# Patient Record
Sex: MALE | Race: BLACK OR AFRICAN AMERICAN
[De-identification: names, ages, dates, MRNs, and addresses within clinical notes are randomized per-mention and may not be internally consistent; named-entity substitution may affect disease eponyms.]

---

## 2017-12-07 ENCOUNTER — HOSPITAL ENCOUNTER (OUTPATIENT)
Dept: HOSPITAL 62 - OD | Age: 72
End: 2017-12-07
Attending: INTERNAL MEDICINE
Payer: MEDICARE

## 2017-12-07 DIAGNOSIS — E11.9: ICD-10-CM

## 2017-12-07 DIAGNOSIS — M10.00: ICD-10-CM

## 2017-12-07 DIAGNOSIS — R80.9: ICD-10-CM

## 2017-12-07 DIAGNOSIS — N18.3: Primary | ICD-10-CM

## 2017-12-07 LAB
ANION GAP SERPL CALC-SCNC: 10 MMOL/L (ref 5–19)
APPEARANCE UR: CLEAR
BILIRUB UR QL STRIP: NEGATIVE
BUN SERPL-MCNC: 22 MG/DL (ref 7–20)
CALCIUM: 8.6 MG/DL (ref 8.4–10.2)
CHLORIDE SERPL-SCNC: 104 MMOL/L (ref 98–107)
CO2 SERPL-SCNC: 29 MMOL/L (ref 22–30)
CREAT SERPL-MCNC: 1.63 MG/DL (ref 0.52–1.25)
CREAT UR-MCNC: 92.4 MG/DL (ref 22–328)
GLUCOSE SERPL-MCNC: 108 MG/DL (ref 75–110)
GLUCOSE UR STRIP-MCNC: NEGATIVE MG/DL
KETONES UR STRIP-MCNC: NEGATIVE MG/DL
NITRITE UR QL STRIP: NEGATIVE
PH UR STRIP: 6 [PH] (ref 5–9)
POTASSIUM SERPL-SCNC: 4 MMOL/L (ref 3.6–5)
PROT UR STRIP-MCNC: 334 MG/DL (ref ?–12)
PROT UR STRIP-MCNC: >=500 MG/DL
SODIUM SERPL-SCNC: 143 MMOL/L (ref 137–145)
SP GR UR STRIP: 1.01
UROBILINOGEN UR-MCNC: NEGATIVE MG/DL (ref ?–2)

## 2017-12-07 PROCEDURE — 81001 URINALYSIS AUTO W/SCOPE: CPT

## 2017-12-07 PROCEDURE — 84156 ASSAY OF PROTEIN URINE: CPT

## 2017-12-07 PROCEDURE — 36415 COLL VENOUS BLD VENIPUNCTURE: CPT

## 2017-12-07 PROCEDURE — 80048 BASIC METABOLIC PNL TOTAL CA: CPT

## 2017-12-07 PROCEDURE — 82570 ASSAY OF URINE CREATININE: CPT

## 2017-12-27 ENCOUNTER — HOSPITAL ENCOUNTER (OUTPATIENT)
Dept: HOSPITAL 62 - SC | Age: 72
Discharge: HOME | End: 2017-12-27
Attending: OPHTHALMOLOGY
Payer: MEDICARE

## 2017-12-27 DIAGNOSIS — M10.9: ICD-10-CM

## 2017-12-27 DIAGNOSIS — H40.1132: ICD-10-CM

## 2017-12-27 DIAGNOSIS — E11.9: ICD-10-CM

## 2017-12-27 DIAGNOSIS — I10: ICD-10-CM

## 2017-12-27 DIAGNOSIS — H25.11: Primary | ICD-10-CM

## 2017-12-27 DIAGNOSIS — Z79.82: ICD-10-CM

## 2017-12-27 DIAGNOSIS — Z79.899: ICD-10-CM

## 2017-12-27 DIAGNOSIS — E78.00: ICD-10-CM

## 2017-12-27 PROCEDURE — 089230Z DRAINAGE OF RIGHT ANTERIOR CHAMBER WITH DRAINAGE DEVICE, PERCUTANEOUS APPROACH: ICD-10-PCS | Performed by: OPHTHALMOLOGY

## 2017-12-27 PROCEDURE — 08RJ3JZ REPLACEMENT OF RIGHT LENS WITH SYNTHETIC SUBSTITUTE, PERCUTANEOUS APPROACH: ICD-10-PCS | Performed by: OPHTHALMOLOGY

## 2017-12-27 PROCEDURE — 0191T: CPT

## 2017-12-27 PROCEDURE — C1783 OCULAR IMP, AQUEOUS DRAIN DE: HCPCS

## 2017-12-27 PROCEDURE — V2630 ANTER CHAMBER INTRAOCUL LENS: HCPCS

## 2017-12-27 PROCEDURE — 66984 XCAPSL CTRC RMVL W/O ECP: CPT

## 2017-12-27 RX ADMIN — BESIFLOXACIN PRN DROP: 6 SUSPENSION OPHTHALMIC at 10:50

## 2017-12-27 RX ADMIN — TROPICAMIDE PRN DROP: 10 SOLUTION/ DROPS OPHTHALMIC at 10:40

## 2017-12-27 RX ADMIN — TROPICAMIDE PRN DROP: 10 SOLUTION/ DROPS OPHTHALMIC at 10:50

## 2017-12-27 RX ADMIN — TETRACAINE HYDROCHLORIDE PRN DROP: 25 LIQUID OPHTHALMIC at 11:06

## 2017-12-27 RX ADMIN — CYCLOPENTOLATE HYDROCHLORIDE AND PHENYLEPHRINE HYDROCHLORIDE PRN DROP: 2; 10 SOLUTION/ DROPS OPHTHALMIC at 10:30

## 2017-12-27 RX ADMIN — CYCLOPENTOLATE HYDROCHLORIDE AND PHENYLEPHRINE HYDROCHLORIDE PRN DROP: 2; 10 SOLUTION/ DROPS OPHTHALMIC at 10:50

## 2017-12-27 RX ADMIN — TETRACAINE HYDROCHLORIDE PRN DROP: 25 LIQUID OPHTHALMIC at 10:50

## 2017-12-27 RX ADMIN — CYCLOPENTOLATE HYDROCHLORIDE AND PHENYLEPHRINE HYDROCHLORIDE PRN DROP: 2; 10 SOLUTION/ DROPS OPHTHALMIC at 10:40

## 2017-12-27 RX ADMIN — BESIFLOXACIN PRN DROP: 6 SUSPENSION OPHTHALMIC at 11:25

## 2017-12-27 RX ADMIN — TROPICAMIDE PRN DROP: 10 SOLUTION/ DROPS OPHTHALMIC at 10:30

## 2017-12-27 RX ADMIN — BESIFLOXACIN PRN DROP: 6 SUSPENSION OPHTHALMIC at 10:30

## 2017-12-27 RX ADMIN — TETRACAINE HYDROCHLORIDE PRN DROP: 25 LIQUID OPHTHALMIC at 10:31

## 2018-01-10 ENCOUNTER — HOSPITAL ENCOUNTER (OUTPATIENT)
Dept: HOSPITAL 62 - SC | Age: 73
Discharge: HOME | End: 2018-01-10
Attending: OPHTHALMOLOGY
Payer: MEDICARE

## 2018-01-10 DIAGNOSIS — Z98.41: ICD-10-CM

## 2018-01-10 DIAGNOSIS — M19.90: ICD-10-CM

## 2018-01-10 DIAGNOSIS — Z79.82: ICD-10-CM

## 2018-01-10 DIAGNOSIS — M10.9: ICD-10-CM

## 2018-01-10 DIAGNOSIS — E78.00: ICD-10-CM

## 2018-01-10 DIAGNOSIS — Z96.652: ICD-10-CM

## 2018-01-10 DIAGNOSIS — Z79.899: ICD-10-CM

## 2018-01-10 DIAGNOSIS — H40.1122: ICD-10-CM

## 2018-01-10 DIAGNOSIS — H25.12: Primary | ICD-10-CM

## 2018-01-10 DIAGNOSIS — E11.9: ICD-10-CM

## 2018-01-10 DIAGNOSIS — I10: ICD-10-CM

## 2018-01-10 PROCEDURE — V2630 ANTER CHAMBER INTRAOCUL LENS: HCPCS

## 2018-01-10 PROCEDURE — 08RK3JZ REPLACEMENT OF LEFT LENS WITH SYNTHETIC SUBSTITUTE, PERCUTANEOUS APPROACH: ICD-10-PCS | Performed by: OPHTHALMOLOGY

## 2018-01-10 PROCEDURE — 66984 XCAPSL CTRC RMVL W/O ECP: CPT

## 2018-01-10 PROCEDURE — C1783 OCULAR IMP, AQUEOUS DRAIN DE: HCPCS

## 2018-01-10 PROCEDURE — 82962 GLUCOSE BLOOD TEST: CPT

## 2018-01-10 PROCEDURE — 089330Z DRAINAGE OF LEFT ANTERIOR CHAMBER WITH DRAINAGE DEVICE, PERCUTANEOUS APPROACH: ICD-10-PCS | Performed by: OPHTHALMOLOGY

## 2018-01-10 PROCEDURE — 0191T: CPT

## 2018-01-10 RX ADMIN — CYCLOPENTOLATE HYDROCHLORIDE AND PHENYLEPHRINE HYDROCHLORIDE PRN DROP: 2; 10 SOLUTION/ DROPS OPHTHALMIC at 07:05

## 2018-01-10 RX ADMIN — TROPICAMIDE PRN DROP: 10 SOLUTION/ DROPS OPHTHALMIC at 07:15

## 2018-01-10 RX ADMIN — TOBRAMYCIN AND DEXAMETHASONE ONE APPLIC: 3; 1 OINTMENT OPHTHALMIC at 07:56

## 2018-01-10 RX ADMIN — CYCLOPENTOLATE HYDROCHLORIDE AND PHENYLEPHRINE HYDROCHLORIDE PRN DROP: 2; 10 SOLUTION/ DROPS OPHTHALMIC at 06:55

## 2018-01-10 RX ADMIN — BESIFLOXACIN PRN DROP: 6 SUSPENSION OPHTHALMIC at 07:15

## 2018-01-10 RX ADMIN — BESIFLOXACIN PRN DROP: 6 SUSPENSION OPHTHALMIC at 06:55

## 2018-01-10 RX ADMIN — TETRACAINE HYDROCHLORIDE PRN DROP: 25 LIQUID OPHTHALMIC at 07:15

## 2018-01-10 RX ADMIN — TROPICAMIDE PRN DROP: 10 SOLUTION/ DROPS OPHTHALMIC at 07:05

## 2018-01-10 RX ADMIN — TETRACAINE HYDROCHLORIDE PRN DROP: 25 LIQUID OPHTHALMIC at 07:35

## 2018-01-10 RX ADMIN — TROPICAMIDE PRN DROP: 10 SOLUTION/ DROPS OPHTHALMIC at 06:55

## 2018-01-10 RX ADMIN — TETRACAINE HYDROCHLORIDE PRN DROP: 25 LIQUID OPHTHALMIC at 06:56

## 2018-01-10 RX ADMIN — TOBRAMYCIN AND DEXAMETHASONE ONE APPLIC: 3; 1 OINTMENT OPHTHALMIC at 00:00

## 2018-01-10 RX ADMIN — BESIFLOXACIN PRN DROP: 6 SUSPENSION OPHTHALMIC at 00:00

## 2018-01-10 RX ADMIN — BESIFLOXACIN PRN DROP: 6 SUSPENSION OPHTHALMIC at 07:56

## 2018-01-10 RX ADMIN — CYCLOPENTOLATE HYDROCHLORIDE AND PHENYLEPHRINE HYDROCHLORIDE PRN DROP: 2; 10 SOLUTION/ DROPS OPHTHALMIC at 07:15

## 2018-01-15 ENCOUNTER — HOSPITAL ENCOUNTER (OUTPATIENT)
Dept: HOSPITAL 62 - OD | Age: 73
End: 2018-01-15
Attending: INTERNAL MEDICINE
Payer: MEDICARE

## 2018-01-15 DIAGNOSIS — Z94.0: ICD-10-CM

## 2018-01-15 DIAGNOSIS — D89.9: Primary | ICD-10-CM

## 2018-01-15 DIAGNOSIS — E83.30: ICD-10-CM

## 2018-01-15 DIAGNOSIS — Z79.899: ICD-10-CM

## 2018-01-15 LAB
ANION GAP SERPL CALC-SCNC: 11 MMOL/L (ref 5–19)
APPEARANCE UR: CLEAR
APTT PPP: YELLOW S
BILIRUB UR QL STRIP: NEGATIVE
BUN SERPL-MCNC: 28 MG/DL (ref 7–20)
CALCIUM: 9.3 MG/DL (ref 8.4–10.2)
CHLORIDE SERPL-SCNC: 105 MMOL/L (ref 98–107)
CO2 SERPL-SCNC: 28 MMOL/L (ref 22–30)
CREAT UR-MCNC: 68.4 MG/DL (ref 22–328)
GLUCOSE SERPL-MCNC: 145 MG/DL (ref 75–110)
GLUCOSE UR STRIP-MCNC: NEGATIVE MG/DL
KETONES UR STRIP-MCNC: NEGATIVE MG/DL
NITRITE UR QL STRIP: NEGATIVE
PH UR STRIP: 6 [PH] (ref 5–9)
POTASSIUM SERPL-SCNC: 4.2 MMOL/L (ref 3.6–5)
PROT UR STRIP-MCNC: 100 MG/DL
PROT UR STRIP-MCNC: 108 MG/DL (ref ?–12)
SODIUM SERPL-SCNC: 143.5 MMOL/L (ref 137–145)
SP GR UR STRIP: 1.01
UR PRO/CREAT RATIO RESULT: 1.6 MG/MG (ref 0–0.2)
UROBILINOGEN UR-MCNC: NEGATIVE MG/DL (ref ?–2)

## 2018-01-15 PROCEDURE — 81001 URINALYSIS AUTO W/SCOPE: CPT

## 2018-01-15 PROCEDURE — 80048 BASIC METABOLIC PNL TOTAL CA: CPT

## 2018-01-15 PROCEDURE — 36415 COLL VENOUS BLD VENIPUNCTURE: CPT

## 2018-01-15 PROCEDURE — 84156 ASSAY OF PROTEIN URINE: CPT

## 2018-01-15 PROCEDURE — 82570 ASSAY OF URINE CREATININE: CPT

## 2018-05-22 ENCOUNTER — HOSPITAL ENCOUNTER (OUTPATIENT)
Dept: HOSPITAL 62 - LAB | Age: 73
End: 2018-05-22
Attending: INTERNAL MEDICINE
Payer: MEDICARE

## 2018-05-22 DIAGNOSIS — R35.1: ICD-10-CM

## 2018-05-22 DIAGNOSIS — E78.5: ICD-10-CM

## 2018-05-22 DIAGNOSIS — E11.22: Primary | ICD-10-CM

## 2018-05-22 DIAGNOSIS — I12.9: ICD-10-CM

## 2018-05-22 DIAGNOSIS — N18.3: ICD-10-CM

## 2018-05-22 DIAGNOSIS — N40.1: ICD-10-CM

## 2018-05-22 LAB
ADD MANUAL DIFF: NO
ALBUMIN SERPL-MCNC: 3.8 G/DL (ref 3.5–5)
ALP SERPL-CCNC: 64 U/L (ref 38–126)
ALT SERPL-CCNC: 36 U/L (ref 21–72)
ANION GAP SERPL CALC-SCNC: 15 MMOL/L (ref 5–19)
AST SERPL-CCNC: 26 U/L (ref 17–59)
BASOPHILS # BLD AUTO: 0.1 10^3/UL (ref 0–0.2)
BASOPHILS NFR BLD AUTO: 0.8 % (ref 0–2)
BILIRUB DIRECT SERPL-MCNC: 0.3 MG/DL (ref 0–0.4)
BILIRUB SERPL-MCNC: 0.8 MG/DL (ref 0.2–1.3)
BUN SERPL-MCNC: 27 MG/DL (ref 7–20)
CALCIUM: 8.8 MG/DL (ref 8.4–10.2)
CHLORIDE SERPL-SCNC: 102 MMOL/L (ref 98–107)
CHOLEST SERPL-MCNC: 135 MG/DL (ref 0–200)
CO2 SERPL-SCNC: 28 MMOL/L (ref 22–30)
EOSINOPHIL # BLD AUTO: 0.2 10^3/UL (ref 0–0.6)
EOSINOPHIL NFR BLD AUTO: 2 % (ref 0–6)
ERYTHROCYTE [DISTWIDTH] IN BLOOD BY AUTOMATED COUNT: 16.7 % (ref 11.5–14)
GLUCOSE SERPL-MCNC: 118 MG/DL (ref 75–110)
HCT VFR BLD CALC: 39.7 % (ref 37.9–51)
HGB BLD-MCNC: 13.1 G/DL (ref 13.5–17)
LDLC SERPL DIRECT ASSAY-MCNC: 71 MG/DL (ref ?–100)
LYMPHOCYTES # BLD AUTO: 1.7 10^3/UL (ref 0.5–4.7)
LYMPHOCYTES NFR BLD AUTO: 19.3 % (ref 13–45)
MCH RBC QN AUTO: 27.4 PG (ref 27–33.4)
MCHC RBC AUTO-ENTMCNC: 33 G/DL (ref 32–36)
MCV RBC AUTO: 83 FL (ref 80–97)
MONOCYTES # BLD AUTO: 1 10^3/UL (ref 0.1–1.4)
MONOCYTES NFR BLD AUTO: 11.4 % (ref 3–13)
NEUTROPHILS # BLD AUTO: 5.9 10^3/UL (ref 1.7–8.2)
NEUTS SEG NFR BLD AUTO: 66.5 % (ref 42–78)
PLATELET # BLD: 171 10^3/UL (ref 150–450)
POTASSIUM SERPL-SCNC: 3.9 MMOL/L (ref 3.6–5)
PROT SERPL-MCNC: 7.2 G/DL (ref 6.3–8.2)
RBC # BLD AUTO: 4.78 10^6/UL (ref 4.35–5.55)
SODIUM SERPL-SCNC: 144.5 MMOL/L (ref 137–145)
TOTAL CELLS COUNTED % (AUTO): 100 %
TRIGL SERPL-MCNC: 53 MG/DL (ref ?–150)
VLDLC SERPL CALC-MCNC: 11 MG/DL (ref 10–31)
WBC # BLD AUTO: 8.8 10^3/UL (ref 4–10.5)

## 2018-05-22 PROCEDURE — 80061 LIPID PANEL: CPT

## 2018-05-22 PROCEDURE — 83036 HEMOGLOBIN GLYCOSYLATED A1C: CPT

## 2018-05-22 PROCEDURE — 85025 COMPLETE CBC W/AUTO DIFF WBC: CPT

## 2018-05-22 PROCEDURE — 84153 ASSAY OF PSA TOTAL: CPT

## 2018-05-22 PROCEDURE — 80053 COMPREHEN METABOLIC PANEL: CPT

## 2018-05-22 PROCEDURE — 84443 ASSAY THYROID STIM HORMONE: CPT

## 2018-05-22 PROCEDURE — 36415 COLL VENOUS BLD VENIPUNCTURE: CPT

## 2018-08-07 ENCOUNTER — HOSPITAL ENCOUNTER (OUTPATIENT)
Dept: HOSPITAL 62 - RAD | Age: 73
End: 2018-08-07
Attending: UROLOGY
Payer: MEDICARE

## 2018-08-07 DIAGNOSIS — K40.90: ICD-10-CM

## 2018-08-07 DIAGNOSIS — R91.8: ICD-10-CM

## 2018-08-07 DIAGNOSIS — C61: Primary | ICD-10-CM

## 2018-08-07 DIAGNOSIS — D35.02: ICD-10-CM

## 2018-08-07 DIAGNOSIS — N40.0: ICD-10-CM

## 2018-08-07 PROCEDURE — 74178 CT ABD&PLV WO CNTR FLWD CNTR: CPT

## 2018-08-07 PROCEDURE — 82565 ASSAY OF CREATININE: CPT

## 2018-08-07 PROCEDURE — A9561 TC99M OXIDRONATE: HCPCS

## 2018-08-07 PROCEDURE — 71260 CT THORAX DX C+: CPT

## 2018-08-07 PROCEDURE — 78306 BONE IMAGING WHOLE BODY: CPT

## 2018-08-07 NOTE — RADIOLOGY REPORT (SQ)
EXAM DESCRIPTION:  CT ABD/PELVIS COMBO



COMPLETED DATE/TIME:  8/7/2018 9:37 am



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



COMPARISON:  None.



TECHNIQUE:  CT scan of the abdomen and pelvis performed with and without intravenous contrast, and wi
thout oral contrast. Contrasted imaging performed helical scanning technique and dynamic intravenous 
contrast injection. Images reviewed with lung, soft tissue, and bone windows. Reconstructed coronal a
nd sagittal MPR images reviewed. Delayed images for evaluation of the urinary system also acquired. A
ll images stored on PACS.

All CT scanners at this facility use dose modulation, iterative reconstruction, and/or weight based d
osing when appropriate to reduce radiation dose to as low as reasonably achievable (ALARA).

[facility]: Dose Right  [facility]: CareDose    [facility]: Dose Right    [facility]: Teradose 4D    [facility]: Smart Technologies



CONTRAST TYPE AND DOSE:  contrast/concentration: Isovue 300.00 mg/ml; Total Contrast Delivered: 91.0 
ml; Total Saline Delivered: 70.0 ml



RENAL FUNCTION:  GFR 42.



RADIATION DOSE:  CT Rad equipment meets quality standard of care and radiation dose reduction techniq
ues were employed. CTDIvol: 9.0 - 9.1 mGy. DLP: 1565 mGy-cm. .



LIMITATIONS:  None.



FINDINGS:  NON-CONTRASTED IMAGING: No significant renal or bladder calcifications. No other significa
nt organ calcifications.

POST-CONTRASTED IMAGING:

LOWER CHEST: See separate report of the CT of the chest.

LIVER: Normal size. No masses.  No dilated ducts.

SPLEEN: Normal size. No focal lesions.

PANCREAS: No masses. No significant calcifications. No adjacent inflammation or peripancreatic fluid 
collections. Pancreatic duct not dilated.

GALLBLADDER: No identified stones by CT criteria. No inflammatory changes to suggest cholecystitis.

ADRENAL GLANDS: Lipid rich left adrenal nodule measuring -20 HU and about 2 cm in diameter.

RIGHT KIDNEY AND URETER: Lower pole cortical lesion measuring 4.2 cm in diameter.  Lesion is cystic w
ith a thick internal septation which enhances.  Simple cortical cyst upper pole measuring 1.5 cm.   N
o significant calcifications.   No hydronephrosis or hydroureter.

LEFT KIDNEY AND URETER: There are 2 lower pole cortical cysts measuring up to about 1.5 cm.  No solid
 masses.   No significant calcifications.   No hydronephrosis or hydroureter.

AORTA AND VESSELS: No aneurysm.

RETROPERITONEUM: No retroperitoneal adenopathy, hemorrhage or masses.

BOWEL AND PERITONEAL CAVITY: No masses or inflammatory changes. No free fluid or peritoneal masses.

APPENDIX: Normal.

PELVIS: Enlarged prostate 5.9 x 4.8 cm.  No pelvic adenopathy.

ABDOMINAL WALL: Right inguinal hernia containing nondilated loops of small bowel.

BONES: No blastic lesions.

OTHER: No other significant finding.



IMPRESSION:

1. 4 cm Bosniak 3 lesion lower pole right kidney.

2. Benign left adrenal adenoma.

3. Enlarged prostate.

4. Right inguinal hernia.



TECHNICAL DOCUMENTATION:  JOB ID:  [id number]

Quality ID # 436: Final reports with documentation of one or more dose reduction techniques (e.g., Au
tomated exposure control, adjustment of the mA and/or kV according to patient size, use of iterative 
reconstruction technique)

 2011 [email or web address]- All Rights Reserved



Reading location - IP/workstation name: [email or web address]

## 2018-08-07 NOTE — RADIOLOGY REPORT (SQ)
EXAM DESCRIPTION:  NM WHOLE BODY BONE SCAN



COMPLETED DATE/TIME:  8/7/2018 1:07 pm



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



COMPARISON:  CT scan 8/7/2018



RADIONUCLIDE AND DOSE:  21.7 millicuries Tc99m HDP.

The route of agent administration: Intravenous.



ADDITIONAL DRUGS AND DOSES:  None.



TECHNIQUE:  Routine delayed images at 3 hour post radionuclide injection acquired of the bony skeleto
n including anterior and posterior whole-body projections and additional focused images as needed.



LIMITATIONS:  None.



FINDINGS:  BONES: Focal increase activity 2 right anterior ribs corresponding to healing fractures on
 CT scan.  Photopenia related to a right knee replacement.  No suspicious lesions for metastatic dise
ase.

KIDNEYS: Symmetric excretion without obstruction.

OTHER: No other significant finding.



IMPRESSION:  No lesions suspicious for metastatic disease.



COMMENT:  Quality measure 147:  Current bone scan is compared with any available plain radiographs, p
rior bone scans, and CT/MRI.



TECHNICAL DOCUMENTATION:  JOB ID:  [id number]

 2011 [employer]- All Rights Reserved



Reading location - IP/workstation name: [email or web address]

## 2018-08-07 NOTE — RADIOLOGY REPORT (SQ)
EXAM DESCRIPTION:  CT CHEST WITH



COMPLETED DATE/TIME:  8/7/2018 9:37 am



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



COMPARISON:  None.



TECHNIQUE:  CT scan of the chest performed using helical scanning technique with dynamic intravenous 
contrast injection.  Images reviewed with lung, soft tissue and bone windows.  Reconstructed coronal 
and sagittal MPR images reviewed.  All images stored on PACS.

All CT scanners at this facility use dose modulation, iterative reconstruction, and/or weight based d
osing when appropriate to reduce radiation dose to as low as reasonably achievable (ALARA).

[facility]: Dose Right  [facility]: CareDose    [facility]: Dose Right    [facility]: Teradose 4D    [facility]: Smart Technologies



CONTRAST TYPE AND DOSE:  See separate report of the same date.



RENAL FUNCTION:  See separate report of the same date.



RADIATION DOSE:   .



LIMITATIONS:  None.



FINDINGS:  LUNGS AND PLEURA: Centrilobular and paraseptal emphysema.  There are 4 to 5 nodules in bot
h lungs, the largest 5 mm in the middle lobe.  No effusions.

HILAR AND MEDIASTINAL STRUCTURES: No identified masses or abnormal nodes.

HEART AND VASCULAR STRUCTURES: No aneurysm or dissection.  No central pulmonary emboli.  No pericardi
al effusion.

HARDWARE: None in the chest.

UPPER ABDOMEN: See separate report of the CT of the abdomen.

THYROID AND OTHER SOFT TISSUES: No masses.  No adenopathy.

BONES: No significant finding.

OTHER: No other significant finding.



IMPRESSION:  Less than 6 mm pulmonary nodules.



TECHNICAL DOCUMENTATION:  JOB ID:  [id number]

Quality ID # 436: Final reports with documentation of one or more dose reduction techniques (e.g., Au
tomated exposure control, adjustment of the mA and/or kV according to patient size, use of iterative 
reconstruction technique)

 2011 [employer]- All Rights Reserved



Reading location - IP/workstation name: [email or web address]

## 2018-08-31 ENCOUNTER — HOSPITAL ENCOUNTER (OUTPATIENT)
Dept: HOSPITAL 62 - LAB | Age: 73
End: 2018-08-31
Attending: INTERNAL MEDICINE
Payer: MEDICARE

## 2018-08-31 DIAGNOSIS — I12.9: Primary | ICD-10-CM

## 2018-08-31 DIAGNOSIS — I50.9: ICD-10-CM

## 2018-08-31 DIAGNOSIS — N18.3: ICD-10-CM

## 2018-08-31 DIAGNOSIS — R80.9: ICD-10-CM

## 2018-08-31 LAB
ANION GAP SERPL CALC-SCNC: 9 MMOL/L (ref 5–19)
BUN SERPL-MCNC: 30 MG/DL (ref 7–20)
CALCIUM: 8.5 MG/DL (ref 8.4–10.2)
CHLORIDE SERPL-SCNC: 110 MMOL/L (ref 98–107)
CO2 SERPL-SCNC: 26 MMOL/L (ref 22–30)
ERYTHROCYTE [DISTWIDTH] IN BLOOD BY AUTOMATED COUNT: 16.3 % (ref 11.5–14)
GLUCOSE SERPL-MCNC: 137 MG/DL (ref 75–110)
HCT VFR BLD CALC: 34.9 % (ref 37.9–51)
HGB BLD-MCNC: 11.4 G/DL (ref 13.5–17)
MCH RBC QN AUTO: 27.2 PG (ref 27–33.4)
MCHC RBC AUTO-ENTMCNC: 32.8 G/DL (ref 32–36)
MCV RBC AUTO: 83 FL (ref 80–97)
PLATELET # BLD: 181 10^3/UL (ref 150–450)
POTASSIUM SERPL-SCNC: 4.5 MMOL/L (ref 3.6–5)
RBC # BLD AUTO: 4.21 10^6/UL (ref 4.35–5.55)
SODIUM SERPL-SCNC: 145.3 MMOL/L (ref 137–145)
WBC # BLD AUTO: 9.2 10^3/UL (ref 4–10.5)

## 2018-08-31 PROCEDURE — 36415 COLL VENOUS BLD VENIPUNCTURE: CPT

## 2018-08-31 PROCEDURE — 80048 BASIC METABOLIC PNL TOTAL CA: CPT

## 2018-08-31 PROCEDURE — 85027 COMPLETE CBC AUTOMATED: CPT

## 2018-10-04 ENCOUNTER — HOSPITAL ENCOUNTER (EMERGENCY)
Dept: HOSPITAL 62 - ER | Age: 73
Discharge: HOME | End: 2018-10-04
Payer: MEDICARE

## 2018-10-04 VITALS — SYSTOLIC BLOOD PRESSURE: 161 MMHG | DIASTOLIC BLOOD PRESSURE: 70 MMHG

## 2018-10-04 DIAGNOSIS — Z96.652: ICD-10-CM

## 2018-10-04 DIAGNOSIS — R19.5: ICD-10-CM

## 2018-10-04 DIAGNOSIS — I10: ICD-10-CM

## 2018-10-04 DIAGNOSIS — C61: ICD-10-CM

## 2018-10-04 DIAGNOSIS — M54.5: Primary | ICD-10-CM

## 2018-10-04 LAB
ADD MANUAL DIFF: NO
ALBUMIN SERPL-MCNC: 3.3 G/DL (ref 3.5–5)
ALP SERPL-CCNC: 87 U/L (ref 38–126)
ALT SERPL-CCNC: 34 U/L (ref 21–72)
ANION GAP SERPL CALC-SCNC: 13 MMOL/L (ref 5–19)
AST SERPL-CCNC: 79 U/L (ref 17–59)
BASOPHILS # BLD AUTO: 0.1 10^3/UL (ref 0–0.2)
BASOPHILS NFR BLD AUTO: 0.6 % (ref 0–2)
BILIRUB DIRECT SERPL-MCNC: 0.5 MG/DL (ref 0–0.4)
BILIRUB SERPL-MCNC: 1 MG/DL (ref 0.2–1.3)
BUN SERPL-MCNC: 26 MG/DL (ref 7–20)
CALCIUM: 8.4 MG/DL (ref 8.4–10.2)
CHLORIDE SERPL-SCNC: 102 MMOL/L (ref 98–107)
CO2 SERPL-SCNC: 21 MMOL/L (ref 22–30)
EOSINOPHIL # BLD AUTO: 0.1 10^3/UL (ref 0–0.6)
EOSINOPHIL NFR BLD AUTO: 0.5 % (ref 0–6)
ERYTHROCYTE [DISTWIDTH] IN BLOOD BY AUTOMATED COUNT: 15.7 % (ref 11.5–14)
GLUCOSE SERPL-MCNC: 140 MG/DL (ref 75–110)
HCT VFR BLD CALC: 34.2 % (ref 37.9–51)
HGB BLD-MCNC: 11.5 G/DL (ref 13.5–17)
LYMPHOCYTES # BLD AUTO: 1.5 10^3/UL (ref 0.5–4.7)
LYMPHOCYTES NFR BLD AUTO: 13.6 % (ref 13–45)
MCH RBC QN AUTO: 26.8 PG (ref 27–33.4)
MCHC RBC AUTO-ENTMCNC: 33.7 G/DL (ref 32–36)
MCV RBC AUTO: 80 FL (ref 80–97)
MONOCYTES # BLD AUTO: 1.3 10^3/UL (ref 0.1–1.4)
MONOCYTES NFR BLD AUTO: 12.1 % (ref 3–13)
NEUTROPHILS # BLD AUTO: 8 10^3/UL (ref 1.7–8.2)
NEUTS SEG NFR BLD AUTO: 73.2 % (ref 42–78)
PLATELET # BLD: 193 10^3/UL (ref 150–450)
POTASSIUM SERPL-SCNC: 3.6 MMOL/L (ref 3.6–5)
PROT SERPL-MCNC: 6.8 G/DL (ref 6.3–8.2)
RBC # BLD AUTO: 4.29 10^6/UL (ref 4.35–5.55)
SODIUM SERPL-SCNC: 135.9 MMOL/L (ref 137–145)
TOTAL CELLS COUNTED % (AUTO): 100 %
WBC # BLD AUTO: 11 10^3/UL (ref 4–10.5)

## 2018-10-04 PROCEDURE — 99283 EMERGENCY DEPT VISIT LOW MDM: CPT

## 2018-10-04 PROCEDURE — 36415 COLL VENOUS BLD VENIPUNCTURE: CPT

## 2018-10-04 PROCEDURE — 82272 OCCULT BLD FECES 1-3 TESTS: CPT

## 2018-10-04 PROCEDURE — 80053 COMPREHEN METABOLIC PANEL: CPT

## 2018-10-04 PROCEDURE — 85025 COMPLETE CBC W/AUTO DIFF WBC: CPT

## 2018-10-04 NOTE — ER DOCUMENT REPORT
ED General





- General


Chief Complaint: Black/Tarry Stools


Stated Complaint: BLACK TARRY STOOLS


Time Seen by Provider: 10/04/18 03:45


Mode of Arrival: Ambulatory


Information source: Patient


Notes: 





Patient is a 72-year-old male who presents to the ED with left low back pain, 

black tarry stools, which started yesterday during the day.  He was diagnosed 

with prostate cancer 3 months ago and was given his cancer medication injection 

2 days ago.  He describes his back pain as a dull, numbing pain.  He has not 

taken any medications to help relieve the pain.  Aggravating factors include 

walking, bending, and twisting.  He denies any relieving factors.  He denies 

any iron use.


TRAVEL OUTSIDE OF THE [place] IN LAST 30 DAYS: No





- HPI


Pain Level: 5





- Related Data


Allergies/Adverse Reactions: 


 





No Known Allergies Allergy (Verified 10/04/18 03:20)


 











Past Medical History





- Social History


Smoking Status: Never Smoker


Frequency of alcohol use: None


Drug Abuse: None


Lives with: Family


Family History: Reviewed & Not Pertinent





- Past Medical History


Cardiac Medical History: Reports: Hx Hypertension


   Denies: Hx Heart Attack


Pulmonary Medical History: 


   Denies: Hx Asthma


Neurological Medical History: Denies: Hx Cerebrovascular Accident, Hx Seizures


Renal/ Medical History: Denies: Hx Peritoneal Dialysis


GI Medical History: Denies: Hx Hepatitis, Hx Hiatal Hernia, Hx Ulcer


Musculoskeletal Medical History: Reports Hx Gout


Infectious Medical History: Denies: Hx Hepatitis


Past Surgical History: Reports: Hx Orthopedic Surgery - lt knee replacement, Hx 

Tonsillectomy.  Denies: Hx Open Heart Surgery, Hx Pacemaker





- Immunizations


Hx Diphtheria, Pertussis, Tetanus Vaccination: Yes





Review of Systems





- Review of Systems


Constitutional: No symptoms reported


EENT: No symptoms reported


Cardiovascular: No symptoms reported


Respiratory: No symptoms reported


Gastrointestinal: See HPI


Genitourinary: No symptoms reported


Male Genitourinary: No symptoms reported


Musculoskeletal: See HPI


Skin: No symptoms reported


Hematologic/Lymphatic: No symptoms reported


Neurological/Psychological: See HPI





Physical Exam





- Vital signs


Vitals: 


 











Temp Pulse Resp BP Pulse Ox


 


 98.9 F   92   20   172/69 H  98 


 


 10/04/18 03:23  10/04/18 03:23  10/04/18 03:23  10/04/18 03:23  10/04/18 03:23














- General


General appearance: Appears well


In distress: None





- Respiratory


Respiratory status: No respiratory distress





- Cardiovascular


Pulses: Normal: Radial, Dorsalis pedis





- Rectal


Stool: Heme negative


Hemorrhoids: None





- Neurological


Cognition: Normal


Orientation: AAOx4


Chelsie Coma Scale Verbal: Oriented


Speech: Normal





Course





- Re-evaluation


Re-evalutation: 


10/04/18 04:30


After patient's rectal exam to check for rectal bleeding, patient stated he had 

taken iron pills for the past 2 days.  Patient educated on how iron pills can 

change stool color.


10/04/18 05:16 all labs have been reviewed.  I do not believe the patient has 

an active GI bleed, abdominal pathology, cauda equina syndrome, or any other 

threatening pathology at this time.


10/04/18 05:48


Patient states his pain has improved.  He was updated on all his lab results.  

Patient will be discharged home.





- Vital Signs


Vital signs: 


 











Temp Pulse Resp BP Pulse Ox


 


 98.9 F   92   20   172/69 H  98 


 


 10/04/18 03:23  10/04/18 03:23  10/04/18 03:23  10/04/18 03:23  10/04/18 03:23














- Laboratory


Result Diagrams: 


 10/04/18 04:38





 10/04/18 04:38


Laboratory results interpreted by me: 


 











  10/04/18 10/04/18





  04:38 04:38


 


WBC  11.0 H 


 


RBC  4.29 L 


 


Hgb  11.5 L 


 


Hct  34.2 L 


 


MCH  26.8 L 


 


RDW  15.7 H 


 


Sodium   135.9 L


 


Carbon Dioxide   21 L


 


BUN   26 H


 


Creatinine   1.81 H


 


Est GFR ( Amer)   45 L


 


Est GFR (Non-Af Amer)   37 L


 


Glucose   140 H


 


Direct Bilirubin   0.5 H


 


AST   79 H


 


Albumin   3.3 L














Discharge





- Discharge


Clinical Impression: 


Back pain


Qualifiers:


 Back pain location: low back pain Chronicity: acute Back pain laterality: left 

Sciatica presence: without sciatica Qualified Code(s): M54.5 - Low back pain





Condition: Stable


Disposition: HOME, SELF-CARE


Additional Instructions: 


You have been seen in the emergency department for back pain.  Your back pain 

is most likely from a pulled muscle.  You may take Tylenol 650 mg every 6 hours 

as needed for pain.  If you feel your back pain is getting worse, you are 

unable to walk, unable to bend, or any symptoms that are worrisome to you, 

please return to the emergency department for further evaluation.





Your black tarry stools are due to you taking iron pills these past few days.  

Iron pills can turn your stool a different color.  Please follow-up with your 

oncologist and primary care doctor in regards to your emergency department 

visit.


Referrals: 


DELORES MUNOZ MD [ACTIVE STAFF] - Follow up as needed

## 2018-11-07 ENCOUNTER — HOSPITAL ENCOUNTER (INPATIENT)
Dept: HOSPITAL 62 - ER | Age: 73
LOS: 8 days | Discharge: NURSING, REHAB OR LONG-TERM CARE | DRG: 641 | End: 2018-11-15
Attending: INTERNAL MEDICINE | Admitting: INTERNAL MEDICINE
Payer: MEDICARE

## 2018-11-07 DIAGNOSIS — C78.02: ICD-10-CM

## 2018-11-07 DIAGNOSIS — Z96.652: ICD-10-CM

## 2018-11-07 DIAGNOSIS — Z79.899: ICD-10-CM

## 2018-11-07 DIAGNOSIS — N18.3: ICD-10-CM

## 2018-11-07 DIAGNOSIS — I12.9: ICD-10-CM

## 2018-11-07 DIAGNOSIS — F32.9: ICD-10-CM

## 2018-11-07 DIAGNOSIS — R11.2: ICD-10-CM

## 2018-11-07 DIAGNOSIS — C61: ICD-10-CM

## 2018-11-07 DIAGNOSIS — M10.9: ICD-10-CM

## 2018-11-07 DIAGNOSIS — E86.0: ICD-10-CM

## 2018-11-07 DIAGNOSIS — D63.8: ICD-10-CM

## 2018-11-07 DIAGNOSIS — E87.1: Primary | ICD-10-CM

## 2018-11-07 DIAGNOSIS — C78.01: ICD-10-CM

## 2018-11-07 PROCEDURE — 36430 TRANSFUSION BLD/BLD COMPNT: CPT

## 2018-11-07 PROCEDURE — 83930 ASSAY OF BLOOD OSMOLALITY: CPT

## 2018-11-07 PROCEDURE — 83935 ASSAY OF URINE OSMOLALITY: CPT

## 2018-11-07 PROCEDURE — 85025 COMPLETE CBC W/AUTO DIFF WBC: CPT

## 2018-11-07 PROCEDURE — 96365 THER/PROPH/DIAG IV INF INIT: CPT

## 2018-11-07 PROCEDURE — 84443 ASSAY THYROID STIM HORMONE: CPT

## 2018-11-07 PROCEDURE — 86850 RBC ANTIBODY SCREEN: CPT

## 2018-11-07 PROCEDURE — 83735 ASSAY OF MAGNESIUM: CPT

## 2018-11-07 PROCEDURE — A9561 TC99M OXIDRONATE: HCPCS

## 2018-11-07 PROCEDURE — 71260 CT THORAX DX C+: CPT

## 2018-11-07 PROCEDURE — 87040 BLOOD CULTURE FOR BACTERIA: CPT

## 2018-11-07 PROCEDURE — 84300 ASSAY OF URINE SODIUM: CPT

## 2018-11-07 PROCEDURE — 82607 VITAMIN B-12: CPT

## 2018-11-07 PROCEDURE — 82728 ASSAY OF FERRITIN: CPT

## 2018-11-07 PROCEDURE — 96375 TX/PRO/DX INJ NEW DRUG ADDON: CPT

## 2018-11-07 PROCEDURE — 74176 CT ABD & PELVIS W/O CONTRAST: CPT

## 2018-11-07 PROCEDURE — 86920 COMPATIBILITY TEST SPIN: CPT

## 2018-11-07 PROCEDURE — 80053 COMPREHEN METABOLIC PANEL: CPT

## 2018-11-07 PROCEDURE — 94799 UNLISTED PULMONARY SVC/PX: CPT

## 2018-11-07 PROCEDURE — 99285 EMERGENCY DEPT VISIT HI MDM: CPT

## 2018-11-07 PROCEDURE — 86901 BLOOD TYPING SEROLOGIC RH(D): CPT

## 2018-11-07 PROCEDURE — 87086 URINE CULTURE/COLONY COUNT: CPT

## 2018-11-07 PROCEDURE — 82746 ASSAY OF FOLIC ACID SERUM: CPT

## 2018-11-07 PROCEDURE — 80048 BASIC METABOLIC PNL TOTAL CA: CPT

## 2018-11-07 PROCEDURE — 96367 TX/PROPH/DG ADDL SEQ IV INF: CPT

## 2018-11-07 PROCEDURE — P9016 RBC LEUKOCYTES REDUCED: HCPCS

## 2018-11-07 PROCEDURE — 78306 BONE IMAGING WHOLE BODY: CPT

## 2018-11-07 PROCEDURE — 82550 ASSAY OF CK (CPK): CPT

## 2018-11-07 PROCEDURE — 84153 ASSAY OF PSA TOTAL: CPT

## 2018-11-07 PROCEDURE — 85045 AUTOMATED RETICULOCYTE COUNT: CPT

## 2018-11-07 PROCEDURE — 83540 ASSAY OF IRON: CPT

## 2018-11-07 PROCEDURE — 86900 BLOOD TYPING SEROLOGIC ABO: CPT

## 2018-11-07 PROCEDURE — 83550 IRON BINDING TEST: CPT

## 2018-11-07 PROCEDURE — 81001 URINALYSIS AUTO W/SCOPE: CPT

## 2018-11-07 PROCEDURE — 36415 COLL VENOUS BLD VENIPUNCTURE: CPT

## 2018-11-08 LAB
%HYPO/RBC NFR BLD AUTO: SLIGHT %
ABSOLUTE LYMPHOCYTES# (MANUAL): 1.9 10^3/UL (ref 0.5–4.7)
ABSOLUTE MONOCYTES # (MANUAL): 1.5 10^3/UL (ref 0.1–1.4)
ABSOLUTE NEUTROPHILS# (MANUAL): 13.3 10^3/UL (ref 1.7–8.2)
ADD MANUAL DIFF: YES
ALBUMIN SERPL-MCNC: 3 G/DL (ref 3.5–5)
ALP SERPL-CCNC: 270 U/L (ref 38–126)
ALT SERPL-CCNC: 38 U/L (ref 21–72)
ANION GAP SERPL CALC-SCNC: 13 MMOL/L (ref 5–19)
ANION GAP SERPL CALC-SCNC: 14 MMOL/L (ref 5–19)
ANISOCYTOSIS BLD QL SMEAR: (no result)
APPEARANCE UR: (no result)
APTT PPP: YELLOW S
AST SERPL-CCNC: 109 U/L (ref 17–59)
BASOPHILS NFR BLD MANUAL: 1 % (ref 0–2)
BILIRUB DIRECT SERPL-MCNC: 0.3 MG/DL (ref 0–0.4)
BILIRUB SERPL-MCNC: 0.6 MG/DL (ref 0.2–1.3)
BILIRUB UR QL STRIP: NEGATIVE
BUN SERPL-MCNC: 22 MG/DL (ref 7–20)
BUN SERPL-MCNC: 23 MG/DL (ref 7–20)
BUN SERPL-MCNC: 24 MG/DL (ref 7–20)
BUN SERPL-MCNC: 25 MG/DL (ref 7–20)
BUN SERPL-MCNC: 30 MG/DL (ref 7–20)
CALCIUM: 7.4 MG/DL (ref 8.4–10.2)
CALCIUM: 7.5 MG/DL (ref 8.4–10.2)
CALCIUM: 7.8 MG/DL (ref 8.4–10.2)
CALCIUM: 7.8 MG/DL (ref 8.4–10.2)
CALCIUM: 7.9 MG/DL (ref 8.4–10.2)
CHLORIDE SERPL-SCNC: 93 MMOL/L (ref 98–107)
CHLORIDE SERPL-SCNC: 96 MMOL/L (ref 98–107)
CHLORIDE SERPL-SCNC: 97 MMOL/L (ref 98–107)
CO2 SERPL-SCNC: 20 MMOL/L (ref 22–30)
CO2 SERPL-SCNC: 20 MMOL/L (ref 22–30)
CO2 SERPL-SCNC: 21 MMOL/L (ref 22–30)
EOSINOPHIL NFR BLD MANUAL: 1 % (ref 0–6)
ERYTHROCYTE [DISTWIDTH] IN BLOOD BY AUTOMATED COUNT: 16.9 % (ref 11.5–14)
GLUCOSE SERPL-MCNC: 168 MG/DL (ref 75–110)
GLUCOSE SERPL-MCNC: 171 MG/DL (ref 75–110)
GLUCOSE SERPL-MCNC: 181 MG/DL (ref 75–110)
GLUCOSE SERPL-MCNC: 182 MG/DL (ref 75–110)
GLUCOSE SERPL-MCNC: 218 MG/DL (ref 75–110)
GLUCOSE UR STRIP-MCNC: NEGATIVE MG/DL
HCT VFR BLD CALC: 28.5 % (ref 37.9–51)
HGB BLD-MCNC: 9.9 G/DL (ref 13.5–17)
KETONES UR STRIP-MCNC: NEGATIVE MG/DL
MCH RBC QN AUTO: 26.2 PG (ref 27–33.4)
MCHC RBC AUTO-ENTMCNC: 34.6 G/DL (ref 32–36)
MCV RBC AUTO: 76 FL (ref 80–97)
MONOCYTES % (MANUAL): 9 % (ref 3–13)
NITRITE UR QL STRIP: NEGATIVE
OSMOLALITY,URINE: 389 MOSM/KG (ref 300–900)
OVALOCYTES BLD QL SMEAR: SLIGHT
PH UR STRIP: 5 [PH] (ref 5–9)
PLATELET # BLD: 424 10^3/UL (ref 150–450)
PLATELET COMMENT: ADEQUATE
POIKILOCYTOSIS BLD QL SMEAR: SLIGHT
POLYCHROMASIA BLD QL SMEAR: SLIGHT
POTASSIUM SERPL-SCNC: 4.4 MMOL/L (ref 3.6–5)
POTASSIUM SERPL-SCNC: 4.5 MMOL/L (ref 3.6–5)
POTASSIUM SERPL-SCNC: 4.5 MMOL/L (ref 3.6–5)
POTASSIUM SERPL-SCNC: 4.6 MMOL/L (ref 3.6–5)
POTASSIUM SERPL-SCNC: 4.6 MMOL/L (ref 3.6–5)
PROT SERPL-MCNC: 6.5 G/DL (ref 6.3–8.2)
PROT UR STRIP-MCNC: 100 MG/DL
RBC # BLD AUTO: 3.77 10^6/UL (ref 4.35–5.55)
SCHISTOCYTES BLD QL SMEAR: SLIGHT
SEGMENTED NEUTROPHILS % (MAN): 78 % (ref 42–78)
SODIUM SERPL-SCNC: 127.5 MMOL/L (ref 137–145)
SODIUM SERPL-SCNC: 129.9 MMOL/L (ref 137–145)
SODIUM SERPL-SCNC: 129.9 MMOL/L (ref 137–145)
SODIUM SERPL-SCNC: 130.5 MMOL/L (ref 137–145)
SODIUM SERPL-SCNC: 131.4 MMOL/L (ref 137–145)
SP GR UR STRIP: 1.01
TOTAL CELLS COUNTED BLD: 100
TOXIC GRANULES BLD QL SMEAR: (no result)
URINE SODIUM: 19 MMOL/L (ref 30–90)
UROBILINOGEN UR-MCNC: NEGATIVE MG/DL (ref ?–2)
VARIANT LYMPHS NFR BLD MANUAL: 11 % (ref 13–45)
WBC # BLD AUTO: 17 10^3/UL (ref 4–10.5)
WBC TOXIC VACUOLES BLD QL SMEAR: PRESENT

## 2018-11-08 RX ADMIN — ACETAMINOPHEN PRN MG: 325 TABLET ORAL at 20:04

## 2018-11-08 RX ADMIN — HYDRALAZINE HYDROCHLORIDE SCH MG: 50 TABLET, FILM COATED ORAL at 14:03

## 2018-11-08 RX ADMIN — Medication SCH ML: at 14:03

## 2018-11-08 RX ADMIN — HYDRALAZINE HYDROCHLORIDE SCH MG: 50 TABLET, FILM COATED ORAL at 10:09

## 2018-11-08 RX ADMIN — HEPARIN SODIUM SCH UNIT: 5000 INJECTION, SOLUTION INTRAVENOUS; SUBCUTANEOUS at 21:38

## 2018-11-08 RX ADMIN — Medication SCH: at 21:39

## 2018-11-08 RX ADMIN — HEPARIN SODIUM SCH UNIT: 5000 INJECTION, SOLUTION INTRAVENOUS; SUBCUTANEOUS at 14:03

## 2018-11-08 RX ADMIN — HYDRALAZINE HYDROCHLORIDE SCH MG: 50 TABLET, FILM COATED ORAL at 21:38

## 2018-11-08 RX ADMIN — Medication SCH ML: at 06:38

## 2018-11-08 RX ADMIN — LEVOFLOXACIN SCH MLS/HR: 750 INJECTION, SOLUTION INTRAVENOUS at 06:37

## 2018-11-08 RX ADMIN — HEPARIN SODIUM SCH UNIT: 5000 INJECTION, SOLUTION INTRAVENOUS; SUBCUTANEOUS at 06:37

## 2018-11-08 NOTE — PDOC H&P
History of Present Illness


Admission Date/PCP: 


  





  CARLOS JACOB MD





Patient complains of: Generalized weakness, nausea


History of Present Illness: 


SULMA MURPHY is a 72 year old male with a past medical history of recently 

diagnosed prostate cancer awaiting chemoradiation.  He presents with headache, 

generalized weakness, nausea and vomiting of gastric content.  He admits 

nonproductive cough and fever of dehydration prompting him to consume 8 16 

ounce bottles of water per day.  Denies chest pain, shortness of breath, 

abdominal pain, diarrhea, constipation, dysuria.  He denies infectious contacts

, recent change in medications or antibiotic use.  In the emergency room is 

found to have a an unremarkable abdominal pelvic CT but chest reveals small 

pleural effusions and lung nodules leukocytosis of 17,000 and hyponatremia of 

127.  He receives empiric antibiotics and referred to the hospitalist for 

admission.  





Past Medical History


Cardiac Medical History: Reports: Hypertension


   Denies: Myocardial Infarction


Pulmonary Medical History: 


   Denies: Asthma


Neurological Medical History: 


   Denies: Seizures


GI Medical History: 


   Denies: Hepatitis, Hiatal Hernia


Musculoskeltal Medical History: Reports: Gout


Hematology: 


   Denies: Anemia, Sickle Cell Disease





Past Surgical History


Past Surgical History: Reports: Orthopedic Surgery - lt knee replacement, 

Tonsillectomy


   Denies: Pacemaker





Social History


Information Source: Patient


Smoking Status: Never Smoker


Frequency of Alcohol Use: None


Hx Recreational Drug Use: No


Drugs: None


Hx Prescription Drug Abuse: No





- Advance Directive


Resuscitation Status: Full Code





Family History


Family History: denies: Malignancy


Parental Family History Reviewed: Yes


Children Family History Reviewed: Yes


Sibling(s) Family History Reviewed.: Yes





Medication/Allergy


Home Medications: 








Allopurinol [Zyloprim 300 mg Tablet] 300 mg PO DAILY 12/21/17 


Aspirin [Aspirin 81 mg Chewable Tablet] 81 mg PO DAILY 12/21/17 


Atorvastatin Calcium 10 mg PO DAILY 12/21/17 


Hydralazine HCl 100 mg PO TID 12/21/17 


Latanoprost 1 drop OP QHS 12/21/17 


Lisinopril 40 mg PO DAILY 12/21/17 








Allergies/Adverse Reactions: 


 





No Known Allergies Allergy (Verified 10/04/18 03:20)


 











Review of Systems


Constitutional: ABSENT: chills, fever(s), headache(s), weight gain, weight loss


Eyes: ABSENT: visual disturbances


Ears: ABSENT: hearing changes


Cardiovascular: ABSENT: chest pain, dyspnea on exertion, edema, orthropnea, 

palpitations


Respiratory: ABSENT: cough, hemoptysis


Gastrointestinal: ABSENT: abdominal pain, constipation, diarrhea, hematemesis, 

hematochezia, nausea, vomiting


Genitourinary: ABSENT: dysuria, hematuria


Musculoskeletal: ABSENT: joint swelling


Integumentary: ABSENT: rash, wounds


Neurological: ABSENT: abnormal gait, abnormal speech, confusion, dizziness, 

focal weakness, syncope


Psychiatric: ABSENT: anxiety, depression, homidical ideation, suicidal ideation


Endocrine: ABSENT: cold intolerance, heat intolerance, polydipsia, polyuria


Hematologic/Lymphatic: ABSENT: easy bleeding, easy bruising





Physical Exam


Vital Signs: 


 











Temp Pulse Resp BP Pulse Ox


 


 98.8 F   101 H  16   153/57 H  97 


 


 11/07/18 23:24  11/07/18 23:24  11/07/18 23:24  11/07/18 23:24  11/07/18 23:24








 Intake & Output











 11/06/18 11/07/18 11/08/18





 11:59 11:59 11:59


 


Intake Total   1000


 


Balance   1000


 


Weight   79.379 kg











General appearance: PRESENT: no acute distress, cooperative, well-developed, 

well-nourished


Head exam: PRESENT: atraumatic, normocephalic


Eye exam: PRESENT: conjunctiva pink, EOMI, PERRLA.  ABSENT: scleral icterus


Ear exam: PRESENT: normal external ear exam


Mouth exam: PRESENT: moist, tongue midline


Neck exam: ABSENT: carotid bruit, JVD, lymphadenopathy, thyromegaly


Respiratory exam: PRESENT: clear to auscultation venessa, crackles, decreased 

breath sounds, symmetrical.  ABSENT: rales, rhonchi, wheezes


Cardiovascular exam: PRESENT: RRR.  ABSENT: diastolic murmur, rubs, systolic 

murmur


Pulses: PRESENT: normal dorsalis pedis pul


Vascular exam: PRESENT: normal capillary refill


GI/Abdominal exam: PRESENT: normal bowel sounds, soft.  ABSENT: distended, 

guarding, mass, organolmegaly, rebound, tenderness


Rectal exam: PRESENT: deferred


Extremities exam: PRESENT: full ROM.  ABSENT: calf tenderness, clubbing, pedal 

edema


Neurological exam: PRESENT: alert, awake, oriented to person, oriented to place

, oriented to time, oriented to situation, CN II-XII grossly intact.  ABSENT: 

motor sensory deficit


Psychiatric exam: PRESENT: appropriate affect, normal mood.  ABSENT: homicidal 

ideation, suicidal ideation


Skin exam: PRESENT: dry, intact, warm.  ABSENT: cyanosis, rash





Results


Laboratory Results: 


 





 11/08/18 00:06 





 11/08/18 00:06 





 











  11/08/18 11/08/18 11/08/18





  00:06 00:06 00:45


 


WBC  17.0 H  


 


RBC  3.77 L  


 


Hgb  9.9 L  


 


Hct  28.5 L  


 


MCV  76 L  


 


MCH  26.2 L  


 


MCHC  34.6  


 


RDW  16.9 H  


 


Plt Count  424  


 


Seg Neutrophils %  Not Reportable  


 


Lymphocytes %  Not Reportable  


 


Monocytes %  Not Reportable  


 


Eosinophils %  Not Reportable  


 


Basophils %  Not Reportable  


 


Absolute Neutrophils  Not Reportable  


 


Absolute Lymphocytes  Not Reportable  


 


Absolute Monocytes  Not Reportable  


 


Absolute Eosinophils  Not Reportable  


 


Absolute Basophils  Not Reportable  


 


Sodium   127.5 L 


 


Potassium   4.5 


 


Chloride   93 L 


 


Carbon Dioxide   21 L 


 


Anion Gap   14 


 


BUN   30 H 


 


Creatinine   1.28 H 


 


Est GFR ( Amer)   > 60 


 


Est GFR (Non-Af Amer)   55 L 


 


Glucose   168 H 


 


Calcium   7.8 L 


 


Total Bilirubin   0.6 


 


AST   109 H 


 


ALT   38 


 


Alkaline Phosphatase   270 H 


 


Total Protein   6.5 


 


Albumin   3.0 L 


 


Urine Color    YELLOW


 


Urine Appearance    SLIGHTLY-CLOUDY


 


Urine pH    5.0


 


Ur Specific Gravity    1.014


 


Urine Protein    100 H


 


Urine Glucose (UA)    NEGATIVE


 


Urine Ketones    NEGATIVE


 


Urine Blood    NEGATIVE


 


Urine Nitrite    NEGATIVE


 


Ur Leukocyte Esterase    NEGATIVE


 


Urine WBC (Auto)    3


 


Urine RBC (Auto)    1











Impressions: 


 





Abdomen/Pelvis CT  11/08/18 01:37


IMPRESSION:


 


Pulmonary nodules in the right lung base measuring up to 8 mm,


concerning for metastatic disease given the history of prostate


cancer. No evidence of bony metastatic disease.


Bilateral pleural effusions.


Enlarged prostate gland.


Bilateral inguinal hernias containing short segments of small


bowel with no evidence of an obstruction


 


TECHNICAL DOCUMENTATION:


 


Quality ID # 436: Final reports with documentation of one or more


dose reduction techniques (e.g., Automated exposure control,


adjustment of the mA and/or kV according to patient size, use of


iterative reconstruction technique)


 


 2011 [employer]- All Rights Reserved


 














Assessment & Plan





- Diagnosis


(1) Pneumonia


Is this a current diagnosis for this admission?: Yes   


Plan: 


Appears atypical given similar lung nodules described in CT of August 2018 with 

follow-up negative PET scan.  Levaquin ordered, follow-up blood culture and CBC








(2) Hyponatremia


Is this a current diagnosis for this admission?: Yes   


Plan: 


Low urine specific gravity, excessive water consumption suggesting primary 

polydipsia.  Trial fluid restriction.  Follow-up urine sodium, osmolarity and 

serial chemistries








(3) Prostate cancer


Is this a current diagnosis for this admission?: Yes   


Plan: 


Defer to oncology








- Time


Time Spent: 50 to 70 Minutes





- Inpatient Certification


Medical Necessity: Need Close Monitoring Due to Risk of Patient Decompensation

## 2018-11-08 NOTE — PDOC PROGRESS REPORT
Subjective


Progress Note for:: 11/08/18


Subjective:: 





The patient states to feel slightly better this morning.  He does not have any 

nausea or vomiting.





Reason For Visit: 


HYPONATREMIA, PNEUMONIA








Physical Exam


Vital Signs: 


 











Temp Pulse Resp BP Pulse Ox


 


 98.8 F   102 H  17   154/57 H  96 


 


 11/08/18 08:38  11/08/18 08:38  11/08/18 08:38  11/08/18 08:38  11/08/18 08:38








 Intake & Output











 11/07/18 11/08/18 11/09/18





 06:59 06:59 06:59


 


Intake Total  0 150


 


Output Total  500 


 


Balance  -500 150


 


Weight  81.1 kg 











General appearance: PRESENT: mild distress


Head exam: PRESENT: atraumatic


Eye exam: PRESENT: conjunctiva pink


Neck exam: PRESENT: carotid bruit.  ABSENT: JVD


Respiratory exam: PRESENT: crackles, rhonchi


Cardiovascular exam: PRESENT: RRR, +S1, +S2


GI/Abdominal exam: PRESENT: normal bowel sounds, soft


Extremities exam: PRESENT: tenderness


Musculoskeletal exam: PRESENT: ambulatory


Neurological exam: PRESENT: alert, awake





Results


Laboratory Results: 


 





 11/08/18 06:13 





 











  11/08/18 11/08/18





  06:13 06:13


 


Sodium   131.4 L


 


Potassium   4.4


 


Chloride   96 L


 


Carbon Dioxide   21 L


 


Anion Gap   14


 


BUN   25 H


 


Creatinine   1.18


 


Est GFR ( Amer)   > 60


 


Est GFR (Non-Af Amer)   > 60


 


Glucose   218 H


 


Serum Osmolality  277 


 


Calcium   7.9 L








 











  11/08/18





  06:47


 


Creatine Kinase  209 H











Impressions: 


 





Abdomen/Pelvis CT  11/08/18 01:37


IMPRESSION:


 


Pulmonary nodules in the right lung base measuring up to 8 mm,


concerning for metastatic disease given the history of prostate


cancer. No evidence of bony metastatic disease.


Bilateral pleural effusions.


Enlarged prostate gland.


Bilateral inguinal hernias containing short segments of small


bowel with no evidence of an obstruction


 


TECHNICAL DOCUMENTATION:


 


Quality ID # 436: Final reports with documentation of one or more


dose reduction techniques (e.g., Automated exposure control,


adjustment of the mA and/or kV according to patient size, use of


iterative reconstruction technique)


 


 2011 [employer]- All Rights Reserved


 














Assessment & Plan





- Diagnosis


(1) Hyponatremia


Is this a current diagnosis for this admission?: Yes   


Plan: 


We will obtain the workup for SIADH because of pulmonary nodules








(2) Leukocytosis


Qualifiers: 


   Leukocytosis type: unspecified   Qualified Code(s): D72.829 - Elevated white 

blood cell count, unspecified   


Is this a current diagnosis for this admission?: Yes   


Plan: 


The workup is in progress to find the cause of leukocytosis








(3) Nausea & vomiting


Qualifiers: 


   Vomiting type: unspecified   Vomiting Intractability: unspecified   

Qualified Code(s): R11.2 - Nausea with vomiting, unspecified   


Is this a current diagnosis for this admission?: Yes   


Plan: 


We will continue with n.p.o. and IV fluids








(4) Prostate cancer


Is this a current diagnosis for this admission?: Yes   


Plan: 


Possibly worsening.  Patient presently is scheduled for radiation treatments.  

The recent events of new findings on CT.  We will need to reconsider the 

possible treatment








(5) CKD (chronic kidney disease) stage 3, GFR 30-59 ml/min


Is this a current diagnosis for this admission?: Yes   


Plan: 


Most probably secondary to dehydration and increase in lisinopril.  Will stop 

lisinopril and hydrate with fluids so we can perform the CT with contrast

## 2018-11-08 NOTE — RADIOLOGY REPORT (SQ)
EXAM DESCRIPTION: 



CT ABDOMEN PELVIS WITHOUT IV CONTRAST



COMPLETED DATE/TME:  11/08/2018 01:37



CLINICAL HISTORY: 



72 years, Male, prostate cancer



COMPARISON:

None.



TECHNIQUE:

Axial CT images of the abdomen and pelvis were obtained without

contrast. DLP for 60  Images stored on PACS.

 

All CT scanners at this facility use dose modulation, iterative

reconstruction, and/or weight based dosing when appropriate to

reduce radiation dose to as low as reasonably achievable (ALARA).





[facility]: Dose Right [facility]: CareDose   [facility]: Dose Right    [facility]:

Teradose 4D    [facility]: Smart Technologies



LIMITATIONS:

None.



FINDINGS:



There are multiple nodules seen within the right lung base the

largest of which measures 8 mm (axial image 1). There are other

nodules which measure 4 mm (axial image 2), 3 mm (axial image 3,

5). There are small bilateral pleural effusions, larger on the

left.

The liver, pancreas, and spleen are unremarkable. The gallbladder

is distended. There is a 1.5 cm right adrenal nodule and a 2 cm

left adrenal nodule. Both adrenal nodules measure between -12 and

3 Hounsfield units, likely representing adenomas.

There is a 3.8 cm right renal cyst and 1 cm left renal cyst.

There is no evidence of nephrolithiasis or hydronephrosis

bilaterally. There is a 9 mm exophytic cyst along the lower pole

of the left kidney.

There is no intraperitoneal free air or fluid. There is no

lymphadenopathy.

There are atherosclerotic calcifications of the abdominal aorta

without evidence of aneurysm.

The stomach and small bowel are unremarkable. There are bilateral

inguinal hernias containing short segments of small bowel with no

evidence of an obstruction. There are changes of an appendectomy.

The colon is unremarkable.

The urinary bladder appears unremarkable. The prostate gland

measures 6.0 x 4.8 cm.

There are no lytic or blastic bone lesions.



IMPRESSION:



Pulmonary nodules in the right lung base measuring up to 8 mm,

concerning for metastatic disease given the history of prostate

cancer. No evidence of bony metastatic disease.

Bilateral pleural effusions.

Enlarged prostate gland.

Bilateral inguinal hernias containing short segments of small

bowel with no evidence of an obstruction

 

TECHNICAL DOCUMENTATION:



Quality ID # 436: Final reports with documentation of one or more

dose reduction techniques (e.g., Automated exposure control,

adjustment of the mA and/or kV according to patient size, use of

iterative reconstruction technique)



 2011 [employer]- All Rights Reserved

## 2018-11-08 NOTE — PDOC CONSULTATION
Consultation


Consult Date: 11/08/18


Attending physician:: CARLOS JACOB


Consult reason:: Called overnight about patient with history of prostate cancer 

here with fever status post fiducial placement for radiation





History of Present Illness


Admission Date/PCP: 


  11/08/18 03:32





  CARLOS JACOB MD





Patient complains of: Fever, weakness


History of Present Illness: 


SULMA MURPHY is a 72 year old male with recent history of early stage prostate 

cancer, he has been seen both by Dr. Marshall Louis of urology [facility], as 

well as Dr. Twan Godoy of radiation oncology, sounds like he had an 

elevated PSA recently and has been monitored and over the last few months it 

has risen and the decision was made to proceed with evaluation, biopsy was done

, we will get the results of that, and radiation therapy recommended.  He had 

fiducial placement for the radiation about a week to 10 days ago, and he was to 

start radiation on 11/13/2018.  Over the last 48 hours prior to admission he 

notes that he was having fevers and chills, although some part of the chills 

may have been sweats at night because he was receiving Lupron shots prior to 

initiation of radiation.  The eye was called overnight and he was noted to have 

an elevated white count at 17, hyponatremia, some elevation in his baseline 

creatinine.  So I recommended admission to the hospitalist team for 24 hours 

with IV antibiotics to ensure that fiducial placement did not cause a mild 

sepsis-like episode.  He feels better now, has been afebrile for about 6 or 8 

hours.





Of note the hospitalist requested CT of the abdomen pelvis to be done, this was 

done there is no source of infection noted, there was an enlarged prostate, 

there was some element of pulmonary nodules noted.  The largest nodule was only 

8 mm.  I reviewed previous imaging, and he did have pulmonary nodules noted but 

the largest was only 6 mm in 8/2018.  I reviewed his labs here and his PSA was 

187.








Past Medical History


Cardiac Medical History: Reports: Hypertension


   Denies: Myocardial Infarction


Pulmonary Medical History: 


   Denies: Asthma


Neurological Medical History: 


   Denies: Seizures


Malignancy Medical History: Reports: Other - Prostate cancer


GI Medical History: 


   Denies: Hepatitis, Hiatal Hernia


Musculoskeltal Medical History: Reports: Gout


Hematology: 


   Denies: Anemia, Sickle Cell Disease





Past Surgical History


Past Surgical History: Reports: Orthopedic Surgery - lt knee replacement, 

Tonsillectomy, Other - Prostate biopsy, fiducial placement


   Denies: Pacemaker





Social History


Information Source: Patient


Lives with: Family - 1


Smoking Status: Never Smoker


Frequency of Alcohol Use: None


Hx Recreational Drug Use: No


Drugs: None


Hx Prescription Drug Abuse: No





- Advance Directive


Resuscitation Status: Full Code





Family History


Family History: denies: Malignancy


Parental Family History Reviewed: Yes


Children Family History Reviewed: Yes


Sibling(s) Family History Reviewed.: Yes





Medication/Allergy


Home Medications: 








Allopurinol [Zyloprim 300 mg Tablet] 300 mg PO DAILY 11/08/18 


Atorvastatin Calcium [Lipitor 10 mg Tablet] 10 mg PO QHS 11/08/18 


Cetirizine HCl [Zyrtec 10 mg Tablet] 10 mg PO DAILY 11/08/18 


Fluticasone Propionate [Flonase Nasal Spray 50 Mcg/Spray 16 gm] 1 spray NASL 

BID 11/08/18 


Hydralazine HCl [Apresoline 50 mg Tablet] 100 mg PO Q8 11/08/18 


Latanoprost/Pf [Latanoprost 0.005% Eye Drop] 1 drop OS QHS 11/08/18 


Lisinopril [Prinivil 40 mg Tablet] 40 mg PO DAILY 11/08/18 


Tamsulosin HCl [Flomax 0.4 mg Cap.sr] 0.4 mg PO DAILY 11/08/18 








Allergies/Adverse Reactions: 


 





No Known Allergies Allergy (Verified 10/04/18 03:20)


 











Review of Systems


Constitutional: PRESENT: anorexia, fatigue, weakness


Cardiovascular: ABSENT: chest pain, dyspnea on exertion, edema, orthropnea, 

palpitations


Gastrointestinal: ABSENT: abdominal pain, constipation, diarrhea, hematemesis, 

hematochezia, nausea, vomiting


Musculoskeletal: ABSENT: joint swelling


Integumentary: PRESENT: diaphoresis


Neurological: ABSENT: abnormal gait, abnormal speech, confusion, dizziness, 

focal weakness, syncope


Endocrine: PRESENT: flushing





Physical Exam


Vital Signs: 


 











Temp Pulse Resp BP Pulse Ox


 


 98.9 F   106 H  18   166/63 H  98 


 


 11/08/18 05:02  11/08/18 05:02  11/08/18 05:02  11/08/18 05:02  11/08/18 05:02








 Intake & Output











 11/07/18 11/08/18 11/09/18





 06:59 06:59 06:59


 


Intake Total  0 


 


Output Total  500 


 


Balance  -500 


 


Weight  81.1 kg 











General appearance: PRESENT: no acute distress, well-developed, well-nourished


Head exam: PRESENT: atraumatic, normocephalic


Eye exam: PRESENT: conjunctiva pink, EOMI, PERRLA.  ABSENT: scleral icterus


Ear exam: PRESENT: normal external ear exam


Mouth exam: PRESENT: moist, tongue midline


Neck exam: ABSENT: carotid bruit, JVD, lymphadenopathy, thyromegaly


Respiratory exam: PRESENT: clear to auscultation venessa.  ABSENT: rales, rhonchi, 

wheezes


Cardiovascular exam: PRESENT: RRR.  ABSENT: diastolic murmur, rubs, systolic 

murmur


Pulses: PRESENT: normal dorsalis pedis pul


Vascular exam: PRESENT: normal capillary refill


GI/Abdominal exam: PRESENT: normal bowel sounds, soft.  ABSENT: distended, 

guarding, mass, organolmegaly, rebound, tenderness


Rectal exam: PRESENT: deferred


Extremities exam: PRESENT: full ROM.  ABSENT: calf tenderness, clubbing, pedal 

edema


Neurological exam: PRESENT: alert, awake, oriented to person, oriented to place

, oriented to time, oriented to situation, CN II-XII grossly intact.  ABSENT: 

motor sensory deficit


Psychiatric exam: PRESENT: appropriate affect, normal mood.  ABSENT: homicidal 

ideation, suicidal ideation


Skin exam: PRESENT: dry, intact, warm.  ABSENT: cyanosis, rash





Results


Laboratory Results: 


 





 11/08/18 06:13 





 











  11/08/18 11/08/18





  06:13 06:13


 


Sodium   131.4 L


 


Potassium   4.4


 


Chloride   96 L


 


Carbon Dioxide   21 L


 


Anion Gap   14


 


BUN   25 H


 


Creatinine   1.18


 


Est GFR ( Amer)   > 60


 


Est GFR (Non-Af Amer)   > 60


 


Glucose   218 H


 


Serum Osmolality  277 


 


Calcium   7.9 L








 











  11/08/18





  06:47


 


Creatine Kinase  209 H











Impressions: 


 





Abdomen/Pelvis CT  11/08/18 01:37


IMPRESSION:


 


Pulmonary nodules in the right lung base measuring up to 8 mm,


concerning for metastatic disease given the history of prostate


cancer. No evidence of bony metastatic disease.


Bilateral pleural effusions.


Enlarged prostate gland.


Bilateral inguinal hernias containing short segments of small


bowel with no evidence of an obstruction


 


TECHNICAL DOCUMENTATION:


 


Quality ID # 436: Final reports with documentation of one or more


dose reduction techniques (e.g., Automated exposure control,


adjustment of the mA and/or kV according to patient size, use of


iterative reconstruction technique)


 


 2011 [employer]- All Rights Reserved


 











Status: Image reviewed by me





Assessment & Plan





- Diagnosis


(1) Prostate cancer


Is this a current diagnosis for this admission?: Yes   


Plan: 


I discussed his case with Dr. Godoy, he recommended restaging with CT of 

the chest with bone scan, this will help evaluate if he is got new bony lesions 

or increased size of pulmonary nodules.  If there is progression of disease, 

would recommend holding on radiation therapy and considering endocrine therapy 

alone.  If instead there is no progression of disease, the pulmonary nodule 

seems similar in size, it is possible that this may be granulomatous disease 

and it would be reasonable to proceed with standard radiation.  I discussed 

this with the patient who agrees with this approach.








- Time


Time Spent: Greater than 70 Minutes





- Inpatient Certification


Based on my medical assessment, after consideration of the patient's 

comorbidities, presenting symptoms, or acuity I expect that the services needed 

warrant INPATIENT care.: Yes


I certify that my determination is in accordance with my understanding of 

Medicare's requirements for reasonable and necessary INPATIENT services [42 CFR 

412.3e].: Yes


Medical Necessity: Need for IV Antibiotics

## 2018-11-09 LAB
ABSOLUTE LYMPHOCYTES# (MANUAL): 2.3 10^3/UL (ref 0.5–4.7)
ABSOLUTE MONOCYTES # (MANUAL): 1.4 10^3/UL (ref 0.1–1.4)
ABSOLUTE NEUTROPHILS# (MANUAL): 13.7 10^3/UL (ref 1.7–8.2)
ADD MANUAL DIFF: YES
ANION GAP SERPL CALC-SCNC: 14 MMOL/L (ref 5–19)
ANISOCYTOSIS BLD QL SMEAR: (no result)
BASOPHILS NFR BLD MANUAL: 0 % (ref 0–2)
BUN SERPL-MCNC: 19 MG/DL (ref 7–20)
CALCIUM: 7.7 MG/DL (ref 8.4–10.2)
CHLORIDE SERPL-SCNC: 97 MMOL/L (ref 98–107)
CO2 SERPL-SCNC: 22 MMOL/L (ref 22–30)
EOSINOPHIL NFR BLD MANUAL: 0 % (ref 0–6)
ERYTHROCYTE [DISTWIDTH] IN BLOOD BY AUTOMATED COUNT: 16.2 % (ref 11.5–14)
GLUCOSE SERPL-MCNC: 175 MG/DL (ref 75–110)
HCT VFR BLD CALC: 27.8 % (ref 37.9–51)
HGB BLD-MCNC: 9.2 G/DL (ref 13.5–17)
MCH RBC QN AUTO: 25.4 PG (ref 27–33.4)
MCHC RBC AUTO-ENTMCNC: 33.1 G/DL (ref 32–36)
MCV RBC AUTO: 77 FL (ref 80–97)
MONOCYTES % (MANUAL): 8 % (ref 3–13)
NEUTS BAND NFR BLD MANUAL: 5 % (ref 3–5)
OVALOCYTES BLD QL SMEAR: SLIGHT
PLATELET # BLD: 448 10^3/UL (ref 150–450)
PLATELET COMMENT: ADEQUATE
POIKILOCYTOSIS BLD QL SMEAR: SLIGHT
POTASSIUM SERPL-SCNC: 4.9 MMOL/L (ref 3.6–5)
RBC # BLD AUTO: 3.63 10^6/UL (ref 4.35–5.55)
SCHISTOCYTES BLD QL SMEAR: SLIGHT
SEGMENTED NEUTROPHILS % (MAN): 74 % (ref 42–78)
SODIUM SERPL-SCNC: 133 MMOL/L (ref 137–145)
TARGETS BLD QL SMEAR: SLIGHT
TOTAL CELLS COUNTED BLD: 100
VARIANT LYMPHS NFR BLD MANUAL: 13 % (ref 13–45)
WBC # BLD AUTO: 17.4 10^3/UL (ref 4–10.5)
WBC TOXIC VACUOLES BLD QL SMEAR: PRESENT

## 2018-11-09 RX ADMIN — HEPARIN SODIUM SCH UNIT: 5000 INJECTION, SOLUTION INTRAVENOUS; SUBCUTANEOUS at 05:22

## 2018-11-09 RX ADMIN — Medication SCH: at 10:24

## 2018-11-09 RX ADMIN — HYDRALAZINE HYDROCHLORIDE SCH MG: 50 TABLET, FILM COATED ORAL at 14:10

## 2018-11-09 RX ADMIN — Medication SCH ML: at 14:13

## 2018-11-09 RX ADMIN — HEPARIN SODIUM SCH UNIT: 5000 INJECTION, SOLUTION INTRAVENOUS; SUBCUTANEOUS at 14:07

## 2018-11-09 RX ADMIN — HEPARIN SODIUM SCH UNIT: 5000 INJECTION, SOLUTION INTRAVENOUS; SUBCUTANEOUS at 21:44

## 2018-11-09 RX ADMIN — ASPIRIN SCH MG: 81 TABLET, CHEWABLE ORAL at 10:33

## 2018-11-09 RX ADMIN — HYDRALAZINE HYDROCHLORIDE SCH MG: 50 TABLET, FILM COATED ORAL at 05:22

## 2018-11-09 RX ADMIN — LEVOFLOXACIN SCH MLS/HR: 750 INJECTION, SOLUTION INTRAVENOUS at 05:22

## 2018-11-09 RX ADMIN — AMLODIPINE BESYLATE SCH MG: 5 TABLET ORAL at 10:32

## 2018-11-09 RX ADMIN — Medication SCH ML: at 21:46

## 2018-11-09 RX ADMIN — HYDRALAZINE HYDROCHLORIDE SCH MG: 50 TABLET, FILM COATED ORAL at 21:44

## 2018-11-09 NOTE — PDOC PROGRESS REPORT
Subjective


Progress Note for:: 11/09/18


Subjective:: 





The patient states to feel slightly better.  He is presently scheduled for a CT.


His kidney function has improved.  His blood pressure is slightly up.  His 

sodium has improved.


Reason For Visit: 


FEVER OF UNKNOWN ORIGIN METASTIC PROSTATE CANCER








Physical Exam


Vital Signs: 


 











Temp Pulse Resp BP Pulse Ox


 


 98.6 F   99   16   155/63 H  96 


 


 11/09/18 09:05  11/09/18 09:05  11/09/18 09:05  11/09/18 09:05  11/09/18 09:05








 Intake & Output











 11/08/18 11/09/18 11/10/18





 06:59 06:59 06:59


 


Intake Total 0 1602 


 


Output Total 500 1475 


 


Balance -500 127 


 


Weight 81.1 kg 82.6 kg 











General appearance: PRESENT: mild distress


Head exam: PRESENT: atraumatic


Eye exam: PRESENT: conjunctiva pink


Neck exam: ABSENT: carotid bruit, JVD


Respiratory exam: PRESENT: rhonchi


Cardiovascular exam: PRESENT: RRR, +S2


GI/Abdominal exam: PRESENT: normal bowel sounds, soft


Extremities exam: PRESENT: full ROM


Musculoskeletal exam: PRESENT: ambulatory


Neurological exam: PRESENT: alert, awake





Results


Laboratory Results: 


 





 11/09/18 03:51 





 11/09/18 03:51 





 











  11/08/18 11/08/18 11/08/18





  06:13 11:15 16:50


 


WBC   


 


RBC   


 


Hgb   


 


Hct   


 


MCV   


 


MCH   


 


MCHC   


 


RDW   


 


Plt Count   


 


Seg Neutrophils %   


 


Lymphocytes %   


 


Monocytes %   


 


Eosinophils %   


 


Basophils %   


 


Absolute Neutrophils   


 


Absolute Lymphocytes   


 


Absolute Monocytes   


 


Absolute Eosinophils   


 


Absolute Basophils   


 


Sodium   129.9 L  130.5 L


 


Potassium   4.6  4.5


 


Chloride   96 L  96 L


 


Carbon Dioxide   20 L  21 L


 


Anion Gap   14  14


 


BUN   24 H  23 H


 


Creatinine   1.21  1.21


 


Est GFR ( Amer)   > 60  > 60


 


Est GFR (Non-Af Amer)   59 L  59 L


 


Glucose   182 H  181 H


 


Calcium   7.8 L  7.5 L


 


Prostate Specific Ag  18.300 H  














  11/08/18 11/09/18 11/09/18





  22:40 03:51 03:51


 


WBC   17.4 H 


 


RBC   3.63 L 


 


Hgb   9.2 L 


 


Hct   27.8 L 


 


MCV   77 L 


 


MCH   25.4 L 


 


MCHC   33.1 


 


RDW   16.2 H 


 


Plt Count   448 


 


Seg Neutrophils %   Not Reportable 


 


Lymphocytes %   Not Reportable 


 


Monocytes %   Not Reportable 


 


Eosinophils %   Not Reportable 


 


Basophils %   Not Reportable 


 


Absolute Neutrophils   Not Reportable 


 


Absolute Lymphocytes   Not Reportable 


 


Absolute Monocytes   Not Reportable 


 


Absolute Eosinophils   Not Reportable 


 


Absolute Basophils   Not Reportable 


 


Sodium  129.9 L   133.0 L


 


Potassium  4.6   4.9


 


Chloride  97 L   97 L


 


Carbon Dioxide  20 L   22


 


Anion Gap  13   14


 


BUN  22 H   19


 


Creatinine  1.20   1.06


 


Est GFR ( Amer)  > 60   > 60


 


Est GFR (Non-Af Amer)  59 L   > 60


 


Glucose  171 H   175 H


 


Calcium  7.4 L   7.7 L


 


Prostate Specific Ag   








 











  11/08/18





  06:47


 


Creatine Kinase  209 H











Impressions: 


 





Abdomen/Pelvis CT  11/08/18 01:37


IMPRESSION:


 


Pulmonary nodules in the right lung base measuring up to 8 mm,


concerning for metastatic disease given the history of prostate


cancer. No evidence of bony metastatic disease.


Bilateral pleural effusions.


Enlarged prostate gland.


Bilateral inguinal hernias containing short segments of small


bowel with no evidence of an obstruction


 


TECHNICAL DOCUMENTATION:


 


Quality ID # 436: Final reports with documentation of one or more


dose reduction techniques (e.g., Automated exposure control,


adjustment of the mA and/or kV according to patient size, use of


iterative reconstruction technique)


 


 2011 [employer]- All Rights Reserved


 














Assessment & Plan





- Diagnosis


(1) Hyponatremia


Is this a current diagnosis for this admission?: Yes   


Plan: 


Resolved will decrease the IV fluids








(2) Leukocytosis


Qualifiers: 


   Leukocytosis type: unspecified   Qualified Code(s): D72.829 - Elevated white 

blood cell count, unspecified   


Is this a current diagnosis for this admission?: Yes   


Plan: 


The workup is in progress to find the cause of leukocytosis








(3) Nausea & vomiting


Qualifiers: 


   Vomiting type: unspecified   Vomiting Intractability: unspecified   

Qualified Code(s): R11.2 - Nausea with vomiting, unspecified   


Is this a current diagnosis for this admission?: Yes   


Plan: 


Resolved continue current treatment








(4) Prostate cancer


Is this a current diagnosis for this admission?: Yes   


Plan: 


Presently being evaluated and followed by the oncologist and radiation oncology








(5) CKD (chronic kidney disease) stage 3, GFR 30-59 ml/min


Is this a current diagnosis for this admission?: Yes   


Plan: 


Improved with rehydration

## 2018-11-09 NOTE — RADIOLOGY REPORT (SQ)
EXAM DESCRIPTION:  CT CHEST WITH



COMPLETED DATE/TIME:  11/9/2018 1:35 pm



REASON FOR STUDY:  INDICATION PROSTATE CANCER PULMONARY NODULE



COMPARISON:  Bone scan 11/9/2018, 8/7/2018

CT abdomen pelvis 11/8/2018, 8/7/2018

CT chest 8/7/2018



TECHNIQUE:  CT scan of the chest performed using helical scanning technique with dynamic intravenous 
contrast injection.  Images reviewed with lung, soft tissue and bone windows.  Reconstructed coronal 
and sagittal MPR and MIP images reviewed.  All images stored on PACS.

All CT scanners at this facility use dose modulation, iterative reconstruction, and/or weight based d
osing when appropriate to reduce radiation dose to as low as reasonably achievable (ALARA).

[facility]: Dose Right  [facility]: CareDose    [facility]: Dose Right    [facility]: Teradose 4D    [facility]: [employer]



CONTRAST TYPE AND DOSE:  contrast/concentration: Isovue 350.00 mg/ml; Total Contrast Delivered: 80.0 
ml; Total Saline Delivered: 55.0 ml



RENAL FUNCTION:  Creatinine 1.06



RADIATION DOSE:  CT Rad equipment meets quality standard of care and radiation dose reduction techniq
ues were employed. CTDIvol: 8.2 mGy. DLP: 321 mGy-cm. .



LIMITATIONS:  None.



FINDINGS:  LUNGS AND PLEURA: There are now small bilateral pleural effusions, new compared to previou
s exams.

New 6 mm posterior left upper lobe pulmonary nodule axial image 35.

New 8 mm nodule in the periphery of the right upper lobe, axial image 79.

Multiple other less than 5 mm pulmonary nodules are scattered throughout the right middle lobe near t
he minor fissure.  These are stable.

There is consolidation in the right and left posterior costophrenic sulci from probable atelectasis. 
 Pneumonia could not entirely be excluded.

HILAR AND MEDIASTINAL STRUCTURES: No identified masses or abnormal nodes.

HEART AND VASCULAR STRUCTURES: No aneurysm or dissection.  No central pulmonary emboli.  No pericardi
al effusion.

HARDWARE: None in the chest.

UPPER ABDOMEN: No significant findings.  Limited exam.

THYROID AND OTHER SOFT TISSUES: No masses.  No adenopathy.

BONES: Subtle smudgy sclerotic foci are present throughout the ribs and thoracic spine which correlat
e with new foci of increased uptake on bone scan today.  These findings are worrisome for bony metast
atic disease

OTHER: No other significant finding.



IMPRESSION:  New small bilateral pleural effusions

New 6 mm posterior left upper lobe pulmonary nodule, 8 mm nodule periphery right upper lobe

New subtle smudgy sclerotic foci are present throughout the ribs and thoracic spine which correlate w
ith new foci of increased uptake on bone scan today, worrisome for bony metastatic involvement



TECHNICAL DOCUMENTATION:  JOB ID:  [id number]

Quality ID # 436: Final reports with documentation of one or more dose reduction techniques (e.g., Au
tomated exposure control, adjustment of the mA and/or kV according to patient size, use of iterative 
reconstruction technique)

 2011 [employer]- All Rights Reserved



Reading location - IP/workstation name: [facility]-[email or web address]

## 2018-11-09 NOTE — PDOC PROGRESS REPORT
Subjective


Progress Note for:: 11/09/18


Subjective:: 


Patient seems a little bit better today, reviewed labs and his PSA is actually 

down to 18 from 187.  So has had a good response to androgen deprivation 

therapy already.





Reason For Visit: 


FEVER OF UNKNOWN ORIGIN METASTIC PROSTATE CANCER








Physical Exam


Vital Signs: 


 











Temp Pulse Resp BP Pulse Ox


 


 99.1 F   106 H  20   171/64 H  96 


 


 11/09/18 03:33  11/09/18 03:33  11/09/18 03:33  11/09/18 03:33  11/09/18 03:33








 Intake & Output











 11/08/18 11/09/18 11/10/18





 06:59 06:59 06:59


 


Intake Total 0 1602 


 


Output Total 500 1475 


 


Balance -500 127 


 


Weight 81.1 kg 82.6 kg 











General appearance: PRESENT: no acute distress, well-developed, well-nourished


Head exam: PRESENT: atraumatic, normocephalic


Eye exam: PRESENT: conjunctiva pink, EOMI, PERRLA.  ABSENT: scleral icterus


Ear exam: PRESENT: normal external ear exam


Mouth exam: PRESENT: moist, tongue midline


Neck exam: ABSENT: carotid bruit, JVD, lymphadenopathy, thyromegaly


Respiratory exam: PRESENT: clear to auscultation venessa.  ABSENT: rales, rhonchi, 

wheezes


Cardiovascular exam: PRESENT: RRR.  ABSENT: diastolic murmur, rubs, systolic 

murmur


Pulses: PRESENT: normal dorsalis pedis pul


Vascular exam: PRESENT: normal capillary refill


GI/Abdominal exam: PRESENT: normal bowel sounds, soft.  ABSENT: distended, 

guarding, mass, organolmegaly, rebound, tenderness


Rectal exam: PRESENT: deferred


Extremities exam: PRESENT: full ROM.  ABSENT: calf tenderness, clubbing, pedal 

edema


Neurological exam: PRESENT: alert, awake, oriented to person, oriented to place

, oriented to time, oriented to situation, CN II-XII grossly intact.  ABSENT: 

motor sensory deficit


Psychiatric exam: PRESENT: appropriate affect, normal mood.  ABSENT: homicidal 

ideation, suicidal ideation


Skin exam: PRESENT: dry, intact, warm.  ABSENT: cyanosis, rash





Results


Laboratory Results: 


 





 11/09/18 03:51 





 11/09/18 03:51 





 











  11/08/18 11/08/18 11/08/18





  06:13 11:15 16:50


 


WBC   


 


RBC   


 


Hgb   


 


Hct   


 


MCV   


 


MCH   


 


MCHC   


 


RDW   


 


Plt Count   


 


Seg Neutrophils %   


 


Lymphocytes %   


 


Monocytes %   


 


Eosinophils %   


 


Basophils %   


 


Absolute Neutrophils   


 


Absolute Lymphocytes   


 


Absolute Monocytes   


 


Absolute Eosinophils   


 


Absolute Basophils   


 


Sodium   129.9 L  130.5 L


 


Potassium   4.6  4.5


 


Chloride   96 L  96 L


 


Carbon Dioxide   20 L  21 L


 


Anion Gap   14  14


 


BUN   24 H  23 H


 


Creatinine   1.21  1.21


 


Est GFR ( Amer)   > 60  > 60


 


Est GFR (Non-Af Amer)   59 L  59 L


 


Glucose   182 H  181 H


 


Calcium   7.8 L  7.5 L


 


Prostate Specific Ag  18.300 H  














  11/08/18 11/09/18 11/09/18





  22:40 03:51 03:51


 


WBC   17.4 H 


 


RBC   3.63 L 


 


Hgb   9.2 L 


 


Hct   27.8 L 


 


MCV   77 L 


 


MCH   25.4 L 


 


MCHC   33.1 


 


RDW   16.2 H 


 


Plt Count   448 


 


Seg Neutrophils %   Not Reportable 


 


Lymphocytes %   Not Reportable 


 


Monocytes %   Not Reportable 


 


Eosinophils %   Not Reportable 


 


Basophils %   Not Reportable 


 


Absolute Neutrophils   Not Reportable 


 


Absolute Lymphocytes   Not Reportable 


 


Absolute Monocytes   Not Reportable 


 


Absolute Eosinophils   Not Reportable 


 


Absolute Basophils   Not Reportable 


 


Sodium  129.9 L   133.0 L


 


Potassium  4.6   4.9


 


Chloride  97 L   97 L


 


Carbon Dioxide  20 L   22


 


Anion Gap  13   14


 


BUN  22 H   19


 


Creatinine  1.20   1.06


 


Est GFR ( Amer)  > 60   > 60


 


Est GFR (Non-Af Amer)  59 L   > 60


 


Glucose  171 H   175 H


 


Calcium  7.4 L   7.7 L


 


Prostate Specific Ag   








 











  11/08/18





  06:47


 


Creatine Kinase  209 H











Impressions: 


 





Abdomen/Pelvis CT  11/08/18 01:37


IMPRESSION:


 


Pulmonary nodules in the right lung base measuring up to 8 mm,


concerning for metastatic disease given the history of prostate


cancer. No evidence of bony metastatic disease.


Bilateral pleural effusions.


Enlarged prostate gland.


Bilateral inguinal hernias containing short segments of small


bowel with no evidence of an obstruction


 


TECHNICAL DOCUMENTATION:


 


Quality ID # 436: Final reports with documentation of one or more


dose reduction techniques (e.g., Automated exposure control,


adjustment of the mA and/or kV according to patient size, use of


iterative reconstruction technique)


 


 2011 [employer]- All Rights Reserved


 














Assessment & Plan





- Diagnosis


(1) Prostate cancer


Is this a current diagnosis for this admission?: Yes   


Plan: 


Proceed with CT imaging of the chest as well as bone scan today, has noted 

previously if this does not show any change in his pulmonary nodules or no new 

bone lesions, he would go forward with radiation of the prostate as scheduled.  

If instead he has pulmonary nodules that is enlarged greater than 10 mm and 

easily accessible by biopsy, we may want to do that before we consider primary 

radiation.  However, he does have rarely severe bullous emphysema with large 

blebs, so he would be at a high risk for pneumothorax so the lesion would have 

to be very peripheral to be able to access appropriately.








- Time


Time Spent with patient: 35 or more minutes

## 2018-11-10 LAB
%HYPO/RBC NFR BLD AUTO: (no result) %
ABSOLUTE LYMPHOCYTES# (MANUAL): 1.1 10^3/UL (ref 0.5–4.7)
ABSOLUTE MONOCYTES # (MANUAL): 0.5 10^3/UL (ref 0.1–1.4)
ABSOLUTE NEUTROPHILS# (MANUAL): 14.4 10^3/UL (ref 1.7–8.2)
ADD MANUAL DIFF: YES
ANION GAP SERPL CALC-SCNC: 12 MMOL/L (ref 5–19)
ANION GAP SERPL CALC-SCNC: 14 MMOL/L (ref 5–19)
ANISOCYTOSIS BLD QL SMEAR: (no result)
BASOPHILS NFR BLD MANUAL: 0 % (ref 0–2)
BUN SERPL-MCNC: 15 MG/DL (ref 7–20)
BUN SERPL-MCNC: 17 MG/DL (ref 7–20)
CALCIUM: 7.8 MG/DL (ref 8.4–10.2)
CALCIUM: 7.8 MG/DL (ref 8.4–10.2)
CHLORIDE SERPL-SCNC: 91 MMOL/L (ref 98–107)
CHLORIDE SERPL-SCNC: 94 MMOL/L (ref 98–107)
CO2 SERPL-SCNC: 21 MMOL/L (ref 22–30)
CO2 SERPL-SCNC: 21 MMOL/L (ref 22–30)
DACRYOCYTES BLD QL SMEAR: SLIGHT
EOSINOPHIL NFR BLD MANUAL: 0 % (ref 0–6)
ERYTHROCYTE [DISTWIDTH] IN BLOOD BY AUTOMATED COUNT: 16.6 % (ref 11.5–14)
GLUCOSE SERPL-MCNC: 144 MG/DL (ref 75–110)
GLUCOSE SERPL-MCNC: 164 MG/DL (ref 75–110)
HCT VFR BLD CALC: 28.9 % (ref 37.9–51)
HGB BLD-MCNC: 9.4 G/DL (ref 13.5–17)
MCH RBC QN AUTO: 24.8 PG (ref 27–33.4)
MCHC RBC AUTO-ENTMCNC: 32.6 G/DL (ref 32–36)
MCV RBC AUTO: 76 FL (ref 80–97)
METAMYELOCYTES % (MANUAL): 1 %
MONOCYTES % (MANUAL): 3 % (ref 3–13)
MYELOCYTES % (MANUAL): 1 %
NEUTS BAND NFR BLD MANUAL: 4 % (ref 3–5)
OVALOCYTES BLD QL SMEAR: (no result)
PLATELET # BLD: 436 10^3/UL (ref 150–450)
PLATELET COMMENT: ADEQUATE
PLATELET LARGE: PRESENT
POIKILOCYTOSIS BLD QL SMEAR: (no result)
POLYCHROMASIA BLD QL SMEAR: (no result)
POTASSIUM SERPL-SCNC: 4.6 MMOL/L (ref 3.6–5)
POTASSIUM SERPL-SCNC: 4.8 MMOL/L (ref 3.6–5)
RBC # BLD AUTO: 3.8 10^6/UL (ref 4.35–5.55)
ROULEAUX BLD QL SMEAR: (no result)
SCHISTOCYTES BLD QL SMEAR: SLIGHT
SEGMENTED NEUTROPHILS % (MAN): 84 % (ref 42–78)
SODIUM SERPL-SCNC: 126.4 MMOL/L (ref 137–145)
SODIUM SERPL-SCNC: 127.2 MMOL/L (ref 137–145)
TOTAL CELLS COUNTED BLD: 100
TOXIC GRANULES BLD QL SMEAR: (no result)
VARIANT LYMPHS NFR BLD MANUAL: 7 % (ref 13–45)
WBC # BLD AUTO: 16 10^3/UL (ref 4–10.5)
WBC TOXIC VACUOLES BLD QL SMEAR: PRESENT

## 2018-11-10 RX ADMIN — Medication SCH ML: at 05:38

## 2018-11-10 RX ADMIN — Medication SCH ML: at 15:40

## 2018-11-10 RX ADMIN — HYDRALAZINE HYDROCHLORIDE SCH MG: 50 TABLET, FILM COATED ORAL at 21:09

## 2018-11-10 RX ADMIN — HEPARIN SODIUM SCH UNIT: 5000 INJECTION, SOLUTION INTRAVENOUS; SUBCUTANEOUS at 21:09

## 2018-11-10 RX ADMIN — LEVOFLOXACIN SCH MLS/HR: 750 INJECTION, SOLUTION INTRAVENOUS at 05:38

## 2018-11-10 RX ADMIN — AMLODIPINE BESYLATE SCH MG: 5 TABLET ORAL at 10:01

## 2018-11-10 RX ADMIN — ACETAMINOPHEN PRN MG: 325 TABLET ORAL at 16:38

## 2018-11-10 RX ADMIN — ACETAMINOPHEN PRN MG: 325 TABLET ORAL at 04:07

## 2018-11-10 RX ADMIN — HEPARIN SODIUM SCH UNIT: 5000 INJECTION, SOLUTION INTRAVENOUS; SUBCUTANEOUS at 15:42

## 2018-11-10 RX ADMIN — ASPIRIN SCH MG: 81 TABLET, CHEWABLE ORAL at 10:02

## 2018-11-10 RX ADMIN — SODIUM CHLORIDE TAB 1 GM SCH GM: 1 TAB at 21:10

## 2018-11-10 RX ADMIN — HYDRALAZINE HYDROCHLORIDE SCH MG: 50 TABLET, FILM COATED ORAL at 15:42

## 2018-11-10 RX ADMIN — ACETAMINOPHEN PRN MG: 325 TABLET ORAL at 20:13

## 2018-11-10 RX ADMIN — HEPARIN SODIUM SCH UNIT: 5000 INJECTION, SOLUTION INTRAVENOUS; SUBCUTANEOUS at 05:37

## 2018-11-10 RX ADMIN — HYDRALAZINE HYDROCHLORIDE SCH MG: 50 TABLET, FILM COATED ORAL at 05:37

## 2018-11-10 RX ADMIN — SODIUM CHLORIDE TAB 1 GM SCH GM: 1 TAB at 15:40

## 2018-11-10 RX ADMIN — ACETAMINOPHEN PRN MG: 325 TABLET ORAL at 10:04

## 2018-11-10 RX ADMIN — Medication SCH: at 22:02

## 2018-11-10 NOTE — PDOC PROGRESS REPORT
Subjective


Progress Note for:: 11/10/18


Subjective:: 


Mr. Tucker is a 72 year old male with a past medical history of recently 

diagnosed prostate cancer and hypertension who presented with generalized 

weakness, headache and nausea. He was noted to have leukocytosis and 

hyponatremia. 





His CT showed multiple pulmonary nodules and bony lesions on the ribs and 

thoracic spine concerning for metastases. Whole body NM scan was done and are 

suggestive that lesions are likely metastatic foci.





Patient was updated on results this morning. He was tearful and was consoled.





His sodium just came back lower than on admission at 126. He denies symptoms 

this morning including headache, dizziness or nausea.





He did say that he has been drinking a lot of water more than the usual in the 

past week prior to admission (>5 bottles of 16 oz water/day) as he has been 

feeling very thirsty.


Reason For Visit: 


FEVER OF UNKNOWN ORIGIN METASTIC PROSTATE CANCER








Physical Exam


Vital Signs: 


 











Temp Pulse Resp BP Pulse Ox


 


 99.2 F   94   20   145/58 H  97 


 


 11/10/18 08:21  11/10/18 08:21  11/10/18 08:21  11/10/18 08:21  11/10/18 08:21








 Intake & Output











 11/09/18 11/10/18 11/11/18





 06:59 06:59 06:59


 


Intake Total 1602 2644 150


 


Output Total 1475 2245 


 


Balance 127 399 150


 


Weight 182 lb 1.629 oz 184 lb 1.376 oz 











General appearance: PRESENT: no acute distress, well-developed, well-nourished


Head exam: PRESENT: atraumatic, normocephalic


Eye exam: PRESENT: conjunctiva pink, EOMI, PERRLA.  ABSENT: scleral icterus


Ear exam: PRESENT: normal external ear exam


Mouth exam: PRESENT: moist, tongue midline


Neck exam: ABSENT: carotid bruit, JVD, lymphadenopathy, thyromegaly


Respiratory exam: PRESENT: clear to auscultation venessa.  ABSENT: rales, rhonchi, 

wheezes


Cardiovascular exam: PRESENT: RRR.  ABSENT: diastolic murmur, rubs, systolic 

murmur


Pulses: PRESENT: normal dorsalis pedis pul


Vascular exam: PRESENT: normal capillary refill


GI/Abdominal exam: PRESENT: normal bowel sounds, soft.  ABSENT: distended, 

guarding, mass, organolmegaly, rebound, tenderness


Rectal exam: PRESENT: deferred


Neurological exam: PRESENT: alert, awake, oriented to person, oriented to place

, oriented to time, oriented to situation, CN II-XII grossly intact.  ABSENT: 

motor sensory deficit





Results


Laboratory Results: 


 





 11/10/18 11:00 





 11/10/18 11:00 





 











  11/09/18 11/10/18 11/10/18





  03:51 11:00 11:00


 


WBC   16.0 H 


 


RBC   3.80 L 


 


Hgb   9.4 L 


 


Hct   28.9 L 


 


MCV   76 L 


 


MCH   24.8 L 


 


MCHC   32.6 


 


RDW   16.6 H 


 


Plt Count   436 


 


Seg Neutrophils %   Not Reportable 


 


Lymphocytes %   Not Reportable 


 


Monocytes %   Not Reportable 


 


Eosinophils %   Not Reportable 


 


Basophils %   Not Reportable 


 


Absolute Neutrophils   Not Reportable 


 


Absolute Lymphocytes   Not Reportable 


 


Absolute Monocytes   Not Reportable 


 


Absolute Eosinophils   Not Reportable 


 


Absolute Basophils   Not Reportable 


 


Sodium    126.4 L


 


Potassium    4.6


 


Chloride    91 L


 


Carbon Dioxide    21 L


 


Anion Gap    14


 


BUN    15


 


Creatinine    1.03


 


Est GFR ( Amer)    > 60


 


Est GFR (Non-Af Amer)    > 60


 


Glucose    164 H


 


Calcium    7.8 L


 


Magnesium  1.6  








 











  11/08/18





  06:47


 


Creatine Kinase  209 H











Impressions: 


 





Abdomen/Pelvis CT  11/08/18 01:37


IMPRESSION:


 


Pulmonary nodules in the right lung base measuring up to 8 mm,


concerning for metastatic disease given the history of prostate


cancer. No evidence of bony metastatic disease.


Bilateral pleural effusions.


Enlarged prostate gland.


Bilateral inguinal hernias containing short segments of small


bowel with no evidence of an obstruction


 


TECHNICAL DOCUMENTATION:


 


Quality ID # 436: Final reports with documentation of one or more


dose reduction techniques (e.g., Automated exposure control,


adjustment of the mA and/or kV according to patient size, use of


iterative reconstruction technique)


 


 2011 [employer]- All Rights Reserved


 








Body Scan Nuclear Medicine  11/09/18 00:00


IMPRESSION:  New diffuse bony foci of increased uptake worrisome for metastatic 

disease.


 








Chest CT  11/09/18 00:00


IMPRESSION:  New small bilateral pleural effusions


New 6 mm posterior left upper lobe pulmonary nodule, 8 mm nodule periphery 

right upper lobe


New subtle smudgy sclerotic foci are present throughout the ribs and thoracic 

spine which correlate with new foci of increased uptake on bone scan today, 

worrisome for bony metastatic involvement


 














Assessment & Plan





- Diagnosis


(1) Acute hyponatremia


Is this a current diagnosis for this admission?: Yes   


Plan: 


Sodium trended down to 126 today. He has hypotonic hypovolemia with serum osm 

of 277. He appears euvolemic. Urine osm is 389 and urine sodium is 19. Possible 

SIADH form lung nodules vs primary polydipsia. Either are treated with fluid 

restriction. Will repeat BMP later today.








(2) Prostate cancer


Is this a current diagnosis for this admission?: Yes   


Plan: 


Prostate CA with possible lung and bony metastases. Oncology following. Patient 

will follow with Dr. Rizvi next week for endocrine therapy.








(3) Leukocytosis


Qualifiers: 


   Leukocytosis type: unspecified   Qualified Code(s): D72.829 - Elevated white 

blood cell count, unspecified   


Is this a current diagnosis for this admission?: Yes   


Plan: 


Possible prostatitis. On Levaquin.








(4) Hypertension


Qualifiers: 


   Hypertension type: unspecified   Qualified Code(s): I10 - Essential (primary

) hypertension   


Is this a current diagnosis for this admission?: Yes   


Plan: 


Continue amlodipine and hydralazine.








- Time


Time Spent with patient: 25-34 minutes

## 2018-11-10 NOTE — PDOC PROGRESS REPORT
Subjective


Progress Note for:: 11/10/18


Subjective:: 


I reviewed the CT images myself and with the patient, along with the bone scan 

results, unfortunately there is diffuse bony metastasis, and there are new 

pulmonary nodules both subcentimeter and too small to biopsy.  Today discussed 

the implications of this with the patient and daughter who is at bedside.





Reason For Visit: 


FEVER OF UNKNOWN ORIGIN METASTIC PROSTATE CANCER








Physical Exam


Vital Signs: 


 











Temp Pulse Resp BP Pulse Ox


 


 99.2 F   94   20   145/58 H  97 


 


 11/10/18 08:21  11/10/18 08:21  11/10/18 08:21  11/10/18 08:21  11/10/18 08:21








 Intake & Output











 11/09/18 11/10/18 11/11/18





 06:59 06:59 06:59


 


Intake Total 1602 2644 150


 


Output Total 1475 2245 


 


Balance 127 399 150


 


Weight 82.6 kg 83.5 kg 











General appearance: PRESENT: no acute distress, well-developed, well-nourished


Head exam: PRESENT: atraumatic, normocephalic


Eye exam: PRESENT: conjunctiva pink, EOMI, PERRLA.  ABSENT: scleral icterus


Ear exam: PRESENT: normal external ear exam


Mouth exam: PRESENT: moist, tongue midline


Neck exam: ABSENT: carotid bruit, JVD, lymphadenopathy, thyromegaly


Respiratory exam: PRESENT: clear to auscultation venessa.  ABSENT: rales, rhonchi, 

wheezes


Cardiovascular exam: PRESENT: RRR.  ABSENT: diastolic murmur, rubs, systolic 

murmur


Pulses: PRESENT: normal dorsalis pedis pul


Vascular exam: PRESENT: normal capillary refill


GI/Abdominal exam: PRESENT: normal bowel sounds, soft.  ABSENT: distended, 

guarding, mass, organolmegaly, rebound, tenderness


Rectal exam: PRESENT: deferred


Extremities exam: PRESENT: full ROM.  ABSENT: calf tenderness, clubbing, pedal 

edema


Neurological exam: PRESENT: alert, awake, oriented to person, oriented to place

, oriented to time, oriented to situation, CN II-XII grossly intact.  ABSENT: 

motor sensory deficit


Psychiatric exam: PRESENT: appropriate affect, normal mood.  ABSENT: homicidal 

ideation, suicidal ideation


Skin exam: PRESENT: dry, intact, warm.  ABSENT: cyanosis, rash





Results


Laboratory Results: 


 





 11/09/18 03:51 





 11/09/18 03:51 





 











  11/09/18





  03:51


 


Magnesium  1.6








 











  11/08/18





  06:47


 


Creatine Kinase  209 H











Impressions: 


 





Abdomen/Pelvis CT  11/08/18 01:37


IMPRESSION:


 


Pulmonary nodules in the right lung base measuring up to 8 mm,


concerning for metastatic disease given the history of prostate


cancer. No evidence of bony metastatic disease.


Bilateral pleural effusions.


Enlarged prostate gland.


Bilateral inguinal hernias containing short segments of small


bowel with no evidence of an obstruction


 


TECHNICAL DOCUMENTATION:


 


Quality ID # 436: Final reports with documentation of one or more


dose reduction techniques (e.g., Automated exposure control,


adjustment of the mA and/or kV according to patient size, use of


iterative reconstruction technique)


 


 2011 [employer]- All Rights Reserved


 








Body Scan Nuclear Medicine  11/09/18 00:00


IMPRESSION:  New diffuse bony foci of increased uptake worrisome for metastatic 

disease.


 








Chest CT  11/09/18 00:00


IMPRESSION:  New small bilateral pleural effusions


New 6 mm posterior left upper lobe pulmonary nodule, 8 mm nodule periphery 

right upper lobe


New subtle smudgy sclerotic foci are present throughout the ribs and thoracic 

spine which correlate with new foci of increased uptake on bone scan today, 

worrisome for bony metastatic involvement


 














Assessment & Plan





- Diagnosis


(1) Prostate cancer


Is this a current diagnosis for this admission?: Yes   


Plan: 


Does appear to be metastatic disease, will discuss with Dr. Godoy but 

unfortunately radiation should be put on hold and we should continue with 

endocrine therapy alone.  I will discussed this with his urologist as well and 

we will take over his oncology care from here.  We will set him up for 

outpatient follow-up and plan to take over his care thereafter.  Today spent 

greater than 45 minutes in discussion and coordination of care with patient and 

family, discussion with hospitalist team.








- Time


Time Spent with patient: 35 or more minutes


Anticipated discharge: Home


Within: within 24 hours

## 2018-11-10 NOTE — PROGRESS NOTE
Provider Note


Provider Note: 


Patient wants to be full code for now and will pursue further treatment options 

for his cancer when he follows up with oncology next week.

## 2018-11-11 LAB
ANION GAP SERPL CALC-SCNC: 11 MMOL/L (ref 5–19)
ANION GAP SERPL CALC-SCNC: 13 MMOL/L (ref 5–19)
BUN SERPL-MCNC: 15 MG/DL (ref 7–20)
BUN SERPL-MCNC: 16 MG/DL (ref 7–20)
CALCIUM: 7.7 MG/DL (ref 8.4–10.2)
CALCIUM: 7.7 MG/DL (ref 8.4–10.2)
CHLORIDE SERPL-SCNC: 95 MMOL/L (ref 98–107)
CHLORIDE SERPL-SCNC: 96 MMOL/L (ref 98–107)
CO2 SERPL-SCNC: 22 MMOL/L (ref 22–30)
CO2 SERPL-SCNC: 22 MMOL/L (ref 22–30)
GLUCOSE SERPL-MCNC: 146 MG/DL (ref 75–110)
GLUCOSE SERPL-MCNC: 154 MG/DL (ref 75–110)
POTASSIUM SERPL-SCNC: 4.7 MMOL/L (ref 3.6–5)
POTASSIUM SERPL-SCNC: 4.8 MMOL/L (ref 3.6–5)
SODIUM SERPL-SCNC: 128.8 MMOL/L (ref 137–145)
SODIUM SERPL-SCNC: 129.7 MMOL/L (ref 137–145)

## 2018-11-11 RX ADMIN — Medication SCH ML: at 16:18

## 2018-11-11 RX ADMIN — ACETAMINOPHEN PRN MG: 325 TABLET ORAL at 02:45

## 2018-11-11 RX ADMIN — HYDRALAZINE HYDROCHLORIDE SCH MG: 50 TABLET, FILM COATED ORAL at 05:40

## 2018-11-11 RX ADMIN — GABAPENTIN SCH MG: 300 CAPSULE ORAL at 10:59

## 2018-11-11 RX ADMIN — AMLODIPINE BESYLATE SCH MG: 5 TABLET ORAL at 11:01

## 2018-11-11 RX ADMIN — GABAPENTIN SCH MG: 300 CAPSULE ORAL at 17:53

## 2018-11-11 RX ADMIN — ASPIRIN SCH MG: 81 TABLET, CHEWABLE ORAL at 11:02

## 2018-11-11 RX ADMIN — SODIUM CHLORIDE TAB 1 GM SCH GM: 1 TAB at 21:50

## 2018-11-11 RX ADMIN — HEPARIN SODIUM SCH UNIT: 5000 INJECTION, SOLUTION INTRAVENOUS; SUBCUTANEOUS at 21:50

## 2018-11-11 RX ADMIN — HEPARIN SODIUM SCH UNIT: 5000 INJECTION, SOLUTION INTRAVENOUS; SUBCUTANEOUS at 05:40

## 2018-11-11 RX ADMIN — Medication SCH ML: at 21:55

## 2018-11-11 RX ADMIN — HEPARIN SODIUM SCH UNIT: 5000 INJECTION, SOLUTION INTRAVENOUS; SUBCUTANEOUS at 16:18

## 2018-11-11 RX ADMIN — HYDRALAZINE HYDROCHLORIDE SCH MG: 50 TABLET, FILM COATED ORAL at 16:18

## 2018-11-11 RX ADMIN — Medication SCH ML: at 05:40

## 2018-11-11 RX ADMIN — HYDROCODONE BITARTRATE AND ACETAMINOPHEN PRN TAB: 5; 325 TABLET ORAL at 11:03

## 2018-11-11 RX ADMIN — SODIUM CHLORIDE TAB 1 GM SCH GM: 1 TAB at 10:59

## 2018-11-11 RX ADMIN — HYDROCODONE BITARTRATE AND ACETAMINOPHEN PRN TAB: 5; 325 TABLET ORAL at 21:50

## 2018-11-11 RX ADMIN — HYDRALAZINE HYDROCHLORIDE SCH MG: 50 TABLET, FILM COATED ORAL at 21:49

## 2018-11-11 RX ADMIN — LEVOFLOXACIN SCH MG: 500 TABLET, FILM COATED ORAL at 11:00

## 2018-11-11 NOTE — PDOC PROGRESS REPORT
Subjective


Progress Note for:: 11/11/18


Subjective:: 


Mr. Tucker is a 72 year old male with a past medical history of recently 

diagnosed prostate cancer and hypertension who presented with generalized 

weakness, headache and nausea. He was noted to have leukocytosis and 

hyponatremia. 





His CT showed multiple pulmonary nodules and bony lesions on the ribs and 

thoracic spine concerning for metastases. Whole body NM scan was done and are 

suggestive that lesions are likely metastatic foci. Results have been discussed 

with him yesterday.





His sodium just came back lower yesterday at 126. He did say that he has been 

drinking a lot of water more than the usual in the past week prior to admission 

(>5 bottles of 16 oz water/day) as he has been feeling very thirsty as well 

since admission.





Fluid restriction was started yesterday. His sodium did slightly improve to 128 

today. No acute event overnight but he says he feels weak today and is 

complaining of lower back pain.





He denies headache, dizziness or nausea.


Reason For Visit: 


FEVER OF UNKNOWN ORIGIN METASTIC PROSTATE CANCER








Physical Exam


Vital Signs: 


 











Temp Pulse Resp BP Pulse Ox


 


 99.6 F   95   16   158/57 H  95 


 


 11/11/18 08:00  11/11/18 08:00  11/11/18 08:00  11/11/18 08:00  11/11/18 08:00








 Intake & Output











 11/10/18 11/11/18 11/12/18





 06:59 06:59 06:59


 


Intake Total 2644 1584 


 


Output Total 2245 1875 200


 


Balance 399 -291 -200


 


Weight 184 lb 1.376 oz 184 lb 15.485 oz 











General appearance: PRESENT: no acute distress, well-developed, well-nourished


Head exam: PRESENT: atraumatic, normocephalic


Eye exam: PRESENT: conjunctiva pink, EOMI, PERRLA.  ABSENT: scleral icterus


Ear exam: PRESENT: normal external ear exam


Mouth exam: PRESENT: moist, tongue midline


Neck exam: ABSENT: carotid bruit, JVD, lymphadenopathy, thyromegaly


Respiratory exam: PRESENT: clear to auscultation venessa.  ABSENT: rales, rhonchi, 

wheezes


Cardiovascular exam: PRESENT: RRR.  ABSENT: diastolic murmur, rubs, systolic 

murmur


Pulses: PRESENT: normal dorsalis pedis pul


GI/Abdominal exam: PRESENT: normal bowel sounds, soft.  ABSENT: distended, 

guarding, mass, organolmegaly, rebound, tenderness


Rectal exam: PRESENT: deferred


Extremities exam: PRESENT: full ROM.  ABSENT: calf tenderness, clubbing, pedal 

edema


Neurological exam: PRESENT: alert, awake, oriented to person, oriented to place

, oriented to time, oriented to situation, CN II-XII grossly intact.  ABSENT: 

motor sensory deficit





Results


Laboratory Results: 


 





 11/10/18 11:00 





 11/11/18 05:38 





 











  11/10/18 11/10/18 11/11/18





  11:00 18:00 05:38


 


Sodium   127.2 L  128.8 L


 


Potassium   4.8  4.7


 


Chloride   94 L  96 L


 


Carbon Dioxide   21 L  22


 


Anion Gap   12  11


 


BUN   17  15


 


Creatinine   1.01  1.00


 


Est GFR ( Amer)   > 60  > 60


 


Est GFR (Non-Af Amer)   > 60  > 60


 


Glucose   144 H  146 H


 


Calcium   7.8 L  7.7 L


 


TSH  1.85  








 











  11/08/18





  06:47


 


Creatine Kinase  209 H











Impressions: 


 





Abdomen/Pelvis CT  11/08/18 01:37


IMPRESSION:


 


Pulmonary nodules in the right lung base measuring up to 8 mm,


concerning for metastatic disease given the history of prostate


cancer. No evidence of bony metastatic disease.


Bilateral pleural effusions.


Enlarged prostate gland.


Bilateral inguinal hernias containing short segments of small


bowel with no evidence of an obstruction


 


TECHNICAL DOCUMENTATION:


 


Quality ID # 436: Final reports with documentation of one or more


dose reduction techniques (e.g., Automated exposure control,


adjustment of the mA and/or kV according to patient size, use of


iterative reconstruction technique)


 


 2011 [employer]- All Rights Reserved


 








Body Scan Nuclear Medicine  11/09/18 00:00


IMPRESSION:  New diffuse bony foci of increased uptake worrisome for metastatic 

disease.


 








Chest CT  11/09/18 00:00


IMPRESSION:  New small bilateral pleural effusions


New 6 mm posterior left upper lobe pulmonary nodule, 8 mm nodule periphery 

right upper lobe


New subtle smudgy sclerotic foci are present throughout the ribs and thoracic 

spine which correlate with new foci of increased uptake on bone scan today, 

worrisome for bony metastatic involvement


 














Assessment & Plan





- Diagnosis


(1) Acute hyponatremia


Is this a current diagnosis for this admission?: Yes   


Plan: 


Fluid restriction was started yesterday. Sodium has slightly improved to 128 

today from 126.  He has hypotonic hypovolemia with serum osm of 277. He appears 

euvolemic. Urine osm is 389 and urine sodium is 19. Hyponatremia could be from 

a combination of possible SIADH form lung nodules vs primary polydipsia. Either 

are treated with fluid restriction. Repeat later this afternoon.








(2) Prostate cancer


Is this a current diagnosis for this admission?: Yes   


Plan: 


Prostate CA with possible lung and bony metastases. Oncology following. Patient 

will follow with Dr. Rizvi next week for endocrine therapy.








(3) Leukocytosis


Qualifiers: 


   Leukocytosis type: unspecified   Qualified Code(s): D72.829 - Elevated white 

blood cell count, unspecified   


Is this a current diagnosis for this admission?: Yes   


Plan: 


Possible prostatitis. On Levaquin.








(4) Hypertension


Qualifiers: 


   Hypertension type: unspecified   Qualified Code(s): I10 - Essential (primary

) hypertension   


Is this a current diagnosis for this admission?: Yes   


Plan: 


Continue amlodipine and hydralazine.








- Time


Time Spent with patient: 15-24 minutes

## 2018-11-12 LAB
ANION GAP SERPL CALC-SCNC: 13 MMOL/L (ref 5–19)
BUN SERPL-MCNC: 18 MG/DL (ref 7–20)
CALCIUM: 7.7 MG/DL (ref 8.4–10.2)
CHLORIDE SERPL-SCNC: 92 MMOL/L (ref 98–107)
CO2 SERPL-SCNC: 23 MMOL/L (ref 22–30)
GLUCOSE SERPL-MCNC: 144 MG/DL (ref 75–110)
PATH REV BLD -IMP: (no result)
POTASSIUM SERPL-SCNC: 4.5 MMOL/L (ref 3.6–5)
SODIUM SERPL-SCNC: 127.7 MMOL/L (ref 137–145)

## 2018-11-12 RX ADMIN — Medication SCH: at 13:16

## 2018-11-12 RX ADMIN — Medication SCH ML: at 05:32

## 2018-11-12 RX ADMIN — HYDROCODONE BITARTRATE AND ACETAMINOPHEN PRN TAB: 5; 325 TABLET ORAL at 21:22

## 2018-11-12 RX ADMIN — SODIUM CHLORIDE PRN MLS/HR: 9 INJECTION, SOLUTION INTRAVENOUS at 23:00

## 2018-11-12 RX ADMIN — AMLODIPINE BESYLATE SCH MG: 5 TABLET ORAL at 09:39

## 2018-11-12 RX ADMIN — HYDROCODONE BITARTRATE AND ACETAMINOPHEN PRN TAB: 5; 325 TABLET ORAL at 15:20

## 2018-11-12 RX ADMIN — LACTULOSE SCH GM: 20 SOLUTION ORAL at 21:21

## 2018-11-12 RX ADMIN — HEPARIN SODIUM SCH UNIT: 5000 INJECTION, SOLUTION INTRAVENOUS; SUBCUTANEOUS at 13:23

## 2018-11-12 RX ADMIN — LEVOFLOXACIN SCH MG: 500 TABLET, FILM COATED ORAL at 09:39

## 2018-11-12 RX ADMIN — LACTULOSE SCH: 20 SOLUTION ORAL at 11:59

## 2018-11-12 RX ADMIN — HYDRALAZINE HYDROCHLORIDE SCH MG: 50 TABLET, FILM COATED ORAL at 21:21

## 2018-11-12 RX ADMIN — HYDROCODONE BITARTRATE AND ACETAMINOPHEN PRN TAB: 5; 325 TABLET ORAL at 05:29

## 2018-11-12 RX ADMIN — SODIUM CHLORIDE TAB 1 GM SCH GM: 1 TAB at 21:23

## 2018-11-12 RX ADMIN — SODIUM CHLORIDE TAB 1 GM SCH GM: 1 TAB at 09:39

## 2018-11-12 RX ADMIN — ACETAMINOPHEN PRN MG: 325 TABLET ORAL at 09:46

## 2018-11-12 RX ADMIN — GABAPENTIN SCH MG: 300 CAPSULE ORAL at 09:39

## 2018-11-12 RX ADMIN — HEPARIN SODIUM SCH UNIT: 5000 INJECTION, SOLUTION INTRAVENOUS; SUBCUTANEOUS at 21:21

## 2018-11-12 RX ADMIN — ACETAMINOPHEN PRN MG: 325 TABLET ORAL at 00:16

## 2018-11-12 RX ADMIN — ASPIRIN SCH MG: 81 TABLET, CHEWABLE ORAL at 09:39

## 2018-11-12 RX ADMIN — Medication SCH ML: at 21:22

## 2018-11-12 RX ADMIN — ACETAMINOPHEN PRN MG: 325 TABLET ORAL at 20:15

## 2018-11-12 RX ADMIN — HEPARIN SODIUM SCH UNIT: 5000 INJECTION, SOLUTION INTRAVENOUS; SUBCUTANEOUS at 05:29

## 2018-11-12 RX ADMIN — HYDRALAZINE HYDROCHLORIDE SCH MG: 50 TABLET, FILM COATED ORAL at 13:23

## 2018-11-12 RX ADMIN — HYDRALAZINE HYDROCHLORIDE SCH MG: 50 TABLET, FILM COATED ORAL at 05:29

## 2018-11-12 RX ADMIN — GABAPENTIN SCH MG: 300 CAPSULE ORAL at 17:11

## 2018-11-12 NOTE — PDOC PROGRESS REPORT
Subjective


Progress Note for:: 11/12/18


Subjective:: 


Did not d/c home b/c of hyponatremia, improved on fluid restriction, Dr. Schwartz has seen pt, getting nephrology involved for recs. Discussed 

findings w/ Dr. Godoy, d/c radiation plans since mets noted on imaging. 





Reason For Visit: 


FEVER OF UNKNOWN ORIGIN METASTIC PROSTATE CANCER








Physical Exam


Vital Signs: 


 











Temp Pulse Resp BP Pulse Ox


 


 98.5 F   93   16   146/68 H  96 


 


 11/12/18 04:00  11/12/18 07:00  11/12/18 04:00  11/12/18 04:00  11/12/18 04:00








 Intake & Output











 11/11/18 11/12/18 11/13/18





 06:59 06:59 06:59


 


Intake Total 1584 960 


 


Output Total 1875 775 


 


Balance -291 185 


 


Weight 83.9 kg 82.2 kg 











General appearance: PRESENT: no acute distress, well-developed, well-nourished


Head exam: PRESENT: atraumatic, normocephalic


Eye exam: PRESENT: conjunctiva pink, EOMI, PERRLA.  ABSENT: scleral icterus


Ear exam: PRESENT: normal external ear exam


Mouth exam: PRESENT: moist, tongue midline


Neck exam: ABSENT: carotid bruit, JVD, lymphadenopathy, thyromegaly


Respiratory exam: PRESENT: clear to auscultation venessa.  ABSENT: rales, rhonchi, 

wheezes


Cardiovascular exam: PRESENT: RRR.  ABSENT: diastolic murmur, rubs, systolic 

murmur


Pulses: PRESENT: normal dorsalis pedis pul


Vascular exam: PRESENT: normal capillary refill


GI/Abdominal exam: PRESENT: normal bowel sounds, soft.  ABSENT: distended, 

guarding, mass, organolmegaly, rebound, tenderness


Rectal exam: PRESENT: deferred


Extremities exam: PRESENT: full ROM.  ABSENT: calf tenderness, clubbing, pedal 

edema


Neurological exam: PRESENT: alert, awake, oriented to person, oriented to place

, oriented to time, oriented to situation, CN II-XII grossly intact.  ABSENT: 

motor sensory deficit


Psychiatric exam: PRESENT: appropriate affect, normal mood.  ABSENT: homicidal 

ideation, suicidal ideation


Skin exam: PRESENT: dry, intact, warm.  ABSENT: cyanosis, rash





Results


Laboratory Results: 


 





 11/10/18 11:00 





 11/11/18 16:50 





 











  11/11/18





  16:50


 


Sodium  129.7 L


 


Potassium  4.8


 


Chloride  95 L


 


Carbon Dioxide  22


 


Anion Gap  13


 


BUN  16


 


Creatinine  1.06


 


Est GFR ( Amer)  > 60


 


Est GFR (Non-Af Amer)  > 60


 


Glucose  154 H


 


Calcium  7.7 L








 











  11/08/18





  06:47


 


Creatine Kinase  209 H











Impressions: 


 





Abdomen/Pelvis CT  11/08/18 01:37


IMPRESSION:


 


Pulmonary nodules in the right lung base measuring up to 8 mm,


concerning for metastatic disease given the history of prostate


cancer. No evidence of bony metastatic disease.


Bilateral pleural effusions.


Enlarged prostate gland.


Bilateral inguinal hernias containing short segments of small


bowel with no evidence of an obstruction


 


TECHNICAL DOCUMENTATION:


 


Quality ID # 436: Final reports with documentation of one or more


dose reduction techniques (e.g., Automated exposure control,


adjustment of the mA and/or kV according to patient size, use of


iterative reconstruction technique)


 


 2011 [employer]- All Rights Reserved


 








Body Scan Nuclear Medicine  11/09/18 00:00


IMPRESSION:  New diffuse bony foci of increased uptake worrisome for metastatic 

disease.


 








Chest CT  11/09/18 00:00


IMPRESSION:  New small bilateral pleural effusions


New 6 mm posterior left upper lobe pulmonary nodule, 8 mm nodule periphery 

right upper lobe


New subtle smudgy sclerotic foci are present throughout the ribs and thoracic 

spine which correlate with new foci of increased uptake on bone scan today, 

worrisome for bony metastatic involvement


 














Assessment & Plan





- Diagnosis


(1) Prostate cancer


Is this a current diagnosis for this admission?: Yes   


Plan: 


f/u in our office in 2 wk, today spent >35 m in discussion w/ pt, coordination 

of care








- Time


Time Spent with patient: 35 or more minutes

## 2018-11-12 NOTE — PDOC PROGRESS REPORT
Subjective


Progress Note for:: 11/12/18


Subjective:: 





The patient states to feel slightly better.  He has been out of bed to chair.  

Discussed with oncology and nephrology about his hypo-natremia.  With the lung 

metastases it is most probably SIADH.


Reason For Visit: 


FEVER OF UNKNOWN ORIGIN METASTIC PROSTATE CANCER








Physical Exam


Vital Signs: 


 











Temp Pulse Resp BP Pulse Ox


 


 98.5 F   93   16   146/68 H  96 


 


 11/12/18 04:00  11/12/18 07:00  11/12/18 04:00  11/12/18 04:00  11/12/18 04:00








 Intake & Output











 11/11/18 11/12/18 11/13/18





 06:59 06:59 06:59


 


Intake Total 1584 960 


 


Output Total 1875 775 


 


Balance -291 185 


 


Weight 83.9 kg 82.2 kg 











General appearance: PRESENT: mild distress


Head exam: PRESENT: atraumatic


Eye exam: PRESENT: conjunctiva pink


Neck exam: ABSENT: carotid bruit, JVD


Respiratory exam: PRESENT: crackles


Cardiovascular exam: PRESENT: RRR, +S1, +S2


GI/Abdominal exam: PRESENT: normal bowel sounds, soft


Extremities exam: PRESENT: full ROM


Musculoskeletal exam: PRESENT: ambulatory


Neurological exam: PRESENT: alert, awake





Results


Laboratory Results: 


 





 11/10/18 11:00 





 11/11/18 16:50 





 











  11/11/18





  16:50


 


Sodium  129.7 L


 


Potassium  4.8


 


Chloride  95 L


 


Carbon Dioxide  22


 


Anion Gap  13


 


BUN  16


 


Creatinine  1.06


 


Est GFR ( Amer)  > 60


 


Est GFR (Non-Af Amer)  > 60


 


Glucose  154 H


 


Calcium  7.7 L








 











  11/08/18





  06:47


 


Creatine Kinase  209 H











Impressions: 


 





Abdomen/Pelvis CT  11/08/18 01:37


IMPRESSION:


 


Pulmonary nodules in the right lung base measuring up to 8 mm,


concerning for metastatic disease given the history of prostate


cancer. No evidence of bony metastatic disease.


Bilateral pleural effusions.


Enlarged prostate gland.


Bilateral inguinal hernias containing short segments of small


bowel with no evidence of an obstruction


 


TECHNICAL DOCUMENTATION:


 


Quality ID # 436: Final reports with documentation of one or more


dose reduction techniques (e.g., Automated exposure control,


adjustment of the mA and/or kV according to patient size, use of


iterative reconstruction technique)


 


 2011 [employer]- All Rights Reserved


 








Body Scan Nuclear Medicine  11/09/18 00:00


IMPRESSION:  New diffuse bony foci of increased uptake worrisome for metastatic 

disease.


 








Chest CT  11/09/18 00:00


IMPRESSION:  New small bilateral pleural effusions


New 6 mm posterior left upper lobe pulmonary nodule, 8 mm nodule periphery 

right upper lobe


New subtle smudgy sclerotic foci are present throughout the ribs and thoracic 

spine which correlate with new foci of increased uptake on bone scan today, 

worrisome for bony metastatic involvement


 














Assessment & Plan





- Diagnosis


(1) Hyponatremia


Is this a current diagnosis for this admission?: Yes   


Plan: 


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  

Will consult nephrology.








(2) Leukocytosis


Qualifiers: 


   Leukocytosis type: unspecified   Qualified Code(s): D72.829 - Elevated white 

blood cell count, unspecified   


Is this a current diagnosis for this admission?: Yes   


Plan: 


The workup is in progress to find the cause of leukocytosis








(3) Nausea & vomiting


Qualifiers: 


   Vomiting type: unspecified   Vomiting Intractability: unspecified   

Qualified Code(s): R11.2 - Nausea with vomiting, unspecified   


Is this a current diagnosis for this admission?: Yes   





(4) Prostate cancer


Is this a current diagnosis for this admission?: Yes   


Plan: 


At this point there is metastatic disease and the patient is being followed by 

the oncologist and will probably start hormonal therapy.








(5) CKD (chronic kidney disease) stage 3, GFR 30-59 ml/min


Is this a current diagnosis for this admission?: Yes   





(6) Hypertension


Qualifiers: 


   Hypertension type: unspecified   Qualified Code(s): I10 - Essential (primary

) hypertension   


Is this a current diagnosis for this admission?: Yes   


Plan: 


Continue current medications

## 2018-11-13 LAB
ABSOLUTE RETICS #: 0.05 10^6/UL (ref 0.03–0.12)
ANION GAP SERPL CALC-SCNC: 15 MMOL/L (ref 5–19)
BUN SERPL-MCNC: 19 MG/DL (ref 7–20)
CALCIUM: 7.6 MG/DL (ref 8.4–10.2)
CHLORIDE SERPL-SCNC: 95 MMOL/L (ref 98–107)
CO2 SERPL-SCNC: 20 MMOL/L (ref 22–30)
FOLATE SERPL-MCNC: 5.11 NG/ML (ref 2.76–?)
GLUCOSE SERPL-MCNC: 178 MG/DL (ref 75–110)
IRON(TIBC): 10.5 UG/DL (ref 49–181)
POTASSIUM SERPL-SCNC: 4.5 MMOL/L (ref 3.6–5)
RETICULOCYTE COUNT (AUTO): 1.36 % (ref 0.66–2.85)
SODIUM SERPL-SCNC: 129.5 MMOL/L (ref 137–145)
VIT B12 SERPL-MCNC: 349 PG/ML (ref 239–931)

## 2018-11-13 RX ADMIN — HYDRALAZINE HYDROCHLORIDE SCH MG: 50 TABLET, FILM COATED ORAL at 17:34

## 2018-11-13 RX ADMIN — SODIUM CHLORIDE TAB 1 GM SCH GM: 1 TAB at 22:03

## 2018-11-13 RX ADMIN — SODIUM CHLORIDE TAB 1 GM SCH GM: 1 TAB at 09:32

## 2018-11-13 RX ADMIN — ASPIRIN SCH MG: 81 TABLET, CHEWABLE ORAL at 09:33

## 2018-11-13 RX ADMIN — AMLODIPINE BESYLATE SCH MG: 10 TABLET ORAL at 09:32

## 2018-11-13 RX ADMIN — GABAPENTIN SCH MG: 300 CAPSULE ORAL at 17:33

## 2018-11-13 RX ADMIN — SERTRALINE HYDROCHLORIDE SCH MG: 50 TABLET ORAL at 09:33

## 2018-11-13 RX ADMIN — LACTULOSE SCH GM: 20 SOLUTION ORAL at 06:32

## 2018-11-13 RX ADMIN — SODIUM CHLORIDE PRN MLS/HR: 9 INJECTION, SOLUTION INTRAVENOUS at 09:34

## 2018-11-13 RX ADMIN — HEPARIN SODIUM SCH UNIT: 5000 INJECTION, SOLUTION INTRAVENOUS; SUBCUTANEOUS at 06:32

## 2018-11-13 RX ADMIN — DEXAMETHASONE SODIUM PHOSPHATE PRN MG: 10 INJECTION INTRAMUSCULAR; INTRAVENOUS at 17:29

## 2018-11-13 RX ADMIN — GABAPENTIN SCH MG: 300 CAPSULE ORAL at 09:33

## 2018-11-13 RX ADMIN — HEPARIN SODIUM SCH UNIT: 5000 INJECTION, SOLUTION INTRAVENOUS; SUBCUTANEOUS at 17:33

## 2018-11-13 RX ADMIN — HYDRALAZINE HYDROCHLORIDE SCH MG: 50 TABLET, FILM COATED ORAL at 22:02

## 2018-11-13 RX ADMIN — HYDROCODONE BITARTRATE AND ACETAMINOPHEN PRN TAB: 5; 325 TABLET ORAL at 22:11

## 2018-11-13 RX ADMIN — LACTULOSE SCH: 20 SOLUTION ORAL at 17:33

## 2018-11-13 RX ADMIN — Medication SCH: at 17:34

## 2018-11-13 RX ADMIN — Medication SCH ML: at 06:33

## 2018-11-13 RX ADMIN — ACETAMINOPHEN PRN MG: 325 TABLET ORAL at 06:33

## 2018-11-13 RX ADMIN — HYDRALAZINE HYDROCHLORIDE SCH MG: 50 TABLET, FILM COATED ORAL at 06:33

## 2018-11-13 RX ADMIN — HEPARIN SODIUM SCH UNIT: 5000 INJECTION, SOLUTION INTRAVENOUS; SUBCUTANEOUS at 22:02

## 2018-11-13 RX ADMIN — LACTULOSE SCH: 20 SOLUTION ORAL at 22:03

## 2018-11-13 NOTE — PDOC PROGRESS REPORT
Subjective


Progress Note for:: 11/13/18


Subjective:: 





The patient is complaining of not feeling well.  He is complaining of being 

weak.  He has difficulty according to him getting up out of bed on his own.


He does not have any help at home.  His wife is disabled also.  Discussed the 

possibility of rehab to improve his stamina and health.  The patient is 

agreeable to it.


Advised patient that because of the metastasis of his prostate cancer they are 

considering the endocrine therapy and not radiation.  He would have to follow-

up with Dr. JOEL stearns as an outpatient to start the treatment


Reason For Visit: 


FEVER OF UNKNOWN ORIGIN METASTIC PROSTATE CANCER








Physical Exam


Vital Signs: 


 











Temp Pulse Resp BP Pulse Ox


 


 101.3 F H  107 H  18   169/70 H  94 


 


 11/13/18 04:02  11/13/18 04:02  11/13/18 04:02  11/13/18 04:02  11/13/18 04:02








 Intake & Output











 11/12/18 11/13/18 11/14/18





 06:59 06:59 06:59


 


Intake Total 960 820 


 


Output Total 775 900 


 


Balance 185 -80 


 


Weight 82.2 kg 82.2 kg 











General appearance: PRESENT: mild distress


Head exam: PRESENT: atraumatic


Eye exam: PRESENT: conjunctiva pink


Neck exam: ABSENT: carotid bruit, JVD


Respiratory exam: PRESENT: clear to auscultation venessa


Cardiovascular exam: PRESENT: RRR, +S1, +S2


GI/Abdominal exam: PRESENT: soft


Extremities exam: PRESENT: full ROM, tenderness


Musculoskeletal exam: PRESENT: tenderness


Neurological exam: PRESENT: alert, awake


Psychiatric exam: PRESENT: anxious, depressed, flat affect





Results


Laboratory Results: 


 





 11/10/18 11:00 





 11/13/18 05:20 





 











  11/12/18 11/13/18 11/13/18





  18:09 05:20 05:20


 


Retic Count (auto)   


 


Absolute Retic   


 


Sodium  127.7 L  129.5 L 


 


Potassium  4.5  4.5 


 


Chloride  92 L  95 L 


 


Carbon Dioxide  23  20 L 


 


Anion Gap  13  15 


 


BUN  18  19 


 


Creatinine  1.13  1.14 


 


Est GFR ( Amer)  > 60  > 60 


 


Est GFR (Non-Af Amer)  > 60  > 60 


 


Glucose  144 H  178 H 


 


Serum Osmolality    270 L


 


Calcium  7.7 L  7.6 L 


 


Iron   10.5 L 


 


TIBC   173 L 


 


% Saturation   6 


 


Ferritin   > 1000.00 H 


 


Vitamin B12   349.0 


 


Folate   5.11 














  11/13/18





  05:20


 


Retic Count (auto)  1.36


 


Absolute Retic  0.046


 


Sodium 


 


Potassium 


 


Chloride 


 


Carbon Dioxide 


 


Anion Gap 


 


BUN 


 


Creatinine 


 


Est GFR ( Amer) 


 


Est GFR (Non-Af Amer) 


 


Glucose 


 


Serum Osmolality 


 


Calcium 


 


Iron 


 


TIBC 


 


% Saturation 


 


Ferritin 


 


Vitamin B12 


 


Folate 








 





11/08/18 04:54   Blood   Blood Culture - Final


                            NO GROWTH IN 5 DAYS





 











  11/08/18





  06:47


 


Creatine Kinase  209 H











Impressions: 


 





Abdomen/Pelvis CT  11/08/18 01:37


IMPRESSION:


 


Pulmonary nodules in the right lung base measuring up to 8 mm,


concerning for metastatic disease given the history of prostate


cancer. No evidence of bony metastatic disease.


Bilateral pleural effusions.


Enlarged prostate gland.


Bilateral inguinal hernias containing short segments of small


bowel with no evidence of an obstruction


 


TECHNICAL DOCUMENTATION:


 


Quality ID # 436: Final reports with documentation of one or more


dose reduction techniques (e.g., Automated exposure control,


adjustment of the mA and/or kV according to patient size, use of


iterative reconstruction technique)


 


 2011 [employer]- All Rights Reserved


 








Body Scan Nuclear Medicine  11/09/18 00:00


IMPRESSION:  New diffuse bony foci of increased uptake worrisome for metastatic 

disease.


 








Chest CT  11/09/18 00:00


IMPRESSION:  New small bilateral pleural effusions


New 6 mm posterior left upper lobe pulmonary nodule, 8 mm nodule periphery 

right upper lobe


New subtle smudgy sclerotic foci are present throughout the ribs and thoracic 

spine which correlate with new foci of increased uptake on bone scan today, 

worrisome for bony metastatic involvement


 














Assessment & Plan





- Diagnosis


(1) Hyponatremia


Is this a current diagnosis for this admission?: Yes   


Plan: 


Presently being followed by the nephrologist.  He is receiving IV fluids








(2) Leukocytosis


Qualifiers: 


   Leukocytosis type: unspecified   Qualified Code(s): D72.829 - Elevated white 

blood cell count, unspecified   


Is this a current diagnosis for this admission?: Yes   





(3) Nausea & vomiting


Qualifiers: 


   Vomiting type: unspecified   Vomiting Intractability: unspecified   

Qualified Code(s): R11.2 - Nausea with vomiting, unspecified   


Is this a current diagnosis for this admission?: Yes   





(4) Prostate cancer


Is this a current diagnosis for this admission?: Yes   


Plan: 


At this point there is metastatic disease and the patient is being followed by 

the oncologist and will probably start hormonal therapy.








(5) CKD (chronic kidney disease) stage 3, GFR 30-59 ml/min


Is this a current diagnosis for this admission?: Yes   


Plan: 


Improved with rehydration








(6) Hypertension


Qualifiers: 


   Hypertension type: unspecified   Qualified Code(s): I10 - Essential (primary

) hypertension   


Is this a current diagnosis for this admission?: Yes   


Plan: 


Continue current medications








(7) Physical deconditioning


Is this a current diagnosis for this admission?: Yes   


Plan: 


We will start with physical therapy.  Discussed the short-term rehab

## 2018-11-13 NOTE — PDOC PROGRESS REPORT
Subjective


Progress Note for:: 11/13/18


Reason For Visit: 


Patient seen today.  He feels progressively getting weaker.  He says he is 

unable to stand up combination of weakness and neuropathy.  He denies any such 

chest pain, shortness of breath, fever or chills.  Appetite is getting worse.  

He is looking rather depressed. Labs and medications were reviewed with the 

patient.  His sodium today is 129.  Renal functions are stable.





Physical Exam


Vital Signs: 


 











Temp Pulse Resp BP Pulse Ox


 


 98.1 F   100   16   147/60 H  96 


 


 11/13/18 12:37  11/13/18 12:37  11/13/18 12:37  11/13/18 12:37  11/13/18 12:37








 Intake & Output











 11/12/18 11/13/18 11/14/18





 06:59 06:59 06:59


 


Intake Total 960 820 526


 


Output Total 775 900 100


 


Balance 185 -80 426


 


Weight 82.2 kg 82.2 kg 











General appearance: PRESENT: no acute distress, disheveled


Respiratory exam: PRESENT: clear to auscultation venessa.  ABSENT: crackles


Cardiovascular exam: PRESENT: +S1, +S2, systolic murmur


GI/Abdominal exam: PRESENT: normal bowel sounds, soft.  ABSENT: organomegaly, 

tenderness


Extremities exam: ABSENT: pedal edema


Neurological exam: PRESENT: alert, awake, oriented to person, oriented to place


Skin exam: ABSENT: erythema, mottled, rash





Results


Laboratory Results: 


 





 11/10/18 11:00 





 11/13/18 05:20 





 











  11/12/18 11/13/18 11/13/18





  18:09 05:20 05:20


 


Retic Count (auto)   


 


Absolute Retic   


 


Sodium  127.7 L  129.5 L 


 


Potassium  4.5  4.5 


 


Chloride  92 L  95 L 


 


Carbon Dioxide  23  20 L 


 


Anion Gap  13  15 


 


BUN  18  19 


 


Creatinine  1.13  1.14 


 


Est GFR ( Amer)  > 60  > 60 


 


Est GFR (Non-Af Amer)  > 60  > 60 


 


Glucose  144 H  178 H 


 


Serum Osmolality    270 L


 


Calcium  7.7 L  7.6 L 


 


Iron   10.5 L 


 


TIBC   173 L 


 


% Saturation   6 


 


Ferritin   > 1000.00 H 


 


Vitamin B12   349.0 


 


Folate   5.11 














  11/13/18





  05:20


 


Retic Count (auto)  1.36


 


Absolute Retic  0.046


 


Sodium 


 


Potassium 


 


Chloride 


 


Carbon Dioxide 


 


Anion Gap 


 


BUN 


 


Creatinine 


 


Est GFR ( Amer) 


 


Est GFR (Non-Af Amer) 


 


Glucose 


 


Serum Osmolality 


 


Calcium 


 


Iron 


 


TIBC 


 


% Saturation 


 


Ferritin 


 


Vitamin B12 


 


Folate 








 





11/08/18 04:54   Blood   Blood Culture - Final


                            NO GROWTH IN 5 DAYS





 











  11/08/18





  06:47


 


Creatine Kinase  209 H











Impressions: 


 





Abdomen/Pelvis CT  11/08/18 01:37


IMPRESSION:


 


Pulmonary nodules in the right lung base measuring up to 8 mm,


concerning for metastatic disease given the history of prostate


cancer. No evidence of bony metastatic disease.


Bilateral pleural effusions.


Enlarged prostate gland.


Bilateral inguinal hernias containing short segments of small


bowel with no evidence of an obstruction


 


TECHNICAL DOCUMENTATION:


 


Quality ID # 436: Final reports with documentation of one or more


dose reduction techniques (e.g., Automated exposure control,


adjustment of the mA and/or kV according to patient size, use of


iterative reconstruction technique)


 


 2011 [employer]- All Rights Reserved


 








Body Scan Nuclear Medicine  11/09/18 00:00


IMPRESSION:  New diffuse bony foci of increased uptake worrisome for metastatic 

disease.


 








Chest CT  11/09/18 00:00


IMPRESSION:  New small bilateral pleural effusions


New 6 mm posterior left upper lobe pulmonary nodule, 8 mm nodule periphery 

right upper lobe


New subtle smudgy sclerotic foci are present throughout the ribs and thoracic 

spine which correlate with new foci of increased uptake on bone scan today, 

worrisome for bony metastatic involvement


 














Assessment & Plan





- Diagnosis


(1) Acute hyponatremia


Is this a current diagnosis for this admission?: Yes   


Plan: 


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  Orders for urine studies to be 

repeated this morning were not done.  Further orders have been placed again 

this morning which on reviewing this evening was not done again.  Have called 

the nurse about it.  In the meanwhile continue on gentle hydration.








(2) Leukocytosis


Qualifiers: 


   Leukocytosis type: unspecified   Qualified Code(s): D72.829 - Elevated white 

blood cell count, unspecified   


Is this a current diagnosis for this admission?: Yes   


Plan: 


As per Ramses Guadalupe MD.








(3) Prostate cancer


Is this a current diagnosis for this admission?: Yes   


Plan: 


With bony and lung metastasis.  Management as per Dr. Rizvi.








(4) CKD (chronic kidney disease) stage 3, GFR 30-59 ml/min


Is this a current diagnosis for this admission?: Yes   


Plan: 


Stable.  Stay on current guidelines.








(5) Peripheral neuropathy


Plan: 


Normal B12.  Unexplained etiology for his new onset peripheral neuropathy.This 

might need further looking into by neurology as this might be a cause for his 

progressive lower extremity weakness which cannot really be explained by some 

debilitation of just a few days duration.

## 2018-11-13 NOTE — PDOC PROGRESS REPORT
Subjective


Progress Note for:: 11/13/18


Subjective:: 


No acute events overnight but feels very weak and is very concerned about going 

home, he notes that he doesn't feel he can get up w/out assistance although 

nursing notes he walked up and down the [place] w/ walker and nursing assist.





I ordered PT/OT to eval pt, but I told pt he should be appropriate for home PT, 

most likely deconditioned from the hospital stay





Reason For Visit: 


FEVER OF UNKNOWN ORIGIN METASTIC PROSTATE CANCER








Physical Exam


Vital Signs: 


 











Temp Pulse Resp BP Pulse Ox


 


 101.3 F H  107 H  18   169/70 H  94 


 


 11/13/18 04:02  11/13/18 04:02  11/13/18 04:02  11/13/18 04:02  11/13/18 04:02








 Intake & Output











 11/12/18 11/13/18 11/14/18





 06:59 06:59 06:59


 


Intake Total 960 820 


 


Output Total 775 900 


 


Balance 185 -80 


 


Weight 82.2 kg 82.2 kg 











General appearance: PRESENT: no acute distress, well-developed, well-nourished


Head exam: PRESENT: atraumatic, normocephalic


Eye exam: PRESENT: conjunctiva pink, EOMI, PERRLA.  ABSENT: scleral icterus


Ear exam: PRESENT: normal external ear exam


Mouth exam: PRESENT: moist, tongue midline


Neck exam: ABSENT: carotid bruit, JVD, lymphadenopathy, thyromegaly


Respiratory exam: PRESENT: clear to auscultation venessa.  ABSENT: rales, rhonchi, 

wheezes


Cardiovascular exam: PRESENT: RRR.  ABSENT: diastolic murmur, rubs, systolic 

murmur


Pulses: PRESENT: normal dorsalis pedis pul


Vascular exam: PRESENT: normal capillary refill


GI/Abdominal exam: PRESENT: normal bowel sounds, soft.  ABSENT: distended, 

guarding, mass, organolmegaly, rebound, tenderness


Rectal exam: PRESENT: deferred


Extremities exam: PRESENT: full ROM.  ABSENT: calf tenderness, clubbing, pedal 

edema


Neurological exam: PRESENT: alert, awake, oriented to person, oriented to place

, oriented to time, oriented to situation, CN II-XII grossly intact.  ABSENT: 

motor sensory deficit


Psychiatric exam: PRESENT: appropriate affect, normal mood.  ABSENT: homicidal 

ideation, suicidal ideation


Skin exam: PRESENT: dry, intact, warm.  ABSENT: cyanosis, rash





Results


Laboratory Results: 


 





 11/10/18 11:00 





 11/13/18 05:20 





 











  11/12/18 11/13/18 11/13/18





  18:09 05:20 05:20


 


Retic Count (auto)    1.36


 


Absolute Retic    0.046


 


Sodium  127.7 L  129.5 L 


 


Potassium  4.5  4.5 


 


Chloride  92 L  95 L 


 


Carbon Dioxide  23  20 L 


 


Anion Gap  13  15 


 


BUN  18  19 


 


Creatinine  1.13  1.14 


 


Est GFR ( Amer)  > 60  > 60 


 


Est GFR (Non-Af Amer)  > 60  > 60 


 


Glucose  144 H  178 H 


 


Calcium  7.7 L  7.6 L 


 


Iron   10.5 L 


 


TIBC   173 L 


 


% Saturation   6 








 





11/08/18 04:54   Blood   Blood Culture - Final


                            NO GROWTH IN 5 DAYS





 











  11/08/18





  06:47


 


Creatine Kinase  209 H











Impressions: 


 





Abdomen/Pelvis CT  11/08/18 01:37


IMPRESSION:


 


Pulmonary nodules in the right lung base measuring up to 8 mm,


concerning for metastatic disease given the history of prostate


cancer. No evidence of bony metastatic disease.


Bilateral pleural effusions.


Enlarged prostate gland.


Bilateral inguinal hernias containing short segments of small


bowel with no evidence of an obstruction


 


TECHNICAL DOCUMENTATION:


 


Quality ID # 436: Final reports with documentation of one or more


dose reduction techniques (e.g., Automated exposure control,


adjustment of the mA and/or kV according to patient size, use of


iterative reconstruction technique)


 


 2011 [employer]- All Rights Reserved


 








Body Scan Nuclear Medicine  11/09/18 00:00


IMPRESSION:  New diffuse bony foci of increased uptake worrisome for metastatic 

disease.


 








Chest CT  11/09/18 00:00


IMPRESSION:  New small bilateral pleural effusions


New 6 mm posterior left upper lobe pulmonary nodule, 8 mm nodule periphery 

right upper lobe


New subtle smudgy sclerotic foci are present throughout the ribs and thoracic 

spine which correlate with new foci of increased uptake on bone scan today, 

worrisome for bony metastatic involvement


 














Assessment & Plan





- Diagnosis


(1) Prostate cancer


Is this a current diagnosis for this admission?: Yes   


Plan: 


PT/OT consult, mineral oil enema x 2 if needed today, f/u as outpt for cont rx

## 2018-11-13 NOTE — PDOC CONSULTATION
Consultation


Consult Date: 11/12/18


Consult reason:: Hyponatremia





History of Present Illness


Admission Date/PCP: 


  11/08/18 03:32





  CALROS JACOB MD





History of Present Illness: 


SULMA MURPHY is a 72 year old male with a history of hypertension, diabetes on 

diet, CKD stage III with a base creatinine 1.5 and recent diagnosis of cancer 

of the prostate was admitted with history of progressive weakness. He also 

complains of bilateral peripheral neuropathy of both his extremities which 

apparently new and this is been giving him problems in walking.  No apparent 

history of foot drop. He was scheduled to start radiation treatment soon and 

had pellets placed approximately 2 weeks ago.  He gives a history of 

intermittent chills without any fever.  No history of any dysuria or hematuria.

  Patient denies any history of nausea or vomiting or diarrhea prior to 

admission.  Currently he is constipated. He admits to drinking excessive 

amounts of water though. He is not on any diuretics. Initial evaluations 

revealed a sodium of 127 and a BUN/creatinine of 30/1.2 respectively.Patient 

was begun on IV normal saline and his sodium was improved to 129.  Patient 

denies any history of orthostasis. His labs and medications were reviewed.








Past Medical History


Cardiac Medical History: Reports: Hypertension-primary


   Denies: Myocardial Infarction


Pulmonary Medical History: 


   Denies: Asthma


Neurological Medical History: 


   Denies: Seizures


Renal/ Medical History: Reports: Chronic Kidney Disease Stage III


Malignancy Medical History: Reports: Other - Prostate cancer


GI Medical History: 


   Denies: Hepatitis, Hiatal Hernia


Musculoskeltal Medical History: Reports: Gout





Past Surgical History


Past Surgical History: Reports: Orthopedic Surgery - lt knee replacement, 

Tonsillectomy, Other - Prostate biopsy, fiducial placement


   Denies: Pacemaker





Social History


Lives with: Family - 1


Smoking Status: Never Smoker


Frequency of Alcohol Use: None


Hx Recreational Drug Use: No


Drugs: None


Hx Prescription Drug Abuse: No





- Advance Directive


Resuscitation Status: Full Code





Family History


Parental Family History Reviewed: Yes - Negative for CKD.


Children Family History Reviewed: No


Sibling(s) Family History Reviewed.: No





Medication/Allergy


Home Medications: 








Allopurinol [Zyloprim 300 mg Tablet] 300 mg PO DAILY 11/08/18 


Atorvastatin Calcium [Lipitor 10 mg Tablet] 10 mg PO QHS 11/08/18 


Cetirizine HCl [Zyrtec 10 mg Tablet] 10 mg PO DAILY 11/08/18 


Fluticasone Propionate [Flonase Nasal Spray 50 Mcg/Spray 16 gm] 1 spray NASL 

BID 11/08/18 


Hydralazine HCl [Apresoline 50 mg Tablet] 100 mg PO Q8 11/08/18 


Latanoprost/Pf [Latanoprost 0.005% Eye Drop] 1 drop OS QHS 11/08/18 


Lisinopril [Prinivil 40 mg Tablet] 40 mg PO DAILY 11/08/18 


Tamsulosin HCl [Flomax 0.4 mg Cap.sr] 0.4 mg PO DAILY 11/08/18 








Allergies/Adverse Reactions: 


 





No Known Allergies Allergy (Verified 10/04/18 03:20)


 











Review of Systems


Constitutional: PRESENT: anorexia, chills, fatigue, night sweats, weakness, 

weight loss.  ABSENT: fever(s), headache(s)


Nose, Mouth, and Throat: ABSENT: mouth pain, sore throat


Cardiovascular: ABSENT: chest pain, dyspnea on exertion, edema, orthropnea, 

palpitations


Gastrointestinal: ABSENT: abdominal pain, coffee ground emesis, diarrhea, 

dysphagia, heartburn, hematemesis, hematochezia, melena, vomiting


Genitourinary: ABSENT: difficulty urinating, dysuria, hematuria


Integumentary: ABSENT: erythema, lesions, pruritus, rash


Neurological: PRESENT: numbness.  ABSENT: abnormal movements, abnormal speech, 

focal weakness


Hematologic/Lymphatic: ABSENT: easy bleeding, easy bruising, lymphadenopathy





Physical Exam


Vital Signs: 


 











Temp Pulse Resp BP Pulse Ox


 


 99.1 F   99   20   147/64 H  97 


 


 11/12/18 07:36  11/12/18 07:36  11/12/18 07:36  11/12/18 07:36  11/12/18 08:41








 Intake & Output











 11/11/18 11/12/18 11/13/18





 06:59 06:59 06:59


 


Intake Total 1584 960 


 


Output Total 1875 775 


 


Balance -291 185 


 


Weight 83.9 kg 82.2 kg 











General appearance: PRESENT: no acute distress


Eye exam: PRESENT: EOMI.  ABSENT: nystagmus


Ear exam: PRESENT: normal external ear exam


Mouth exam: PRESENT: neck supple.  ABSENT: moist


Neck exam: ABSENT: lymphadenopathy, meningismus, tenderness, thyromegaly, 

tracheal deviation


Respiratory exam: PRESENT: clear to auscultation venessa.  ABSENT: crackles


Cardiovascular exam: PRESENT: +S1, +S2, systolic murmur


GI/Abdominal exam: PRESENT: normal bowel sounds, soft.  ABSENT: organomegaly, 

tenderness


Extremities exam: ABSENT: pedal edema


Neurological exam: PRESENT: alert, awake, oriented to person, oriented to place


Psychiatric exam: PRESENT: appropriate affect


Skin exam: ABSENT: cyanosis, mottled, rash





Results


Laboratory Results: 


 





 11/10/18 11:00 





 11/11/18 16:50 





 











  11/11/18





  16:50


 


Sodium  129.7 L


 


Potassium  4.8


 


Chloride  95 L


 


Carbon Dioxide  22


 


Anion Gap  13


 


BUN  16


 


Creatinine  1.06


 


Est GFR ( Amer)  > 60


 


Est GFR (Non-Af Amer)  > 60


 


Glucose  154 H


 


Calcium  7.7 L








 











  11/08/18





  06:47


 


Creatine Kinase  209 H











Impressions: 


 





Abdomen/Pelvis CT  11/08/18 01:37


IMPRESSION:


 


Pulmonary nodules in the right lung base measuring up to 8 mm,


concerning for metastatic disease given the history of prostate


cancer. No evidence of bony metastatic disease.


Bilateral pleural effusions.


Enlarged prostate gland.


Bilateral inguinal hernias containing short segments of small


bowel with no evidence of an obstruction


 


TECHNICAL DOCUMENTATION:


 


Quality ID # [id number]: Final reports with documentation of one or more


dose reduction techniques (e.g., Automated exposure control,


adjustment of the mA and/or kV according to patient size, use of


iterative reconstruction technique)


 


 2011 [employer]- All Rights Reserved


 








Body Scan Nuclear Medicine  11/09/18 00:00


IMPRESSION:  New diffuse bony foci of increased uptake worrisome for metastatic 

disease.


 








Chest CT  11/09/18 00:00


IMPRESSION:  New small bilateral pleural effusions


New 6 mm posterior left upper lobe pulmonary nodule, 8 mm nodule periphery 

right upper lobe


New subtle smudgy sclerotic foci are present throughout the ribs and thoracic 

spine which correlate with new foci of increased uptake on bone scan today, 

worrisome for bony metastatic involvement


 














Assessment & Plan





- Diagnosis


(1) Acute hyponatremia


Is this a current diagnosis for this admission?: Yes   


Plan: 


Patient has asymptomatic euvolemic hyponatremia.  This is new for him.  Labs do 

not reveal a clear pattern for SIADH even though his metastatic CA of the 

prostate to the lungs and bones might make one think about that as a first 

differential.  I believe he has got a mixed pattern.  His excessive amount of 

fluid intake in combination with his metastatic cancer could be a factor.  

Advised the patient about limiting fluid intake.  I am going to start him on 

gentle hydration and and watch over him and repeat his osmolality and similar 

labs again in the morning.








(2) Leukocytosis


Qualifiers: 


   Leukocytosis type: unspecified   Qualified Code(s): D72.829 - Elevated white 

blood cell count, unspecified   


Is this a current diagnosis for this admission?: Yes   


Plan: 


Unknown etiology.  However his recent surgical procedure for placement of 

pellets for his radiation could be a factor.  Patient is on antibiotics.  

Monitor.








(3) Prostate cancer


Is this a current diagnosis for this admission?: Yes   


Plan: 


Now with bone and lung metastasis.  As per Dr. Rizvi.








(4) CKD (chronic kidney disease) stage 3, GFR 30-59 ml/min


Is this a current diagnosis for this admission?: Yes   


Plan: 


Stable with improving renal numbers with good hydration.








(5) Peripheral neuropathy


Plan: 


Unexplained. We will get basic diagnostics.

## 2018-11-14 LAB
%HYPO/RBC NFR BLD AUTO: (no result) %
ABSOLUTE LYMPHOCYTES# (MANUAL): 1 10^3/UL (ref 0.5–4.7)
ABSOLUTE MONOCYTES # (MANUAL): 1.4 10^3/UL (ref 0.1–1.4)
ABSOLUTE NEUTROPHILS# (MANUAL): 17.2 10^3/UL (ref 1.7–8.2)
ADD MANUAL DIFF: YES
ALBUMIN SERPL-MCNC: 2.5 G/DL (ref 3.5–5)
ALP SERPL-CCNC: 322 U/L (ref 38–126)
ALT SERPL-CCNC: 33 U/L (ref 21–72)
ANION GAP SERPL CALC-SCNC: 11 MMOL/L (ref 5–19)
AST SERPL-CCNC: 121 U/L (ref 17–59)
BASOPHILS NFR BLD MANUAL: 0 % (ref 0–2)
BILIRUB DIRECT SERPL-MCNC: 0.5 MG/DL (ref 0–0.4)
BILIRUB SERPL-MCNC: 0.6 MG/DL (ref 0.2–1.3)
BUN SERPL-MCNC: 27 MG/DL (ref 7–20)
CALCIUM: 7.6 MG/DL (ref 8.4–10.2)
CHLORIDE SERPL-SCNC: 98 MMOL/L (ref 98–107)
CO2 SERPL-SCNC: 23 MMOL/L (ref 22–30)
EOSINOPHIL NFR BLD MANUAL: 0 % (ref 0–6)
ERYTHROCYTE [DISTWIDTH] IN BLOOD BY AUTOMATED COUNT: 17.1 % (ref 11.5–14)
GLUCOSE SERPL-MCNC: 147 MG/DL (ref 75–110)
HCT VFR BLD CALC: 23.7 % (ref 37.9–51)
HGB BLD-MCNC: 7.8 G/DL (ref 13.5–17)
MCH RBC QN AUTO: 25.1 PG (ref 27–33.4)
MCHC RBC AUTO-ENTMCNC: 33 G/DL (ref 32–36)
MCV RBC AUTO: 76 FL (ref 80–97)
MONOCYTES % (MANUAL): 7 % (ref 3–13)
NEUTS HYPERSEG BLD QL SMEAR: PRESENT
OSMOLALITY,URINE: 388 MOSM/KG (ref 300–900)
OVALOCYTES BLD QL SMEAR: SLIGHT
PLATELET # BLD: 297 10^3/UL (ref 150–450)
PLATELET COMMENT: ADEQUATE
POIKILOCYTOSIS BLD QL SMEAR: SLIGHT
POTASSIUM SERPL-SCNC: 4.5 MMOL/L (ref 3.6–5)
PROT SERPL-MCNC: 5.4 G/DL (ref 6.3–8.2)
RBC # BLD AUTO: 3.12 10^6/UL (ref 4.35–5.55)
SCHISTOCYTES BLD QL SMEAR: SLIGHT
SEGMENTED NEUTROPHILS % (MAN): 88 % (ref 42–78)
SODIUM SERPL-SCNC: 132.1 MMOL/L (ref 137–145)
TOTAL CELLS COUNTED BLD: 100
URINE SODIUM: 8 MMOL/L (ref 30–90)
VARIANT LYMPHS NFR BLD MANUAL: 5 % (ref 13–45)
WBC # BLD AUTO: 19.6 10^3/UL (ref 4–10.5)

## 2018-11-14 PROCEDURE — 30233N1 TRANSFUSION OF NONAUTOLOGOUS RED BLOOD CELLS INTO PERIPHERAL VEIN, PERCUTANEOUS APPROACH: ICD-10-PCS | Performed by: INTERNAL MEDICINE

## 2018-11-14 RX ADMIN — ASPIRIN SCH MG: 81 TABLET, CHEWABLE ORAL at 10:45

## 2018-11-14 RX ADMIN — LACTULOSE SCH: 20 SOLUTION ORAL at 13:53

## 2018-11-14 RX ADMIN — BICALUTAMIDE SCH MG: 50 TABLET, FILM COATED ORAL at 10:50

## 2018-11-14 RX ADMIN — HEPARIN SODIUM SCH UNIT: 5000 INJECTION, SOLUTION INTRAVENOUS; SUBCUTANEOUS at 06:49

## 2018-11-14 RX ADMIN — SERTRALINE HYDROCHLORIDE SCH MG: 50 TABLET ORAL at 10:44

## 2018-11-14 RX ADMIN — ACETAMINOPHEN PRN MG: 325 TABLET ORAL at 13:52

## 2018-11-14 RX ADMIN — HYDRALAZINE HYDROCHLORIDE SCH MG: 50 TABLET, FILM COATED ORAL at 21:44

## 2018-11-14 RX ADMIN — GABAPENTIN SCH MG: 300 CAPSULE ORAL at 10:44

## 2018-11-14 RX ADMIN — GABAPENTIN SCH MG: 300 CAPSULE ORAL at 18:26

## 2018-11-14 RX ADMIN — LACTULOSE SCH: 20 SOLUTION ORAL at 06:50

## 2018-11-14 RX ADMIN — DEXAMETHASONE SODIUM PHOSPHATE PRN MG: 10 INJECTION INTRAMUSCULAR; INTRAVENOUS at 08:54

## 2018-11-14 RX ADMIN — HYDROCODONE BITARTRATE AND ACETAMINOPHEN PRN TAB: 5; 325 TABLET ORAL at 10:45

## 2018-11-14 RX ADMIN — HEPARIN SODIUM SCH UNIT: 5000 INJECTION, SOLUTION INTRAVENOUS; SUBCUTANEOUS at 18:26

## 2018-11-14 RX ADMIN — HYDRALAZINE HYDROCHLORIDE SCH MG: 50 TABLET, FILM COATED ORAL at 06:49

## 2018-11-14 RX ADMIN — HEPARIN SODIUM SCH: 5000 INJECTION, SOLUTION INTRAVENOUS; SUBCUTANEOUS at 21:48

## 2018-11-14 RX ADMIN — SODIUM CHLORIDE TAB 1 GM SCH GM: 1 TAB at 10:49

## 2018-11-14 RX ADMIN — LACTULOSE SCH: 20 SOLUTION ORAL at 21:48

## 2018-11-14 RX ADMIN — SODIUM CHLORIDE TAB 1 GM SCH GM: 1 TAB at 21:45

## 2018-11-14 RX ADMIN — HYDRALAZINE HYDROCHLORIDE SCH MG: 50 TABLET, FILM COATED ORAL at 13:52

## 2018-11-14 RX ADMIN — AMLODIPINE BESYLATE SCH MG: 10 TABLET ORAL at 10:44

## 2018-11-14 RX ADMIN — HYDROCODONE BITARTRATE AND ACETAMINOPHEN PRN TAB: 5; 325 TABLET ORAL at 22:22

## 2018-11-14 RX ADMIN — LEVOFLOXACIN SCH MG: 500 TABLET, FILM COATED ORAL at 10:44

## 2018-11-14 NOTE — PDOC PROGRESS REPORT
Subjective


Progress Note for:: 11/14/18


Subjective:: 


Had long discussion w/ pt and Dr. Schwartz, pt feels very weak so yesterday PT

/OT eval pt and feel that there has been significant functional decline, 

suggest rehab placement. 





Discussed w/ pt that he would need to wait until d/c from rehab to receive 

active treatment for his prostate ca but we can start casodex orally daily 

which is inexpensive and can be continued thru rehab. We can continue outpt 

[facility] once d/c from rehab.





Reason For Visit: 


FEVER OF UNKNOWN ORIGIN METASTIC PROSTATE CANCER








Physical Exam


Vital Signs: 


 











Temp Pulse Resp BP Pulse Ox


 


 98.6 F   91   14   117/54 L  95 


 


 11/14/18 03:07  11/14/18 03:07  11/14/18 03:07  11/14/18 03:07  11/14/18 03:07








 Intake & Output











 11/13/18 11/14/18 11/15/18





 06:59 06:59 06:59


 


Intake Total 820 1092 


 


Output Total 900 850 


 


Balance -80 242 


 


Weight 82.2 kg 83.2 kg 











General appearance: PRESENT: no acute distress, well-developed, well-nourished


Head exam: PRESENT: atraumatic, normocephalic


Eye exam: PRESENT: conjunctiva pink, EOMI, PERRLA.  ABSENT: scleral icterus


Ear exam: PRESENT: normal external ear exam


Mouth exam: PRESENT: moist, tongue midline


Neck exam: ABSENT: carotid bruit, JVD, lymphadenopathy, thyromegaly


Respiratory exam: PRESENT: clear to auscultation venessa.  ABSENT: rales, rhonchi, 

wheezes


Cardiovascular exam: PRESENT: RRR.  ABSENT: diastolic murmur, rubs, systolic 

murmur


Pulses: PRESENT: normal dorsalis pedis pul


Vascular exam: PRESENT: normal capillary refill


GI/Abdominal exam: PRESENT: normal bowel sounds, soft.  ABSENT: distended, 

guarding, mass, organolmegaly, rebound, tenderness


Rectal exam: PRESENT: deferred


Extremities exam: PRESENT: full ROM.  ABSENT: calf tenderness, clubbing, pedal 

edema


Neurological exam: PRESENT: alert, awake, oriented to person, oriented to place

, oriented to time, oriented to situation, CN II-XII grossly intact.  ABSENT: 

motor sensory deficit


Psychiatric exam: PRESENT: appropriate affect, normal mood.  ABSENT: homicidal 

ideation, suicidal ideation


Skin exam: PRESENT: dry, intact, warm.  ABSENT: cyanosis, rash





Results


Laboratory Results: 


 





 11/14/18 05:16 





 











  11/13/18 11/13/18 11/14/18





  05:20 05:20 05:16


 


WBC    19.6 H


 


RBC    3.12 L


 


Hgb    7.8 L


 


Hct    23.7 L


 


MCV    76 L


 


MCH    25.1 L


 


MCHC    33.0


 


RDW    17.1 H


 


Plt Count    297


 


Seg Neutrophils %    Not Reportable


 


Lymphocytes %    Not Reportable


 


Monocytes %    Not Reportable


 


Eosinophils %    Not Reportable


 


Basophils %    Not Reportable


 


Absolute Neutrophils    Not Reportable


 


Absolute Lymphocytes    Not Reportable


 


Absolute Monocytes    Not Reportable


 


Absolute Eosinophils    Not Reportable


 


Absolute Basophils    Not Reportable


 


Serum Osmolality   270 L 


 


Ferritin  > 1000.00 H  


 


Vitamin B12  349.0  


 


Folate  5.11  








 





11/08/18 04:54   Blood   Blood Culture - Final


                            NO GROWTH IN 5 DAYS





 











  11/08/18





  06:47


 


Creatine Kinase  209 H











Impressions: 


 





Abdomen/Pelvis CT  11/08/18 01:37


IMPRESSION:


 


Pulmonary nodules in the right lung base measuring up to 8 mm,


concerning for metastatic disease given the history of prostate


cancer. No evidence of bony metastatic disease.


Bilateral pleural effusions.


Enlarged prostate gland.


Bilateral inguinal hernias containing short segments of small


bowel with no evidence of an obstruction


 


TECHNICAL DOCUMENTATION:


 


Quality ID # 436: Final reports with documentation of one or more


dose reduction techniques (e.g., Automated exposure control,


adjustment of the mA and/or kV according to patient size, use of


iterative reconstruction technique)


 


 2011 [employer]- All Rights Reserved


 








Body Scan Nuclear Medicine  11/09/18 00:00


IMPRESSION:  New diffuse bony foci of increased uptake worrisome for metastatic 

disease.


 








Chest CT  11/09/18 00:00


IMPRESSION:  New small bilateral pleural effusions


New 6 mm posterior left upper lobe pulmonary nodule, 8 mm nodule periphery 

right upper lobe


New subtle smudgy sclerotic foci are present throughout the ribs and thoracic 

spine which correlate with new foci of increased uptake on bone scan today, 

worrisome for bony metastatic involvement


 














Assessment & Plan





- Diagnosis


(1) Prostate cancer


Is this a current diagnosis for this admission?: Yes   


Plan: 


Start casodex today, plan for D/c to rehab and f/u in our office post rehab dc








(2) Anemia, chronic disease


Is this a current diagnosis for this admission?: Yes   


Plan: 


Appears to be ACD, possibly related to prostate ca, hb down to 7.8 will give 

transfusion w/ 2 units PRBC.








- Time


Time Spent with patient: 35 or more minutes





- Inpatient Certification


Based on my medical assessment, after consideration of the patient's 

comorbidities, presenting symptoms, or acuity I expect that the services needed 

warrant INPATIENT care.: Yes


I certify that my determination is in accordance with my understanding of 

Medicare's requirements for reasonable and necessary INPATIENT services [42 CFR 

412.3e].: Yes

## 2018-11-14 NOTE — PDOC PROGRESS REPORT
Subjective


Progress Note for:: 11/14/18


Reason For Visit: 


Patient seen today.  He looks and feels somewhat better than yesterday.  He is 

smiling and not acting depressed like he was yesterday.  He states his weakness 

is also some better.  He denies any history of chest pain or shortness of 

breath.  No history of any fever or chills.  He still has symptoms of 

peripheral neuropathy affecting his lower extremities. Labs and medications 

were reviewed with the patient.  His sodium is improved today to 132.








Physical Exam


Vital Signs: 


 











Temp Pulse Resp BP Pulse Ox


 


 97.9 F   75   16   116/46 L  100 


 


 11/14/18 11:28  11/14/18 11:28  11/14/18 11:28  11/14/18 11:28  11/14/18 11:28








 Intake & Output











 11/13/18 11/14/18 11/15/18





 06:59 06:59 06:59


 


Intake Total 820 1092 0


 


Output Total 900 850 


 


Balance -80 242 0


 


Weight 82.2 kg 83.2 kg 











General appearance: PRESENT: no acute distress


Respiratory exam: PRESENT: clear to auscultation venessa.  ABSENT: crackles


Cardiovascular exam: PRESENT: +S1, +S2, systolic murmur


GI/Abdominal exam: PRESENT: normal bowel sounds, soft.  ABSENT: organomegaly, 

tenderness


Extremities exam: PRESENT: pedal edema


Neurological exam: PRESENT: alert, awake, oriented to person, oriented to place


Skin exam: ABSENT: cyanosis, erythema, rash





Results


Laboratory Results: 


 





 11/14/18 05:16 





 11/14/18 05:16 





 











  11/13/18 11/14/18 11/14/18





  05:20 03:10 05:16


 


WBC    19.6 H


 


RBC    3.12 L


 


Hgb    7.8 L


 


Hct    23.7 L


 


MCV    76 L


 


MCH    25.1 L


 


MCHC    33.0


 


RDW    17.1 H


 


Plt Count    297


 


Seg Neutrophils %    Not Reportable


 


Lymphocytes %    Not Reportable


 


Monocytes %    Not Reportable


 


Eosinophils %    Not Reportable


 


Basophils %    Not Reportable


 


Absolute Neutrophils    Not Reportable


 


Absolute Lymphocytes    Not Reportable


 


Absolute Monocytes    Not Reportable


 


Absolute Eosinophils    Not Reportable


 


Absolute Basophils    Not Reportable


 


Sodium   


 


Potassium   


 


Chloride   


 


Carbon Dioxide   


 


Anion Gap   


 


BUN   


 


Creatinine   


 


Est GFR ( Amer)   


 


Est GFR (Non-Af Amer)   


 


Glucose   


 


Calcium   


 


Magnesium   


 


Ferritin  2580.00 H  


 


Total Bilirubin   


 


AST   


 


ALT   


 


Alkaline Phosphatase   


 


Total Protein   


 


Albumin   


 


Urine Osmolality   388 


 


Blood Type   


 


Antibody Screen   














  11/14/18 11/14/18





  05:16 11:12


 


WBC  


 


RBC  


 


Hgb  


 


Hct  


 


MCV  


 


MCH  


 


MCHC  


 


RDW  


 


Plt Count  


 


Seg Neutrophils %  


 


Lymphocytes %  


 


Monocytes %  


 


Eosinophils %  


 


Basophils %  


 


Absolute Neutrophils  


 


Absolute Lymphocytes  


 


Absolute Monocytes  


 


Absolute Eosinophils  


 


Absolute Basophils  


 


Sodium  132.1 L 


 


Potassium  4.5 


 


Chloride  98 


 


Carbon Dioxide  23 


 


Anion Gap  11 


 


BUN  27 H 


 


Creatinine  1.36 H 


 


Est GFR ( Amer)  > 60 


 


Est GFR (Non-Af Amer)  51 L 


 


Glucose  147 H 


 


Calcium  7.6 L 


 


Magnesium  1.9 


 


Ferritin  


 


Total Bilirubin  0.6 


 


AST  121 H 


 


ALT  33 


 


Alkaline Phosphatase  322 H 


 


Total Protein  5.4 L 


 


Albumin  2.5 L 


 


Urine Osmolality  


 


Blood Type   O POSITIVE


 


Antibody Screen   NEGATIVE








 











  11/08/18





  06:47


 


Creatine Kinase  209 H











Impressions: 


 





Abdomen/Pelvis CT  11/08/18 01:37


IMPRESSION:


 


Pulmonary nodules in the right lung base measuring up to 8 mm,


concerning for metastatic disease given the history of prostate


cancer. No evidence of bony metastatic disease.


Bilateral pleural effusions.


Enlarged prostate gland.


Bilateral inguinal hernias containing short segments of small


bowel with no evidence of an obstruction


 


TECHNICAL DOCUMENTATION:


 


Quality ID # 436: Final reports with documentation of one or more


dose reduction techniques (e.g., Automated exposure control,


adjustment of the mA and/or kV according to patient size, use of


iterative reconstruction technique)


 


 2011 [employer]- All Rights Reserved


 








Body Scan Nuclear Medicine  11/09/18 00:00


IMPRESSION:  New diffuse bony foci of increased uptake worrisome for metastatic 

disease.


 








Chest CT  11/09/18 00:00


IMPRESSION:  New small bilateral pleural effusions


New 6 mm posterior left upper lobe pulmonary nodule, 8 mm nodule periphery 

right upper lobe


New subtle smudgy sclerotic foci are present throughout the ribs and thoracic 

spine which correlate with new foci of increased uptake on bone scan today, 

worrisome for bony metastatic involvement


 














Assessment & Plan





- Diagnosis


(1) Acute hyponatremia


Is this a current diagnosis for this admission?: Yes   


Plan: 


He is showing improvement of his sodium.  His latest sodium today is 132.  He 

is now showing signs of mild fluid overload.  We will therefore discontinue the 

normal saline and advised him to p.o. hydration appropriately.  Continue to 

monitor.








(2) Leukocytosis


Qualifiers: 


   Leukocytosis type: unspecified   Qualified Code(s): D72.829 - Elevated white 

blood cell count, unspecified   


Is this a current diagnosis for this admission?: Yes   


Plan: 


As per Ramses Guadalupe MD.








(3) Prostate cancer


Is this a current diagnosis for this admission?: Yes   


Plan: 


With bony and lung metastasis.  Management as per Dr. Rizvi.








(4) CKD (chronic kidney disease) stage 3, GFR 30-59 ml/min


Is this a current diagnosis for this admission?: Yes   


Plan: 


Stable.  Stay on current guidelines.








(5) Peripheral neuropathy


Plan: 


Normal B12.  Unexplained etiology for his new onset peripheral neuropathy.

## 2018-11-15 VITALS — DIASTOLIC BLOOD PRESSURE: 52 MMHG | SYSTOLIC BLOOD PRESSURE: 134 MMHG

## 2018-11-15 LAB
ABSOLUTE LYMPHOCYTES# (MANUAL): 0.8 10^3/UL (ref 0.5–4.7)
ABSOLUTE MONOCYTES # (MANUAL): 2 10^3/UL (ref 0.1–1.4)
ABSOLUTE NEUTROPHILS# (MANUAL): 14 10^3/UL (ref 1.7–8.2)
ADD MANUAL DIFF: YES
ANION GAP SERPL CALC-SCNC: 16 MMOL/L (ref 5–19)
ANISOCYTOSIS BLD QL SMEAR: (no result)
BASOPHILS NFR BLD MANUAL: 0 % (ref 0–2)
BUN SERPL-MCNC: 39 MG/DL (ref 7–20)
CALCIUM: 7.5 MG/DL (ref 8.4–10.2)
CHLORIDE SERPL-SCNC: 97 MMOL/L (ref 98–107)
CO2 SERPL-SCNC: 19 MMOL/L (ref 22–30)
EOSINOPHIL NFR BLD MANUAL: 0 % (ref 0–6)
ERYTHROCYTE [DISTWIDTH] IN BLOOD BY AUTOMATED COUNT: 17.1 % (ref 11.5–14)
GLUCOSE SERPL-MCNC: 167 MG/DL (ref 75–110)
HCT VFR BLD CALC: 27.4 % (ref 37.9–51)
HGB BLD-MCNC: 9.2 G/DL (ref 13.5–17)
MCH RBC QN AUTO: 25.5 PG (ref 27–33.4)
MCHC RBC AUTO-ENTMCNC: 33.4 G/DL (ref 32–36)
MCV RBC AUTO: 77 FL (ref 80–97)
MONOCYTES % (MANUAL): 12 % (ref 3–13)
NEUTS BAND NFR BLD MANUAL: 1 % (ref 3–5)
PLATELET # BLD: 277 10^3/UL (ref 150–450)
PLATELET COMMENT: ADEQUATE
PLATELET LARGE: PRESENT
POIKILOCYTOSIS BLD QL SMEAR: SLIGHT
POLYCHROMASIA BLD QL SMEAR: SLIGHT
POTASSIUM SERPL-SCNC: 4.8 MMOL/L (ref 3.6–5)
RBC # BLD AUTO: 3.58 10^6/UL (ref 4.35–5.55)
SCHISTOCYTES BLD QL SMEAR: SLIGHT
SEGMENTED NEUTROPHILS % (MAN): 82 % (ref 42–78)
SMUDGE CELLS # BLD: PRESENT 10*3/UL
SODIUM SERPL-SCNC: 132.3 MMOL/L (ref 137–145)
TOTAL CELLS COUNTED BLD: 100
TOXIC GRANULES BLD QL SMEAR: (no result)
VARIANT LYMPHS NFR BLD MANUAL: 5 % (ref 13–45)
WBC # BLD AUTO: 16.9 10^3/UL (ref 4–10.5)
WBC TOXIC VACUOLES BLD QL SMEAR: PRESENT

## 2018-11-15 RX ADMIN — HEPARIN SODIUM SCH UNIT: 5000 INJECTION, SOLUTION INTRAVENOUS; SUBCUTANEOUS at 06:36

## 2018-11-15 RX ADMIN — HYDROCODONE BITARTRATE AND ACETAMINOPHEN PRN TAB: 5; 325 TABLET ORAL at 06:35

## 2018-11-15 RX ADMIN — LEVOFLOXACIN SCH MG: 500 TABLET, FILM COATED ORAL at 10:39

## 2018-11-15 RX ADMIN — HYDRALAZINE HYDROCHLORIDE SCH MG: 50 TABLET, FILM COATED ORAL at 06:35

## 2018-11-15 RX ADMIN — AMLODIPINE BESYLATE SCH MG: 10 TABLET ORAL at 10:39

## 2018-11-15 RX ADMIN — BICALUTAMIDE SCH MG: 50 TABLET, FILM COATED ORAL at 10:39

## 2018-11-15 RX ADMIN — GABAPENTIN SCH MG: 300 CAPSULE ORAL at 10:39

## 2018-11-15 RX ADMIN — LACTULOSE SCH: 20 SOLUTION ORAL at 13:55

## 2018-11-15 RX ADMIN — ASPIRIN SCH MG: 81 TABLET, CHEWABLE ORAL at 10:39

## 2018-11-15 RX ADMIN — HEPARIN SODIUM SCH: 5000 INJECTION, SOLUTION INTRAVENOUS; SUBCUTANEOUS at 13:55

## 2018-11-15 RX ADMIN — SODIUM CHLORIDE TAB 1 GM SCH GM: 1 TAB at 10:39

## 2018-11-15 RX ADMIN — HYDRALAZINE HYDROCHLORIDE SCH MG: 50 TABLET, FILM COATED ORAL at 14:12

## 2018-11-15 RX ADMIN — SERTRALINE HYDROCHLORIDE SCH MG: 50 TABLET ORAL at 10:39

## 2018-11-15 RX ADMIN — LACTULOSE SCH: 20 SOLUTION ORAL at 06:31

## 2018-11-15 NOTE — PDOC PROGRESS REPORT
Subjective


Progress Note for:: 11/15/18


Subjective:: 


Pt feeling better this am, D/C planning notes there is bed offer from [place]. 

We will give pt and daughter f/u appt date with our office. Con't casodex.





Reason For Visit: 


FEVER OF UNKNOWN ORIGIN METASTIC PROSTATE CANCER








Physical Exam


Vital Signs: 


 











Temp Pulse Resp BP Pulse Ox


 


 98.5 F   82   18   129/58 H  93 


 


 11/15/18 01:33  11/15/18 02:00  11/15/18 01:33  11/15/18 01:33  11/15/18 01:33








 Intake & Output











 11/14/18 11/15/18 11/16/18





 06:59 06:59 06:59


 


Intake Total 1092 2010 


 


Output Total 850 500 


 


Balance 242 1510 


 


Weight 83.2 kg 81.4 kg 











General appearance: PRESENT: no acute distress, well-developed, well-nourished


Head exam: PRESENT: atraumatic, normocephalic


Eye exam: PRESENT: conjunctiva pink, EOMI, PERRLA.  ABSENT: scleral icterus


Ear exam: PRESENT: normal external ear exam


Mouth exam: PRESENT: moist, tongue midline


Neck exam: ABSENT: carotid bruit, JVD, lymphadenopathy, thyromegaly


Respiratory exam: PRESENT: clear to auscultation venessa.  ABSENT: rales, rhonchi, 

wheezes


Cardiovascular exam: PRESENT: RRR.  ABSENT: diastolic murmur, rubs, systolic 

murmur


Pulses: PRESENT: normal dorsalis pedis pul


Vascular exam: PRESENT: normal capillary refill


GI/Abdominal exam: PRESENT: normal bowel sounds, soft.  ABSENT: distended, 

guarding, mass, organolmegaly, rebound, tenderness


Rectal exam: PRESENT: deferred


Extremities exam: PRESENT: full ROM.  ABSENT: calf tenderness, clubbing, pedal 

edema


Neurological exam: PRESENT: alert, awake, oriented to person, oriented to place

, oriented to time, oriented to situation, CN II-XII grossly intact.  ABSENT: 

motor sensory deficit


Psychiatric exam: PRESENT: appropriate affect, normal mood.  ABSENT: homicidal 

ideation, suicidal ideation


Skin exam: PRESENT: dry, intact, warm.  ABSENT: cyanosis, rash





Results


Laboratory Results: 


 





 11/15/18 03:52 





 11/15/18 03:52 





 











  11/13/18 11/14/18 11/14/18





  05:20 03:10 05:16


 


WBC   


 


RBC   


 


Hgb   


 


Hct   


 


MCV   


 


MCH   


 


MCHC   


 


RDW   


 


Plt Count   


 


Seg Neutrophils %   


 


Lymphocytes %   


 


Monocytes %   


 


Eosinophils %   


 


Basophils %   


 


Absolute Neutrophils   


 


Absolute Lymphocytes   


 


Absolute Monocytes   


 


Absolute Eosinophils   


 


Absolute Basophils   


 


Sodium    132.1 L


 


Potassium    4.5


 


Chloride    98


 


Carbon Dioxide    23


 


Anion Gap    11


 


BUN    27 H


 


Creatinine    1.36 H


 


Est GFR ( Amer)    > 60


 


Est GFR (Non-Af Amer)    51 L


 


Glucose    147 H


 


Calcium    7.6 L


 


Magnesium    1.9


 


Ferritin  2580.00 H  


 


Total Bilirubin    0.6


 


AST    121 H


 


ALT    33


 


Alkaline Phosphatase    322 H


 


Total Protein    5.4 L


 


Albumin    2.5 L


 


Urine Osmolality   388 


 


Blood Type   


 


Antibody Screen   














  11/14/18 11/15/18 11/15/18





  11:12 03:52 03:52


 


WBC    16.9 H


 


RBC    3.58 L


 


Hgb    9.2 L


 


Hct    27.4 L


 


MCV    77 L


 


MCH    25.5 L


 


MCHC    33.4


 


RDW    17.1 H


 


Plt Count    277


 


Seg Neutrophils %    Not Reportable


 


Lymphocytes %    Not Reportable


 


Monocytes %    Not Reportable


 


Eosinophils %    Not Reportable


 


Basophils %    Not Reportable


 


Absolute Neutrophils    Not Reportable


 


Absolute Lymphocytes    Not Reportable


 


Absolute Monocytes    Not Reportable


 


Absolute Eosinophils    Not Reportable


 


Absolute Basophils    Not Reportable


 


Sodium   132.3 L 


 


Potassium   4.8 


 


Chloride   97 L 


 


Carbon Dioxide   19 L 


 


Anion Gap   16 


 


BUN   39 H 


 


Creatinine   1.61 H 


 


Est GFR ( Amer)   51 L 


 


Est GFR (Non-Af Amer)   42 L 


 


Glucose   167 H 


 


Calcium   7.5 L 


 


Magnesium   1.9 


 


Ferritin   


 


Total Bilirubin   


 


AST   


 


ALT   


 


Alkaline Phosphatase   


 


Total Protein   


 


Albumin   


 


Urine Osmolality   


 


Blood Type  O POSITIVE  


 


Antibody Screen  NEGATIVE  








 











  11/08/18





  06:47


 


Creatine Kinase  209 H











Impressions: 


 





Abdomen/Pelvis CT  11/08/18 01:37


IMPRESSION:


 


Pulmonary nodules in the right lung base measuring up to 8 mm,


concerning for metastatic disease given the history of prostate


cancer. No evidence of bony metastatic disease.


Bilateral pleural effusions.


Enlarged prostate gland.


Bilateral inguinal hernias containing short segments of small


bowel with no evidence of an obstruction


 


TECHNICAL DOCUMENTATION:


 


Quality ID # 436: Final reports with documentation of one or more


dose reduction techniques (e.g., Automated exposure control,


adjustment of the mA and/or kV according to patient size, use of


iterative reconstruction technique)


 


 2011 [employer]- All Rights Reserved


 








Body Scan Nuclear Medicine  11/09/18 00:00


IMPRESSION:  New diffuse bony foci of increased uptake worrisome for metastatic 

disease.


 








Chest CT  11/09/18 00:00


IMPRESSION:  New small bilateral pleural effusions


New 6 mm posterior left upper lobe pulmonary nodule, 8 mm nodule periphery 

right upper lobe


New subtle smudgy sclerotic foci are present throughout the ribs and thoracic 

spine which correlate with new foci of increased uptake on bone scan today, 

worrisome for bony metastatic involvement


 














Assessment & Plan





- Diagnosis


(1) Prostate cancer


Is this a current diagnosis for this admission?: Yes   


Plan: 


Stage IV prostate ca, con't casodex alone until d/c from rehab, we would then 

con't androgen deprivation therapy with Lupron after d/c from rehab.








(2) Anemia, chronic disease


Is this a current diagnosis for this admission?: Yes   


Plan: 


Hb improved post transfusion, should stabilize hereafter.








- Time


Time Spent with patient: 35 or more minutes


Anticipated discharge: Acute Rehab


Within: within 24 hours

## 2018-11-15 NOTE — PDOC PROGRESS REPORT
Subjective


Progress Note for:: 11/15/18


Reason For Visit: 


Patient was seen today.  He generally feels better.He has a good appetite now.  

He had a good bowel movement today.  He denies any history of chest pain or 

shortness of breath.  Labs and medications were reviewed with the patient that 

shows stable sodium of 132.  However creatinine has gone up to 1.6 which 

according to my outpatient records is his baseline.He says he will be 

discharged to rehab soon.





Physical Exam


Vital Signs: 


 











Temp Pulse Resp BP Pulse Ox


 


 98.8 F   91   14   145/50 H  92 


 


 11/15/18 03:28  11/15/18 07:00  11/15/18 03:28  11/15/18 03:28  11/15/18 03:28








 Intake & Output











 11/14/18 11/15/18 11/16/18





 06:59 06:59 06:59


 


Intake Total 1092 2010 


 


Output Total 850 500 


 


Balance 242 1510 


 


Weight 83.2 kg 81.4 kg 











General appearance: PRESENT: no acute distress


Respiratory exam: PRESENT: clear to auscultation venessa.  ABSENT: crackles


Cardiovascular exam: PRESENT: +S1, +S2, systolic murmur


GI/Abdominal exam: PRESENT: normal bowel sounds, soft.  ABSENT: organomegaly, 

tenderness


Extremities exam: PRESENT: pedal edema


Neurological exam: PRESENT: alert, awake, oriented to person, oriented to place





Results


Laboratory Results: 


 





 11/15/18 03:52 





 11/15/18 03:52 





 











  11/14/18 11/15/18 11/15/18





  11:12 03:52 03:52


 


WBC    16.9 H


 


RBC    3.58 L


 


Hgb    9.2 L


 


Hct    27.4 L


 


MCV    77 L


 


MCH    25.5 L


 


MCHC    33.4


 


RDW    17.1 H


 


Plt Count    277


 


Seg Neutrophils %    Not Reportable


 


Lymphocytes %    Not Reportable


 


Monocytes %    Not Reportable


 


Eosinophils %    Not Reportable


 


Basophils %    Not Reportable


 


Absolute Neutrophils    Not Reportable


 


Absolute Lymphocytes    Not Reportable


 


Absolute Monocytes    Not Reportable


 


Absolute Eosinophils    Not Reportable


 


Absolute Basophils    Not Reportable


 


Sodium   132.3 L 


 


Potassium   4.8 


 


Chloride   97 L 


 


Carbon Dioxide   19 L 


 


Anion Gap   16 


 


BUN   39 H 


 


Creatinine   1.61 H 


 


Est GFR ( Amer)   51 L 


 


Est GFR (Non-Af Amer)   42 L 


 


Glucose   167 H 


 


Calcium   7.5 L 


 


Magnesium   1.9 


 


Blood Type  O POSITIVE  


 


Antibody Screen  NEGATIVE  








 











  11/08/18





  06:47


 


Creatine Kinase  209 H











Impressions: 


 





Abdomen/Pelvis CT  11/08/18 01:37


IMPRESSION:


 


Pulmonary nodules in the right lung base measuring up to 8 mm,


concerning for metastatic disease given the history of prostate


cancer. No evidence of bony metastatic disease.


Bilateral pleural effusions.


Enlarged prostate gland.


Bilateral inguinal hernias containing short segments of small


bowel with no evidence of an obstruction


 


TECHNICAL DOCUMENTATION:


 


Quality ID # [id number]: Final reports with documentation of one or more


dose reduction techniques (e.g., Automated exposure control,


adjustment of the mA and/or kV according to patient size, use of


iterative reconstruction technique)


 


 2011 [employer]- All Rights Reserved


 








Body Scan Nuclear Medicine  11/09/18 00:00


IMPRESSION:  New diffuse bony foci of increased uptake worrisome for metastatic 

disease.


 








Chest CT  11/09/18 00:00


IMPRESSION:  New small bilateral pleural effusions


New 6 mm posterior left upper lobe pulmonary nodule, 8 mm nodule periphery 

right upper lobe


New subtle smudgy sclerotic foci are present throughout the ribs and thoracic 

spine which correlate with new foci of increased uptake on bone scan today, 

worrisome for bony metastatic involvement


 














Assessment & Plan





- Diagnosis


(1) Acute hyponatremia


Is this a current diagnosis for this admission?: Yes   


Plan: 


 His latest sodium today is 132.  He is now showing signs of mild fluid 

overload.  Continue on Lasix low-dose as an outpatient as well.Advised to follow

-up with me in about 2 weeks post discharge.Advised on fluid restriction.








(2) Leukocytosis


Qualifiers: 


   Leukocytosis type: unspecified   Qualified Code(s): D72.829 - Elevated white 

blood cell count, unspecified   


Is this a current diagnosis for this admission?: Yes   





(3) Prostate cancer


Is this a current diagnosis for this admission?: Yes   


Plan: 


With bony and lung metastasis.  Management as per Dr. Rizvi.








(4) CKD (chronic kidney disease) stage 3, GFR 30-59 ml/min


Is this a current diagnosis for this admission?: Yes   


Plan: 


Latest creatinine is 1.6 which is his baseline.  Earlier numbers were most 

likely from over hydration.  We will follow-up as an outpatient.  Continue on 

present medications.

## 2018-11-15 NOTE — PDOC TRANSFER SUMMARY
General





- Admit/Disc Date/PCP


Admission Date/Primary Care Provider: 


  11/08/18 03:32





  CARLOS JACOB MD





Discharge Date: 11/15/18





- Discharge Diagnosis


(1) Hyponatremia


Is this a current diagnosis for this admission?: Yes   


Summary: 


Resolved.  Will continue current treatment.  The nephrology has instructed 

patient about the amount of fluid and salt intake








(2) Leukocytosis


Is this a current diagnosis for this admission?: Yes   


Summary: 


Improved blood cultures and urine cultures are negative.  We will continue the 

Levaquin for 5 more days








(3) Nausea & vomiting


Is this a current diagnosis for this admission?: Yes   





(4) Prostate cancer


Is this a current diagnosis for this admission?: Yes   


Summary: 


Follow-up with Dr. Rizvi after the discharge from the rehab for restarting 

his treatment








(5) CKD (chronic kidney disease) stage 3, GFR 30-59 ml/min


Is this a current diagnosis for this admission?: Yes   


Summary: 


Stable continue current treatment








(6) Hypertension


Is this a current diagnosis for this admission?: Yes   


Summary: 


Stable continue current treatment








(7) Physical deconditioning


Is this a current diagnosis for this admission?: Yes   


Summary: 


We will do physical therapy twice a day








(8) Depression


Is this a current diagnosis for this admission?: Yes   


Summary: 


Stable continue current treatment








- Additional Information


Resuscitation Status: Full Code


Discharge Diet: Regular


Discharge Activity: Activity As Tolerated


Prescriptions: 


Hydrocodone/Acetaminophen [Norco 5-325 mg Tablet] 1 tab PO Q6HP PRN #60 tablet


 PRN Reason: 


Home Medications: 








Allopurinol [Zyloprim 300 mg Tablet] 300 mg PO DAILY 11/08/18 


Atorvastatin Calcium [Lipitor 10 mg Tablet] 10 mg PO QHS 11/08/18 


Cetirizine HCl [Zyrtec 10 mg Tablet] 10 mg PO DAILY 11/08/18 


Fluticasone Propionate [Flonase Nasal Spray 50 Mcg/Spray 16 gm] 1 spray NASL 

BID 11/08/18 


Hydralazine HCl [Apresoline 50 mg Tablet] 100 mg PO Q8 11/08/18 


Latanoprost/Pf [Latanoprost 0.005% Eye Drop] 1 drop OS QHS 11/08/18 


Lisinopril [Prinivil 40 mg Tablet] 40 mg PO DAILY 11/08/18 


Tamsulosin HCl [Flomax 0.4 mg Cap.sr] 0.4 mg PO DAILY 11/08/18 


Acetaminophen [Tylenol 325 mg Tablet] 650 mg PO Q4HP PRN  tablet 11/15/18 


Amlodipine Besylate [Norvasc 10 mg Tablet] 10 mg PO DAILY  tablet 11/15/18 


Bicalutamide [Casodex 50 mg Tablet] 50 mg PO DAILY  tablet 11/15/18 


Gabapentin [Neurontin 300 mg Capsule] 300 mg PO BID  capsule 11/15/18 


Guaifenesin [Robitussin Syrup 200 mg/10 ml Ud Cup] 200 mg PO QIDP PRN  udc 11/15

/18 


Hydrocodone/Acetaminophen [Norco 5-325 mg Tablet] 1 tab PO Q6HP PRN #60 tablet 

11/15/18 


Lactulose [Cephulac Syrup 20 gm/30 ml Udcup] 6.67 gm PO Q8  udc 11/15/18 


Levofloxacin [Levaquin 500 mg Tablet] 500 mg PO DAILY #5 tablet 11/15/18 


Sertraline HCl [Zoloft 50 mg Tablet] 50 mg PO DAILY  tablet 11/15/18 


Sodium Chloride [Sodium Chloride 1 gm Tablet] 1 gm PO Q12  tablet 11/15/18 











History of Present Illness


Admission Date/PCP: 


  11/08/18 03:32





  CARLOS JACOB MD





History of Present Illness: 


SULMA MURPHY is a 72 year old male








Hospital Course


Hospital Course: 





The patient was admitted because of hyponatremia dehydration and 

deconditioning.  He has been hydrated.  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

  He was seen by the oncology nephrology.  His sodium has improved with 

rehydration and fluid restriction.  The patient appeared to be very depressed.  

He was started on Zoloft which has improved his symptoms.  Long discussion with 

the patient and the family about the need to rehab so he can restart his 

treatments after the rehab.





Physical Exam


Vital Signs: 


 











Temp Pulse Resp BP Pulse Ox


 


 98.5 F   91   18   129/58 H  93 


 


 11/15/18 01:33  11/15/18 07:00  11/15/18 01:33  11/15/18 01:33  11/15/18 01:33








 Intake & Output











 11/14/18 11/15/18 11/16/18





 06:59 06:59 06:59


 


Intake Total 1092 2010 


 


Output Total 850 500 


 


Balance 242 1510 


 


Weight 83.2 kg 81.4 kg 











General appearance: PRESENT: no acute distress


Head exam: PRESENT: atraumatic


Eye exam: PRESENT: conjunctiva pink


Neck exam: ABSENT: carotid bruit, JVD


Respiratory exam: PRESENT: clear to auscultation venessa


Cardiovascular exam: PRESENT: RRR, +S1, +S2


GI/Abdominal exam: PRESENT: normal bowel sounds, soft


Extremities exam: PRESENT: tenderness


Musculoskeletal exam: PRESENT: tenderness


Neurological exam: PRESENT: alert, awake





Results


Laboratory Results: 


 





 11/15/18 03:52 





 11/15/18 03:52 





 











  11/13/18 11/14/18 11/14/18





  05:20 05:16 11:12


 


WBC   


 


RBC   


 


Hgb   


 


Hct   


 


MCV   


 


MCH   


 


MCHC   


 


RDW   


 


Plt Count   


 


Seg Neutrophils %   


 


Lymphocytes %   


 


Monocytes %   


 


Eosinophils %   


 


Basophils %   


 


Absolute Neutrophils   


 


Absolute Lymphocytes   


 


Absolute Monocytes   


 


Absolute Eosinophils   


 


Absolute Basophils   


 


Sodium   132.1 L 


 


Potassium   4.5 


 


Chloride   98 


 


Carbon Dioxide   23 


 


Anion Gap   11 


 


BUN   27 H 


 


Creatinine   1.36 H 


 


Est GFR ( Amer)   > 60 


 


Est GFR (Non-Af Amer)   51 L 


 


Glucose   147 H 


 


Calcium   7.6 L 


 


Magnesium   1.9 


 


Ferritin  2580.00 H  


 


Total Bilirubin   0.6 


 


AST   121 H 


 


ALT   33 


 


Alkaline Phosphatase   322 H 


 


Total Protein   5.4 L 


 


Albumin   2.5 L 


 


Blood Type    O POSITIVE


 


Antibody Screen    NEGATIVE














  11/15/18 11/15/18





  03:52 03:52


 


WBC   16.9 H


 


RBC   3.58 L


 


Hgb   9.2 L


 


Hct   27.4 L


 


MCV   77 L


 


MCH   25.5 L


 


MCHC   33.4


 


RDW   17.1 H


 


Plt Count   277


 


Seg Neutrophils %   Not Reportable


 


Lymphocytes %   Not Reportable


 


Monocytes %   Not Reportable


 


Eosinophils %   Not Reportable


 


Basophils %   Not Reportable


 


Absolute Neutrophils   Not Reportable


 


Absolute Lymphocytes   Not Reportable


 


Absolute Monocytes   Not Reportable


 


Absolute Eosinophils   Not Reportable


 


Absolute Basophils   Not Reportable


 


Sodium  132.3 L 


 


Potassium  4.8 


 


Chloride  97 L 


 


Carbon Dioxide  19 L 


 


Anion Gap  16 


 


BUN  39 H 


 


Creatinine  1.61 H 


 


Est GFR ( Amer)  51 L 


 


Est GFR (Non-Af Amer)  42 L 


 


Glucose  167 H 


 


Calcium  7.5 L 


 


Magnesium  1.9 


 


Ferritin  


 


Total Bilirubin  


 


AST  


 


ALT  


 


Alkaline Phosphatase  


 


Total Protein  


 


Albumin  


 


Blood Type  


 


Antibody Screen  








 











  11/08/18





  06:47


 


Creatine Kinase  209 H











Impressions: 


 





Abdomen/Pelvis CT  11/08/18 01:37


IMPRESSION:


 


Pulmonary nodules in the right lung base measuring up to 8 mm,


concerning for metastatic disease given the history of prostate


cancer. No evidence of bony metastatic disease.


Bilateral pleural effusions.


Enlarged prostate gland.


Bilateral inguinal hernias containing short segments of small


bowel with no evidence of an obstruction


 


TECHNICAL DOCUMENTATION:


 


Quality ID # [id number]: Final reports with documentation of one or more


dose reduction techniques (e.g., Automated exposure control,


adjustment of the mA and/or kV according to patient size, use of


iterative reconstruction technique)


 


 2011 [employer]- All Rights Reserved


 








Body Scan Nuclear Medicine  11/09/18 00:00


IMPRESSION:  New diffuse bony foci of increased uptake worrisome for metastatic 

disease.


 








Chest CT  11/09/18 00:00


IMPRESSION:  New small bilateral pleural effusions


New 6 mm posterior left upper lobe pulmonary nodule, 8 mm nodule periphery 

right upper lobe


New subtle smudgy sclerotic foci are present throughout the ribs and thoracic 

spine which correlate with new foci of increased uptake on bone scan today, 

worrisome for bony metastatic involvement


 














Qualifiers





- *


PATIENT BEING DISCHARGED WITH ANY OF THE FOLLOWING DIAGNOSIS: No

## 2018-11-21 ENCOUNTER — HOSPITAL ENCOUNTER (INPATIENT)
Dept: HOSPITAL 62 - ER | Age: 73
LOS: 15 days | Discharge: NURSING, REHAB OR LONG-TERM CARE | DRG: 682 | End: 2018-12-06
Attending: INTERNAL MEDICINE | Admitting: STUDENT IN AN ORGANIZED HEALTH CARE EDUCATION/TRAINING PROGRAM
Payer: MEDICARE

## 2018-11-21 DIAGNOSIS — E44.0: ICD-10-CM

## 2018-11-21 DIAGNOSIS — I08.1: ICD-10-CM

## 2018-11-21 DIAGNOSIS — C79.51: ICD-10-CM

## 2018-11-21 DIAGNOSIS — D63.0: ICD-10-CM

## 2018-11-21 DIAGNOSIS — N17.9: Primary | ICD-10-CM

## 2018-11-21 DIAGNOSIS — I48.91: ICD-10-CM

## 2018-11-21 DIAGNOSIS — M62.82: ICD-10-CM

## 2018-11-21 DIAGNOSIS — F32.9: ICD-10-CM

## 2018-11-21 DIAGNOSIS — I13.0: ICD-10-CM

## 2018-11-21 DIAGNOSIS — C61: ICD-10-CM

## 2018-11-21 DIAGNOSIS — G93.41: ICD-10-CM

## 2018-11-21 DIAGNOSIS — N18.3: ICD-10-CM

## 2018-11-21 DIAGNOSIS — F05: ICD-10-CM

## 2018-11-21 DIAGNOSIS — E11.65: ICD-10-CM

## 2018-11-21 DIAGNOSIS — E87.0: ICD-10-CM

## 2018-11-21 DIAGNOSIS — F03.90: ICD-10-CM

## 2018-11-21 DIAGNOSIS — J91.8: ICD-10-CM

## 2018-11-21 DIAGNOSIS — Z96.652: ICD-10-CM

## 2018-11-21 DIAGNOSIS — I27.20: ICD-10-CM

## 2018-11-21 DIAGNOSIS — J02.9: ICD-10-CM

## 2018-11-21 DIAGNOSIS — E11.22: ICD-10-CM

## 2018-11-21 DIAGNOSIS — R13.10: ICD-10-CM

## 2018-11-21 DIAGNOSIS — C78.02: ICD-10-CM

## 2018-11-21 DIAGNOSIS — M10.9: ICD-10-CM

## 2018-11-21 DIAGNOSIS — D72.829: ICD-10-CM

## 2018-11-21 DIAGNOSIS — R29.810: ICD-10-CM

## 2018-11-21 DIAGNOSIS — I50.9: ICD-10-CM

## 2018-11-21 DIAGNOSIS — E86.0: ICD-10-CM

## 2018-11-21 DIAGNOSIS — Z79.899: ICD-10-CM

## 2018-11-21 DIAGNOSIS — R00.1: ICD-10-CM

## 2018-11-21 DIAGNOSIS — D63.8: ICD-10-CM

## 2018-11-21 LAB
%HYPO/RBC NFR BLD AUTO: SLIGHT %
ABSOLUTE LYMPHOCYTES# (MANUAL): 0.8 10^3/UL (ref 0.5–4.7)
ABSOLUTE MONOCYTES # (MANUAL): 1.5 10^3/UL (ref 0.1–1.4)
ABSOLUTE NEUTROPHILS# (MANUAL): 14.4 10^3/UL (ref 1.7–8.2)
ADD MANUAL DIFF: YES
ALBUMIN SERPL-MCNC: 3 G/DL (ref 3.5–5)
ALP SERPL-CCNC: 425 U/L (ref 38–126)
ALT SERPL-CCNC: 34 U/L (ref 21–72)
ANION GAP SERPL CALC-SCNC: 17 MMOL/L (ref 5–19)
ANISOCYTOSIS BLD QL SMEAR: (no result)
APPEARANCE UR: (no result)
APTT PPP: YELLOW S
ARTERIAL BLOOD FIO2: (no result)
ARTERIAL BLOOD H2CO3: 0.87 MMOL/L (ref 1.05–1.35)
ARTERIAL BLOOD HCO3: 20.3 MMOL/L (ref 20–24)
ARTERIAL BLOOD PCO2: 28.9 MMHG (ref 35–45)
ARTERIAL BLOOD PH: 7.46 (ref 7.35–7.45)
ARTERIAL BLOOD PO2: 47.1 MMHG (ref 80–100)
ARTERIAL BLOOD TOTAL CO2: 21.2 MMOL/L (ref 23–27)
AST SERPL-CCNC: 184 U/L (ref 17–59)
BARBITURATES UR QL SCN: NEGATIVE
BASE EXCESS BLDA CALC-SCNC: -2.7 MMOL/L
BASE EXCESS BLDV CALC-SCNC: -3.8 MMOL/L
BASOPHILS NFR BLD MANUAL: 0 % (ref 0–2)
BILIRUB DIRECT SERPL-MCNC: 0.6 MG/DL (ref 0–0.4)
BILIRUB SERPL-MCNC: 0.7 MG/DL (ref 0.2–1.3)
BILIRUB UR QL STRIP: NEGATIVE
BUN SERPL-MCNC: 73 MG/DL (ref 7–20)
CALCIUM: 8.1 MG/DL (ref 8.4–10.2)
CHLORIDE SERPL-SCNC: 111 MMOL/L (ref 98–107)
CK MB SERPL-MCNC: 1.73 NG/ML (ref ?–4.55)
CK SERPL-CCNC: 817 U/L (ref 55–170)
CO2 SERPL-SCNC: 20 MMOL/L (ref 22–30)
EOSINOPHIL NFR BLD MANUAL: 0 % (ref 0–6)
ERYTHROCYTE [DISTWIDTH] IN BLOOD BY AUTOMATED COUNT: 17.9 % (ref 11.5–14)
GLUCOSE SERPL-MCNC: 321 MG/DL (ref 75–110)
GLUCOSE UR STRIP-MCNC: NEGATIVE MG/DL
HCO3 BLDV-SCNC: 20 MMOL/L (ref 20–32)
HCT VFR BLD CALC: 27.5 % (ref 37.9–51)
HGB BLD-MCNC: 9 G/DL (ref 13.5–17)
INR PPP: 1.52
KETONES UR STRIP-MCNC: NEGATIVE MG/DL
MCH RBC QN AUTO: 25.5 PG (ref 27–33.4)
MCHC RBC AUTO-ENTMCNC: 32.8 G/DL (ref 32–36)
MCV RBC AUTO: 78 FL (ref 80–97)
METAMYELOCYTES % (MANUAL): 1 %
METHADONE UR QL SCN: NEGATIVE
MONOCYTES % (MANUAL): 9 % (ref 3–13)
NITRITE UR QL STRIP: NEGATIVE
NRBC BLD AUTO-RTO: 1 /100 WBC
OVALOCYTES BLD QL SMEAR: SLIGHT
PCO2 BLDV: 32.6 MMHG (ref 35–63)
PCP UR QL SCN: NEGATIVE
PH BLDV: 7.41 [PH] (ref 7.3–7.42)
PH UR STRIP: 5 [PH] (ref 5–9)
PLATELET # BLD: 251 10^3/UL (ref 150–450)
PLATELET COMMENT: ADEQUATE
POIKILOCYTOSIS BLD QL SMEAR: (no result)
POLYCHROMASIA BLD QL SMEAR: SLIGHT
POTASSIUM SERPL-SCNC: 3.9 MMOL/L (ref 3.6–5)
PROT SERPL-MCNC: 6.3 G/DL (ref 6.3–8.2)
PROT UR STRIP-MCNC: 100 MG/DL
PROTHROMBIN TIME: 19.1 SEC (ref 11.4–15.4)
RBC # BLD AUTO: 3.54 10^6/UL (ref 4.35–5.55)
SAO2 % BLDA: 86.2 % (ref 94–98)
SCHISTOCYTES BLD QL SMEAR: SLIGHT
SEGMENTED NEUTROPHILS % (MAN): 85 % (ref 42–78)
SODIUM SERPL-SCNC: 147.7 MMOL/L (ref 137–145)
SP GR UR STRIP: 1.01
TOTAL CELLS COUNTED BLD: 100
TROPONIN I SERPL-MCNC: 0.04 NG/ML
URINE AMPHETAMINES SCREEN: NEGATIVE
URINE BENZODIAZEPINES SCREEN: NEGATIVE
URINE COCAINE SCREEN: NEGATIVE
URINE MARIJUANA (THC) SCREEN: NEGATIVE
UROBILINOGEN UR-MCNC: NEGATIVE MG/DL (ref ?–2)
VARIANT LYMPHS NFR BLD MANUAL: 5 % (ref 13–45)
WBC # BLD AUTO: 16.7 10^3/UL (ref 4–10.5)
WBC TOXIC VACUOLES BLD QL SMEAR: PRESENT

## 2018-11-21 PROCEDURE — 93306 TTE W/DOPPLER COMPLETE: CPT

## 2018-11-21 PROCEDURE — 96361 HYDRATE IV INFUSION ADD-ON: CPT

## 2018-11-21 PROCEDURE — 93010 ELECTROCARDIOGRAM REPORT: CPT

## 2018-11-21 PROCEDURE — 96365 THER/PROPH/DIAG IV INF INIT: CPT

## 2018-11-21 PROCEDURE — 80307 DRUG TEST PRSMV CHEM ANLYZR: CPT

## 2018-11-21 PROCEDURE — 86850 RBC ANTIBODY SCREEN: CPT

## 2018-11-21 PROCEDURE — 81001 URINALYSIS AUTO W/SCOPE: CPT

## 2018-11-21 PROCEDURE — 86901 BLOOD TYPING SEROLOGIC RH(D): CPT

## 2018-11-21 PROCEDURE — 62270 DX LMBR SPI PNXR: CPT

## 2018-11-21 PROCEDURE — 36600 WITHDRAWAL OF ARTERIAL BLOOD: CPT

## 2018-11-21 PROCEDURE — 82746 ASSAY OF FOLIC ACID SERUM: CPT

## 2018-11-21 PROCEDURE — 70551 MRI BRAIN STEM W/O DYE: CPT

## 2018-11-21 PROCEDURE — 83605 ASSAY OF LACTIC ACID: CPT

## 2018-11-21 PROCEDURE — S0028 INJECTION, FAMOTIDINE, 20 MG: HCPCS

## 2018-11-21 PROCEDURE — 85610 PROTHROMBIN TIME: CPT

## 2018-11-21 PROCEDURE — 86738 MYCOPLASMA ANTIBODY: CPT

## 2018-11-21 PROCEDURE — 36569 INSJ PICC 5 YR+ W/O IMAGING: CPT

## 2018-11-21 PROCEDURE — 84484 ASSAY OF TROPONIN QUANT: CPT

## 2018-11-21 PROCEDURE — 89050 BODY FLUID CELL COUNT: CPT

## 2018-11-21 PROCEDURE — 80048 BASIC METABOLIC PNL TOTAL CA: CPT

## 2018-11-21 PROCEDURE — 86900 BLOOD TYPING SEROLOGIC ABO: CPT

## 2018-11-21 PROCEDURE — 85730 THROMBOPLASTIN TIME PARTIAL: CPT

## 2018-11-21 PROCEDURE — 87070 CULTURE OTHR SPECIMN AEROBIC: CPT

## 2018-11-21 PROCEDURE — 87493 C DIFF AMPLIFIED PROBE: CPT

## 2018-11-21 PROCEDURE — 82140 ASSAY OF AMMONIA: CPT

## 2018-11-21 PROCEDURE — 87086 URINE CULTURE/COLONY COUNT: CPT

## 2018-11-21 PROCEDURE — 84443 ASSAY THYROID STIM HORMONE: CPT

## 2018-11-21 PROCEDURE — 82607 VITAMIN B-12: CPT

## 2018-11-21 PROCEDURE — 80202 ASSAY OF VANCOMYCIN: CPT

## 2018-11-21 PROCEDURE — 74176 CT ABD & PELVIS W/O CONTRAST: CPT

## 2018-11-21 PROCEDURE — 82962 GLUCOSE BLOOD TEST: CPT

## 2018-11-21 PROCEDURE — 36415 COLL VENOUS BLD VENIPUNCTURE: CPT

## 2018-11-21 PROCEDURE — 82945 GLUCOSE OTHER FLUID: CPT

## 2018-11-21 PROCEDURE — 80074 ACUTE HEPATITIS PANEL: CPT

## 2018-11-21 PROCEDURE — 76937 US GUIDE VASCULAR ACCESS: CPT

## 2018-11-21 PROCEDURE — 87252 VIRUS INOCULATION TISSUE: CPT

## 2018-11-21 PROCEDURE — 83735 ASSAY OF MAGNESIUM: CPT

## 2018-11-21 PROCEDURE — 85025 COMPLETE CBC W/AUTO DIFF WBC: CPT

## 2018-11-21 PROCEDURE — 87205 SMEAR GRAM STAIN: CPT

## 2018-11-21 PROCEDURE — 82553 CREATINE MB FRACTION: CPT

## 2018-11-21 PROCEDURE — 83690 ASSAY OF LIPASE: CPT

## 2018-11-21 PROCEDURE — 85027 COMPLETE CBC AUTOMATED: CPT

## 2018-11-21 PROCEDURE — 87075 CULTR BACTERIA EXCEPT BLOOD: CPT

## 2018-11-21 PROCEDURE — 80053 COMPREHEN METABOLIC PANEL: CPT

## 2018-11-21 PROCEDURE — 82565 ASSAY OF CREATININE: CPT

## 2018-11-21 PROCEDURE — 83036 HEMOGLOBIN GLYCOSYLATED A1C: CPT

## 2018-11-21 PROCEDURE — 99285 EMERGENCY DEPT VISIT HI MDM: CPT

## 2018-11-21 PROCEDURE — 83880 ASSAY OF NATRIURETIC PEPTIDE: CPT

## 2018-11-21 PROCEDURE — 32555 ASPIRATE PLEURA W/ IMAGING: CPT

## 2018-11-21 PROCEDURE — 82150 ASSAY OF AMYLASE: CPT

## 2018-11-21 PROCEDURE — 70450 CT HEAD/BRAIN W/O DYE: CPT

## 2018-11-21 PROCEDURE — 77001 FLUOROGUIDE FOR VEIN DEVICE: CPT

## 2018-11-21 PROCEDURE — 82803 BLOOD GASES ANY COMBINATION: CPT

## 2018-11-21 PROCEDURE — 87040 BLOOD CULTURE FOR BACTERIA: CPT

## 2018-11-21 PROCEDURE — 86592 SYPHILIS TEST NON-TREP QUAL: CPT

## 2018-11-21 PROCEDURE — 84157 ASSAY OF PROTEIN OTHER: CPT

## 2018-11-21 PROCEDURE — 83615 LACTATE (LD) (LDH) ENZYME: CPT

## 2018-11-21 PROCEDURE — 84155 ASSAY OF PROTEIN SERUM: CPT

## 2018-11-21 PROCEDURE — 77003 FLUOROGUIDE FOR SPINE INJECT: CPT

## 2018-11-21 PROCEDURE — 71045 X-RAY EXAM CHEST 1 VIEW: CPT

## 2018-11-21 PROCEDURE — 94640 AIRWAY INHALATION TREATMENT: CPT

## 2018-11-21 PROCEDURE — 82550 ASSAY OF CK (CPK): CPT

## 2018-11-21 PROCEDURE — 93005 ELECTROCARDIOGRAM TRACING: CPT

## 2018-11-21 RX ADMIN — TAMSULOSIN HYDROCHLORIDE SCH MG: 0.4 CAPSULE ORAL at 21:10

## 2018-11-21 RX ADMIN — HEPARIN SODIUM SCH UNIT: 5000 INJECTION, SOLUTION INTRAVENOUS; SUBCUTANEOUS at 21:10

## 2018-11-21 RX ADMIN — FAMOTIDINE SCH MG: 10 INJECTION INTRAVENOUS at 21:11

## 2018-11-21 RX ADMIN — METOPROLOL TARTRATE SCH MG: 25 TABLET, FILM COATED ORAL at 21:10

## 2018-11-21 RX ADMIN — ASPIRIN SCH: 81 TABLET, CHEWABLE ORAL at 21:05

## 2018-11-21 RX ADMIN — HEPARIN SODIUM SCH UNIT: 5000 INJECTION, SOLUTION INTRAVENOUS; SUBCUTANEOUS at 16:17

## 2018-11-21 NOTE — EKG REPORT
SEVERITY:- ABNORMAL ECG -

ATRIAL FIBRILLATION

BORDERLINE LEFT AXIS DEVIATION

NONSPECIFIC T ABNORMALITIES, INFERIOR LEADS

:

Confirmed by: Maryann Suarez MD 21-Nov-2018 15:47:42

## 2018-11-21 NOTE — PDOC H&P
History of Present Illness


Admission Date/PCP: 


  11/21/18 12:55





  CARLOS JACOB MD





Patient complains of: Confusion


History of Present Illness: 


SULMA MURPHY is a 72 year old male significant for prostate cancer with lung 

and bone metastasis, type 2 diabetes mellitus, stage III kidney disease, CHF, 

and hypertension who presented to the emergency department via EMS from [facility] with limited information.  Per emergency department provider, EMS was 

called for increased confusion, however, received limited information from SNF 

personnel.  Per the ED provider, the patient's family reports that they had 

noted a decrease in his oral intake and slurred speech beginning 3-4 days ago 

that has gradually worsened.


At the time of my evaluation, the patient is oriented to self, place, year, but 

unable to answer any other questions clearly due to garbled speech.  He does 

intermittently follow commands with guidance/prompting.  He is not noted to 

have any focal deficits and is moving all extremities equally.  Unfortunately, 

family members were not present at the time of my arrival in the emergency 

department or during follow-up assessment once the patient was placed on the 

floor.


Evaluation in the emergency department reveals leukocytosis of 16.7 (Unchanged 

from day of previous discharge on 11/15/18 when his WBC was noted to be 16.9), 

and anemia with a hemoglobin of 9.0 (also unchanged from day of discharge 11/15)

, elevated INR of 1.53 (patient not known to be on anticoagulant therapy), 

hypernatremia (.7), acute renal failure (creatinine 2.35, BUN 73 with 

baseline Cr of 1.6 per Dr. Scruggs's most recent progress note from his previous 

admission), elevated lactic acid (2.2) and elevated LFTs.  Urinalysis was 

negative.EKG demonstrated new onset atrial fibrillation with a heart rate of 

57.  Chest x-ray demonstrated bibasilar densities with the right greater than 

left, similar to previous chest CT, with associated tiny pleural effusions.  

Head CT is benign.


He is referred to the hospitalist service for admission and management of the 

above stated complaints.





Past Medical History


Cardiac Medical History: Reports: Congestive Heart Failure, Hypertension


   Denies: Myocardial Infarction


Pulmonary Medical History: Reports: Pneumonia, Other


   Denies: Asthma


EENT Medical History: Reports: None


Neurological Medical History: Reports: None


   Denies: Seizures


Endocrine Medical History: Reports: Diabetes Mellitus Type 1


Renal/ Medical History: Reports: Chronic Kidney Disease


Malignancy Medical History: Reports: Other - Prostate Cancer w/ Lung and Bone 

mets


GI Medical History: 


   Denies: Hepatitis, Hiatal Hernia


Musculoskeltal Medical History: Reports: Gout


Skin Medical History: Reports: None


Psychiatric Medical History: Reports: Depression


Traumatic Medical History: Reports: None


Hematology: Reports: Anemia


   Denies: Sickle Cell Disease





Past Surgical History


Past Surgical History: Reports: Orthopedic Surgery - lt knee replacement, 

Tonsillectomy, Other - Prostate biopsy, fiducial placement


   Denies: Pacemaker





Social History


Information Source: Emergency Med Personnel, Critical access hospital Records


Lives with: Nursing Home


Smoking Status: Unknown if Ever Smoked


Frequency of Alcohol Use: None


Hx Recreational Drug Use: No


Drugs: None


Hx Prescription Drug Abuse: No





- Advance Directive


Resuscitation Status: Full Code


Surrogate healthcare decision maker:: 





Pt is unable to identify a surrogate decision maker; no family members are 

present at this time.





Family History


Family History: 





Limited due to patient's current mental status.


Parental Family History Reviewed: No - Unable to review due to patient's 

altered mental status.


Children Family History Reviewed: No


Sibling(s) Family History Reviewed.: No





Medication/Allergy


Home Medications: 








Allopurinol [Zyloprim 300 mg Tablet] 300 mg PO DAILY 11/08/18 


Atorvastatin Calcium [Lipitor 10 mg Tablet] 10 mg PO QHS 11/08/18 


Cetirizine HCl [Zyrtec 10 mg Tablet] 10 mg PO DAILY 11/08/18 


Fluticasone Propionate [Flonase Nasal Spray 50 Mcg/Spray 16 gm] 1 spray NASL 

BID 11/08/18 


Hydralazine HCl [Apresoline 50 mg Tablet] 100 mg PO Q8 11/08/18 


Latanoprost/Pf [Latanoprost 0.005% Eye Drop] 1 drop OS QHS 11/08/18 


Lisinopril [Prinivil 40 mg Tablet] 40 mg PO DAILY 11/08/18 


Tamsulosin HCl [Flomax 0.4 mg Cap.sr] 0.4 mg PO QHS 11/08/18 


Acetaminophen [Tylenol 325 mg Tablet] 650 mg PO Q4HP PRN  tablet 11/15/18 


Amlodipine Besylate [Norvasc 10 mg Tablet] 10 mg PO DAILY  tablet 11/15/18 


Bicalutamide [Casodex 50 mg Tablet] 50 mg PO DAILY  tablet 11/15/18 


Gabapentin [Neurontin 300 mg Capsule] 300 mg PO BID  capsule 11/15/18 


Guaifenesin [Robitussin Syrup 200 mg/10 ml Ud Cup] 200 mg PO QIDP PRN  udc 11/15

/18 


Hydrocodone/Acetaminophen [Norco 5-325 mg Tablet] 1 tab PO Q6HP PRN #60 tablet 

11/15/18 


Levofloxacin [Levaquin 500 mg Tablet] 500 mg PO DAILY #5 tablet 11/15/18 


Sertraline HCl [Zoloft 50 mg Tablet] 50 mg PO DAILY  tablet 11/15/18 


Sodium Chloride [Sodium Chloride 1 gm Tablet] 1 gm PO Q12  tablet 11/15/18 


Lactulose [Cephulac Syrup 20 gm/30 ml Udcup] 30 ml PO Q8 11/21/18 








Allergies/Adverse Reactions: 


 





No Known Allergies Allergy (Verified 10/04/18 03:20)


 











Review of Systems


ROS unobtainable: Due to mental status





Physical Exam


Vital Signs: 


 











Temp Pulse Resp BP Pulse Ox


 


 97.9 F   77   20   155/51 H  90 L


 


 11/21/18 16:16  11/21/18 16:16  11/21/18 16:16  11/21/18 16:16  11/21/18 16:16








 Intake & Output











 11/20/18 11/21/18 11/22/18





 06:59 06:59 06:59


 


Weight   76.4 kg











General appearance: PRESENT: no acute distress, thin, well-developed, well-

nourished, other - Chronically ill-appearing


Head exam: PRESENT: atraumatic, normocephalic


Eye exam: PRESENT: conjunctiva pink, EOMI, PERRLA.  ABSENT: scleral icterus


Ear exam: PRESENT: normal external ear exam


Mouth exam: PRESENT: dry mucosa, tongue midline


Teeth exam: PRESENT: poor dentation


Neck exam: ABSENT: carotid bruit, JVD, lymphadenopathy, thyromegaly


Respiratory exam: PRESENT: clear to auscultation venessa.  ABSENT: rales, rhonchi, 

wheezes


Cardiovascular exam: PRESENT: irregular rhythm, +S1, +S2.  ABSENT: diastolic 

murmur, rubs, systolic murmur


Pulses: PRESENT: normal dorsalis pedis pul


Vascular exam: PRESENT: normal capillary refill


GI/Abdominal exam: PRESENT: normal bowel sounds, soft.  ABSENT: distended, 

guarding, mass, organolmegaly, rebound, tenderness


Rectal exam: PRESENT: deferred


Extremities exam: PRESENT: full ROM.  ABSENT: calf tenderness, clubbing, pedal 

edema, +1 edema


Neurological exam: PRESENT: alert, awake, oriented to person, oriented to place

, oriented to time, CN II-XII grossly intact, other - Technically A&O x3; 

though unable to clearly speak more than 2 words or answer detailed questions 

without speech becoming garbled.  Does follow simple commands with guidance/

prompting..  ABSENT: oriented to situation, motor sensory deficit


Psychiatric exam: ABSENT: homicidal ideation, suicidal ideation


Skin exam: PRESENT: dry, intact, warm.  ABSENT: cyanosis, rash





Results


Laboratory Results: 


 











  11/21/18 11/21/18 11/21/18





  15:35 15:50 17:00


 


Carbonic Acid    0.87 L


 


HCO3/H2CO3 Ratio    23:1


 


ABG pH    7.46 H


 


ABG pCO2    28.9 L


 


ABG pO2    47.1 L


 


ABG HCO3    20.3


 


ABG O2 Saturation    86.2 L


 


ABG Base Excess    -2.7


 


FiO2    ROOMAIR


 


Lactic Acid   2.2 H 


 


Ammonia  < 8.7 L  











Impressions: 


 





Abdomen/Pelvis CT  11/21/18 00:00


IMPRESSION:  Fluid overload with bilateral moderate pleural effusions and small 

amount of ascites.


Bibasilar consolidation atelectasis versus pneumonia


 








Chest X-Ray  11/21/18 09:09


IMPRESSION:  Bibasilar densities right greater than left as noted above.


 








Head CT  11/21/18 10:00


IMPRESSION:  No acute intracranial pathology.  No noncontrast CT findings to 

explain symptoms.  MRI may be used to more sensitively evaluate for acute 

diffusion restricting infarction if suspected.


EVIDENCE OF ACUTE STROKE: NO.


 














Assessment & Plan





- Diagnosis


(1) Altered mental status


Is this a current diagnosis for this admission?: Yes   


Plan: 


The patient presents with altered mental status.  Per EMS personnel and ED 

provider, the patient's family reports that he had decreased p.o. intake 

beginning 3-4 days ago with onset of slurred speech that has progressively 

worsened until the point that he has become incomprehensible.  He is able to 

speak 1-2 words clearly prior to his speech becoming garbled.  He does follow 

simple commands.


No focal deficits; moving all extremities spontaneously and equally.


Head CT is benign.


Ammonia normal.


UDS and serum EtOH are negative.





Unclear etiology; likely multifactorial secondary to metabolic encephalopathy 

evidenced by elevated LFTs, acute renal failure, hypoxia (ABG reveals 

respiratory alkalosis), poor cardiac output in setting of CHF and new onset A. 

fib with bradycardia, and profound dehydration noted on exam (hypernatremia, 

dry mucous membranes, skin tenting).





The patient is admitted to the medical floor on continuous cardiac telemetry.


He is ordered a one-to-one sitter for safety as the patient is noted to be 

confused and impulsive.


Will begin rehydrating with IV fluids; discussed with nursing to be careful 

attention to fluid volume as the patient's underlying CHF predisposes him to 

fluid volume overload.


Blood and urine cultures are pending.  Urinalysis is negative and patient is 

afebrile.  Although leukocytosis is elevated, this is unchanged from his time 

of discharge earlier this month; low suspicion  for infectious process.  We 

will continue to monitor closely but hold on antibiotics at this time.


Supplemental oxygen as needed to maintain oxygen saturations greater than 92% 

as the patient does not have a history of COPD or chronic respiratory failure.


Spoke with Dr. Pearce, appreciate her evaluation and recommendations.  Will 

attempt to correct the patient's acute kidney injury so that he can tolerate a 

brain MRI with contrast to evaluate for possible brain metastases.


Fall and aspiration precautions; speech therapy is consulted for consistency 

recommendations.  Pured diet with assistance for now.  Will hold on physical 

therapy and he will the patient's mental status improved.








(2) Acute on chronic kidney failure


Qualifiers: 


   Chronic kidney disease stage: stage 3 (moderate) 


Is this a current diagnosis for this admission?: Yes   


Plan: 


Per Dr. Scruggs's note from his previous admission, the patient's baseline 

creatinine is 1.6.  At time of discharge 11/15/2018 his creatinine had returned 

to baseline with a BUN of 39.


On admission today, he is noted to have a creatinine of 2.35 and a BUN of 73.


Sodium is elevated to 147.7.  Fortunately, potassium remains normal at 3.9.


On clinical exam, the patient is clearly dehydrated.


Noncontrasted abdominal and pelvic CT is negative for hydronephrosis or post 

obstruction related to the patient's prostate cancer.





The patient was provided a 1 L bolus while in the emergency department.  We 

will continue gentle IV fluid rehydration at 150 ml/hour.  Nursing is asked to 

monitor closely for evidence of fluid volume overload due to the patient's 

underlying heart failure.


We will avoid nephrotoxic medications as able.


Alcaraz catheter for strict I'&O's.


Daily chemistry.


Should the patient not show improvement with a.m. labs, will consult nephrology.








(3) Hypernatremia


Is this a current diagnosis for this admission?: Yes   


Plan: 


Secondary to report of poor p.o. intake.


During the patient's previous admission he was actually hyponatremic.  

Evaluation at that time was unclear whether or not the patient had SIADH or if 

low sodium was a result of polydipsia.


He received appropriate IV fluids, was educated on dietary and fluid intake.


Sodium is currently elevated to 147.7.





He is received a 1 L normal saline bolus by the emergency department provider.  

We will continue gentle IV fluid rehydration.


We will monitor daily chemistries.








(4) Diabetes


Qualifiers: 


   Diabetes mellitus type: type 2 


Is this a current diagnosis for this admission?: Yes   


Plan: 


A1c (5/22/18) 4.7%.


Glucose is elevated on random chemistry today to 321 despite report of poor 

p.o. intake for the last several days.





Patient is placed on a consistent carb diet.  Accu-Cheks before meals and at 

bedtime with Humalog for sliding scale coverage.








(5) New onset atrial fibrillation


Is this a current diagnosis for this admission?: Yes   


Plan: 


EKG demonstrates atrial fibrillation with a rate in the 50s.


Rhythm strip from previous admission this month demonstrates normal sinus 

rhythm.


TSH is normal.


Echocardiogram is pending.


Possibly related to electrolyte derangements, acute respiratory issue/hypoxia (

lung metastasis, bibasilar consolidations, consideration of PE -unable to rule 

out at this time).





Will start daily aspirin therapy.


Metoprolol 12.5 mg every 12 hours; holding parameters for bradycardia.


Discussed with Dr. Rothman; concern for bleeding with full dose anticoagulation.  

We will continue DVT prophylaxis heparin only at this time.


Consider cardiology consultation.








(6) Rhabdomyolysis


Qualifiers: 


   Rhabdomyolysis type: non-traumatic   Qualified Code(s): M62.82 - 

Rhabdomyolysis   


Is this a current diagnosis for this admission?: Yes   


Plan: 


Secondary to poor p.o. intake resulting in dehydration and bedbound status.


CK is elevated to 817.





IV fluid rehydration.


Every 2 turns; once altered mental status is improved enough to assure patient 

safety, increase mobility.


Daily chemistries.








(7) Anemia, chronic disease


Is this a current diagnosis for this admission?: Yes   


Plan: 


Anemia of chronic disease related to chronic kidney disease stage III, prostate 

cancer with bony metastasis, and iron deficiency related to poor nutrition.





We will place patient on multivitamin with iron.


Registered dietitian is consulted.


We will monitor daily CBC and transfuse as necessary.








(8) Congestive heart failure


Qualifiers: 


   Qualified Code(s): I50.9 - Heart failure, unspecified   


Is this a current diagnosis for this admission?: Yes   


Plan: 


Patient with a history of CHF.


Previous echocardiogram is from July 2017 which demonstrated an LVEF greater 

than 65% with mild pulmonary hypertension.  Unable to assess diastolic 

dysfunction at that time due to study quality.


ProBNP today is elevated to 8400 (up from 597 last year).


Imaging (chest x-ray and abdominal/pelvic CT) suggestive of fluid volume 

overload with moderate bilateral pleural effusions and small amount of ascites.


However, on exam the patient is clearly dehydrated.





Will begin gentle IV fluid rehydration; nursing is advised of concern for 

potential for fluid volume overload and asked to monitor closely.


Is placed on a cardiac diet.


Daily weights, Alcaraz catheter for strict I'&O's.


Continuing amlodipine, start low-dose metoprolol.  Holding diuretics secondary 

to dehydration and acute renal failure.


Echocardiogram is pending.


Consider cardiology consultation.








(9) Dehydration


Is this a current diagnosis for this admission?: Yes   


Plan: 


Evidenced by hypernatremia, dry mucous membranes, skin tenting.


Continue IV fluids.


Remaining plan as above.








(10) Leukocytosis


Qualifiers: 


   Leukocytosis type: unspecified   Qualified Code(s): D72.829 - Elevated white 

blood cell count, unspecified   


Is this a current diagnosis for this admission?: Yes   


Plan: 


Stable; WBC is currently 16.7.  At time of discharge on 11/15/18, WBCs were 

elevated to 16.9.  Currently afebrile; no report of infectious symptoms.


Urinalysis is negative.


Chest x-ray demonstrates bibasilar consolidation; atelectasis versus pneumonia.


Blood and urine cultures are pending.





During previous admission, patient was treated for pneumonia.  He was 

discharged on p.o. Levaquin and reportedly completed his course of therapy.  On 

exam, lung sounds are clear.  Imaging is questionable for pneumonia, however, 

atelectasis is just is possible.


We will rehydrate overnight with IV fluids and repeat imaging in the morning.


Will hold on antibiotics at this time his persistent leukocytosis is likely an 

inflammatory reaction related to his metastatic prostate cancer.








(11) Abnormal LFTs


Is this a current diagnosis for this admission?: Yes   


Plan: 


Patient with elevated LFTs; actually increased from previous admission. 


MELD score 19


Total Bili 0.7, Direct Bili 0.6, , ALT 34, Alk Phos 425


Ammonia <8.7


Albumin 3.0


UDS and serum alcohol are negative


Noncontrasted CT of the abdomen and pelvis is an unremarkable liver today.


Contrasted abdominal CT (8/2018) also showed a normal liver size without masses 

and no dilated ducts.


Hepatitis panel is pending.


Echocardiogram is pending.








- Time


Time Spent: Greater than 70 Minutes


Medications reviewed and adjusted accordingly: Yes





- Inpatient Certification


Based on my medical assessment, after consideration of the patient's 

comorbidities, presenting symptoms, or acuity I expect that the services needed 

warrant INPATIENT care.: Yes


I certify that my determination is in accordance with my understanding of 

Medicare's requirements for reasonable and necessary INPATIENT services [42 CFR 

412.3e].: Yes


Medical Necessity: Failure to Improve With Outpatient Therapy, Need Close 

Monitoring Due to Risk of Patient Decompensation, Need For IV Fluids, Need For 

Continuous Telemetry Monitoring

## 2018-11-21 NOTE — ER DOCUMENT REPORT
ED General





- General


Mode of Arrival: Medic


Information source: Emergency Med Personnel, Outside Facility Records


TRAVEL OUTSIDE OF THE [place] IN LAST 30 DAYS: No





<GEORGETTE DURANT - Last Filed: 11/21/18 09:59>





<LOIS LAFLERU - Last Filed: 11/21/18 12:08>





- General


Stated Complaint: ALTERED MENTAL STATUS


Time Seen by Provider: 11/21/18 08:58


Notes: 





72-year-old male brought in by EMS today for complaints of "not acting like 

himself".  EMS states that upon arrival to the patient's nursing facility, the 

report that was given was very vague and not helpful.  Documents from the 

facility states the patient was on 2 L of oxygen saturating 91% however EMS got 

a saturation of 95% without oxygen.  Patient was discharged from this facility 

on the 15th for hyponatremia, nausea, vomiting, along with his known stage IV 

prostate cancer with metastasis to the lung.  History is limited.





According to family who has just arrived, the patient had slurred speech on 

saturday, and yesterday they were unable to understand him at all. (GEORGETTE DURANT)





- Related Data


Allergies/Adverse Reactions: 


 





No Known Allergies Allergy (Verified 10/04/18 03:20)


 











Past Medical History





- General


Information source: Emergency Med Personnel, Outside Facility Records





- Social History


Smoking Status: Unknown if Ever Smoked


Family History: Reviewed & Not Pertinent





- Past Medical History


Cardiac Medical History: Reports: Hx Hypertension


Malignancy Medical History: Reports Hx Prostate Cancer - stage IV with mets to 

lung


Musculoskeletal Medical History: Reports Hx Gout


Past Surgical History: Reports: Hx Orthopedic Surgery - lt knee replacement, Hx 

Tonsillectomy, Other - Prostate biopsy, fiducial placement





- Immunizations


Hx Diphtheria, Pertussis, Tetanus Vaccination: Yes


Hx Pneumococcal Vaccination: 01/01/18





<GEORGETTE DURANT - Last Filed: 11/21/18 09:59>





Review of Systems





- Review of Systems


-: Yes ROS unobtainable due to patient's medical condition





<GEORGETTE DURANT - Last Filed: 11/21/18 09:59>





Physical Exam





<GEORGETTE DURANT - Last Filed: 11/21/18 09:59>





<LOIS LAFLEUR - Last Filed: 11/21/18 12:08>





- Vital signs


Vitals: 


 











Resp Pulse Ox


 


 27 H  95 


 


 11/21/18 08:59  11/21/18 08:59














- Notes


Notes: 





Physical Exam:


 


General: Alert, appears somewhat lethargic. 


 


HEENT: Normocephalic. Atraumatic. PERRL. Extraocular movements intact. 

Oropharynx clear. Dry mucous membranes.


 


Neck: Supple. Non-tender.


 


Respiratory: No respiratory distress. Clear and equal breath sounds bilaterally.


 


Cardiovascular: Irregularly irregular, normal rate. 


 


Abdominal: Normal Inspection. Non-tender. No distension. Normal Bowel Sounds. 


 


Back: Non-tender. No deformity or step off.


 


Extremities: Moves all four extremities.


Upper extremities: Normal ROM.  


Lower extremities: No edema. Normal ROM.


 


Neurological: Normal cognition. AAOx4. Normal speech.  Appears to have slight 

right sided facial weakness


 


Psychological: Normal affect. Normal Mood. 


 


Skin: Dry skin, normal color. (GEORGETTE DURANT)





Course





- Laboratory


Result Diagrams: 


 11/21/18 09:03





 11/21/18 09:03





<GEORGETTE DURANT - Last Filed: 11/21/18 09:59>





- Laboratory


Result Diagrams: 


 11/21/18 09:03





 11/21/18 09:03





- Diagnostic Test


Radiology reviewed: Image reviewed - Chest x-ray suggests a right lower lobe 

infiltrate





- EKG Interpretation by Me


EKG shows normal: Axis, Intervals, QRS Complexes.  abnormal: ST-T Waves - 

Nonspecific inferior T abnormalities


Rate: Normal - 57


Rhythm: A.Fib


Axis/QRS: Left axis deviation


When compared to previous EKG there are: Changes noted - Inferior T 

abnormalities are a new finding, and the atrial fibrillation is a new finding.





- Consults


  ** Ana Laura Pinzon NP


Time consulted: 12:00


Consulted provider: will come to ER





<LOIS LAFLEUR - Last Filed: 11/21/18 12:08>





- Re-evaluation


Re-evalutation: 





11/21/18 10:12


The patient was admitted on 11/7/2018 through 11/15/2018.  He was diagnosed 

with dehydration and hyponatremia, and treated with hydration and fluid 

restriction according to the discharge summary.  He did come in with a 

creatinine about 1.1, was discharged with a creatinine of 1.61 and a BUN of 39.

  He had outpatient lab work done yesterday showed a BUN of 65 and a creatinine 

2.19 with a sodium of 145.  Today his sodium is 147.7 with a BUN of 73 and a 

creatinine 2.35


He was given some water to drink as he did complain of being thirsty, but he 

spit it back up because of the pain and trying to swallow according to the 

patient.  He does seem to have a droop to the right corner of his mouth which 

his family member states is new.  He does not have any motor deficits in the 

extremities and his speech seems rather clear to me at this time.  There is no 

tongue deviation.


His laboratory workup also shows a leukocytosis that persisted throughout his 

hospital stay and his CBC today is unchanged from his discharge CBC on 11/15/

2018.  His total CK is 817 today suggesting that he is developing 

rhabdomyolysis due to his dehydration and bedridden status. (LOIS LAFLEUR)





- Vital Signs


Vital signs: 


 











Temp Pulse Resp BP Pulse Ox


 


 97.7 F      24 H  138/58 H  94 


 


 11/21/18 09:28     11/21/18 11:01  11/21/18 11:01  11/21/18 11:01














- Laboratory


Laboratory results interpreted by me: 


 











  11/21/18 11/21/18 11/21/18





  09:03 09:03 09:03


 


WBC  16.7 H  


 


RBC  3.54 L  


 


Hgb  9.0 L  


 


Hct  27.5 L  


 


MCV  78 L  


 


MCH  25.5 L  


 


RDW  17.9 H  


 


Seg Neuts % (Manual)  85 H  


 


Lymphocytes % (Manual)  5 L  


 


Metamyelocytes %  1 H  


 


Abs Neuts (Manual)  14.4 H  


 


Abs Monocytes (Manual)  1.5 H  


 


PT   19.1 H 


 


VBG pCO2   


 


Sodium    147.7 H


 


Chloride    111 H


 


Carbon Dioxide    20 L


 


BUN    73 H


 


Creatinine    2.35 H


 


Est GFR ( Amer)    33 L


 


Est GFR (Non-Af Amer)    27 L


 


Glucose    321 H


 


Lactic Acid   


 


Calcium    8.1 L


 


Direct Bilirubin    0.6 H


 


AST    184 H


 


Alkaline Phosphatase    425 H


 


Creatine Kinase    817 H


 


Albumin    3.0 L


 


Urine Protein   














  11/21/18 11/21/18 11/21/18





  09:03 09:03 09:40


 


WBC   


 


RBC   


 


Hgb   


 


Hct   


 


MCV   


 


MCH   


 


RDW   


 


Seg Neuts % (Manual)   


 


Lymphocytes % (Manual)   


 


Metamyelocytes %   


 


Abs Neuts (Manual)   


 


Abs Monocytes (Manual)   


 


PT   


 


VBG pCO2   32.6 L 


 


Sodium   


 


Chloride   


 


Carbon Dioxide   


 


BUN   


 


Creatinine   


 


Est GFR ( Amer)   


 


Est GFR (Non-Af Amer)   


 


Glucose   


 


Lactic Acid  2.2 H  


 


Calcium   


 


Direct Bilirubin   


 


AST   


 


Alkaline Phosphatase   


 


Creatine Kinase   


 


Albumin   


 


Urine Protein    100 H














Critical Care Note





- Critical Care Note


Total time excluding time spent on procedures (mins): 35





<LOIS LAFLEUR - Last Filed: 11/21/18 12:08>





Discharge





<GEORGETTE DURANT - Last Filed: 11/21/18 09:59>





- Discharge


Admitting Provider: Hospitalist


Unit Admitted: Telemetry





<LOIS LAFLEUR - Last Filed: 11/21/18 12:08>





- Discharge


Clinical Impression: 


 Dehydration, Acute kidney injury, New onset atrial fibrillation, Sore throat, 

Difficulty swallowing liquids, Right lower lobe pulmonary infiltrate





Altered mental status


Qualifiers:


 Altered mental status type: unspecified Qualified Code(s): R41.82 - Altered 

mental status, unspecified





Rhabdomyolysis


Qualifiers:


 Rhabdomyolysis type: non-traumatic Qualified Code(s): M62.82 - Rhabdomyolysis





Condition: Stable


Disposition: ADMITTED AS INPATIENT


Referrals: 


RAFI LI,  [NO LOCAL MD] - Follow up as needed


Scribe Attestation: 





11/21/18 10:17


I personally performed the services described in the documentation, reviewed 

and edited the documentation which was dictated to the scribe in my presence, 

and it accurately records my words and actions. (LOIS LAFLEUR)





Scribe Documentation





- Scribe


Written by Christiana:: Christiana Johnson, 11/21/2018 0921


acting as scribe for :: Imtiaz





<GEORGETTE DURANT - Last Filed: 11/21/18 09:59>

## 2018-11-21 NOTE — RADIOLOGY REPORT (SQ)
EXAM DESCRIPTION:  CT ABD/PELVIS NO ORAL OR IV



COMPLETED DATE/TIME:  11/21/2018 1:08 pm



REASON FOR STUDY:  acute renal failure, known prostate CA



COMPARISON:  CT abdomen pelvis 8/7/2015, 11/8/2018

CT chest 11/9/2018

Bone scan 8/7/2018



TECHNIQUE:  CT scan of the abdomen and pelvis performed without intravenous or oral contrast. Images 
reviewed with lung, soft tissue, and bone windows. Reconstructed coronal and sagittal MPR images revi
ewed. All images stored on PACS.

All CT scanners at this facility use dose modulation, iterative reconstruction, and/or weight based d
osing when appropriate to reduce radiation dose to as low as reasonably achievable (ALARA).

[facility]: Dose Right  [facility]: CareDose    [facility]: Dose Right    [facility]: Teradose 4D    [facility]: [email or web address]



RADIATION DOSE:  CT Rad equipment meets quality standard of care and radiation dose reduction techniq
ues were employed. CTDIvol: 12.2 mGy. DLP: 688 mGy-cm.mGy.



LIMITATIONS:  None.



FINDINGS:  LOWER CHEST: Since the prior CT chest 11/9/2018, the patient has developed moderate bilate
ral pleural effusions with moderate airspace disease in the bilateral lower lobes, atelectasis versus
 pneumonia.

NON-CONTRASTED LIVER, SPLEEN, ADRENALS: Non contrasted imaging of the liver and spleen is unremarkabl
e.  Stable bilateral adrenal nodules compared to previous studies.

PANCREAS: Non contrasted images of the pancreas are unremarkable

GALLBLADDER: Trace right upper quadrant free fluid, trace pericholecystic fluid.  No radiopaque galls
tones.

RIGHT KIDNEY AND URETER: No solid masses.  Stable septated cyst right lower pole kidney 5 cm in diame
ter.  Stable small non septated cysts in the upper pole kidney less than 1 cm.   No significant calci
fications.   No hydronephrosis or hydroureter.

LEFT KIDNEY AND URETER: No suspicious masses. Assessment limited by lack of IV contrast.  1.5 cm low-
density benign cortical cyst posterior left lower pole kidney.  No significant calcifications.   No h
ydronephrosis or hydroureter.

AORTA AND RETROPERITONEUM: No aneurysm. No retroperitoneal masses or adenopathy.

BOWEL AND PERITONEAL CAVITY: There is a small amount of ascites in the right pericolic gutter and pel
vis.  No free intraperitoneal air.  No CT evidence of bowel obstruction

APPENDIX: Normal.

PELVIS, BLADDER, AND ABDOMINAL WALL:Trace free pelvic fluid.  Bilateral inguinal hernias containing f
luid.  No adenopathy.  Non contrasted images of the bladder are unremarkable

BONES: No gross bony metastatic disease

OTHER: No other significant finding.



IMPRESSION:  Fluid overload with bilateral moderate pleural effusions and small amount of ascites.

Bibasilar consolidation atelectasis versus pneumonia



COMMENT:  Quality ID # 436: Final reports with documentation of one or more dose reduction techniques
 (e.g., Automated exposure control, adjustment of the mA and/or kV according to patient size, use of 
iterative reconstruction technique)



TECHNICAL DOCUMENTATION:  JOB ID:  [id number]

 2011 [employer]- All Rights Reserved



Reading location - IP/workstation name: [facility]

## 2018-11-21 NOTE — RADIOLOGY REPORT (SQ)
EXAM DESCRIPTION:  CT HEAD WITHOUT



COMPLETED DATE/TIME:  11/21/2018 10:16 am



REASON FOR STUDY:  New onset A-fib,slurred speech, swallowing difficult



COMPARISON:  None.



TECHNIQUE:  Axial images acquired through the brain without intravenous contrast.  Images reviewed wi
th bone, brain and subdural windows.  CT brain, 11/25/2012 Images stored on PACS.

All CT scanners at this facility use dose modulation, iterative reconstruction, and/or weight based d
osing when appropriate to reduce radiation dose to as low as reasonably achievable (ALARA).

[facility]: Dose Right  [facility]: CareDose    [facility]: Dose Right    [facility]: Teradose 4D    [facility]: Smart [email or web address]



RADIATION DOSE:  CT Rad equipment meets quality standard of care and radiation dose reduction techniq
ues were employed. CTDIvol: 53.2 mGy. DLP: 991 mGy-cm. mGy.



LIMITATIONS:  None.



FINDINGS:  VENTRICLES: Normal size and contour.

CEREBRUM: No masses.  No hemorrhage.  No midline shift.  No evidence for acute infarction. Normal gra
y/white matter differentiation. No areas of low density in the white matter.

CEREBELLUM: No masses.  No hemorrhage.  No alteration of density.  No evidence for acute infarction.

EXTRAAXIAL SPACES: No fluid collections.  No masses.

ORBITS AND GLOBE: No intra- or extraconal masses.  Normal contour of globe without masses.

CALVARIUM: No fracture.

PARANASAL SINUSES: No fluid or mucosal thickening.

SOFT TISSUES: No mass or hematoma.

OTHER: No other significant finding.



IMPRESSION:  No acute intracranial pathology.  No noncontrast CT findings to explain symptoms.  MRI m
ay be used to more sensitively evaluate for acute diffusion restricting infarction if suspected.

EVIDENCE OF ACUTE STROKE: NO.



COMMENT:  Quality ID # 436: Final reports with documentation of one or more dose reduction techniques
 (e.g., Automated exposure control, adjustment of the mA and/or kV according to patient size, use of 
iterative reconstruction technique)



TECHNICAL DOCUMENTATION:  JOB ID:  [id number]

 2011 [employer]- All Rights Reserved



Reading location - IP/workstation name: [email or web address]

## 2018-11-21 NOTE — PDOC CONSULTATION
Consultation


Consult Date: 11/21/18


Consult reason:: Hematology/Oncology consultation was requested for patient 

with prostate cancer and new A-fib.





History of Present Illness


Admission Date/PCP: 


  11/21/18 12:55





  CARLOS JACOB MD





History of Present Illness: 


SULMA MURPHY is a 72 year old male with recent history of early stage prostate 

cancer, he has been seen both by Dr. Marshall Louis of urology [facility], as 

well as Dr. Twan Godoy of radiation oncology.  He had fiducial placement 

for the radiation about 2-3 weeks ago, and he was to start radiation on 11/13/ 2018.  He was receiving Lupron shots prior to initiation of radiation.  He was 

admitted to this institution earlier this month and treated for pneumonia.  He 

was discharged to Skilled Nursing/rehab and over the past week has 

progressively worsened.  Family reports that he has been moaning and restless.  

He has slurred speech, with hallucinations.  No obvious pain.  Cold and Hot 

flashes.  Zoloft was started last admission, but this is currently on hold.  





He is now found to be in A-fib which is new.  Work-up for the delirium is 

underway.  





Past Medical History


Cardiac Medical History: Reports: Hypertension


   Denies: Myocardial Infarction


Pulmonary Medical History: 


   Denies: Asthma


Neurological Medical History: 


   Denies: Seizures


Endocrine Medical History: Reports: Diabetes Mellitus Type 1


Malignancy Medical History: Reports: Other - Prostate Cancer


GI Medical History: 


   Denies: Hepatitis, Hiatal Hernia


Musculoskeltal Medical History: Reports: Gout


Hematology: 


   Denies: Anemia, Sickle Cell Disease





Past Surgical History


Past Surgical History: Reports: Orthopedic Surgery - lt knee replacement, 

Tonsillectomy, Other - Prostate biopsy, fiducial placement


   Denies: Pacemaker





Social History


Smoking Status: Unknown if Ever Smoked


Frequency of Alcohol Use: None


Hx Recreational Drug Use: No


Drugs: None


Hx Prescription Drug Abuse: No





- Advance Directive


Resuscitation Status: Full Code





Family History


Family History: Reviewed & Not Pertinent


Parental Family History Reviewed: Yes


Children Family History Reviewed: No


Sibling(s) Family History Reviewed.: Yes





Medication/Allergy


Home Medications: 








Allopurinol [Zyloprim 300 mg Tablet] 300 mg PO DAILY 11/08/18 


Atorvastatin Calcium [Lipitor 10 mg Tablet] 10 mg PO QHS 11/08/18 


Cetirizine HCl [Zyrtec 10 mg Tablet] 10 mg PO DAILY 11/08/18 


Fluticasone Propionate [Flonase Nasal Spray 50 Mcg/Spray 16 gm] 1 spray NASL 

BID 11/08/18 


Hydralazine HCl [Apresoline 50 mg Tablet] 100 mg PO Q8 11/08/18 


Latanoprost/Pf [Latanoprost 0.005% Eye Drop] 1 drop OS QHS 11/08/18 


Lisinopril [Prinivil 40 mg Tablet] 40 mg PO DAILY 11/08/18 


Tamsulosin HCl [Flomax 0.4 mg Cap.sr] 0.4 mg PO QHS 11/08/18 


Acetaminophen [Tylenol 325 mg Tablet] 650 mg PO Q4HP PRN  tablet 11/15/18 


Amlodipine Besylate [Norvasc 10 mg Tablet] 10 mg PO DAILY  tablet 11/15/18 


Bicalutamide [Casodex 50 mg Tablet] 50 mg PO DAILY  tablet 11/15/18 


Gabapentin [Neurontin 300 mg Capsule] 300 mg PO BID  capsule 11/15/18 


Guaifenesin [Robitussin Syrup 200 mg/10 ml Ud Cup] 200 mg PO QIDP PRN  udc 11/15

/18 


Hydrocodone/Acetaminophen [Norco 5-325 mg Tablet] 1 tab PO Q6HP PRN #60 tablet 

11/15/18 


Levofloxacin [Levaquin 500 mg Tablet] 500 mg PO DAILY #5 tablet 11/15/18 


Sertraline HCl [Zoloft 50 mg Tablet] 50 mg PO DAILY  tablet 11/15/18 


Sodium Chloride [Sodium Chloride 1 gm Tablet] 1 gm PO Q12  tablet 11/15/18 


Lactulose [Cephulac Syrup 20 gm/30 ml Udcup] 30 ml PO Q8 11/21/18 








Allergies/Adverse Reactions: 


 





No Known Allergies Allergy (Verified 10/04/18 03:20)


 











Review of Systems


ROS unobtainable: Due to mental status


Review of Systems: 





Family denies any other complaints other than listed in HPI.  





Physical Exam


Vital Signs: 


 











Temp Pulse Resp BP Pulse Ox


 


 97.9 F   77   20   155/51 H  90 L


 


 11/21/18 16:16  11/21/18 16:16  11/21/18 16:16  11/21/18 16:16  11/21/18 16:16








 Intake & Output











 11/20/18 11/21/18 11/22/18





 06:59 06:59 06:59


 


Weight   76.4 kg











General appearance: PRESENT: no acute distress, thin


Exam: 





72 year old  male.  


Head exam: PRESENT: atraumatic, normocephalic


Eye exam: PRESENT: EOMI, other - Pupils dilated..  ABSENT: PERRLA


Mouth exam: PRESENT: dry mucosa


Neck exam: ABSENT: lymphadenopathy, tenderness


Respiratory exam: PRESENT: clear to auscultation venessa, unlabored


Cardiovascular exam: PRESENT: bradycardia, irregular rhythm


GI/Abdominal exam: PRESENT: soft.  ABSENT: tenderness


Extremities exam: PRESENT: +1 edema


Neurological exam: PRESENT: altered, awake.  ABSENT: oriented to person, 

oriented to place, oriented to time, oriented to situation


Psychiatric exam: PRESENT: agitated


Focused psych exam: PRESENT: delusional, restlessness


Skin exam: PRESENT: normal color





Results


Laboratory Results: 


 











  11/21/18 11/21/18 11/21/18





  15:35 15:50 17:00


 


Carbonic Acid    0.87 L


 


HCO3/H2CO3 Ratio    23:1


 


ABG pH    7.46 H


 


ABG pCO2    28.9 L


 


ABG pO2    47.1 L


 


ABG HCO3    20.3


 


ABG O2 Saturation    86.2 L


 


ABG Base Excess    -2.7


 


FiO2    ROOMAIR


 


Lactic Acid   2.2 H 


 


Ammonia  < 8.7 L  











Impressions: 


 





Abdomen/Pelvis CT  11/21/18 00:00


IMPRESSION:  Fluid overload with bilateral moderate pleural effusions and small 

amount of ascites.


Bibasilar consolidation atelectasis versus pneumonia


 








Chest X-Ray  11/21/18 09:09


IMPRESSION:  Bibasilar densities right greater than left as noted above.


 








Head CT  11/21/18 10:00


IMPRESSION:  No acute intracranial pathology.  No noncontrast CT findings to 

explain symptoms.  MRI may be used to more sensitively evaluate for acute 

diffusion restricting infarction if suspected.


EVIDENCE OF ACUTE STROKE: NO.


 











Status: Image reviewed by me





Assessment & Plan





- Diagnosis


(1) Prostate cancer


Is this a current diagnosis for this admission?: Yes   


Plan: 


This was reportedly an early stage cancer.  Although brain mets is possible as 

a cause of his delirium, this is not top on my list.  Consider MRI brain with 

contrast once Cr improved.  








(2) Altered mental status


Qualifiers: 


   Altered mental status type: delirium   Qualified Code(s): R41.0 - 

Disorientation, unspecified   


Is this a current diagnosis for this admission?: Yes   


Plan: 


Multiple possible causes at this point.  His ammonia and Ca are normal.  He is 

hypoxic and with his A-fib and bradycardia, he could have poor perfusion to the 

brain.  Await ECHO.  








(3) New onset atrial fibrillation


Is this a current diagnosis for this admission?: Yes   


Plan: 


I discussed anticoagulation with Ana Laura [place] and I believe his risk of 

bleeding at this point is greater than his risk of clotting.  Also, MRI brain 

to rule out CVA or hemorrhage should be performed prior to starting any 

anticoagulation.  Would consider anticoagulation in the future if his Cr and 

mental status improve.  








(4) CKD (chronic kidney disease) stage 3, GFR 30-59 ml/min


Is this a current diagnosis for this admission?: Yes   


Plan: 


He also has evidence of acute renal insufficiency in addition to the chronic 

renal disease.  I am hopeful that fluids will help this, but if it is due to 

poor perfusion from the heart, this may not improve.  








- Plan Summary


Plan Summary: 





Patient was discussed at length with Ana Laura Pinzon.  I am happy to continue to 

follow him.  I will be available by phone over the next few days and Dr. Rizvi will return on Monday.  Please call with any concerns.

## 2018-11-21 NOTE — RADIOLOGY REPORT (SQ)
EXAM DESCRIPTION:  CHEST SINGLE VIEW



COMPLETED DATE/TIME:  11/21/2018 9:59 am



REASON FOR STUDY:  alterred LOC



COMPARISON:  Chest CT scan dated 11/9/2018



EXAM PARAMETERS:  NUMBER OF VIEWS: One view.

TECHNIQUE: Single frontal radiographic view of the chest acquired.

RADIATION DOSE: NA

LIMITATIONS: None.



FINDINGS:  LUNGS AND PLEURA: A small right and tiny left pleural effusions are identified which appea
r decreased in size as compared to the previous chest CT scan.  There is associated airspace consolid
ation in the right lung base which could represent atelectatic changes or pneumonic consolidation.  T
here is some minimal associated airspace consolidation in the left lung base which could also represe
nt atelectatic changes or minimal basilar infiltrate.  Remaining lung fields are clear.

MEDIASTINUM AND HILAR STRUCTURES: No masses.  Contour normal.

HEART AND VASCULAR STRUCTURES: Cardiac silhouette is enlarged.

BONES: No acute findings.

HARDWARE: None in the chest.

OTHER: No other significant finding.



IMPRESSION:  Bibasilar densities right greater than left as noted above.



TECHNICAL DOCUMENTATION:  JOB ID:  [id number]

 2011 [employer]- All Rights Reserved



Reading location - IP/workstation name: [email or web address]

## 2018-11-22 LAB
ALBUMIN SERPL-MCNC: 2.7 G/DL (ref 3.5–5)
ALP SERPL-CCNC: 358 U/L (ref 38–126)
ALT SERPL-CCNC: 29 U/L (ref 21–72)
ANION GAP SERPL CALC-SCNC: 14 MMOL/L (ref 5–19)
ANION GAP SERPL CALC-SCNC: 16 MMOL/L (ref 5–19)
ANION GAP SERPL CALC-SCNC: 18 MMOL/L (ref 5–19)
ARTERIAL BLOOD FIO2: (no result)
ARTERIAL BLOOD H2CO3: 0.72 MMOL/L (ref 1.05–1.35)
ARTERIAL BLOOD HCO3: 17.2 MMOL/L (ref 20–24)
ARTERIAL BLOOD PCO2: 23.8 MMHG (ref 35–45)
ARTERIAL BLOOD PH: 7.48 (ref 7.35–7.45)
ARTERIAL BLOOD PO2: 57.9 MMHG (ref 80–100)
ARTERIAL BLOOD TOTAL CO2: 17.9 MMOL/L (ref 23–27)
AST SERPL-CCNC: 145 U/L (ref 17–59)
BASE EXCESS BLDA CALC-SCNC: -5.3 MMOL/L
BILIRUB DIRECT SERPL-MCNC: 0.5 MG/DL (ref 0–0.4)
BILIRUB SERPL-MCNC: 0.6 MG/DL (ref 0.2–1.3)
BUN SERPL-MCNC: 63 MG/DL (ref 7–20)
BUN SERPL-MCNC: 65 MG/DL (ref 7–20)
BUN SERPL-MCNC: 66 MG/DL (ref 7–20)
CALCIUM: 7.7 MG/DL (ref 8.4–10.2)
CALCIUM: 7.9 MG/DL (ref 8.4–10.2)
CALCIUM: 8 MG/DL (ref 8.4–10.2)
CHLORIDE SERPL-SCNC: 119 MMOL/L (ref 98–107)
CHLORIDE SERPL-SCNC: 120 MMOL/L (ref 98–107)
CHLORIDE SERPL-SCNC: 121 MMOL/L (ref 98–107)
CK SERPL-CCNC: 617 U/L (ref 55–170)
CO2 SERPL-SCNC: 15 MMOL/L (ref 22–30)
CO2 SERPL-SCNC: 16 MMOL/L (ref 22–30)
CO2 SERPL-SCNC: 17 MMOL/L (ref 22–30)
ERYTHROCYTE [DISTWIDTH] IN BLOOD BY AUTOMATED COUNT: 18.1 % (ref 11.5–14)
ERYTHROCYTE [DISTWIDTH] IN BLOOD BY AUTOMATED COUNT: 18.3 % (ref 11.5–14)
GLUCOSE SERPL-MCNC: 224 MG/DL (ref 75–110)
GLUCOSE SERPL-MCNC: 256 MG/DL (ref 75–110)
GLUCOSE SERPL-MCNC: 300 MG/DL (ref 75–110)
HCT VFR BLD CALC: 25.8 % (ref 37.9–51)
HCT VFR BLD CALC: 29 % (ref 37.9–51)
HGB BLD-MCNC: 8.5 G/DL (ref 13.5–17)
HGB BLD-MCNC: 9.4 G/DL (ref 13.5–17)
MCH RBC QN AUTO: 25.4 PG (ref 27–33.4)
MCH RBC QN AUTO: 25.5 PG (ref 27–33.4)
MCHC RBC AUTO-ENTMCNC: 32.5 G/DL (ref 32–36)
MCHC RBC AUTO-ENTMCNC: 32.7 G/DL (ref 32–36)
MCV RBC AUTO: 78 FL (ref 80–97)
MCV RBC AUTO: 78 FL (ref 80–97)
PLATELET # BLD: 218 10^3/UL (ref 150–450)
PLATELET # BLD: 222 10^3/UL (ref 150–450)
POTASSIUM SERPL-SCNC: 3.8 MMOL/L (ref 3.6–5)
POTASSIUM SERPL-SCNC: 3.9 MMOL/L (ref 3.6–5)
POTASSIUM SERPL-SCNC: 3.9 MMOL/L (ref 3.6–5)
PROT SERPL-MCNC: 5.6 G/DL (ref 6.3–8.2)
RBC # BLD AUTO: 3.31 10^6/UL (ref 4.35–5.55)
RBC # BLD AUTO: 3.72 10^6/UL (ref 4.35–5.55)
SAO2 % BLDA: 92.5 % (ref 94–98)
SODIUM SERPL-SCNC: 151.2 MMOL/L (ref 137–145)
SODIUM SERPL-SCNC: 151.7 MMOL/L (ref 137–145)
SODIUM SERPL-SCNC: 152.8 MMOL/L (ref 137–145)
WBC # BLD AUTO: 16.8 10^3/UL (ref 4–10.5)
WBC # BLD AUTO: 18.8 10^3/UL (ref 4–10.5)

## 2018-11-22 RX ADMIN — METOPROLOL TARTRATE SCH: 25 TABLET, FILM COATED ORAL at 09:03

## 2018-11-22 RX ADMIN — PEDIATRIC MULTIPLE VITAMINS W/ IRON CHEW TAB 18 MG SCH: 18 CHEW TAB at 09:03

## 2018-11-22 RX ADMIN — TAMSULOSIN HYDROCHLORIDE SCH MG: 0.4 CAPSULE ORAL at 01:30

## 2018-11-22 RX ADMIN — FAMOTIDINE SCH MG: 10 INJECTION INTRAVENOUS at 22:30

## 2018-11-22 RX ADMIN — HEPARIN SODIUM SCH UNIT: 5000 INJECTION, SOLUTION INTRAVENOUS; SUBCUTANEOUS at 22:30

## 2018-11-22 RX ADMIN — HEPARIN SODIUM SCH UNIT: 5000 INJECTION, SOLUTION INTRAVENOUS; SUBCUTANEOUS at 15:16

## 2018-11-22 RX ADMIN — HEPARIN SODIUM SCH: 5000 INJECTION, SOLUTION INTRAVENOUS; SUBCUTANEOUS at 05:34

## 2018-11-22 RX ADMIN — DOCUSATE SODIUM SCH: 100 CAPSULE, LIQUID FILLED ORAL at 09:03

## 2018-11-22 RX ADMIN — METOPROLOL TARTRATE SCH MG: 25 TABLET, FILM COATED ORAL at 01:30

## 2018-11-22 RX ADMIN — AMLODIPINE BESYLATE SCH: 10 TABLET ORAL at 09:05

## 2018-11-22 RX ADMIN — ASPIRIN SCH: 81 TABLET, CHEWABLE ORAL at 09:02

## 2018-11-22 NOTE — EKG REPORT
SEVERITY:- ABNORMAL ECG -

SINUS OR ECTOPIC ATRIAL RHYTHM

FIRST DEGREE AV BLOCK

BORDERLINE LEFT AXIS DEVIATION

BORDERLINE T ABNORMALITIES, INFERIOR LEADS

BORDERLINE PROLONGED QT INTERVAL

:

Confirmed by: Maryann Suarez MD 22-Nov-2018 09:40:06

## 2018-11-22 NOTE — RADIOLOGY REPORT (SQ)
EXAM DESCRIPTION:  MRI HEAD WITHOUT



COMPLETED DATE/TIME:  11/22/2018 5:01 pm



REASON FOR STUDY:  AMS, known metastatic CA



COMPARISON:  CT head 11/21/2018



TECHNIQUE:  Multiplanar imaging includes non-contrasted T1, T2, FLAIR, and diffusion with ADC map seq
uences. Images stored on PACS.



LIMITATIONS:  Motion artifact.



FINDINGS:  ANATOMY: No anomalies. Normal vascular flow voids. Pituitary fossa normal.

CSF SPACES: Prominence of the lateral ventricles secondary to mild cortical atrophy.

CEREBRUM: Sulci and gyri normal in size and contour. Normal white matter signal on FLAIR imaging.  No
 evidence of hemorrhage, mass, or extraaxial fluid collection.

POSTERIOR FOSSA: No signal alteration. No hemorrhage. No edema, masses or mass effect.  Internal darshan
tory canals, cerebello-pontine angles, mastoids normal.

DIFFUSION IMAGING: Negative for acute or sub-acute infarction.

ORBITS: No masses. Globes normal.

PARANASAL  SINUSES: No fluid levels.  Mucosa normal.

OTHER: No other significant finding.



IMPRESSION:  Mild involutional changes of aging with no acute intracranial imaging findings on this s
lightly limited study.

EVIDENCE OF ACUTE STROKE: NO.



TECHNICAL DOCUMENTATION:  JOB ID:  [id number]

 2011 [employer]- All Rights Reserved



Reading location - IP/workstation name: [email or web address]

## 2018-11-22 NOTE — PDOC PROGRESS REPORT
Subjective


Progress Note for:: 11/22/18


Subjective:: 


The patient is a 72 year old male significant for prostate cancer with lung and 

bone metastasis, type 2 diabetes mellitus, stage III kidney disease, CHF, and 

hypertension who was admitted on 11/21/2018 for altered mental status and acute 

renal failure.





The patient was seen on morning rounds.  He was found resting in bed 

comfortably on supplemental oxygen at 2 L/min.  He was arousable but with 

intelligible speech, but made fair attempts to follow commands.


ROS is limited secondary to mental status.





I attempted to call patient's NOK; wife Coco Tucker, at 11 am.  Voicemail was 

left requesting that call is returned.  This afternoon, attempted again was 

able to reach the patient's daughter, Ninoska, who provided telephone consent 

for MRI screening.


She had no specific questions or concerns and is anxiously awaiting results of 

MRI.





No concerns per nursing.


Reason For Visit: 


ALTERED MENTAL STATUS,METABOLIC ENCEPHALOPATHY








Physical Exam


Vital Signs: 


 











Temp Pulse Resp BP Pulse Ox


 


 98.3 F   79   20   155/48 H  93 


 


 11/22/18 07:44  11/22/18 07:44  11/22/18 07:44  11/22/18 07:44  11/22/18 07:44








 Intake & Output











 11/21/18 11/22/18 11/23/18





 06:59 06:59 06:59


 


Intake Total   1000


 


Output Total  650 


 


Balance  -650 1000


 


Weight  77.2 kg 











General appearance: PRESENT: no acute distress, thin, well-developed, well-

nourished


Head exam: PRESENT: atraumatic, normocephalic


Eye exam: PRESENT: conjunctiva pink, EOMI, PERRLA.  ABSENT: scleral icterus


Ear exam: PRESENT: normal external ear exam


Mouth exam: PRESENT: dry mucosa, tongue midline


Neck exam: ABSENT: carotid bruit, JVD, lymphadenopathy, thyromegaly


Respiratory exam: PRESENT: clear to auscultation venessa, symmetrical, unlabored.  

ABSENT: rales, rhonchi, wheezes


Cardiovascular exam: PRESENT: irregular rhythm, +S1, +S2.  ABSENT: diastolic 

murmur, rubs, systolic murmur


Pulses: PRESENT: normal dorsalis pedis pul


Vascular exam: PRESENT: normal capillary refill


GI/Abdominal exam: PRESENT: normal bowel sounds, soft.  ABSENT: distended, 

guarding, mass, organolmegaly, rebound, tenderness


Rectal exam: PRESENT: deferred


Extremities exam: ABSENT: calf tenderness, clubbing, pedal edema, +1 edema


Neurological exam: PRESENT: other - Arousable, intelligible speech,  weak 

but equal, plantar flexion weak but equal.  Patient is unable to lift arms or 

legs off bed.  Appears to have a right-sided facial droop today.  Failed 

bedside swallow eval..  ABSENT: motor sensory deficit


Skin exam: PRESENT: dry, intact, warm.  ABSENT: cyanosis, rash





Results


Laboratory Results: 


 





 11/22/18 03:54 





 11/22/18 03:54 





 











  11/21/18 11/21/18 11/21/18





  15:35 15:35 15:50


 


WBC   


 


RBC   


 


Hgb   


 


Hct   


 


MCV   


 


MCH   


 


MCHC   


 


RDW   


 


Plt Count   


 


Carbonic Acid   


 


HCO3/H2CO3 Ratio   


 


ABG pH   


 


ABG pCO2   


 


ABG pO2   


 


ABG HCO3   


 


ABG O2 Saturation   


 


ABG Base Excess   


 


FiO2   


 


Sodium   


 


Potassium   


 


Chloride   


 


Carbon Dioxide   


 


Anion Gap   


 


BUN   


 


Creatinine   


 


Est GFR ( Amer)   


 


Est GFR (Non-Af Amer)   


 


Glucose   


 


Lactic Acid    2.2 H


 


Calcium   


 


Magnesium   2.5 H 


 


Total Bilirubin   


 


AST   


 


ALT   


 


Alkaline Phosphatase   


 


Ammonia  < 8.7 L  


 


Total Protein   


 


Albumin   














  11/21/18 11/22/18 11/22/18





  17:00 03:54 03:54


 


WBC   16.8 H 


 


RBC   3.31 L 


 


Hgb   8.5 L 


 


Hct   25.8 L 


 


MCV   78 L 


 


MCH   25.5 L 


 


MCHC   32.7 


 


RDW   18.1 H 


 


Plt Count   218 


 


Carbonic Acid  0.87 L  


 


HCO3/H2CO3 Ratio  23:1  


 


ABG pH  7.46 H  


 


ABG pCO2  28.9 L  


 


ABG pO2  47.1 L  


 


ABG HCO3  20.3  


 


ABG O2 Saturation  86.2 L  


 


ABG Base Excess  -2.7  


 


FiO2  ROOMAIR  


 


Sodium    151.7 H


 


Potassium    3.8


 


Chloride    119 H


 


Carbon Dioxide    17 L


 


Anion Gap    16


 


BUN    66 H


 


Creatinine    1.92 H


 


Est GFR ( Amer)    42 L


 


Est GFR (Non-Af Amer)    35 L


 


Glucose    224 H


 


Lactic Acid   


 


Calcium    7.7 L


 


Magnesium   


 


Total Bilirubin    0.6


 


AST    145 H


 


ALT    29


 


Alkaline Phosphatase    358 H


 


Ammonia   


 


Total Protein    5.6 L


 


Albumin    2.7 L








 











  11/22/18 11/22/18





  03:54 03:54


 


Creatine Kinase  617 H 


 


NT-Pro-B Natriuret Pep   9380 H











Impressions: 


 





Abdomen/Pelvis CT  11/21/18 00:00


IMPRESSION:  Fluid overload with bilateral moderate pleural effusions and small 

amount of ascites.


Bibasilar consolidation atelectasis versus pneumonia


 








Head CT  11/21/18 10:00


IMPRESSION:  No acute intracranial pathology.  No noncontrast CT findings to 

explain symptoms.  MRI may be used to more sensitively evaluate for acute 

diffusion restricting infarction if suspected.


EVIDENCE OF ACUTE STROKE: NO.


 














Assessment & Plan





- Diagnosis


(1) Altered mental status


Is this a current diagnosis for this admission?: Yes   


Plan: 


Worsened today; patient is now arousable, intelligible speech, questionable 

right-sided facial droop though moves all extremities equally (weak).


The patient presents with altered mental status.  Per EMS personnel and ED 

provider, the patient's family reports that he had decreased p.o. intake 

beginning 3-4 days ago with onset of slurred speech that has progressively 

worsened until the point that he has become incomprehensible.  He is able to 

speak 1-2 words clearly prior to his speech becoming garbled.  He does follow 

simple commands.





Head CT is benign.


Ammonia normal.


UDS and serum EtOH are negative.


MRI Head w/o pending.


WBC trending up, though remains afebrile and without evidence of infectious 

process.


Blood and urine cultures negative at 1 day.


ABG revealed mixed respiratory and metabolic alkalosis.





Unclear etiology; likely multifactorial secondary to metabolic encephalopathy 

evidenced by elevated LFTs, acute renal failure, hypoxia (ABG reveals 

respiratory alkalosis), poor cardiac output in setting of CHF and new onset A. 

fib with bradycardia, and profound dehydration noted on exam (hypernatremia, 

dry mucous membranes, skin tenting).  Concern for metastasis vs CVA.





The patient is admitted to the medical floor on continuous cardiac telemetry.


He is ordered a one-to-one sitter for safety as the patient is noted to be 

confused and impulsive.


Continue IVF.


Oncology has been consulted; appreciate their assistance.


Failed bedside swallow eval; now in n.p.o. status.


Fall and aspiration precautions.








(2) Acute on chronic kidney failure


Qualifiers: 


   Chronic kidney disease stage: stage 3 (moderate) 


Is this a current diagnosis for this admission?: Yes   


Plan: 


Improved; creatinine and BUN are trending down.  Cr 2.35--> 1.92--> 1.91


Per Dr. Scruggs's note from his previous admission, the patient's baseline 

creatinine is 1.6.  At time of discharge 11/15/2018 his creatinine had returned 

to baseline with a BUN of 39.


Sodium is elevated to 152.8.  Fortunately, potassium remains normal at 3.9.


On clinical exam, the patient is clearly dehydrated.


Noncontrasted abdominal and pelvic CT is negative for hydronephrosis or post 

obstruction related to the patient's prostate cancer.





Continue IVF.  Nursing is asked to monitor closely for evidence of fluid volume 

overload due to the patient's underlying heart failure.


We will avoid nephrotoxic medications as able.


Alcaraz catheter for strict I'&O's.


Serial chemistries.








(3) Hypernatremia


Is this a current diagnosis for this admission?: Yes   


Plan: 


Trending up.  147.7--> 151.7--> 152.8


During the patient's previous admission he was actually hyponatremic.  

Evaluation at that time was unclear whether or not the patient had SIADH or if 

low sodium was a result of polydipsia.


He received appropriate IV fluids, was educated on dietary and fluid intake at 

time of discharge.  





Initially placed on NS, changed to 1/2 NS this am; Na continued to trend up.


Will begin D5W at 3ml/kg/hr (200 ml/hr) and monitor with serial chemistries q 6 

hours.








(4) Diabetes


Qualifiers: 


   Diabetes mellitus type: type 2 


Is this a current diagnosis for this admission?: Yes   


Plan: 


A1c (5/22/18) 4.7%.


Glucose is persistently elevated this admission. 





Accu-Cheks every 6 hours with Humalog for sliding scale coverage.


Hypoglycemia protocol in place.








(5) New onset atrial fibrillation


Is this a current diagnosis for this admission?: Yes   


Plan: 


EKG demonstrates atrial fibrillation with a rate in the 50s.


Rhythm strip from previous admission this month demonstrates normal sinus 

rhythm.


TSH is normal.


Echocardiogram is pending.


Possibly related to electrolyte derangements, acute respiratory issue/hypoxia (

lung metastasis, bibasilar consolidations, consideration of PE -unable to rule 

out at this time).





Will start daily aspirin therapy.


Metoprolol 12.5 mg every 12 hours; holding parameters for bradycardia.


Discussed with Dr. Rothman; concern for bleeding with full dose anticoagulation.  

We will continue DVT prophylaxis heparin only at this time.


Consider cardiology consultation.








(6) Rhabdomyolysis


Qualifiers: 


   Rhabdomyolysis type: non-traumatic   Qualified Code(s): M62.82 - 

Rhabdomyolysis   


Is this a current diagnosis for this admission?: Yes   


Plan: 


Improved.


Secondary to poor p.o. intake resulting in dehydration and bedbound status.


CK is elevated to 817--> 617.





IV fluid rehydration.


Every 2 turns; once altered mental status is improved enough to assure patient 

safety, increase mobility.








(7) Anemia, chronic disease


Is this a current diagnosis for this admission?: Yes   


Plan: 


Stable; Hgb 9.0--> 8.5--> 9.4


Anemia of chronic disease related to chronic kidney disease stage III, prostate 

cancer with bony metastasis, and iron deficiency related to poor nutrition.





We will place patient on multivitamin with iron.


Registered dietitian is consulted.


We will monitor daily CBC and transfuse as necessary.








(8) Congestive heart failure


Is this a current diagnosis for this admission?: Yes   


Plan: 


Patient with a history of CHF.


Previous echocardiogram is from July 2017 which demonstrated an LVEF greater 

than 65% with mild pulmonary hypertension.  Unable to assess diastolic 

dysfunction at that time due to study quality.


ProBNP up slightly with IVF; 8400--> 9300 (up from 597 last year).


Imaging (chest x-ray and abdominal/pelvic CT) suggestive of fluid volume 

overload with moderate bilateral pleural effusions and small amount of ascites.


However, on exam the patient is clearly dehydrated.





Continue IV fluid rehydration; nursing is advised of concern for potential for 

fluid volume overload and asked to monitor closely.


Daily weights, Alcaraz catheter for strict I'&O's.


Continuing amlodipine, start low-dose metoprolol w/ holding parameters.  

Holding diuretics secondary to dehydration and acute renal failure.


Echocardiogram is pending.


Consider cardiology consultation.








(9) Dehydration


Is this a current diagnosis for this admission?: Yes   


Plan: 


Evidenced by hypernatremia, dry mucous membranes, skin tenting.


Continue IV fluids.


Remaining plan as above.








(10) Leukocytosis


Qualifiers: 


   Leukocytosis type: unspecified   Qualified Code(s): D72.829 - Elevated white 

blood cell count, unspecified   


Is this a current diagnosis for this admission?: Yes   


Plan: 


Trending up.


WBC is currently 16.7--> 18.8.  


At time of discharge on 11/15/18, WBCs were elevated to 16.9.  Currently 

afebrile; no report of infectious symptoms, lung sounds clear.


Urinalysis is negative.


Chest x-ray demonstrates bibasilar consolidation; atelectasis versus pneumonia.


Repeat Chest x-ray today slightly worsened.


Blood and urine cultures are negative at 24 hours.





During previous admission, patient was treated for pneumonia.  He was 

discharged on p.o. Levaquin and reportedly completed his course of therapy.  On 

exam, lung sounds are clear.  Imaging is questionable for pneumonia, however, 

atelectasis is just is possible.


Will hold on antibiotics at this time his persistent leukocytosis is likely an 

inflammatory reaction related to his metastatic prostate cancer.








(11) Abnormal LFTs


Is this a current diagnosis for this admission?: Yes   


Plan: 


Patient with elevated LFTs; actually increased from previous admission. 


MELD score 19


Total Bili 0.7, Direct Bili 0.6, , ALT 34, Alk Phos 425


Ammonia <8.7


Albumin 3.0


UDS and serum alcohol are negative


Noncontrasted CT of the abdomen and pelvis is an unremarkable liver today.


Contrasted abdominal CT (8/2018) also showed a normal liver size without masses 

and no dilated ducts.


Hepatitis panel is pending.


Echocardiogram is pending.








- Time


Time Spent with patient: 25-34 minutes


Medications reviewed and adjusted accordingly: Yes

## 2018-11-22 NOTE — RADIOLOGY REPORT (SQ)
EXAM DESCRIPTION:  CHEST SINGLE VIEW



COMPLETED DATE/TIME:  11/22/2018 8:13 am



REASON FOR STUDY:  hypoxia



COMPARISON:  11/21/2018.



FINDINGS:  Single-view chest AP portable upright.  Approximately 0802 hours.

Slightly worsening pulmonary findings.

There is diminished aeration in both lung bases, as before.  Dense consolidation, volume loss and sma
ll effusions noted.  Mild vascular congestion.

No pneumothorax.

Stable cardiomediastinal silhouette.



TECHNICAL DOCUMENTATION:  JOB ID:  [id number]



Reading location - IP/workstation name: [email or web address]

## 2018-11-23 LAB
ABSOLUTE LYMPHOCYTES# (MANUAL): 1.1 10^3/UL (ref 0.5–4.7)
ABSOLUTE MONOCYTES # (MANUAL): 0.9 10^3/UL (ref 0.1–1.4)
ABSOLUTE NEUTROPHILS# (MANUAL): 16.3 10^3/UL (ref 1.7–8.2)
ADD MANUAL DIFF: YES
ALBUMIN SERPL-MCNC: 2.7 G/DL (ref 3.5–5)
ALP SERPL-CCNC: 335 U/L (ref 38–126)
ALT SERPL-CCNC: 28 U/L (ref 21–72)
ANION GAP SERPL CALC-SCNC: 12 MMOL/L (ref 5–19)
ANION GAP SERPL CALC-SCNC: 13 MMOL/L (ref 5–19)
ANION GAP SERPL CALC-SCNC: 13 MMOL/L (ref 5–19)
ANION GAP SERPL CALC-SCNC: 18 MMOL/L (ref 5–19)
ANISOCYTOSIS BLD QL SMEAR: (no result)
APPEARANCE ALL TUBES: CLEAR
AST SERPL-CCNC: 94 U/L (ref 17–59)
BASOPHILS NFR BLD MANUAL: 0 % (ref 0–2)
BILIRUB DIRECT SERPL-MCNC: 0.6 MG/DL (ref 0–0.4)
BILIRUB SERPL-MCNC: 0.6 MG/DL (ref 0.2–1.3)
BUN SERPL-MCNC: 55 MG/DL (ref 7–20)
BUN SERPL-MCNC: 56 MG/DL (ref 7–20)
BUN SERPL-MCNC: 59 MG/DL (ref 7–20)
BUN SERPL-MCNC: 60 MG/DL (ref 7–20)
BURR CELLS BLD QL SMEAR: (no result)
CALCIUM: 7.8 MG/DL (ref 8.4–10.2)
CALCIUM: 7.8 MG/DL (ref 8.4–10.2)
CALCIUM: 7.9 MG/DL (ref 8.4–10.2)
CALCIUM: 8 MG/DL (ref 8.4–10.2)
CHLORIDE SERPL-SCNC: 119 MMOL/L (ref 98–107)
CHLORIDE SERPL-SCNC: 120 MMOL/L (ref 98–107)
CK SERPL-CCNC: 367 U/L (ref 55–170)
CO2 SERPL-SCNC: 16 MMOL/L (ref 22–30)
CO2 SERPL-SCNC: 19 MMOL/L (ref 22–30)
COLOR ALL TUBES: COLORLESS
CSF TOTAL VOLUME: 8 CC
CSF TUBE NUMBER: 3
EOSINOPHIL NFR BLD MANUAL: 0 % (ref 0–6)
ERYTHROCYTE [DISTWIDTH] IN BLOOD BY AUTOMATED COUNT: 18.2 % (ref 11.5–14)
GLUCOSE CSF-MCNC: 177 MG/DL (ref 40–70)
GLUCOSE SERPL-MCNC: 231 MG/DL (ref 75–110)
GLUCOSE SERPL-MCNC: 235 MG/DL (ref 75–110)
GLUCOSE SERPL-MCNC: 345 MG/DL (ref 75–110)
GLUCOSE SERPL-MCNC: 384 MG/DL (ref 75–110)
HCT VFR BLD CALC: 28.4 % (ref 37.9–51)
HGB BLD-MCNC: 9.2 G/DL (ref 13.5–17)
INR PPP: 1.46
MCH RBC QN AUTO: 25.5 PG (ref 27–33.4)
MCHC RBC AUTO-ENTMCNC: 32.6 G/DL (ref 32–36)
MCV RBC AUTO: 78 FL (ref 80–97)
MONOCYTES % (MANUAL): 5 % (ref 3–13)
NRBC BLD AUTO-RTO: 2 /100 WBC
PLATELET # BLD: 200 10^3/UL (ref 150–450)
PLATELET COMMENT: ADEQUATE
PLATELET LARGE: PRESENT
POIKILOCYTOSIS BLD QL SMEAR: (no result)
POTASSIUM SERPL-SCNC: 3.6 MMOL/L (ref 3.6–5)
POTASSIUM SERPL-SCNC: 3.7 MMOL/L (ref 3.6–5)
POTASSIUM SERPL-SCNC: 3.7 MMOL/L (ref 3.6–5)
POTASSIUM SERPL-SCNC: 3.8 MMOL/L (ref 3.6–5)
PROT SERPL-MCNC: 5.4 G/DL (ref 6.3–8.2)
PROT SERPL-MCNC: 5.6 G/DL (ref 6.3–8.2)
PROTEIN,CSF: 67 MG/DL (ref 12–60)
PROTHROMBIN TIME: 18.4 SEC (ref 11.4–15.4)
RBC # BLD AUTO: 3.63 10^6/UL (ref 4.35–5.55)
SCHISTOCYTES BLD QL SMEAR: SLIGHT
SEGMENTED NEUTROPHILS % (MAN): 89 % (ref 42–78)
SODIUM SERPL-SCNC: 149.9 MMOL/L (ref 137–145)
SODIUM SERPL-SCNC: 150.8 MMOL/L (ref 137–145)
SODIUM SERPL-SCNC: 152 MMOL/L (ref 137–145)
SODIUM SERPL-SCNC: 152.8 MMOL/L (ref 137–145)
TOTAL CELLS COUNTED BLD: 100
TOXIC GRANULES BLD QL SMEAR: (no result)
VARIANT LYMPHS NFR BLD MANUAL: 6 % (ref 13–45)
VOLUME TUBE 1: 1 CC
VOLUME TUBE 2: 2 CC
VOLUME TUBE 3: 2 CC
VOLUME TUBE 4: 3 CC
WBC # BLD AUTO: 18.3 10^3/UL (ref 4–10.5)

## 2018-11-23 PROCEDURE — B01BZZZ FLUOROSCOPY OF SPINAL CORD: ICD-10-PCS | Performed by: PHYSICIAN ASSISTANT

## 2018-11-23 PROCEDURE — 009U3ZX DRAINAGE OF SPINAL CANAL, PERCUTANEOUS APPROACH, DIAGNOSTIC: ICD-10-PCS | Performed by: PHYSICIAN ASSISTANT

## 2018-11-23 RX ADMIN — VANCOMYCIN HYDROCHLORIDE SCH MLS/HR: 1 INJECTION, POWDER, LYOPHILIZED, FOR SOLUTION INTRAVENOUS at 17:44

## 2018-11-23 RX ADMIN — TAMSULOSIN HYDROCHLORIDE SCH: 0.4 CAPSULE ORAL at 21:25

## 2018-11-23 RX ADMIN — DEXTROSE PRN MLS/HR: 5 SOLUTION INTRAVENOUS at 16:39

## 2018-11-23 RX ADMIN — INSULIN LISPRO PRN UNIT: 100 INJECTION, SOLUTION INTRAVENOUS; SUBCUTANEOUS at 11:52

## 2018-11-23 RX ADMIN — INSULIN LISPRO PRN UNIT: 100 INJECTION, SOLUTION INTRAVENOUS; SUBCUTANEOUS at 09:20

## 2018-11-23 RX ADMIN — DEXTROSE PRN MLS/HR: 5 SOLUTION INTRAVENOUS at 06:44

## 2018-11-23 RX ADMIN — PEDIATRIC MULTIPLE VITAMINS W/ IRON CHEW TAB 18 MG SCH: 18 CHEW TAB at 16:23

## 2018-11-23 RX ADMIN — HEPARIN SODIUM SCH UNIT: 5000 INJECTION, SOLUTION INTRAVENOUS; SUBCUTANEOUS at 16:30

## 2018-11-23 RX ADMIN — ASPIRIN SCH: 81 TABLET, CHEWABLE ORAL at 16:23

## 2018-11-23 RX ADMIN — CEFTRIAXONE SODIUM SCH MLS/HR: 1 INJECTION, POWDER, FOR SOLUTION INTRAMUSCULAR; INTRAVENOUS at 09:15

## 2018-11-23 RX ADMIN — METOPROLOL TARTRATE SCH: 25 TABLET, FILM COATED ORAL at 10:00

## 2018-11-23 RX ADMIN — METOPROLOL TARTRATE SCH: 25 TABLET, FILM COATED ORAL at 21:25

## 2018-11-23 RX ADMIN — HEPARIN SODIUM SCH UNIT: 5000 INJECTION, SOLUTION INTRAVENOUS; SUBCUTANEOUS at 05:22

## 2018-11-23 RX ADMIN — AMLODIPINE BESYLATE SCH: 10 TABLET ORAL at 10:00

## 2018-11-23 RX ADMIN — FAMOTIDINE SCH MG: 10 INJECTION INTRAVENOUS at 21:34

## 2018-11-23 RX ADMIN — HEPARIN SODIUM SCH UNIT: 5000 INJECTION, SOLUTION INTRAVENOUS; SUBCUTANEOUS at 21:34

## 2018-11-23 RX ADMIN — DOCUSATE SODIUM SCH: 100 CAPSULE, LIQUID FILLED ORAL at 16:23

## 2018-11-23 NOTE — RADIOLOGY REPORT (SQ)
EXAM DESCRIPTION:  LUMBAR PUNCTURE; FLUORO/NEEDLE PLACEMENT/SPINE



COMPLETED DATE/TIME:  11/23/2018 12:58 pm



REASON FOR STUDY:  AMS, leukocytosis; AMS



COMPARISON:  MRI brain 11/22/2018.



FLUOROSCOPY TIME:  29 seconds

1 images saved to PACS.



TECHNIQUE:  Fluoroscopic guided lumbar puncture with opening and closing pressures.



LIMITATIONS:  None.



PROCEDURE:  After written consent and assessment were obtained, the patient was brought into the fluo
roscopy room and placed prone on the table.  The patient's lower back was prepped in a sterile fashio
n and an entry site was selected under live fluoroscopic guidance. The entry site was anesthetized wi
th 1% lidocaine. A 20 gauge needle was advanced through the skin and into the thecal sac at the level
 of L2-L3.  An opening pressure of 20 water units was obtained. After approximately 8ml of CSF was dr
ained, a closing pressure of 17 water units was obtained. The needle was removed and a sterile bandag
e was placed of the site. Specimens were sent to the lab for testing. A fluoroscopic spot image was s
aved to PACS confirming level access.



FINDINGS:  Clear CSF



IMPRESSION:  Lumbar puncture under fluoroscopy. No immediate complication.



COMMENT:  Patient medication list reviewed:Yes- Quality ID# [id number]:Eligible professional attests to docu
menting in the medical record they obtained, updated, or reviewed the patient's current medications..


Quality :  Final reports for procedures using fluoroscopy that document radiation exposure fay
claire, or exposure time and number of fluorographic images (if radiation exposure indices are not avail
able)



TECHNICAL DOCUMENTATION:  JOB ID:  [id number]

 2011 [employer]- All Rights Reserved



Reading location - IP/workstation name: [email or web address]

## 2018-11-23 NOTE — RADIOLOGY REPORT (SQ)
EXAM DESCRIPTION:  LUMBAR PUNCTURE; FLUORO/NEEDLE PLACEMENT/SPINE



COMPLETED DATE/TIME:  11/23/2018 12:58 pm



REASON FOR STUDY:  AMS, leukocytosis; AMS



COMPARISON:  MRI brain 11/22/2018.



FLUOROSCOPY TIME:  29 seconds

1 images saved to PACS.



TECHNIQUE:  Fluoroscopic guided lumbar puncture with opening and closing pressures.



LIMITATIONS:  None.



PROCEDURE:  After written consent and assessment were obtained, the patient was brought into the fluo
roscopy room and placed prone on the table.  The patient's lower back was prepped in a sterile fashio
n and an entry site was selected under live fluoroscopic guidance. The entry site was anesthetized wi
th 1% lidocaine. A 20 gauge needle was advanced through the skin and into the thecal sac at the level
 of L2-L3.  An opening pressure of 20 water units was obtained. After approximately 8ml of CSF was dr
ained, a closing pressure of 17 water units was obtained. The needle was removed and a sterile bandag
e was placed of the site. Specimens were sent to the lab for testing. A fluoroscopic spot image was s
aved to PACS confirming level access.



FINDINGS:  Clear CSF



IMPRESSION:  Lumbar puncture under fluoroscopy. No immediate complication.



COMMENT:  Patient medication list reviewed:Yes- Quality ID# [id number]:Eligible professional attests to docu
menting in the medical record they obtained, updated, or reviewed the patient's current medications..


Quality :  Final reports for procedures using fluoroscopy that document radiation exposure fay
claire, or exposure time and number of fluorographic images (if radiation exposure indices are not avail
able)



TECHNICAL DOCUMENTATION:  JOB ID:  [id number]

 2011 [employer]- All Rights Reserved



Reading location - IP/workstation name: [email or web address]

## 2018-11-23 NOTE — PDOC PROGRESS REPORT
Subjective


Progress Note for:: 11/23/18


Subjective:: 


The patient is a 72 year old male significant for prostate cancer with lung and 

bone metastasis, type 2 diabetes mellitus, stage III kidney disease, CHF, and 

hypertension who was admitted on 11/21/2018 for altered mental status and acute 

renal failure.





The patient was seen on morning rounds with his daughter and son-in-law 

present.  He was found resting in bed comfortably on supplemental oxygen at 2 L/

min.  He was arousable and able to say "good morning" and answer "I'm feeling 

fine," but with very difficult ot understand speech.  All other words were 

incomprehensible.  The patient fair attempts to follow commands, but with weak 

response


ROS is limited secondary to mental status.


The patient's daughter confirms that he was in good health on Monday and was 

able to participate with physical therapy on a exercise bicycle.  She states 

that on Tuesday, he seemed overly fatigued and with slurred speech.  She states 

that the nurses at the SNF found his oxygen to be slightly low and placed him 

on O2; the following day he was send to the ED for AMS.  She reports that the 

only abnormal symptom she had noted with occasional dyspnea and nonproductive 

cough that she attributed to his recent pneumonia.


She is appreciative of the continued work up with plans for LP and 

thoracentesis.  She has no further questions at this time.


No concerns per nursing.


Reason For Visit: 


ALTERED MENTAL STATUS,METABOLIC ENCEPHALOPATHY








Physical Exam


Vital Signs: 


 











Temp Pulse Resp BP Pulse Ox


 


 97.8 F   60   20   134/51 H  93 


 


 11/23/18 11:19  11/23/18 14:35  11/23/18 14:35  11/23/18 11:19  11/23/18 14:35








 Intake & Output











 11/22/18 11/23/18 11/24/18





 06:59 06:59 06:59


 


Intake Total  3350 


 


Output Total 650 1700 325


 


Balance -650 1650 -325


 


Weight 77.2 kg 72 kg 











General appearance: PRESENT: no acute distress, thin, well-developed, well-

nourished


Head exam: PRESENT: atraumatic, normocephalic


Eye exam: PRESENT: conjunctiva pink, EOMI, PERRLA.  ABSENT: scleral icterus


Ear exam: PRESENT: normal external ear exam


Mouth exam: PRESENT: moist, tongue midline


Neck exam: ABSENT: carotid bruit, JVD, lymphadenopathy, thyromegaly


Respiratory exam: PRESENT: decreased breath sounds - R>L, symmetrical, unlabored

, other - O2 via NC.  ABSENT: rales, rhonchi, wheezes


Cardiovascular exam: PRESENT: irregular rhythm, +S1, +S2.  ABSENT: diastolic 

murmur, rubs, systolic murmur


Pulses: PRESENT: normal dorsalis pedis pul


Vascular exam: PRESENT: normal capillary refill


GI/Abdominal exam: PRESENT: normal bowel sounds, soft.  ABSENT: distended, 

guarding, mass, organolmegaly, rebound, tenderness


Rectal exam: PRESENT: deferred


Extremities exam: PRESENT: full ROM.  ABSENT: calf tenderness, clubbing, pedal 

edema


Neurological exam: PRESENT: other - Arousable, garbled speech, fair attempts to 

follow directions, however  strength further diminished from yesterday.  

Patient is unable to lift arms or legs off bed.  Right-sided facial droop 

today.  Failed bedside swallow eval..  ABSENT: motor sensory deficit


Psychiatric exam: PRESENT: appropriate affect, normal mood.  ABSENT: homicidal 

ideation, suicidal ideation


Skin exam: PRESENT: dry, intact, warm.  ABSENT: cyanosis, rash





Results


Laboratory Results: 


 





 11/23/18 03:44 





 11/23/18 10:28 





 











  11/22/18 11/23/18 11/23/18





  21:30 03:44 03:44


 


WBC   18.3 H 


 


RBC   3.63 L 


 


Hgb   9.2 L 


 


Hct   28.4 L 


 


MCV   78 L 


 


MCH   25.5 L 


 


MCHC   32.6 


 


RDW   18.2 H 


 


Plt Count   200 


 


Seg Neutrophils %   Not Reportable 


 


Lymphocytes %   Not Reportable 


 


Monocytes %   Not Reportable 


 


Eosinophils %   Not Reportable 


 


Basophils %   Not Reportable 


 


Absolute Neutrophils   Not Reportable 


 


Absolute Lymphocytes   Not Reportable 


 


Absolute Monocytes   Not Reportable 


 


Absolute Eosinophils   Not Reportable 


 


Absolute Basophils   Not Reportable 


 


Sodium  151.2 H   152.8 H


 


Potassium  3.9   3.8


 


Chloride  121 H   119 H


 


Carbon Dioxide  16 L   16 L


 


Anion Gap  14   18


 


BUN  63 H   60 H


 


Creatinine  1.77 H   1.64 H


 


Est GFR ( Amer)  46 L   50 L


 


Est GFR (Non-Af Amer)  38 L   41 L


 


Glucose  300 H   345 H


 


Calcium  7.9 L   7.8 L


 


Total Bilirubin    0.6


 


AST    94 H


 


ALT    28


 


Alkaline Phosphatase    335 H


 


Total Protein    5.6 L


 


Albumin    2.7 L


 


Fluid Tube Number   


 


Fluid Glucose   


 


Fluid Total Protein   


 


Fluid LDH   


 


Fluid Amylase   


 


CSF Volume   


 


CSF Appearance   


 


CSF Color   


 


CSF WBC   


 


CSF RBC   


 


CSF Glucose   


 


CSF Total Protein   














  11/23/18 11/23/18 11/23/18





  10:28 10:28 12:46


 


WBC   


 


RBC   


 


Hgb   


 


Hct   


 


MCV   


 


MCH   


 


MCHC   


 


RDW   


 


Plt Count   


 


Seg Neutrophils %   


 


Lymphocytes %   


 


Monocytes %   


 


Eosinophils %   


 


Basophils %   


 


Absolute Neutrophils   


 


Absolute Lymphocytes   


 


Absolute Monocytes   


 


Absolute Eosinophils   


 


Absolute Basophils   


 


Sodium  150.8 H  


 


Potassium  3.6  


 


Chloride  119 H  


 


Carbon Dioxide  19 L  


 


Anion Gap  13  


 


BUN  56 H  


 


Creatinine  1.61 H  


 


Est GFR ( Amer)  51 L  


 


Est GFR (Non-Af Amer)  42 L  


 


Glucose  384 H  


 


Calcium  7.9 L  


 


Total Bilirubin   


 


AST   


 


ALT   


 


Alkaline Phosphatase   


 


Total Protein   5.4 L 


 


Albumin   


 


Fluid Tube Number   


 


Fluid Glucose   


 


Fluid Total Protein   


 


Fluid LDH   


 


Fluid Amylase    Cancelled


 


CSF Volume   


 


CSF Appearance   


 


CSF Color   


 


CSF WBC   


 


CSF RBC   


 


CSF Glucose   


 


CSF Total Protein   














  11/23/18 11/23/18 11/23/18





  12:46 12:46 12:46


 


WBC   


 


RBC   


 


Hgb   


 


Hct   


 


MCV   


 


MCH   


 


MCHC   


 


RDW   


 


Plt Count   


 


Seg Neutrophils %   


 


Lymphocytes %   


 


Monocytes %   


 


Eosinophils %   


 


Basophils %   


 


Absolute Neutrophils   


 


Absolute Lymphocytes   


 


Absolute Monocytes   


 


Absolute Eosinophils   


 


Absolute Basophils   


 


Sodium   


 


Potassium   


 


Chloride   


 


Carbon Dioxide   


 


Anion Gap   


 


BUN   


 


Creatinine   


 


Est GFR ( Amer)   


 


Est GFR (Non-Af Amer)   


 


Glucose   


 


Calcium   


 


Total Bilirubin   


 


AST   


 


ALT   


 


Alkaline Phosphatase   


 


Total Protein   


 


Albumin   


 


Fluid Tube Number    3


 


Fluid Glucose  Cancelled  


 


Fluid Total Protein   Cancelled 


 


Fluid LDH   Cancelled 


 


Fluid Amylase   


 


CSF Volume    8.0


 


CSF Appearance    CLEAR


 


CSF Color    COLORLESS


 


CSF WBC    2


 


CSF RBC    37


 


CSF Glucose   


 


CSF Total Protein   














  11/23/18





  12:46


 


WBC 


 


RBC 


 


Hgb 


 


Hct 


 


MCV 


 


MCH 


 


MCHC 


 


RDW 


 


Plt Count 


 


Seg Neutrophils % 


 


Lymphocytes % 


 


Monocytes % 


 


Eosinophils % 


 


Basophils % 


 


Absolute Neutrophils 


 


Absolute Lymphocytes 


 


Absolute Monocytes 


 


Absolute Eosinophils 


 


Absolute Basophils 


 


Sodium 


 


Potassium 


 


Chloride 


 


Carbon Dioxide 


 


Anion Gap 


 


BUN 


 


Creatinine 


 


Est GFR ( Amer) 


 


Est GFR (Non-Af Amer) 


 


Glucose 


 


Calcium 


 


Total Bilirubin 


 


AST 


 


ALT 


 


Alkaline Phosphatase 


 


Total Protein 


 


Albumin 


 


Fluid Tube Number 


 


Fluid Glucose 


 


Fluid Total Protein 


 


Fluid LDH 


 


Fluid Amylase 


 


CSF Volume 


 


CSF Appearance 


 


CSF Color 


 


CSF WBC 


 


CSF RBC 


 


CSF Glucose  177 H


 


CSF Total Protein  67 H








 











  11/22/18 11/22/18





  03:54 03:54


 


Creatine Kinase  617 H 


 


NT-Pro-B Natriuret Pep   9380 H











Impressions: 


 





Abdomen/Pelvis CT  11/21/18 00:00


IMPRESSION:  Fluid overload with bilateral moderate pleural effusions and small 

amount of ascites.


Bibasilar consolidation atelectasis versus pneumonia


 








Head CT  11/21/18 10:00


IMPRESSION:  No acute intracranial pathology.  No noncontrast CT findings to 

explain symptoms.  MRI may be used to more sensitively evaluate for acute 

diffusion restricting infarction if suspected.


EVIDENCE OF ACUTE STROKE: NO.


 








Head MRI  11/22/18 14:54


IMPRESSION:  Mild involutional changes of aging with no acute intracranial 

imaging findings on this slightly limited study.


EVIDENCE OF ACUTE STROKE: NO.


 














Assessment & Plan





- Diagnosis


(1) Altered mental status


Is this a current diagnosis for this admission?: Yes   


Plan: 


Worsened today; patient is now arousable, intelligible speech, questionable 

right-sided facial droop though moves all extremities equally (weak).


The patient presents with altered mental status.  Per EMS personnel and ED 

provider, the patient's family reports that he had decreased p.o. intake 

beginning 3-4 days ago with onset of slurred speech that has progressively 

worsened until the point that he has become incomprehensible.  He is able to 

speak 1-2 words clearly prior to his speech becoming garbled.  He does follow 

simple commands.





Head CT is benign.


Ammonia normal.


UDS and serum EtOH are negative.


WBC trended up slightly up, though remains afebrile and without evidence of 

infectious process.


Blood and urine cultures negative at 2 days.


ABG revealed mixed respiratory and metabolic alkalosis.


Noncontrasted head MRI demonstrated mild involutional changes of aging with no 

acute intracranial findings.  Negative for CVA.





Unclear etiology; likely multifactorial secondary to metabolic encephalopathy 

evidenced by elevated LFTs, acute renal failure, hypoxia (ABG reveals 

respiratory alkalosis), poor cardiac output in setting of CHF and new onset A. 

fib with bradycardia, and profound dehydration noted on exam (hypernatremia, 

dry mucous membranes, skin tenting). 





The patient is admitted to the medical floor on continuous cardiac telemetry.


He is ordered a one-to-one sitter for safety as the patient is noted to be 

confused and impulsive.


Continue IVF.


Lumbar puncture completed today; initial evaluation is reassuring with normal 

opening pressure and mildly elevated protein and glucose which is attributed to 

his hyperglycemia.  Gram stain and cultures are pending.


He has been empirically placed on IV vancomycin and Rocephin.


Oncology has been consulted; appreciate their assistance.


Failed bedside swallow eval; now in n.p.o. status.


Fall and aspiration precautions.


Planning for thoracentesis; will evaluate pleural fluid for infection.





I spoke with the patient's daughter today who requests aggressive workup and 

interventions; patient remains full code.








(2) Acute on chronic kidney failure


Qualifiers: 


   Chronic kidney disease stage: stage 3 (moderate) 


Is this a current diagnosis for this admission?: Yes   


Plan: 


Resolved; patient has returned to his baseline creatinine of 1.61. 


Per Dr. Scruggs's note from his previous admission, the patient's baseline 

creatinine is 1.6.  At time of discharge 11/15/2018 his creatinine had returned 

to baseline with a BUN of 39.


Sodium is elevated to 150.8.  Fortunately, potassium remains normal at 3.9.


Noncontrasted abdominal and pelvic CT is negative for hydronephrosis or post 

obstruction related to the patient's prostate cancer.





Continue IVF.  Nursing is asked to monitor closely for evidence of fluid volume 

overload due to the patient's underlying heart failure.


We will avoid nephrotoxic medications as able.


Pharmacy to dose vancomycin.


Alcaraz catheter for strict I'&O's.


Daily chemistries.








(3) Hypernatremia


Is this a current diagnosis for this admission?: Yes   


Plan: 


Improving with IV fluids.  147.7--> 151.7--> 152.8--> 150.8


During the patient's previous admission he was actually hyponatremic.  

Evaluation at that time was unclear whether or not the patient had SIADH or if 

low sodium was a result of polydipsia.


He received appropriate IV fluids, was educated on dietary and fluid intake at 

time of discharge.  





Continue D5W at 100 ml/hr


Monitor with serial chemistries.








(4) Diabetes


Qualifiers: 


   Diabetes mellitus type: type 2   Chronic kidney disease stage: stage 3 (

moderate) 


Is this a current diagnosis for this admission?: Yes   


Plan: 


A1c (5/22/18) 4.7%.


Glucose is persistently elevated this admission. 





Accu-Cheks every 6 hours with Humalog for sliding scale coverage.


Hypoglycemia protocol in place.








(5) New onset atrial fibrillation


Is this a current diagnosis for this admission?: Yes   


Plan: 


EKG demonstrates atrial fibrillation with a rate in the 50s.


Rhythm strip from previous admission this month demonstrates normal sinus 

rhythm.


TSH is normal.


Echocardiogram is pending.


Possibly related to electrolyte derangements, acute respiratory issue/hypoxia (

lung metastasis, bibasilar consolidations, consideration of PE -unable to rule 

out at this time).





Will start daily aspirin therapy.


Metoprolol 12.5 mg every 12 hours; holding parameters for bradycardia.


Discussed with Dr. Rothman; concern for bleeding with full dose anticoagulation.  

We will continue DVT prophylaxis heparin only at this time.


Consider cardiology consultation.








(6) Rhabdomyolysis


Qualifiers: 


   Rhabdomyolysis type: non-traumatic   Qualified Code(s): M62.82 - 

Rhabdomyolysis   


Is this a current diagnosis for this admission?: Yes   


Plan: 


Improved.


Secondary to poor p.o. intake resulting in dehydration and bedbound status.


CK is elevated to 817--> 617.





IV fluid rehydration.


Every 2 turns; once altered mental status is improved enough to assure patient 

safety, increase mobility.








(7) Anemia, chronic disease


Is this a current diagnosis for this admission?: Yes   


Plan: 


Stable; Hgb 9.0--> 8.5--> 9.4--> 9.2


Anemia of chronic disease related to chronic kidney disease stage III, prostate 

cancer with bony metastasis, and iron deficiency related to poor nutrition.





We will place patient on multivitamin with iron.


Registered dietitian is consulted.


We will monitor daily CBC and transfuse as necessary.








(8) Congestive heart failure


Is this a current diagnosis for this admission?: Yes   


Plan: 


Patient with a history of CHF.


Previous echocardiogram is from July 2017 which demonstrated an LVEF greater 

than 65% with mild pulmonary hypertension.  Unable to assess diastolic 

dysfunction at that time due to study quality.


ProBNP up slightly with IVF; 8400--> 9300 (up from 597 last year).


Imaging (chest x-ray and abdominal/pelvic CT) suggestive of fluid volume 

overload with moderate bilateral pleural effusions and small amount of ascites.


However, on exam the patient is clearly dehydrated.





Continue IV fluid rehydration; nursing is advised of concern for potential for 

fluid volume overload and asked to monitor closely.


Daily weights, Alcaraz catheter for strict I'&O's.


Continuing amlodipine, start low-dose metoprolol w/ holding parameters.  

Holding diuretics secondary to dehydration and acute renal failure.


Echocardiogram is pending.


Consider cardiology consultation.








(9) Dehydration


Is this a current diagnosis for this admission?: Yes   


Plan: 


Improved. 


Continue IV fluids.


Remaining plan as above.








(10) Leukocytosis


Qualifiers: 


   Leukocytosis type: unspecified   Qualified Code(s): D72.829 - Elevated white 

blood cell count, unspecified   


Is this a current diagnosis for this admission?: Yes   


Plan: 


WBC is currently 16.7--> 18.8--> 18.3.  


At time of discharge on 11/15/18, WBCs were elevated to 16.9.  Currently 

afebrile; no report of infectious symptoms, lung sounds clear.


Urinalysis is negative.


Chest x-ray demonstrates bibasilar consolidation; atelectasis versus pneumonia.


Repeat Chest x-ray today slightly worsened.


Blood and urine cultures are negative at 2 days.


Initial CSF evaluation is reassuring; Gram stain and C&S pending.





During previous admission, patient was treated for pneumonia.  He was 

discharged on p.o. Levaquin and reportedly completed his course of therapy.  On 

exam, lung sounds are clear.  Imaging is questionable for pneumonia, however, 

atelectasis is just is possible.


Have empirically started IV vancomycin and rocephin; will adjust as cultures 

result.








(11) Abnormal LFTs


Is this a current diagnosis for this admission?: Yes   


Plan: 


Patient with elevated LFTs; increased from previous admission. 


MELD score 19


Total Bili 0.7, Direct Bili 0.6, , ALT 34, Alk Phos 425


Ammonia <8.7


Albumin 3.0


UDS and serum alcohol are negative


Noncontrasted CT of the abdomen and pelvis is an unremarkable liver today.


Contrasted abdominal CT (8/2018) also showed a normal liver size without masses 

and no dilated ducts.


Hepatitis panel is pending.


Echocardiogram is pending.








(12) Pleural effusion


Is this a current diagnosis for this admission?: Yes   


Plan: 


Bilateral, moderate, pleural effusions noted on CT ABD/Pelvis.  Diminished 

bibasilar lung sounds R>L.


Unclear etiology; possibly related to infectious process (recently treated for 

pneumonia), cancer (known lung metastasis), CHF (proBNP is elevated, 

echocardiogram pending), and liver derangements (LFTs trending down).


Mycoplasma pneumonia IgG and IgM pending.


Cultures and antibiotics as above.





Planning for thoracentesis; per radiology could not be completed on same day as 

lumbar puncture.


Supplemental oxygen as needed to maintain oxygen saturations >90%.








- Time


Time Spent with patient: 25-34 minutes


Medications reviewed and adjusted accordingly: Yes

## 2018-11-24 LAB
ABSOLUTE LYMPHOCYTES# (MANUAL): 1.5 10^3/UL (ref 0.5–4.7)
ABSOLUTE MONOCYTES # (MANUAL): 0.7 10^3/UL (ref 0.1–1.4)
ABSOLUTE NEUTROPHILS# (MANUAL): 14.3 10^3/UL (ref 1.7–8.2)
ADD MANUAL DIFF: YES
ALBUMIN SERPL-MCNC: 2.3 G/DL (ref 3.5–5)
ALP SERPL-CCNC: 299 U/L (ref 38–126)
ALT SERPL-CCNC: 25 U/L (ref 21–72)
ANION GAP SERPL CALC-SCNC: 11 MMOL/L (ref 5–19)
ANION GAP SERPL CALC-SCNC: 14 MMOL/L (ref 5–19)
ANISOCYTOSIS BLD QL SMEAR: (no result)
APPEARANCE FLD: CLEAR
APTT BLD: 35.8 SEC (ref 23.5–35.8)
AST SERPL-CCNC: 92 U/L (ref 17–59)
BASOPHILS NFR BLD MANUAL: 0 % (ref 0–2)
BILIRUB DIRECT SERPL-MCNC: 0.4 MG/DL (ref 0–0.4)
BILIRUB SERPL-MCNC: 0.5 MG/DL (ref 0.2–1.3)
BODY FLD TYPE: (no result)
BUN SERPL-MCNC: 51 MG/DL (ref 7–20)
BUN SERPL-MCNC: 55 MG/DL (ref 7–20)
CALCIUM: 7.7 MG/DL (ref 8.4–10.2)
CALCIUM: 7.8 MG/DL (ref 8.4–10.2)
CHLORIDE SERPL-SCNC: 117 MMOL/L (ref 98–107)
CHLORIDE SERPL-SCNC: 119 MMOL/L (ref 98–107)
CK SERPL-CCNC: 291 U/L (ref 55–170)
CO2 SERPL-SCNC: 18 MMOL/L (ref 22–30)
CO2 SERPL-SCNC: 19 MMOL/L (ref 22–30)
DACRYOCYTES BLD QL SMEAR: SLIGHT
EOSINOPHIL NFR BLD MANUAL: 0 % (ref 0–6)
ERYTHROCYTE [DISTWIDTH] IN BLOOD BY AUTOMATED COUNT: 18.4 % (ref 11.5–14)
FLUID COLOR: (no result)
FLUID SOURCE: (no result)
FLUID VISCOSITY: (no result)
GLUCOSE SERPL-MCNC: 223 MG/DL (ref 75–110)
GLUCOSE SERPL-MCNC: 245 MG/DL (ref 75–110)
HCT VFR BLD CALC: 28.4 % (ref 37.9–51)
HEPATITIS C VIRUS ANTIBODY: <0.1 S/CO RATIO (ref 0–0.9)
HGB BLD-MCNC: 9.4 G/DL (ref 13.5–17)
INR PPP: 1.34
LIPASE SERPL-CCNC: 239.6 U/L (ref 23–300)
MCH RBC QN AUTO: 25.5 PG (ref 27–33.4)
MCHC RBC AUTO-ENTMCNC: 32.9 G/DL (ref 32–36)
MCV RBC AUTO: 77 FL (ref 80–97)
MONOCYTES % (MANUAL): 4 % (ref 3–13)
OVALOCYTES BLD QL SMEAR: SLIGHT
PLATELET # BLD: 177 10^3/UL (ref 150–450)
PLATELET COMMENT: ADEQUATE
POIKILOCYTOSIS BLD QL SMEAR: SLIGHT
POTASSIUM SERPL-SCNC: 3.6 MMOL/L (ref 3.6–5)
POTASSIUM SERPL-SCNC: 3.8 MMOL/L (ref 3.6–5)
PROT SERPL-MCNC: 5.1 G/DL (ref 6.3–8.2)
PROTHROMBIN TIME: 17.3 SEC (ref 11.4–15.4)
RBC # BLD AUTO: 3.68 10^6/UL (ref 4.35–5.55)
SCHISTOCYTES BLD QL SMEAR: SLIGHT
SEGMENTED NEUTROPHILS % (MAN): 87 % (ref 42–78)
SODIUM SERPL-SCNC: 148.7 MMOL/L (ref 137–145)
SODIUM SERPL-SCNC: 149.4 MMOL/L (ref 137–145)
TOTAL CELLS COUNTED BLD: 100
TOXIC GRANULES BLD QL SMEAR: (no result)
VARIANT LYMPHS NFR BLD MANUAL: 9 % (ref 13–45)
WBC # BLD AUTO: 16.4 10^3/UL (ref 4–10.5)
WBC TOXIC VACUOLES BLD QL SMEAR: PRESENT

## 2018-11-24 PROCEDURE — 0W993ZX DRAINAGE OF RIGHT PLEURAL CAVITY, PERCUTANEOUS APPROACH, DIAGNOSTIC: ICD-10-PCS | Performed by: PHYSICIAN ASSISTANT

## 2018-11-24 RX ADMIN — VANCOMYCIN HYDROCHLORIDE SCH MLS/HR: 1 INJECTION, POWDER, LYOPHILIZED, FOR SOLUTION INTRAVENOUS at 18:37

## 2018-11-24 RX ADMIN — ASPIRIN SCH: 81 TABLET, CHEWABLE ORAL at 18:52

## 2018-11-24 RX ADMIN — PEDIATRIC MULTIPLE VITAMINS W/ IRON CHEW TAB 18 MG SCH: 18 CHEW TAB at 18:52

## 2018-11-24 RX ADMIN — HEPARIN SODIUM SCH UNIT: 5000 INJECTION, SOLUTION INTRAVENOUS; SUBCUTANEOUS at 22:15

## 2018-11-24 RX ADMIN — METOPROLOL TARTRATE SCH: 25 TABLET, FILM COATED ORAL at 21:41

## 2018-11-24 RX ADMIN — HEPARIN SODIUM SCH UNIT: 5000 INJECTION, SOLUTION INTRAVENOUS; SUBCUTANEOUS at 06:01

## 2018-11-24 RX ADMIN — DOCUSATE SODIUM SCH: 100 CAPSULE, LIQUID FILLED ORAL at 18:52

## 2018-11-24 RX ADMIN — HEPARIN SODIUM SCH: 5000 INJECTION, SOLUTION INTRAVENOUS; SUBCUTANEOUS at 14:00

## 2018-11-24 RX ADMIN — INSULIN LISPRO PRN UNIT: 100 INJECTION, SOLUTION INTRAVENOUS; SUBCUTANEOUS at 11:00

## 2018-11-24 RX ADMIN — TAMSULOSIN HYDROCHLORIDE SCH: 0.4 CAPSULE ORAL at 21:41

## 2018-11-24 RX ADMIN — INSULIN LISPRO PRN UNIT: 100 INJECTION, SOLUTION INTRAVENOUS; SUBCUTANEOUS at 18:32

## 2018-11-24 RX ADMIN — AMLODIPINE BESYLATE SCH: 10 TABLET ORAL at 18:53

## 2018-11-24 RX ADMIN — DEXTROSE PRN MLS/HR: 5 SOLUTION INTRAVENOUS at 18:36

## 2018-11-24 RX ADMIN — INSULIN LISPRO PRN UNIT: 100 INJECTION, SOLUTION INTRAVENOUS; SUBCUTANEOUS at 01:08

## 2018-11-24 RX ADMIN — INSULIN LISPRO PRN UNIT: 100 INJECTION, SOLUTION INTRAVENOUS; SUBCUTANEOUS at 06:01

## 2018-11-24 RX ADMIN — FAMOTIDINE SCH MG: 10 INJECTION INTRAVENOUS at 22:14

## 2018-11-24 RX ADMIN — DEXTROSE PRN MLS/HR: 5 SOLUTION INTRAVENOUS at 06:01

## 2018-11-24 RX ADMIN — CEFTRIAXONE SODIUM SCH MLS/HR: 1 INJECTION, POWDER, FOR SOLUTION INTRAMUSCULAR; INTRAVENOUS at 10:55

## 2018-11-24 RX ADMIN — METOPROLOL TARTRATE SCH: 25 TABLET, FILM COATED ORAL at 18:52

## 2018-11-24 NOTE — RADIOLOGY REPORT (SQ)
EXAM DESCRIPTION:  CHEST SINGLE VIEW



COMPLETED DATE/TIME:  11/24/2018 6:35 pm



REASON FOR STUDY:  POST THORA PLEURAL EFFUSION DRAINED ON RT



COMPARISON:  11/24/2018



EXAM PARAMETERS:  NUMBER OF VIEWS: One view.

TECHNIQUE: Single frontal radiographic view of the chest acquired.

RADIATION DOSE: NA

LIMITATIONS: Patient rotation.



FINDINGS:  LUNGS AND PLEURA: Stable pulmonary exam again demonstrating small bilateral pleural effusi
ons.  No new focal consolidation.  No pneumothorax.

MEDIASTINUM AND HILAR STRUCTURES: No masses.  Contour normal.

HEART AND VASCULAR STRUCTURES: Heart normal in size.  Normal vasculature.

BONES: No acute findings.

HARDWARE: None in the chest.

OTHER: No other significant finding.



IMPRESSION:  Stable pulmonary exam demonstrating small bilateral pleural effusions.



TECHNICAL DOCUMENTATION:  JOB ID:  [id number]

 2011 [employer]- All Rights Reserved



Reading location - IP/workstation name: [email or web address]

## 2018-11-24 NOTE — RADIOLOGY REPORT (SQ)
EXAM DESCRIPTION:  CHEST SINGLE VIEW



COMPLETED DATE/TIME:  11/24/2018 4:31 pm



REASON FOR STUDY:  POST THORA PLEURAL EFFUSION DRAINED ON RT



COMPARISON:  Chest x-ray 11/22/2018.



EXAM PARAMETERS:  NUMBER OF VIEWS: One view.

TECHNIQUE: Single frontal portable supine radiographic view of the chest acquired on 11/24/2018 at 16
:21 hours.

RADIATION DOSE: NA

LIMITATIONS: None.



FINDINGS:  LUNGS AND PLEURA: Interval decrease in the moderate right-sided pleural effusion.  Persist
ent small left-sided pleural effusion.  Airspace opacities are seen at the bilateral lung bases.  No 
obvious pneumothorax on this supine view.

MEDIASTINUM AND HILAR STRUCTURES: No masses.  Contour normal.

HEART AND VASCULAR STRUCTURES: Heart normal in size.  Mild vascular congestion.

BONES: No acute findings.

HARDWARE: None in the chest.



IMPRESSION:  1.   Interval decrease in the moderate right-sided pleural effusion.  No obvious pneumot
horax on this supine view.

2.  Persistent small left-sided pleural effusion.

3.  Bibasilar airspace opacities, may be secondary to atelectasis or pneumonia.

4.  Mild vascular congestion.



TECHNICAL DOCUMENTATION:  JOB ID:  [id number]

[id number]

 2011 [employer]- All Rights Reserved



Reading location - IP/workstation name: [email or web address]

## 2018-11-24 NOTE — PDOC PROGRESS REPORT
Subjective


Progress Note for:: 11/24/18


Subjective:: 


The patient is a 72 year old male significant for prostate cancer with lung and 

bone metastasis, type 2 diabetes mellitus, stage III kidney disease, CHF, and 

hypertension who was admitted on 11/21/2018 for altered mental status and acute 

renal failure.





The patient was seen on morning rounds.  He was found resting in bed 

comfortably on supplemental oxygen at 2 L/min.  He was arousable and oriented 

to person place and time.  He was clearly able to tell me his name, location, 

but then began to have slightly slurred speech and drifted back to sleep.  The 

patient fair attempts to follow commands with improved strength from yesterday.

  Facial droop is no longer present.


ROS is limited secondary to mental status.


No concerns per nursing; reports that he is on the schedule for his 

thoracentesis today.


Reason For Visit: 


ALTERED MENTAL STATUS,METABOLIC ENCEPHALOPATHY








Physical Exam


Vital Signs: 


 











Temp Pulse Resp BP Pulse Ox


 


 98.2 F   77   14   138/56 H  97 


 


 11/24/18 08:30  11/24/18 08:30  11/24/18 08:30  11/24/18 08:30  11/24/18 08:30








 Intake & Output











 11/23/18 11/24/18 11/25/18





 06:59 06:59 06:59


 


Intake Total 3350 2292 


 


Output Total 1700 1425 


 


Balance 1650 867 


 


Weight 72 kg 81.5 kg 











General appearance: PRESENT: no acute distress, cooperative, thin, well-

developed, well-nourished


Head exam: PRESENT: atraumatic, normocephalic


Eye exam: PRESENT: conjunctiva pink, EOMI, PERRLA.  ABSENT: scleral icterus


Ear exam: PRESENT: normal external ear exam


Mouth exam: PRESENT: dry mucosa, tongue midline


Neck exam: ABSENT: carotid bruit, JVD, lymphadenopathy, thyromegaly


Respiratory exam: PRESENT: clear to auscultation venessa, decreased breath sounds - 

Bibasilar; absent to the right base, symmetrical, unlabored, other - 

Supplemental oxygen via nasal cannula.  ABSENT: rales, rhonchi, wheezes


Cardiovascular exam: PRESENT: irregular rhythm, +S1, +S2.  ABSENT: diastolic 

murmur, rubs, systolic murmur


Pulses: PRESENT: normal dorsalis pedis pul


Vascular exam: PRESENT: normal capillary refill


GI/Abdominal exam: PRESENT: normal bowel sounds, soft.  ABSENT: distended, 

guarding, mass, organolmegaly, rebound, tenderness


Rectal exam: PRESENT: deferred


Gentrourinary exam: PRESENT: indwelling catheter


Extremities exam: ABSENT: calf tenderness, clubbing, pedal edema, +1 edema


Neurological exam: PRESENT: alert, oriented to person, oriented to place, 

oriented to time, CN II-XII grossly intact, other - Arousable, initially speech 

is clear however become slurred as the patient fatigues.  Follows commands; 

 strength equal 4/5, dorsiflexion equal 3/5 -significant improvements from 

yesterday.  ABSENT: oriented to situation, motor sensory deficit


Skin exam: PRESENT: dry, intact, warm.  ABSENT: cyanosis, rash





Results


Laboratory Results: 


 





 11/24/18 03:56 





 11/24/18 08:45 





 











  11/23/18 11/23/18 11/23/18





  12:46 12:46 12:46


 


WBC   


 


RBC   


 


Hgb   


 


Hct   


 


MCV   


 


MCH   


 


MCHC   


 


RDW   


 


Plt Count   


 


Seg Neutrophils %   


 


Lymphocytes %   


 


Monocytes %   


 


Eosinophils %   


 


Basophils %   


 


Absolute Neutrophils   


 


Absolute Lymphocytes   


 


Absolute Monocytes   


 


Absolute Eosinophils   


 


Absolute Basophils   


 


Sodium   


 


Potassium   


 


Chloride   


 


Carbon Dioxide   


 


Anion Gap   


 


BUN   


 


Creatinine   


 


Est GFR ( Amer)   


 


Est GFR (Non-Af Amer)   


 


Glucose   


 


Lactic Acid   


 


Calcium   


 


Total Bilirubin   


 


AST   


 


ALT   


 


Alkaline Phosphatase   


 


Total Protein   


 


Albumin   


 


Lipase   


 


Fluid Tube Number   


 


Fluid Glucose   Cancelled 


 


Fluid Total Protein    Cancelled


 


Fluid LDH    Cancelled


 


Fluid Amylase  Cancelled  


 


CSF Volume   


 


CSF Appearance   


 


CSF Color   


 


CSF WBC   


 


CSF RBC   


 


CSF Glucose   


 


CSF Total Protein   














  11/23/18 11/23/18 11/23/18





  12:46 12:46 16:05


 


WBC   


 


RBC   


 


Hgb   


 


Hct   


 


MCV   


 


MCH   


 


MCHC   


 


RDW   


 


Plt Count   


 


Seg Neutrophils %   


 


Lymphocytes %   


 


Monocytes %   


 


Eosinophils %   


 


Basophils %   


 


Absolute Neutrophils   


 


Absolute Lymphocytes   


 


Absolute Monocytes   


 


Absolute Eosinophils   


 


Absolute Basophils   


 


Sodium    152.0 H


 


Potassium    3.7


 


Chloride    120 H


 


Carbon Dioxide    19 L


 


Anion Gap    13


 


BUN    59 H


 


Creatinine    1.62 H


 


Est GFR ( Amer)    51 L


 


Est GFR (Non-Af Amer)    42 L


 


Glucose    235 H


 


Lactic Acid   


 


Calcium    8.0 L


 


Total Bilirubin   


 


AST   


 


ALT   


 


Alkaline Phosphatase   


 


Total Protein   


 


Albumin   


 


Lipase   


 


Fluid Tube Number  3  


 


Fluid Glucose   


 


Fluid Total Protein   


 


Fluid LDH   


 


Fluid Amylase   


 


CSF Volume  8.0  


 


CSF Appearance  CLEAR  


 


CSF Color  COLORLESS  


 


CSF WBC  2  


 


CSF RBC  37  


 


CSF Glucose   177 H 


 


CSF Total Protein   67 H 














  11/23/18 11/23/18 11/24/18





  22:00 22:00 03:56


 


WBC    16.4 H


 


RBC    3.68 L


 


Hgb    9.4 L


 


Hct    28.4 L


 


MCV    77 L


 


MCH    25.5 L


 


MCHC    32.9


 


RDW    18.4 H


 


Plt Count    177


 


Seg Neutrophils %    Not Reportable


 


Lymphocytes %    Not Reportable


 


Monocytes %    Not Reportable


 


Eosinophils %    Not Reportable


 


Basophils %    Not Reportable


 


Absolute Neutrophils    Not Reportable


 


Absolute Lymphocytes    Not Reportable


 


Absolute Monocytes    Not Reportable


 


Absolute Eosinophils    Not Reportable


 


Absolute Basophils    Not Reportable


 


Sodium  149.9 H  


 


Potassium  3.7  


 


Chloride  119 H  


 


Carbon Dioxide  19 L  


 


Anion Gap  12  


 


BUN  55 H  


 


Creatinine  1.59 H  


 


Est GFR ( Amer)  52 L  


 


Est GFR (Non-Af Amer)  43 L  


 


Glucose  231 H  


 


Lactic Acid   2.1 


 


Calcium  7.8 L  


 


Total Bilirubin   


 


AST   


 


ALT   


 


Alkaline Phosphatase   


 


Total Protein   


 


Albumin   


 


Lipase   


 


Fluid Tube Number   


 


Fluid Glucose   


 


Fluid Total Protein   


 


Fluid LDH   


 


Fluid Amylase   


 


CSF Volume   


 


CSF Appearance   


 


CSF Color   


 


CSF WBC   


 


CSF RBC   


 


CSF Glucose   


 


CSF Total Protein   














  11/24/18 11/24/18





  03:56 08:45


 


WBC  


 


RBC  


 


Hgb  


 


Hct  


 


MCV  


 


MCH  


 


MCHC  


 


RDW  


 


Plt Count  


 


Seg Neutrophils %  


 


Lymphocytes %  


 


Monocytes %  


 


Eosinophils %  


 


Basophils %  


 


Absolute Neutrophils  


 


Absolute Lymphocytes  


 


Absolute Monocytes  


 


Absolute Eosinophils  


 


Absolute Basophils  


 


Sodium  149.4 H  148.7 H


 


Potassium  3.6  3.8


 


Chloride  119 H  117 H


 


Carbon Dioxide  19 L  18 L


 


Anion Gap  11  14


 


BUN  55 H  51 H


 


Creatinine  1.40 H  1.48 H


 


Est GFR ( Amer)  > 60  56 L


 


Est GFR (Non-Af Amer)  50 L  47 L


 


Glucose  223 H  245 H


 


Lactic Acid  


 


Calcium  7.8 L  7.7 L


 


Total Bilirubin   0.5


 


AST   92 H


 


ALT   25


 


Alkaline Phosphatase   299 H


 


Total Protein   5.1 L


 


Albumin   2.3 L


 


Lipase   239.6


 


Fluid Tube Number  


 


Fluid Glucose  


 


Fluid Total Protein  


 


Fluid LDH  


 


Fluid Amylase  


 


CSF Volume  


 


CSF Appearance  


 


CSF Color  


 


CSF WBC  


 


CSF RBC  


 


CSF Glucose  


 


CSF Total Protein  








 











  11/22/18 11/22/18 11/23/18





  03:54 03:54 16:05


 


Creatine Kinase  617 H   367 H


 


NT-Pro-B Natriuret Pep   9380 H 














  11/24/18 11/24/18





  08:45 08:45


 


Creatine Kinase  291 H 


 


NT-Pro-B Natriuret Pep   8430 H











Impressions: 


 





Abdomen/Pelvis CT  11/21/18 00:00


IMPRESSION:  Fluid overload with bilateral moderate pleural effusions and small 

amount of ascites.


Bibasilar consolidation atelectasis versus pneumonia


 








Head CT  11/21/18 10:00


IMPRESSION:  No acute intracranial pathology.  No noncontrast CT findings to 

explain symptoms.  MRI may be used to more sensitively evaluate for acute 

diffusion restricting infarction if suspected.


EVIDENCE OF ACUTE STROKE: NO.


 








Head MRI  11/22/18 14:54


IMPRESSION:  Mild involutional changes of aging with no acute intracranial 

imaging findings on this slightly limited study.


EVIDENCE OF ACUTE STROKE: NO.


 








Guidance Fluoroscopy  11/23/18 00:00


IMPRESSION:  Lumbar puncture under fluoroscopy. No immediate complication.


 








Lumbar Puncture  11/23/18 10:40


IMPRESSION:  Lumbar puncture under fluoroscopy. No immediate complication.


 














Assessment & Plan





- Diagnosis


(1) Altered mental status


Is this a current diagnosis for this admission?: Yes   


Plan: 


Improved today; patient is A&Ox3 with initially clear speech, and improved 

strength.  Facial droop appears to have resolved.  


Multifactorial; secondary to metabolic encephalopathy due to severe dehydration 

resulting in acute renal failure, electrolyte derangements, and likely 

accumulation of gabapentin.





Head CT is benign.


Ammonia normal.


UDS and serum EtOH are negative.


WBC trended up slightly up, though remains afebrile and without evidence of 

infectious process.


Blood and urine cultures negative at 2 days.


ABG revealed mixed respiratory and metabolic alkalosis.


Noncontrasted head MRI demonstrated mild involutional changes of aging with no 

acute intracranial findings.  Negative for CVA.


CSF Gram stain is negative, no growth at 1 day





The patient is admitted to the medical floor on continuous cardiac telemetry.


Continue IVF.


Lumbar puncture completed yesterday; initial evaluation is reassuring with 

normal opening pressure and mildly elevated protein and glucose which is 

attributed to his hyperglycemia. 


Continue empiric IV vancomycin and Rocephin.


We will continue to evaluate for infectious process with thoracentesis which is 

scheduled for today.


Oncology has been consulted; appreciate their assistance.


Failed bedside swallow eval; now in n.p.o. status.


Fall and aspiration precautions.





I spoke with the patient's daughter today who requests aggressive workup and 

interventions; patient remains FULL CODE.








(2) Acute on chronic kidney failure


Qualifiers: 


   Chronic kidney disease stage: stage 3 (moderate) 


Is this a current diagnosis for this admission?: Yes   


Plan: 


Resolved; patient has returned to his baseline.  Current creatinine 1.48 


Per Dr. Scruggs's note from his previous admission, the patient's baseline 

creatinine is 1.6.  At time of discharge 11/15/2018 his creatinine had returned 

to baseline with a BUN of 39.


Sodium is slightly elevated at 148.7.  Fortunately, potassium remains normal at 

3.8.


Noncontrasted abdominal and pelvic CT is negative for hydronephrosis or post 

obstruction related to the patient's prostate cancer.





Continue IVF.  Nursing is asked to monitor closely for evidence of fluid volume 

overload due to the patient's underlying heart failure.


We will avoid nephrotoxic medications as able.


Pharmacy to dose vancomycin.


Alcaraz catheter for strict I'&O's.


Daily chemistries.








(3) Hypernatremia


Is this a current diagnosis for this admission?: Yes   


Plan: 


Continues to improve with IV fluids.  147.7--> 151.7--> 152.8--> 150.8--> 148.7


During the patient's previous admission he was actually hyponatremic.  

Evaluation at that time was unclear whether or not the patient had SIADH or if 

low sodium was a result of polydipsia.


He received appropriate IV fluids, was educated on dietary and fluid intake at 

time of discharge.  





Continue D5W at 100 ml/hr


Monitor with serial chemistries.








(4) Diabetes


Qualifiers: 


   Diabetes mellitus type: type 2   Chronic kidney disease stage: stage 3 (

moderate) 


Is this a current diagnosis for this admission?: Yes   


Plan: 


A1c 6.7%





Accu-Cheks every 6 hours with Humalog for sliding scale coverage.


Hypoglycemia protocol in place.








(5) New onset atrial fibrillation


Is this a current diagnosis for this admission?: Yes   


Plan: 


EKG demonstrates atrial fibrillation with a rate in the 50s.


Rhythm strip from previous admission this month demonstrates normal sinus 

rhythm.


TSH is normal.


Echocardiogram is pending.


Possibly related to electrolyte derangements, acute respiratory issue/hypoxia (

lung metastasis, bibasilar consolidations, consideration of PE -unable to rule 

out at this time).





Will start daily aspirin therapy.


Metoprolol 12.5 mg every 12 hours; holding parameters for bradycardia.


Discussed with Dr. Rothman; concern for bleeding with full dose anticoagulation.  

We will continue DVT prophylaxis heparin only at this time.


Consider cardiology consultation.








(6) Rhabdomyolysis


Qualifiers: 


   Rhabdomyolysis type: non-traumatic   Qualified Code(s): M62.82 - 

Rhabdomyolysis   


Is this a current diagnosis for this admission?: Yes   


Plan: 


Improved.


Secondary to poor p.o. intake resulting in dehydration and bedbound status.


CK is elevated to 817--> 617--> 291





IV fluid rehydration.


Every 2 turns; once altered mental status is improved enough to assure patient 

safety, increase mobility.








(7) Anemia, chronic disease


Is this a current diagnosis for this admission?: Yes   


Plan: 


Stable.


Anemia of chronic disease related to chronic kidney disease stage III, prostate 

cancer with bony metastasis, and iron deficiency related to poor nutrition.





We will place patient on multivitamin with iron.


Registered dietitian is consulted.


We will monitor daily CBC and transfuse as necessary.








(8) Congestive heart failure


Is this a current diagnosis for this admission?: Yes   


Plan: 


Patient with a history of CHF.


Previous echocardiogram is from July 2017 which demonstrated an LVEF greater 

than 65% with mild pulmonary hypertension.  Unable to assess diastolic 

dysfunction at that time due to study quality.


ProBNP up slightly with IVF; 8400--> 9300 (up from 597 last year).


Imaging (chest x-ray and abdominal/pelvic CT) suggestive of fluid volume 

overload with moderate bilateral pleural effusions and small amount of ascites.


However, on exam the patient remains dehydrated (improved).





Continue IV fluid rehydration; nursing is advised of concern for potential for 

fluid volume overload and asked to monitor closely.


Daily weights, Alcaraz catheter for strict I'&O's.


Continuing amlodipine, metoprolol (w/ holding parameters), and daily aspirin.  

Holding diuretics secondary to dehydration and acute renal failure.


Echocardiogram is pending.


Consider cardiology consultation.








(9) Dehydration


Is this a current diagnosis for this admission?: Yes   


Plan: 


Improved. 


Continue IV fluids.


Remaining plan as above.








(10) Leukocytosis


Qualifiers: 


   Leukocytosis type: unspecified   Qualified Code(s): D72.829 - Elevated white 

blood cell count, unspecified   


Is this a current diagnosis for this admission?: Yes   


Plan: 


WBC 16.7--> 18.8--> 18.3 --> 16.4.  


At time of discharge on 11/15/18, WBCs were elevated to 16.9.  Currently 

afebrile; no evidence for infectious process.


Urinalysis is negative.


Chest x-ray demonstrates bibasilar consolidation; atelectasis versus pneumonia.


Repeat Chest x-ray slightly worsened.


Blood and urine cultures are negative at 72 hours.


Initial CSF evaluation is reassuring; Gram stain and C&S negative at 1 day.





During previous admission, patient was treated for pneumonia.  He was 

discharged on p.o. Levaquin and reportedly completed his course of therapy.  

Imaging is questionable for pneumonia, however, atelectasis is just is 

possible.  No rhonchi or crackles on exam; diminished throughout, absent right 

base.


Thoracentesis planned for today.


Have empirically started IV vancomycin and rocephin; will adjust as cultures 

result.








(11) Abnormal LFTs


Is this a current diagnosis for this admission?: Yes   


Plan: 


Trending down.


Patient with elevated LFTs; increased from previous admission. 





Total Bili 0.5, Direct Bili 0.4, AST 92, ALT 25, Alk Phos 299


Ammonia <8.7


Albumin 2.3


UDS and serum alcohol are negative


Noncontrasted CT of the abdomen and pelvis is an unremarkable liver today.


Contrasted abdominal CT (8/2018) also showed a normal liver size without masses 

and no dilated ducts.


Hepatitis panel is pending.


Mycoplasma IgG/IgM pending.


Echocardiogram is pending.








(12) Pleural effusion


Is this a current diagnosis for this admission?: Yes   


Plan: 


Bilateral, moderate, pleural effusions noted on CT ABD/Pelvis.  Diminished 

bibasilar lung sounds R>L.


Unclear etiology; possibly related to infectious process (recently treated for 

pneumonia), cancer (known lung metastasis), CHF (proBNP is elevated, 

echocardiogram pending), and liver derangements (LFTs trending down).


Mycoplasma pneumonia IgG and IgM pending.


Cultures and antibiotics as above.





Planning for thoracentesis; on schedule for today.


Supplemental oxygen as needed to maintain oxygen saturations >90%.








- Time


Time Spent with patient: 25-34 minutes


Medications reviewed and adjusted accordingly: Yes


Anticipated discharge: SN[facility]

## 2018-11-24 NOTE — RADIOLOGY REPORT (SQ)
EXAM DESCRIPTION:  U/S THORACENTESIS WITH IMAGING



COMPLETED DATE/TIME:  11/24/2018 4:13 pm



REASON FOR STUDY:  Pleural effusion



COMPARISON:  Chest x-ray dated 11/22/2018.



LIMITATIONS:  None.



PROCEDURE:  Procedure, risks, benefit, and alternative explained to patient who then gave written con
sent.  The posterior right chest wall was marked using ultrasound guidance.  A time-out was called fo
r correct marking verification. Chest prepped and draped using sterile technique.  Local anesthesia a
chieved using 10 ml of 1% lidocaine injection.  A 6fr Safe-T- Centesis set was introduced into the [facility] pleural space.  Fluid was aspirated.  The catheter was removed and the entry site was covered wit
h sterile bandage.  No immediate complications noted.

Images acquired during the procedure were stored on PACS.



FINDINGS:  ENTRY SITE: posterior right chest.

FLUID VOLUME: 800 mL

FLUID ANALYSIS: Straw-colored

OTHER: Fluid sent to the lab for testing.



IMPRESSION:  SUCCESSFUL THORACENTESIS USING ULTRASOUND GUIDANCE.



COMMENT:  Patient medication list reviewed: Yes- Quality ID# [id number]:Eligible professional attests to doc
umenting in the medical record they obtained, updated, or reviewed the patient's current medications.




TECHNICAL DOCUMENTATION:  JOB ID: [id number]

 2011 [employer]- All Rights Reserved



[place] location - IP/workstation name: [facility]

## 2018-11-24 NOTE — XCELER[facility] REPORT
[address] [address]

                               Tel: [phone]

                               Fax: [phone]



                      Transthoracic Echocardiogram Report

_______________________________________________________________________________



Name: SULMA MURPHY

MRN: [id number]                           Age: 72 yrs

Gender: Male                              : 1945

Patient Status: Inpatient                 Patient Location: [address] [place] [address]

Account #: [id number]

Study Date: 2018 06:19 PM

Accession #: [id number]

_______________________________________________________________________________



Height: 72 in        Weight: 172 lb        BSA: 2.0 m2

_______________________________________________________________________________

Procedure: A two-dimensional transthoracic echocardiogram with color flow and

Doppler was performed. Study Quality: Fair.

Reason For Study: CHF exacerbation



History: CHF.

Ordering Physician: XUAN FLORES



Performed By: America Arita

_______________________________________________________________________________



Interpretation Summary

The left ventricle is normal in size.

There is normal left ventricular wall thickness.

LV EF is 65%

Left ventricular systolic function is normal.

The left ventricular wall motion is normal.

There is no thrombus.

The right ventricle is not well visualized secondary to technical limitations

Probably mildly dilated RV with normal systolic function.

The right atrium is borderline dilated.

The left and right artia are mildly dilated

There is no evidence of mitral valve prolapse.

There is no mitral valve stenosis.

There is a severe amount of mitral regurgitation

There is no aortic valvular vegetation.

There is no LVOT obstruction.

Visually no Aortic stenosis.

There is a trace to mild amount of aortic regurgitation

There is no tricuspid stenosis.

There is a moderate to severe amount of tricuspid regurgitation

There is servere pulmonary hypertension by echo

RVSp is 68 n to 73 mm of Hg , with RA mean of 15 to20.

The aortic root is normal size.

The inferior vena cava appeared dilated and decreased < 50% with respiration

(RAP 15-20 mmHg)

There is no pericardial effusion.



MMode/2D Measurements & Calculations

RVDd: 2.9 cm        LVIDd: 5.0 cm FS: 41.9 %           Ao root diam: 3.1 cm



IVSd: 1.2 cm        LVIDs: 2.9 cm EDV(Teich): 116.2 ml Ao root area: 7.3 cm2

                    LVPWd: 1.1 cm ESV(Teich): 31.7 ml  LA dimension: 4.3 cm

                                  EF(Teich): 72.7 %

        _______________________________________________________________

LVOT diam: 2.0 cm



LVOT area: 3.0 cm2



Doppler Measurements & Calculations

MV E max steven:      MV P1/2t max steven:     Ao V2 max:        LV V1 max P.1 cm/sec       223.7 cm/sec          228.0 cm/sec      11.3 mmHg

                   MV P1/2t: 64.6 msec   Ao max PG:        LV V1 mean PG:

                   MVA(P1/2t): 3.4 cm2   20.8 mmHg         6.1 mmHg

                   MV dec slope:         Ao V2 mean:       LV V1 max:

                                         151.4 cm/sec      168.2 cm/sec

                   1015 cm/sec2          Ao mean PG:       LV V1 mean:

                   MV dec time: 0.26 sec 10.9 mmHg         111.6 cm/sec

                                         Ao V2 VTI: 43.2 cmLV V1 VTI: 32.2 cm

                                         OG(I,D): 2.3 cm2



                                         OG(V,D): 2.2 cm2

        _______________________________________________________________

SV(LVOT): 97.9 ml  PA V2 max:            TR max steven:       MV P1/2t-pr_phl:

                   167.0 cm/sec          360.5 cm/sec      64.6 msec

                   PA max P.1 mmHg  TR max P.0 mmHg





Left Ventricle

The left ventricle is normal in size. There is normal left ventricular wall

thickness. LV EF is 65%. Left ventricular systolic function is normal. LV

diastolic function could not be adequately assessed due to atrial fibrilation.

The left ventricular wall motion is normal. There is no thrombus.



Right Ventricle

The right ventricle is not well visualized secondary to technical limitations.

Probably mildly dilated RV with normal systolic function.



Atria

The right atrium is borderline dilated. The left and right artia are mildly

dilated.



Mitral Valve

There is mild mitral leaflet calcification. There is no evidence of mitral

valve prolapse. There is no vegetation seen on the mitral valve. There is no

mitral valve stenosis. There is a severe amount of mitral regurgitation.





Aortic Valve

There is no aortic valvular vegetation. There is no LVOT obstruction. Visually

no Aortic stenosis. There is a trace to mild amount of aortic regurgitation.



Tricuspid Valve

There is no tricuspid stenosis. There is a moderate to severe amount of

tricuspid regurgitation. There is servere pulmonary hypertension by echo. RVSp

is 68 n to 73 mm of Hg , with RA mean of 15 to20.



Pulmonic Valve

There is no pulmonic valvular stenosis. There is no pulmonic valvular

regurgitation.



Great Vessels

The aortic root is normal size. The inferior vena cava appeared dilated and

decreased < 50% with respiration (RAP 15-20 mmHg).



Effusions

There is no pericardial effusion.





_______________________________________________________________________________

_______________________________________________________________________________



Electronically signed by:      Maryann Suarez      on 2018 01:08 PM



CC: XUAN FLORES

>

Maryann Suarez

## 2018-11-24 NOTE — PDOC PROGRESS REPORT
Subjective


Progress Note for:: 11/24/18


Subjective:: 





Patient is able to speak a few words and nod head appropriately.  States he is 

feeling pretty good today.  No new complaints.  


Reason For Visit: 


ALTERED MENTAL STATUS,METABOLIC ENCEPHALOPATHY








Physical Exam


Vital Signs: 


 











Temp Pulse Resp BP Pulse Ox


 


 98.3 F   66   16   138/47 H  97 


 


 11/24/18 11:00  11/24/18 11:00  11/24/18 11:00  11/24/18 11:00  11/24/18 11:00








 Intake & Output











 11/23/18 11/24/18 11/25/18





 06:59 06:59 06:59


 


Intake Total 3350 2292 


 


Output Total 1700 1425 


 


Balance 1650 867 


 


Weight 72 kg 81.5 kg 











General appearance: PRESENT: no acute distress, well-developed, well-nourished


Head exam: PRESENT: normocephalic


Mouth exam: PRESENT: dry mucosa


Respiratory exam: PRESENT: clear to auscultation venessa, unlabored


Cardiovascular exam: PRESENT: bradycardia, irregular rhythm


GI/Abdominal exam: PRESENT: soft.  ABSENT: tenderness


Extremities exam: PRESENT: +1 edema, other - TEDs in place


Neurological exam: PRESENT: other - Sleepy.  Not able to stay awake long enough 

to have a full conversation.  Unable to tell if patient is fully oriented.





Results


Laboratory Results: 


 





 11/24/18 03:56 





 11/24/18 08:45 





 











  11/23/18 11/23/18 11/23/18





  16:05 22:00 22:00


 


WBC   


 


RBC   


 


Hgb   


 


Hct   


 


MCV   


 


MCH   


 


MCHC   


 


RDW   


 


Plt Count   


 


Seg Neutrophils %   


 


Lymphocytes %   


 


Monocytes %   


 


Eosinophils %   


 


Basophils %   


 


Absolute Neutrophils   


 


Absolute Lymphocytes   


 


Absolute Monocytes   


 


Absolute Eosinophils   


 


Absolute Basophils   


 


Sodium  152.0 H  149.9 H 


 


Potassium  3.7  3.7 


 


Chloride  120 H  119 H 


 


Carbon Dioxide  19 L  19 L 


 


Anion Gap  13  12 


 


BUN  59 H  55 H 


 


Creatinine  1.62 H  1.59 H 


 


Est GFR ( Amer)  51 L  52 L 


 


Est GFR (Non-Af Amer)  42 L  43 L 


 


Glucose  235 H  231 H 


 


Lactic Acid    2.1


 


Calcium  8.0 L  7.8 L 


 


Total Bilirubin   


 


AST   


 


ALT   


 


Alkaline Phosphatase   


 


Total Protein   


 


Albumin   


 


Lipase   














  11/24/18 11/24/18 11/24/18





  03:56 03:56 08:45


 


WBC  16.4 H  


 


RBC  3.68 L  


 


Hgb  9.4 L  


 


Hct  28.4 L  


 


MCV  77 L  


 


MCH  25.5 L  


 


MCHC  32.9  


 


RDW  18.4 H  


 


Plt Count  177  


 


Seg Neutrophils %  Not Reportable  


 


Lymphocytes %  Not Reportable  


 


Monocytes %  Not Reportable  


 


Eosinophils %  Not Reportable  


 


Basophils %  Not Reportable  


 


Absolute Neutrophils  Not Reportable  


 


Absolute Lymphocytes  Not Reportable  


 


Absolute Monocytes  Not Reportable  


 


Absolute Eosinophils  Not Reportable  


 


Absolute Basophils  Not Reportable  


 


Sodium   149.4 H  148.7 H


 


Potassium   3.6  3.8


 


Chloride   119 H  117 H


 


Carbon Dioxide   19 L  18 L


 


Anion Gap   11  14


 


BUN   55 H  51 H


 


Creatinine   1.40 H  1.48 H


 


Est GFR ( Amer)   > 60  56 L


 


Est GFR (Non-Af Amer)   50 L  47 L


 


Glucose   223 H  245 H


 


Lactic Acid   


 


Calcium   7.8 L  7.7 L


 


Total Bilirubin    0.5


 


AST    92 H


 


ALT    25


 


Alkaline Phosphatase    299 H


 


Total Protein    5.1 L


 


Albumin    2.3 L


 


Lipase    239.6








 











  11/22/18 11/22/18 11/23/18





  03:54 03:54 16:05


 


Creatine Kinase  617 H   367 H


 


NT-Pro-B Natriuret Pep   9380 H 














  11/24/18 11/24/18





  08:45 08:45


 


Creatine Kinase  291 H 


 


NT-Pro-B Natriuret Pep   8430 H











Impressions: 


 





Abdomen/Pelvis CT  11/21/18 00:00


IMPRESSION:  Fluid overload with bilateral moderate pleural effusions and small 

amount of ascites.


Bibasilar consolidation atelectasis versus pneumonia


 








Head CT  11/21/18 10:00


IMPRESSION:  No acute intracranial pathology.  No noncontrast CT findings to 

explain symptoms.  MRI may be used to more sensitively evaluate for acute 

diffusion restricting infarction if suspected.


EVIDENCE OF ACUTE STROKE: NO.


 








Head MRI  11/22/18 14:54


IMPRESSION:  Mild involutional changes of aging with no acute intracranial 

imaging findings on this slightly limited study.


EVIDENCE OF ACUTE STROKE: NO.


 








Guidance Fluoroscopy  11/23/18 00:00


IMPRESSION:  Lumbar puncture under fluoroscopy. No immediate complication.


 








Lumbar Puncture  11/23/18 10:40


IMPRESSION:  Lumbar puncture under fluoroscopy. No immediate complication.


 














Assessment & Plan





- Diagnosis


(1) Prostate cancer


Is this a current diagnosis for this admission?: Yes   





(2) Altered mental status


Is this a current diagnosis for this admission?: Yes   


Plan: 


A bit improved, but still not to baseline.  No obvious bleeding, masses, or 

infectious etiology.  Remains hypoxic.   I suspect he is not able to perfuse 

his brain fully.  








(3) New onset atrial fibrillation


Is this a current diagnosis for this admission?: Yes   


Plan: 


Await ECHO.  He remains bradycardic.  Await Cardiology recommendations but 

family has said that pacemaker has been recommended in the past.  His renal 

function has improves and no signs of bleeding.  Therefore, no contraindication 

to starting blood thinners for the Afib at this point.  








(4) CKD (chronic kidney disease) stage 3, GFR 30-59 ml/min


Is this a current diagnosis for this admission?: Yes   


Plan: 


Improved with fluids.  








- Plan Summary


Plan Summary: 





Will continue to follow.  Please call with questions.  Patient was discussed 

with Ana Laura Pinzon.

## 2018-11-25 LAB
ANION GAP SERPL CALC-SCNC: 12 MMOL/L (ref 5–19)
BUN SERPL-MCNC: 49 MG/DL (ref 7–20)
CALCIUM: 7.6 MG/DL (ref 8.4–10.2)
CHLORIDE SERPL-SCNC: 115 MMOL/L (ref 98–107)
CO2 SERPL-SCNC: 19 MMOL/L (ref 22–30)
ERYTHROCYTE [DISTWIDTH] IN BLOOD BY AUTOMATED COUNT: 18.5 % (ref 11.5–14)
GLUCOSE SERPL-MCNC: 251 MG/DL (ref 75–110)
HCT VFR BLD CALC: 28.2 % (ref 37.9–51)
HGB BLD-MCNC: 9.2 G/DL (ref 13.5–17)
MCH RBC QN AUTO: 25.5 PG (ref 27–33.4)
MCHC RBC AUTO-ENTMCNC: 32.7 G/DL (ref 32–36)
MCV RBC AUTO: 78 FL (ref 80–97)
PLATELET # BLD: 141 10^3/UL (ref 150–450)
POTASSIUM SERPL-SCNC: 3.7 MMOL/L (ref 3.6–5)
RBC # BLD AUTO: 3.62 10^6/UL (ref 4.35–5.55)
SODIUM SERPL-SCNC: 146.3 MMOL/L (ref 137–145)
VANCOMYCIN,TROUGH: 12.7 UG/ML (ref 5–20)
WBC # BLD AUTO: 13.7 10^3/UL (ref 4–10.5)

## 2018-11-25 RX ADMIN — HEPARIN SODIUM SCH UNIT: 5000 INJECTION, SOLUTION INTRAVENOUS; SUBCUTANEOUS at 13:54

## 2018-11-25 RX ADMIN — PEDIATRIC MULTIPLE VITAMINS W/ IRON CHEW TAB 18 MG SCH: 18 CHEW TAB at 09:31

## 2018-11-25 RX ADMIN — AMLODIPINE BESYLATE SCH: 10 TABLET ORAL at 09:31

## 2018-11-25 RX ADMIN — HEPARIN SODIUM SCH: 5000 INJECTION, SOLUTION INTRAVENOUS; SUBCUTANEOUS at 23:12

## 2018-11-25 RX ADMIN — CEFTRIAXONE SODIUM SCH MLS/HR: 1 INJECTION, POWDER, FOR SOLUTION INTRAMUSCULAR; INTRAVENOUS at 09:34

## 2018-11-25 RX ADMIN — VANCOMYCIN HYDROCHLORIDE SCH MLS/HR: 1 INJECTION, POWDER, LYOPHILIZED, FOR SOLUTION INTRAVENOUS at 17:35

## 2018-11-25 RX ADMIN — INSULIN LISPRO PRN UNIT: 100 INJECTION, SOLUTION INTRAVENOUS; SUBCUTANEOUS at 02:37

## 2018-11-25 RX ADMIN — DOCUSATE SODIUM SCH: 100 CAPSULE, LIQUID FILLED ORAL at 09:31

## 2018-11-25 RX ADMIN — DEXTROSE PRN MLS/HR: 5 SOLUTION INTRAVENOUS at 06:42

## 2018-11-25 RX ADMIN — TAMSULOSIN HYDROCHLORIDE SCH: 0.4 CAPSULE ORAL at 23:11

## 2018-11-25 RX ADMIN — INSULIN LISPRO PRN UNIT: 100 INJECTION, SOLUTION INTRAVENOUS; SUBCUTANEOUS at 06:43

## 2018-11-25 RX ADMIN — METOPROLOL TARTRATE SCH: 25 TABLET, FILM COATED ORAL at 09:31

## 2018-11-25 RX ADMIN — FAMOTIDINE SCH: 10 INJECTION INTRAVENOUS at 23:12

## 2018-11-25 RX ADMIN — METOPROLOL TARTRATE SCH: 25 TABLET, FILM COATED ORAL at 23:12

## 2018-11-25 RX ADMIN — ASPIRIN SCH: 81 TABLET, CHEWABLE ORAL at 09:32

## 2018-11-25 RX ADMIN — HEPARIN SODIUM SCH UNIT: 5000 INJECTION, SOLUTION INTRAVENOUS; SUBCUTANEOUS at 06:42

## 2018-11-25 NOTE — PDOC PROGRESS REPORT
Subjective


Progress Note for:: 11/25/18


Subjective:: 


The patient is a 72 year old male significant for prostate cancer with lung and 

bone metastasis, type 2 diabetes mellitus, stage III kidney disease, CHF, and 

hypertension who was admitted on 11/21/2018 for altered mental status and acute 

renal failure.





The patient was seen on morning rounds and again briefly this afternoon.  He 

was found resting in bed comfortably on supplemental oxygen at 2 L/min.  He was 

arousable and oriented to person place and time.  He was able to speak clearly 

for 2-3 sentences and then began having slurred and incomprehensible speech.  

He does follow commands with improved strength from yesterday; now able to lift 

arms off of the bed.  Facial droop is no longer present.


ROS is limited secondary to mental status.


No concerns per nursing; reports that he is on the schedule for his 

thoracentesis today.


Reason For Visit: 


ALTERED MENTAL STATUS,METABOLIC ENCEPHALOPATHY








Physical Exam


Vital Signs: 


 











Temp Pulse Resp BP Pulse Ox


 


 98.1 F   52 L  17   138/50 H  98 


 


 11/25/18 07:51  11/25/18 07:51  11/25/18 07:51  11/25/18 07:51  11/25/18 07:51








 Intake & Output











 11/24/18 11/25/18 11/26/18





 06:59 06:59 06:59


 


Intake Total 2292 2300 


 


Output Total 1425 860 


 


Balance 867 1440 


 


Weight 81.5 kg 77.7 kg 











General appearance: PRESENT: no acute distress, thin, well-developed, well-

nourished


Head exam: PRESENT: atraumatic, normocephalic


Eye exam: PRESENT: conjunctiva pink, EOMI, PERRLA.  ABSENT: scleral icterus


Ear exam: PRESENT: normal external ear exam


Mouth exam: PRESENT: dry mucosa, tongue midline


Neck exam: ABSENT: carotid bruit, JVD, lymphadenopathy, thyromegaly


Respiratory exam: PRESENT: decreased breath sounds - Bibasilar; significant 

improvement from yesterday, symmetrical, unlabored.  ABSENT: rales, rhonchi, 

wheezes


Cardiovascular exam: PRESENT: bradycardia, irregular rhythm, +S1, +S2.  ABSENT: 

diastolic murmur, rubs, systolic murmur


Pulses: PRESENT: normal dorsalis pedis pul


Vascular exam: PRESENT: normal capillary refill


GI/Abdominal exam: PRESENT: normal bowel sounds, soft.  ABSENT: distended, 

guarding, mass, organolmegaly, rebound, tenderness


Rectal exam: PRESENT: deferred


Extremities exam: ABSENT: calf tenderness, clubbing, pedal edema


Neurological exam: PRESENT: oriented to person, oriented to place, oriented to 

time, CN II-XII grossly intact, other - Arousable, initially speech is clear 

however become slurred as the patient fatigues.  Follows commands;  

strength equal 4/5, dorsiflexion equal 3/5.  Now able to lift arms off the bed.

  Continues to improve..  ABSENT: motor sensory deficit


Psychiatric exam: PRESENT: appropriate affect, normal mood.  ABSENT: homicidal 

ideation, suicidal ideation


Skin exam: PRESENT: dry, intact, warm.  ABSENT: cyanosis, rash





Results


Laboratory Results: 


 





 11/25/18 03:53 





 11/25/18 03:53 





 











  11/24/18 11/25/18 11/25/18





  16:15 03:53 03:53


 


WBC   13.7 H 


 


RBC   3.62 L 


 


Hgb   9.2 L 


 


Hct   28.2 L 


 


MCV   78 L 


 


MCH   25.5 L 


 


MCHC   32.7 


 


RDW   18.5 H 


 


Plt Count   141 L 


 


Sodium    146.3 H


 


Potassium    3.7


 


Chloride    115 H


 


Carbon Dioxide    19 L


 


Anion Gap    12


 


BUN    49 H


 


Creatinine    1.44 H


 


Est GFR ( Amer)    58 L


 


Est GFR (Non-Af Amer)    48 L


 


Glucose    251 H


 


Calcium    7.6 L


 


Fluid Type  PLEURAL  


 


Fluid Source  LUNG  


 


Fluid Color  LIGHT YELLOW  


 


Fluid Appearance  CLEAR  


 


Fluid Viscosity  LIQUID  


 


Fluid WBC  204  


 


Fluid RBC  385  








 











  11/22/18 11/22/18 11/23/18





  03:54 03:54 16:05


 


Creatine Kinase  617 H   367 H


 


NT-Pro-B Natriuret Pep   9380 H 














  11/24/18 11/24/18 11/25/18





  08:45 08:45 03:53


 


Creatine Kinase  291 H  


 


NT-Pro-B Natriuret Pep   8430 H  9560 H











Impressions: 


 





Abdomen/Pelvis CT  11/21/18 00:00


IMPRESSION:  Fluid overload with bilateral moderate pleural effusions and small 

amount of ascites.


Bibasilar consolidation atelectasis versus pneumonia


 








Head CT  11/21/18 10:00


IMPRESSION:  No acute intracranial pathology.  No noncontrast CT findings to 

explain symptoms.  MRI may be used to more sensitively evaluate for acute 

diffusion restricting infarction if suspected.


EVIDENCE OF ACUTE STROKE: NO.


 








Head MRI  11/22/18 14:54


IMPRESSION:  Mild involutional changes of aging with no acute intracranial 

imaging findings on this slightly limited study.


EVIDENCE OF ACUTE STROKE: NO.


 








Guidance Fluoroscopy  11/23/18 00:00


IMPRESSION:  Lumbar puncture under fluoroscopy. No immediate complication.


 








Lumbar Puncture  11/23/18 10:40


IMPRESSION:  Lumbar puncture under fluoroscopy. No immediate complication.


 








Thoracentesis Ultrasound  11/24/18 10:19


IMPRESSION:  SUCCESSFUL THORACENTESIS USING ULTRASOUND GUIDANCE.


 








Chest X-Ray  11/24/18 18:00


IMPRESSION:  Stable pulmonary exam demonstrating small bilateral pleural 

effusions.


 














Assessment & Plan





- Diagnosis


(1) Altered mental status


Is this a current diagnosis for this admission?: Yes   


Plan: 


Gradual improvement; patient is A&Ox3 with initially clear speech, and improved 

strength.  Facial droop appears to have resolved.  


Multifactorial; secondary to metabolic encephalopathy due to severe dehydration 

resulting in acute renal failure, electrolyte derangements, and likely 

accumulation of gabapentin.





Head CT is benign.


Ammonia normal.


UDS and serum EtOH are negative.


WBC remains elevated, now trending down.


Blood and urine cultures negative at 4 days.


ABG revealed mixed respiratory and metabolic alkalosis.


Noncontrasted head MRI demonstrated mild involutional changes of aging with no 

acute intracranial findings.  Negative for CVA.


CSF Gram stain is negative, no growth at 2 days





The patient is admitted to the medical floor on continuous cardiac telemetry.


Continue IVF.


Lumbar puncture completed; initial evaluation is reassuring with normal opening 

pressure and mildly elevated protein and glucose which is attributed to his 

hyperglycemia. 


Thoracentesis completed; initial evaluation is transudative.


Continue empiric IV vancomycin and Rocephin.


Oncology has been consulted; appreciate their assistance.


Failed bedside swallow eval; now in n.p.o. status.  Will ask ST to see again 

tomorrow; may need to start TF.


Fall and aspiration precautions.





I spoke with the patient's daughter who requests aggressive workup and 

interventions; patient remains FULL CODE.








(2) Acute on chronic kidney failure


Qualifiers: 


   Chronic kidney disease stage: stage 3 (moderate) 


Is this a current diagnosis for this admission?: Yes   


Plan: 


Resolved; patient has returned to his baseline.  Current creatinine 1.44 


Per Dr. Scruggs's note from his previous admission, the patient's baseline 

creatinine is 1.6.  At time of discharge 11/15/2018 his creatinine had returned 

to baseline with a BUN of 39.


Sodium is slightly elevated at 148.7.  Fortunately, potassium remains normal at 

3.8.


Noncontrasted abdominal and pelvic CT is negative for hydronephrosis or post 

obstruction related to the patient's prostate cancer.





Continue IVF.  Nursing is asked to monitor closely for evidence of fluid volume 

overload due to the patient's underlying heart failure.


We will avoid nephrotoxic medications as able.


Pharmacy to dose vancomycin.


Alcaraz catheter for strict I'&O's.


Daily chemistries.








(3) Hypernatremia


Is this a current diagnosis for this admission?: Yes   


Plan: 


Continues to improve with IV fluids.  147.7--> 151.7--> 152.8--> 150.8--> 148.7-

-> 146.3


During the patient's previous admission he was actually hyponatremic.  

Evaluation at that time was unclear whether or not the patient had SIADH or if 

low sodium was a result of polydipsia.


He received appropriate IV fluids, was educated on dietary and fluid intake at 

time of discharge.  





Continue D5W at 50 ml/hr


Monitor with serial chemistries.








(4) Diabetes


Qualifiers: 


   Diabetes mellitus type: type 2   Chronic kidney disease stage: stage 3 (

moderate) 


Is this a current diagnosis for this admission?: Yes   


Plan: 


A1c 6.7%





Accu-Cheks every 6 hours with Humalog for sliding scale coverage.


Hypoglycemia protocol in place.








(5) New onset atrial fibrillation


Is this a current diagnosis for this admission?: Yes   


Plan: 


EKG demonstrates atrial fibrillation with a rate in the 50s.


Rhythm strip from previous admission this month demonstrates normal sinus 

rhythm.


TSH is normal.


Echocardiogram demonstrated LVEF 65%, severe mitral regurgitation, severe 

tricuspid regurgitation, and severe pulmonary hypertension.


Possibly related to electrolyte derangements, acute respiratory issue/hypoxia (

lung metastasis, bibasilar consolidations, consideration of PE -unable to rule 

out at this time).





Will start daily aspirin therapy.


Metoprolol 12.5 mg every 12 hours; holding parameters for bradycardia (patient 

has not been receiving this medication secondary to n.p.o. status).





Will need to discuss risks/benefits of chronic anticoagulation with family.  

[id number] 4.8% CVA risk.  [id number] Score 10.4% of major bleeding risk 

in elderly.








(6) Rhabdomyolysis


Qualifiers: 


   Rhabdomyolysis type: non-traumatic   Qualified Code(s): M62.82 - 

Rhabdomyolysis   


Is this a current diagnosis for this admission?: Yes   


Plan: 


Improved.


Secondary to poor p.o. intake resulting in dehydration and bedbound status.


CK is elevated to 817--> 617--> 291





IV fluid rehydration.


Every 2 turns; once altered mental status is improved enough to assure patient 

safety, increase mobility.








(7) Anemia, chronic disease


Is this a current diagnosis for this admission?: Yes   


Plan: 


Stable.


Anemia of chronic disease related to chronic kidney disease stage III, prostate 

cancer with bony metastasis, and iron deficiency related to poor nutrition.





We will place patient on multivitamin with iron.


Registered dietitian is consulted.


We will monitor daily CBC and transfuse as necessary.








(8) Congestive heart failure


Is this a current diagnosis for this admission?: Yes   


Plan: 


Patient with a history of CHF.


Previous echocardiogram is from July 2017 which demonstrated an LVEF greater 

than 65% with mild pulmonary hypertension.  Unable to assess diastolic 

dysfunction at that time due to study quality.


Repeat echocardiogram demonstrates severe mitral regurgitation, severe 

tricuspid regurgitation, and severe pulmonary hypertension


ProBNP up slightly with IVF; 8400--> 9300--> 9560 (up from 597 last year).


Imaging (chest x-ray and abdominal/pelvic CT) suggestive of fluid volume 

overload with moderate bilateral pleural effusions and small amount of ascites.


However, on exam the patient remains dehydrated (much improved).





Continue IV fluid rehydration; have reduced rate to 50 ml/hr.  Nursing is 

advised of concern for potential for fluid volume overload and asked to monitor 

closely.


Daily weights, Alcaraz catheter for strict I'&O's.


Continuing amlodipine, metoprolol (w/ holding parameters), and daily aspirin.  

Holding diuretics secondary to dehydration and acute renal failure.





Discussed with patient's daughter today.  She tells me that she was recently 

informed that the patient has been recommended to have a pacemaker in the past.

  She is unaware if he has ever been evaluated for valve repair. 


Will obtain Cardiology consultation.








(9) Dehydration


Is this a current diagnosis for this admission?: Yes   


Plan: 


Improved. 


Continue IV fluids.


Remaining plan as above.








(10) Leukocytosis


Qualifiers: 


   Leukocytosis type: unspecified   Qualified Code(s): D72.829 - Elevated white 

blood cell count, unspecified   


Is this a current diagnosis for this admission?: Yes   


Plan: 


WBC 16.7--> 18.8--> 18.3 --> 16.4--> 13.7.  


At time of discharge on 11/15/18, WBCs were elevated to 16.9.  Currently 

afebrile; no evidence for infectious process.


Urinalysis is negative.


Chest x-ray demonstrates bibasilar consolidation; atelectasis versus pneumonia.


Repeat Chest x-ray slightly worsened.


Blood and urine cultures are negative at 4 days.


Initial CSF evaluation is reassuring; Gram stain and C&S negative at 2 day.


Pleural effusion work up pending; Gram stain and C&S negative at 1 days.





During previous admission, patient was treated for pneumonia.  He was 

discharged on p.o. Levaquin and reportedly completed his course of therapy.  

Imaging is questionable for pneumonia, however, atelectasis is just is 

possible.  No rhonchi or crackles on exam; diminished throughout, absent right 

base.





Have empirically started IV vancomycin and rocephin; will adjust as cultures 

result.








(11) Abnormal LFTs


Is this a current diagnosis for this admission?: Yes   


Plan: 


Trending down.


Patient with elevated LFTs; increased from previous admission. 





Total Bili 0.5, Direct Bili 0.4, AST 92, ALT 25, Alk Phos 299


Ammonia <8.7


Albumin 2.3


UDS and serum alcohol are negative


Noncontrasted CT of the abdomen and pelvis is an unremarkable liver today.


Contrasted abdominal CT (8/2018) also showed a normal liver size without masses 

and no dilated ducts.


Hepatitis panel is pending.


Mycoplasma IgG/IgM pending.


Echocardiogram is pending.








(12) Pleural effusion


Is this a current diagnosis for this admission?: Yes   


Plan: 


Bilateral, moderate, pleural effusions noted on CT ABD/Pelvis.  Diminished 

bibasilar lung sounds R>L.


Unclear etiology; possibly related to infectious process (recently treated for 

pneumonia), cancer (known lung metastasis), CHF (proBNP is elevated, LVEF 65%, 

severe pulmonary htn), and liver derangements (LFTs trending down).


Mycoplasma pneumonia IgG and IgM pending.


Pleural effusion work up pending; Gram stain and C&S negative at 1 days.


Cultures and antibiotics as above.





Thoracentesis completed.  Initial results are reassuring; appears to be 

transudative.  Fluid glucose, protein, LDH, amylase are all send out and should 

be available tomorrow.


Supplemental oxygen as needed to maintain oxygen saturations >90%.








- Time


Time Spent with patient: 25-34 minutes


Medications reviewed and adjusted accordingly: Yes


Anticipated discharge: SNF

## 2018-11-25 NOTE — PDOC PROGRESS REPORT
Subjective


Progress Note for:: 11/25/18


Subjective:: 





Patient able to answer few words at a time.  States that he feels pretty good 

today.  States that he could eat something today.  Family is at bedside.  They 

state that he is doing better.  However, still not back to baseline.  Not 

eating or drinking anything yet.  Denies any pain.  ROS: no nausea.  No chest 

pain.  


Reason For Visit: 


ALTERED MENTAL STATUS,METABOLIC ENCEPHALOPATHY








Physical Exam


Vital Signs: 


 











Temp Pulse Resp BP Pulse Ox


 


 98.1 F   64   16   138/50 H  98 


 


 11/25/18 07:51  11/25/18 13:59  11/25/18 13:59  11/25/18 07:51  11/25/18 13:59








 Intake & Output











 11/24/18 11/25/18 11/26/18





 06:59 06:59 06:59


 


Intake Total 2292 2300 50


 


Output Total 1425 860 


 


Balance 867 1440 50


 


Weight 81.5 kg 77.7 kg 











General appearance: PRESENT: no acute distress, well-developed, well-nourished


Head exam: PRESENT: normocephalic


Respiratory exam: PRESENT: clear to auscultation venessa, unlabored


Cardiovascular exam: PRESENT: bradycardia, irregular rhythm


GI/Abdominal exam: PRESENT: soft.  ABSENT: tenderness


Extremities exam: PRESENT: +1 edema


Skin exam: PRESENT: normal color





Results


Laboratory Results: 


 





 11/25/18 03:53 





 11/25/18 03:53 





 











  11/24/18 11/25/18 11/25/18





  16:15 03:53 03:53


 


WBC   13.7 H 


 


RBC   3.62 L 


 


Hgb   9.2 L 


 


Hct   28.2 L 


 


MCV   78 L 


 


MCH   25.5 L 


 


MCHC   32.7 


 


RDW   18.5 H 


 


Plt Count   141 L 


 


Sodium    146.3 H


 


Potassium    3.7


 


Chloride    115 H


 


Carbon Dioxide    19 L


 


Anion Gap    12


 


BUN    49 H


 


Creatinine    1.44 H


 


Est GFR ( Amer)    58 L


 


Est GFR (Non-Af Amer)    48 L


 


Glucose    251 H


 


Calcium    7.6 L


 


Fluid Type  PLEURAL  


 


Fluid Source  LUNG  


 


Fluid Color  LIGHT YELLOW  


 


Fluid Appearance  CLEAR  


 


Fluid Viscosity  LIQUID  


 


Fluid WBC  204  


 


Fluid RBC  385  








 











  11/22/18 11/22/18 11/23/18





  03:54 03:54 16:05


 


Creatine Kinase  617 H   367 H


 


NT-Pro-B Natriuret Pep   9380 H 














  11/24/18 11/24/18 11/25/18





  08:45 08:45 03:53


 


Creatine Kinase  291 H  


 


NT-Pro-B Natriuret Pep   8430 H  9560 H











Impressions: 


 





Abdomen/Pelvis CT  11/21/18 00:00


IMPRESSION:  Fluid overload with bilateral moderate pleural effusions and small 

amount of ascites.


Bibasilar consolidation atelectasis versus pneumonia


 








Head CT  11/21/18 10:00


IMPRESSION:  No acute intracranial pathology.  No noncontrast CT findings to 

explain symptoms.  MRI may be used to more sensitively evaluate for acute 

diffusion restricting infarction if suspected.


EVIDENCE OF ACUTE STROKE: NO.


 








Head MRI  11/22/18 14:54


IMPRESSION:  Mild involutional changes of aging with no acute intracranial 

imaging findings on this slightly limited study.


EVIDENCE OF ACUTE STROKE: NO.


 








Guidance Fluoroscopy  11/23/18 00:00


IMPRESSION:  Lumbar puncture under fluoroscopy. No immediate complication.


 








Lumbar Puncture  11/23/18 10:40


IMPRESSION:  Lumbar puncture under fluoroscopy. No immediate complication.


 








Thoracentesis Ultrasound  11/24/18 10:19


IMPRESSION:  SUCCESSFUL THORACENTESIS USING ULTRASOUND GUIDANCE.


 








Chest X-Ray  11/24/18 18:00


IMPRESSION:  Stable pulmonary exam demonstrating small bilateral pleural 

effusions.


 














Assessment & Plan





- Diagnosis


(1) Prostate cancer


Is this a current diagnosis for this admission?: Yes   


Plan: 


No indication that this has progressed or is the underlying cause of his 

delerium.  








(2) Altered mental status


Qualifiers: 


   Altered mental status type: delirium   Qualified Code(s): R41.0 - 

Disorientation, unspecified   


Is this a current diagnosis for this admission?: Yes   


Plan: 


Improved, but still significantly altered.  No evidence of a metabolic cause.  

No fever or infectious cause.  Await thoracentesis results.  ECHO with LVEF 65%

, but with severe pulmonary HTN and significant valve problems.  I still 

suspect that this is due to poor perfusion, perhaps due to bradycardia.  








(3) New onset atrial fibrillation


Is this a current diagnosis for this admission?: Yes   


Plan: 


Patient is at risk for CVA with the A-fib, but he is also still at risk for 

active bleeding.  Discussed with Ana Laura Pinzon.  Will hold off on 

anticoagulation at this point. and she will discuss further with family.  








(4) CKD (chronic kidney disease) stage 3, GFR 30-59 ml/min


Is this a current diagnosis for this admission?: Yes   


Plan: 


Much improved with IV fluids.  However, he is sitll not eating or drinking 

enough to sustain himself.  He did not do well with swallowing eval previously.

  Crystal to discuss possible PEG tube with family as well.

## 2018-11-26 LAB
AMYLASE BODY FLUID: 8 U/L
GLUCOSE FLD-MCNC: 263 MG/DL
LDH BODY FLUID: 153 IU/L
TOTAL PROTEIN BODY FLUID: 1.6 G/DL

## 2018-11-26 RX ADMIN — INSULIN LISPRO PRN UNIT: 100 INJECTION, SOLUTION INTRAVENOUS; SUBCUTANEOUS at 12:02

## 2018-11-26 RX ADMIN — CEFTRIAXONE SODIUM SCH MLS/HR: 1 INJECTION, POWDER, FOR SOLUTION INTRAMUSCULAR; INTRAVENOUS at 11:03

## 2018-11-26 RX ADMIN — PEDIATRIC MULTIPLE VITAMINS W/ IRON CHEW TAB 18 MG SCH: 18 CHEW TAB at 10:00

## 2018-11-26 RX ADMIN — AMLODIPINE BESYLATE SCH MG: 10 TABLET ORAL at 11:05

## 2018-11-26 RX ADMIN — HEPARIN SODIUM SCH UNIT: 5000 INJECTION, SOLUTION INTRAVENOUS; SUBCUTANEOUS at 22:26

## 2018-11-26 RX ADMIN — INSULIN LISPRO PRN UNIT: 100 INJECTION, SOLUTION INTRAVENOUS; SUBCUTANEOUS at 17:53

## 2018-11-26 RX ADMIN — ASPIRIN SCH MG: 81 TABLET, CHEWABLE ORAL at 11:05

## 2018-11-26 RX ADMIN — DOCUSATE SODIUM SCH: 100 CAPSULE, LIQUID FILLED ORAL at 10:00

## 2018-11-26 RX ADMIN — FUROSEMIDE SCH MG: 10 INJECTION, SOLUTION INTRAMUSCULAR; INTRAVENOUS at 11:03

## 2018-11-26 RX ADMIN — HEPARIN SODIUM SCH UNIT: 5000 INJECTION, SOLUTION INTRAVENOUS; SUBCUTANEOUS at 15:45

## 2018-11-26 RX ADMIN — TAMSULOSIN HYDROCHLORIDE SCH MG: 0.4 CAPSULE ORAL at 22:26

## 2018-11-26 RX ADMIN — HEPARIN SODIUM SCH: 5000 INJECTION, SOLUTION INTRAVENOUS; SUBCUTANEOUS at 06:27

## 2018-11-26 NOTE — PDOC PROGRESS REPORT
Subjective


Progress Note for:: 11/26/18


Subjective:: 





The patient appears to be awake but he is having difficulty expressing himself.


Reason For Visit: 


ALTERED MENTAL STATUS,METABOLIC ENCEPHALOPATHY








Physical Exam


Vital Signs: 


 











Temp Pulse Resp BP Pulse Ox


 


 98.0 F   57 L  17   156/55 H  99 


 


 11/26/18 07:37  11/26/18 07:37  11/26/18 07:37  11/26/18 07:37  11/26/18 07:37








 Intake & Output











 11/25/18 11/26/18 11/27/18





 06:59 06:59 06:59


 


Intake Total 2300 300 


 


Output Total 860 1560 


 


Balance 1440 -1260 


 


Weight 77.7 kg 77.6 kg 











General appearance: PRESENT: mild distress


Head exam: PRESENT: atraumatic


Eye exam: PRESENT: conjunctival injection


Mouth exam: PRESENT: dry mucosa


Neck exam: PRESENT: carotid bruit.  ABSENT: JVD


Respiratory exam: PRESENT: crackles, rhonchi


Cardiovascular exam: PRESENT: irregular rhythm, +S1, +S2


GI/Abdominal exam: PRESENT: normal bowel sounds, soft


Extremities exam: PRESENT: other


Musculoskeletal exam: PRESENT: other


Neurological exam: PRESENT: awake


Psychiatric exam: PRESENT: anxious





Results


Laboratory Results: 


 





 11/25/18 03:53 





 11/25/18 17:35 





 











  11/24/18 11/25/18





  16:15 17:35


 


Creatinine   1.34 H


 


Est GFR ( Amer)   > 60


 


Est GFR (Non-Af Amer)   52 L


 


Fluid Glucose  263 


 


Fluid Total Protein  1.6 


 


Fluid LDH  153 


 


Fluid Amylase  8 








 











  11/22/18 11/22/18 11/23/18





  03:54 03:54 16:05


 


Creatine Kinase  617 H   367 H


 


NT-Pro-B Natriuret Pep   9380 H 














  11/24/18 11/24/18 11/25/18





  08:45 08:45 03:53


 


Creatine Kinase  291 H  


 


NT-Pro-B Natriuret Pep   8430 H  9560 H











Impressions: 


 





Abdomen/Pelvis CT  11/21/18 00:00


IMPRESSION:  Fluid overload with bilateral moderate pleural effusions and small 

amount of ascites.


Bibasilar consolidation atelectasis versus pneumonia


 








Head CT  11/21/18 10:00


IMPRESSION:  No acute intracranial pathology.  No noncontrast CT findings to 

explain symptoms.  MRI may be used to more sensitively evaluate for acute 

diffusion restricting infarction if suspected.


EVIDENCE OF ACUTE STROKE: NO.


 








Head MRI  11/22/18 14:54


IMPRESSION:  Mild involutional changes of aging with no acute intracranial 

imaging findings on this slightly limited study.


EVIDENCE OF ACUTE STROKE: NO.


 








Guidance Fluoroscopy  11/23/18 00:00


IMPRESSION:  Lumbar puncture under fluoroscopy. No immediate complication.


 








Lumbar Puncture  11/23/18 10:40


IMPRESSION:  Lumbar puncture under fluoroscopy. No immediate complication.


 








Thoracentesis Ultrasound  11/24/18 10:19


IMPRESSION:  SUCCESSFUL THORACENTESIS USING ULTRASOUND GUIDANCE.


 








Chest X-Ray  11/24/18 18:00


IMPRESSION:  Stable pulmonary exam demonstrating small bilateral pleural 

effusions.


 














Assessment & Plan





- Diagnosis


(1) Altered mental status


Qualifiers: 


   Altered mental status type: delirium   Qualified Code(s): R41.0 - 

Disorientation, unspecified   


Is this a current diagnosis for this admission?: Yes   


Plan: 


We will check B12 folic acid and RPR.


We might need to restart the SSRI which was stopped








(2) Dehydration


Is this a current diagnosis for this admission?: Yes   


Plan: 


Possible cause of hyponatremia.  The patient right now is fluid overloaded.  

Will stop the D5 and start diuretics








(3) Hypernatremia


Is this a current diagnosis for this admission?: Yes   


Plan: 


Start diuretics








(4) New onset atrial fibrillation


Is this a current diagnosis for this admission?: Yes   


Plan: 


We will stop the Lopressor due to significant bradycardia.  Awaiting cardiology 

consultation








(5) CKD (chronic kidney disease) stage 3, GFR 30-59 ml/min


Is this a current diagnosis for this admission?: Yes   





(6) Hypertension


Qualifiers: 


   Hypertension type: unspecified   Qualified Code(s): I10 - Essential (primary

) hypertension   


Is this a current diagnosis for this admission?: Yes   


Plan: 


Continue current treatment








(7) Physical deconditioning


Is this a current diagnosis for this admission?: Yes   


Plan: 


Will need PT OT








(8) Prostate cancer


Is this a current diagnosis for this admission?: Yes   


Plan: 


Stage IV with metastasis.

## 2018-11-26 NOTE — PDOC PROGRESS REPORT
Subjective


Progress Note for:: 11/26/18


Subjective:: 


Still w significant confusion though nursing notes it is better, I ordered 

swallow eval this am to see if he does better





Reason For Visit: 


ALTERED MENTAL STATUS,METABOLIC ENCEPHALOPATHY








Physical Exam


Vital Signs: 


 











Temp Pulse Resp BP Pulse Ox


 


 97.9 F   69   17   150/57 H  98 


 


 11/26/18 04:00  11/26/18 07:00  11/26/18 04:00  11/26/18 04:00  11/26/18 04:00








 Intake & Output











 11/25/18 11/26/18 11/27/18





 06:59 06:59 06:59


 


Intake Total 2300 300 


 


Output Total 860 1560 


 


Balance 1440 -1260 


 


Weight 77.7 kg 77.6 kg 














Results


Laboratory Results: 


 





 11/25/18 03:53 





 11/25/18 17:35 





 











  11/24/18 11/25/18





  16:15 17:35


 


Creatinine   1.34 H


 


Est GFR ( Amer)   > 60


 


Est GFR (Non-Af Amer)   52 L


 


Fluid Glucose  263 


 


Fluid Total Protein  1.6 


 


Fluid LDH  153 


 


Fluid Amylase  8 








 











  11/22/18 11/22/18 11/23/18





  03:54 03:54 16:05


 


Creatine Kinase  617 H   367 H


 


NT-Pro-B Natriuret Pep   9380 H 














  11/24/18 11/24/18 11/25/18





  08:45 08:45 03:53


 


Creatine Kinase  291 H  


 


NT-Pro-B Natriuret Pep   8430 H  9560 H











Impressions: 


 





Abdomen/Pelvis CT  11/21/18 00:00


IMPRESSION:  Fluid overload with bilateral moderate pleural effusions and small 

amount of ascites.


Bibasilar consolidation atelectasis versus pneumonia


 








Head CT  11/21/18 10:00


IMPRESSION:  No acute intracranial pathology.  No noncontrast CT findings to 

explain symptoms.  MRI may be used to more sensitively evaluate for acute 

diffusion restricting infarction if suspected.


EVIDENCE OF ACUTE STROKE: NO.


 








Head MRI  11/22/18 14:54


IMPRESSION:  Mild involutional changes of aging with no acute intracranial 

imaging findings on this slightly limited study.


EVIDENCE OF ACUTE STROKE: NO.


 








Guidance Fluoroscopy  11/23/18 00:00


IMPRESSION:  Lumbar puncture under fluoroscopy. No immediate complication.


 








Lumbar Puncture  11/23/18 10:40


IMPRESSION:  Lumbar puncture under fluoroscopy. No immediate complication.


 








Thoracentesis Ultrasound  11/24/18 10:19


IMPRESSION:  SUCCESSFUL THORACENTESIS USING ULTRASOUND GUIDANCE.


 








Chest X-Ray  11/24/18 18:00


IMPRESSION:  Stable pulmonary exam demonstrating small bilateral pleural 

effusions.


 














Assessment & Plan





- Diagnosis


(1) Altered mental status


Qualifiers: 


   Altered mental status type: delirium   Qualified Code(s): R41.0 - 

Disorientation, unspecified   


Is this a current diagnosis for this admission?: Yes   


Plan: 


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








(2) Difficulty swallowing liquids


Is this a current diagnosis for this admission?: Yes   


Plan: 


Likely related in part to dementia, swallow re-eval pending








(3) Prostate cancer


Is this a current diagnosis for this admission?: Yes   


Plan: 


Stage IV w/ known bone mets, current presentation probably unrelated to this








- Time


Disposition: 





Spoke w/ family over tgiving holiday prior to admit, discussed w/ daughter 

today spent>45 m in discussion

## 2018-11-27 LAB
ABSOLUTE LYMPHOCYTES# (MANUAL): 0.5 10^3/UL (ref 0.5–4.7)
ABSOLUTE MONOCYTES # (MANUAL): 0.5 10^3/UL (ref 0.1–1.4)
ABSOLUTE NEUTROPHILS# (MANUAL): 11.8 10^3/UL (ref 1.7–8.2)
ADD MANUAL DIFF: YES
ALBUMIN SERPL-MCNC: 2.6 G/DL (ref 3.5–5)
ALP SERPL-CCNC: 413 U/L (ref 38–126)
ALT SERPL-CCNC: 55 U/L (ref 21–72)
ANION GAP SERPL CALC-SCNC: 13 MMOL/L (ref 5–19)
ANISOCYTOSIS BLD QL SMEAR: (no result)
AST SERPL-CCNC: 135 U/L (ref 17–59)
BASOPHILS NFR BLD MANUAL: 0 % (ref 0–2)
BILIRUB DIRECT SERPL-MCNC: 0.5 MG/DL (ref 0–0.4)
BILIRUB SERPL-MCNC: 0.6 MG/DL (ref 0.2–1.3)
BUN SERPL-MCNC: 30 MG/DL (ref 7–20)
BURR CELLS BLD QL SMEAR: SLIGHT
CALCIUM: 7.2 MG/DL (ref 8.4–10.2)
CHLORIDE SERPL-SCNC: 115 MMOL/L (ref 98–107)
CO2 SERPL-SCNC: 21 MMOL/L (ref 22–30)
DACRYOCYTES BLD QL SMEAR: (no result)
EOSINOPHIL NFR BLD MANUAL: 0 % (ref 0–6)
ERYTHROCYTE [DISTWIDTH] IN BLOOD BY AUTOMATED COUNT: 18.7 % (ref 11.5–14)
FOLATE SERPL-MCNC: 3.83 NG/ML (ref 2.76–?)
GLUCOSE SERPL-MCNC: 172 MG/DL (ref 75–110)
HCT VFR BLD CALC: 32.8 % (ref 37.9–51)
HELMET CELLS BLD QL SMEAR: SLIGHT
HGB BLD-MCNC: 10.3 G/DL (ref 13.5–17)
MCH RBC QN AUTO: 24.9 PG (ref 27–33.4)
MCHC RBC AUTO-ENTMCNC: 31.6 G/DL (ref 32–36)
MCV RBC AUTO: 79 FL (ref 80–97)
MONOCYTES % (MANUAL): 4 % (ref 3–13)
MYCOPLASMA PNEUMONIAE IGG AB: 375 U/ML (ref 0–99)
MYCOPLASMA PNEUMONIAE IGM AB: <770 U/ML (ref 0–769)
OVALOCYTES BLD QL SMEAR: (no result)
PLATELET # BLD: 146 10^3/UL (ref 150–450)
PLATELET COMMENT: ADEQUATE
POIKILOCYTOSIS BLD QL SMEAR: (no result)
POTASSIUM SERPL-SCNC: 3.6 MMOL/L (ref 3.6–5)
PROT SERPL-MCNC: 5.4 G/DL (ref 6.3–8.2)
RBC # BLD AUTO: 4.15 10^6/UL (ref 4.35–5.55)
SCHISTOCYTES BLD QL SMEAR: (no result)
SEGMENTED NEUTROPHILS % (MAN): 92 % (ref 42–78)
SODIUM SERPL-SCNC: 148.7 MMOL/L (ref 137–145)
TARGETS BLD QL SMEAR: (no result)
TOTAL CELLS COUNTED BLD: 100
TOXIC GRANULES BLD QL SMEAR: (no result)
VARIANT LYMPHS NFR BLD MANUAL: 4 % (ref 13–45)
VIT B12 SERPL-MCNC: 719 PG/ML (ref 239–931)
WBC # BLD AUTO: 12.8 10^3/UL (ref 4–10.5)
WBC TOXIC VACUOLES BLD QL SMEAR: PRESENT

## 2018-11-27 PROCEDURE — B518ZZA FLUOROSCOPY OF SUPERIOR VENA CAVA, GUIDANCE: ICD-10-PCS | Performed by: RADIOLOGY

## 2018-11-27 PROCEDURE — 02HV33Z INSERTION OF INFUSION DEVICE INTO SUPERIOR VENA CAVA, PERCUTANEOUS APPROACH: ICD-10-PCS | Performed by: RADIOLOGY

## 2018-11-27 PROCEDURE — B548ZZA ULTRASONOGRAPHY OF SUPERIOR VENA CAVA, GUIDANCE: ICD-10-PCS | Performed by: RADIOLOGY

## 2018-11-27 RX ADMIN — HEPARIN SODIUM SCH UNIT: 5000 INJECTION, SOLUTION INTRAVENOUS; SUBCUTANEOUS at 14:06

## 2018-11-27 RX ADMIN — AMLODIPINE BESYLATE SCH MG: 10 TABLET ORAL at 10:53

## 2018-11-27 RX ADMIN — LEVOFLOXACIN SCH MG: 500 TABLET, FILM COATED ORAL at 10:53

## 2018-11-27 RX ADMIN — TAMSULOSIN HYDROCHLORIDE SCH MG: 0.4 CAPSULE ORAL at 23:18

## 2018-11-27 RX ADMIN — HEPARIN SODIUM SCH: 5000 INJECTION, SOLUTION INTRAVENOUS; SUBCUTANEOUS at 05:42

## 2018-11-27 RX ADMIN — TAMSULOSIN HYDROCHLORIDE SCH: 0.4 CAPSULE ORAL at 23:44

## 2018-11-27 RX ADMIN — FUROSEMIDE SCH MG: 10 INJECTION, SOLUTION INTRAMUSCULAR; INTRAVENOUS at 23:18

## 2018-11-27 RX ADMIN — INSULIN LISPRO PRN UNIT: 100 INJECTION, SOLUTION INTRAVENOUS; SUBCUTANEOUS at 08:06

## 2018-11-27 RX ADMIN — HYDRALAZINE HYDROCHLORIDE SCH: 10 TABLET, FILM COATED ORAL at 23:44

## 2018-11-27 RX ADMIN — FUROSEMIDE SCH: 10 INJECTION, SOLUTION INTRAMUSCULAR; INTRAVENOUS at 10:44

## 2018-11-27 RX ADMIN — HYDRALAZINE HYDROCHLORIDE SCH MG: 10 TABLET, FILM COATED ORAL at 23:18

## 2018-11-27 RX ADMIN — PEDIATRIC MULTIPLE VITAMINS W/ IRON CHEW TAB 18 MG SCH TAB: 18 CHEW TAB at 10:54

## 2018-11-27 RX ADMIN — DOCUSATE SODIUM SCH MG: 100 CAPSULE, LIQUID FILLED ORAL at 10:53

## 2018-11-27 RX ADMIN — INSULIN LISPRO PRN UNIT: 100 INJECTION, SOLUTION INTRAVENOUS; SUBCUTANEOUS at 17:39

## 2018-11-27 RX ADMIN — INSULIN LISPRO PRN UNIT: 100 INJECTION, SOLUTION INTRAVENOUS; SUBCUTANEOUS at 12:50

## 2018-11-27 RX ADMIN — FUROSEMIDE SCH: 10 INJECTION, SOLUTION INTRAMUSCULAR; INTRAVENOUS at 05:41

## 2018-11-27 RX ADMIN — ASPIRIN SCH MG: 81 TABLET, CHEWABLE ORAL at 10:54

## 2018-11-27 RX ADMIN — CEFTRIAXONE SODIUM SCH: 1 INJECTION, POWDER, FOR SOLUTION INTRAMUSCULAR; INTRAVENOUS at 10:43

## 2018-11-27 RX ADMIN — Medication SCH UNIT: at 23:18

## 2018-11-27 RX ADMIN — HEPARIN SODIUM SCH: 5000 INJECTION, SOLUTION INTRAVENOUS; SUBCUTANEOUS at 23:13

## 2018-11-27 RX ADMIN — DOCUSATE SODIUM SCH: 100 CAPSULE, LIQUID FILLED ORAL at 11:02

## 2018-11-27 NOTE — PDOC PROGRESS REPORT
Subjective


Progress Note for:: 11/27/18


Subjective:: 


Speech therapy evaluated pt, he has delayed swallow, can have ice chips and 

apple sauce for now. Pt is interested in trying to eat. He is more responsive 

this am.





Reason For Visit: 


ALTERED MENTAL STATUS,METABOLIC ENCEPHALOPATHY








Physical Exam


Vital Signs: 


 











Temp Pulse Resp BP Pulse Ox


 


 98.3 F   73   16   137/43 H  100 


 


 11/26/18 23:26  11/27/18 02:00  11/26/18 23:26  11/26/18 23:26  11/27/18 00:12








 Intake & Output











 11/26/18 11/27/18 11/28/18





 06:59 06:59 06:59


 


Intake Total 300 1050 


 


Output Total 1560 1150 


 


Balance -1260 -100 


 


Weight 77.6 kg 77.6 kg 











General appearance: PRESENT: no acute distress, well-developed, well-nourished


Head exam: PRESENT: atraumatic, normocephalic


Eye exam: PRESENT: conjunctiva pink, EOMI, PERRLA.  ABSENT: scleral icterus


Ear exam: PRESENT: normal external ear exam


Mouth exam: PRESENT: moist, tongue midline


Neck exam: ABSENT: carotid bruit, JVD, lymphadenopathy, thyromegaly


Respiratory exam: PRESENT: clear to auscultation venessa.  ABSENT: rales, rhonchi, 

wheezes


Cardiovascular exam: PRESENT: RRR.  ABSENT: diastolic murmur, rubs, systolic 

murmur


Pulses: PRESENT: normal dorsalis pedis pul


Vascular exam: PRESENT: normal capillary refill


GI/Abdominal exam: PRESENT: normal bowel sounds, soft.  ABSENT: distended, 

guarding, mass, organolmegaly, rebound, tenderness


Rectal exam: PRESENT: deferred


Extremities exam: PRESENT: full ROM.  ABSENT: calf tenderness, clubbing, pedal 

edema


Neurological exam: PRESENT: alert, awake, oriented to person, oriented to place

, oriented to time, oriented to situation, CN II-XII grossly intact.  ABSENT: 

motor sensory deficit


Psychiatric exam: PRESENT: appropriate affect, normal mood.  ABSENT: homicidal 

ideation, suicidal ideation


Skin exam: PRESENT: dry, intact, warm.  ABSENT: cyanosis, rash





Results


Laboratory Results: 


 





 11/27/18 05:39 





 











  11/27/18 11/27/18





  05:39 05:54


 


WBC  12.8 H 


 


RBC  4.15 L 


 


Hgb  10.3 L 


 


Hct  32.8 L 


 


MCV  79 L 


 


MCH  24.9 L 


 


MCHC  31.6 L 


 


RDW  18.7 H 


 


Plt Count  146 L 


 


Seg Neutrophils %  Not Reportable 


 


Lymphocytes %  Not Reportable 


 


Monocytes %  Not Reportable 


 


Eosinophils %  Not Reportable 


 


Basophils %  Not Reportable 


 


Absolute Neutrophils  Not Reportable 


 


Absolute Lymphocytes  Not Reportable 


 


Absolute Monocytes  Not Reportable 


 


Absolute Eosinophils  Not Reportable 


 


Absolute Basophils  Not Reportable 


 


TSH   2.59








 





11/23/18 12:46   Cerebral Spinal Fluid - Csf   Gram Stain - Final


11/23/18 12:46   Cerebral Spinal Fluid - Csf   CSF Culture - Final


                            NO GROWTH 3 DAYS





 











  11/22/18 11/22/18 11/23/18





  03:54 03:54 16:05


 


Creatine Kinase  617 H   367 H


 


NT-Pro-B Natriuret Pep   9380 H 














  11/24/18 11/24/18 11/25/18





  08:45 08:45 03:53


 


Creatine Kinase  291 H  


 


NT-Pro-B Natriuret Pep   8430 H  9560 H











Impressions: 


 





Abdomen/Pelvis CT  11/21/18 00:00


IMPRESSION:  Fluid overload with bilateral moderate pleural effusions and small 

amount of ascites.


Bibasilar consolidation atelectasis versus pneumonia


 








Head CT  11/21/18 10:00


IMPRESSION:  No acute intracranial pathology.  No noncontrast CT findings to 

explain symptoms.  MRI may be used to more sensitively evaluate for acute 

diffusion restricting infarction if suspected.


EVIDENCE OF ACUTE STROKE: NO.


 








Head MRI  11/22/18 14:54


IMPRESSION:  Mild involutional changes of aging with no acute intracranial 

imaging findings on this slightly limited study.


EVIDENCE OF ACUTE STROKE: NO.


 








Guidance Fluoroscopy  11/23/18 00:00


IMPRESSION:  Lumbar puncture under fluoroscopy. No immediate complication.


 








Lumbar Puncture  11/23/18 10:40


IMPRESSION:  Lumbar puncture under fluoroscopy. No immediate complication.


 








Thoracentesis Ultrasound  11/24/18 10:19


IMPRESSION:  SUCCESSFUL THORACENTESIS USING ULTRASOUND GUIDANCE.


 








Chest X-Ray  11/24/18 18:00


IMPRESSION:  Stable pulmonary exam demonstrating small bilateral pleural 

effusions.


 














Assessment & Plan





- Diagnosis


(1) Altered mental status


Qualifiers: 


   Altered mental status type: delirium   Qualified Code(s): R41.0 - 

Disorientation, unspecified   


Is this a current diagnosis for this admission?: Yes   


Plan: 


Improved, not cancer related, hopefully will cont to improve








(2) Difficulty swallowing liquids


Is this a current diagnosis for this admission?: Yes   


Plan: 


Hopefully will cont to improve, speech following, suggested to nursing continue 

to push to improve diet








(3) Prostate cancer


Is this a current diagnosis for this admission?: Yes   


Plan: 


Overall stable, would like to cont rx as outpt if pt improves

## 2018-11-27 NOTE — RADIOLOGY REPORT (SQ)
EXAM DESCRIPTION:  PICC INSERTION; U/S GUIDE FOR VASCULAR ACCESS; FLUORO/CV PLACEMENT



COMPLETED DATE/TIME:  11/27/2018 3:33 pm; 11/27/2018 3:34 pm



REASON FOR STUDY:  poor venous access; IV ACCESS; IV ABX



COMPARISON:  None.



FLUOROSCOPY TIME:  AP chest 11/24/2018

1 AP chest fluoroscopic image and 1 ultrasound images saved to PACS.



TECHNIQUE:  Fluoroscopic and ultrasound guided PICC placement.



LIMITATIONS:  None.



PROCEDURE:  After written consent and assessment were obtained, the patient was brought into the fluo
roscopy room and placed supine on the table. Ultrasound evaluation of potential access sites were per
formed. After successfully identifying a patent left basilic vein, the left arm was prepped and drape
d in a sterile fashion along with the ultrasound probe. The entry site was anesthetized with 1% lidoc
bird. A 21 gauge 7 cm needle was advanced through the skin and into the basilic vein under live ultra
sound guidance.  An ultrasound image was saved to PACS confirming access site.  A .018 guide wire was
 then inserted through the needle and into the venous system. The needle was then removed and an 11 b
lade scalpel was used to make a 1cm skin incision.  A 5 fr peel-away sheath was advanced over the wir
e and into the venous system. A measurement was then made using the existing wire and live fluoroscop
ic guidance. The wire was then removed and trimmed. The PICC was advanced through the peel-away sheat
h and into the venous system. The peel-away sheath was removed and the catheter was adhered to the pa
tients arm with a stat lock. The catheter was then aspirated and flushed and a sterile bandage was pl
aced over the access site.  A fluoroscopic spot image was saved to PACS confirming the catheter tip w
ithin the superior vena cava.



IMPRESSION:  SUCCESSFUL PLACEMENT OF A 5 FR DUAL LUMEN 42 CM PICC IN THE LEFT BASILIC VEIN.



COMMENT:  Patient medication list reviewed: Yes- Quality ID# [id number]:Eligible professional attests to doc
umenting in the medical record they obtained, updated, or reviewed the patient's current medications.
.

Quality :  Final reports for procedures using fluoroscopy that document radiation exposure fay
claire, or exposure time and number of fluorographic images (if radiation exposure indices are not avail
able)

Quality ID #76: The patient was prepped and draped using maximum sterile barrier technique including 
cap, mask, sterile gown, sterile gloves, a large sterile sheet, hand hygiene, and 2% Chlorhexidine fo
r cutaneous antisepsis. When ultrasound is used, sterile ultrasound techniques are followed requiring
 sterile gel and sterile probes.



TECHNICAL DOCUMENTATION:  JOB ID:  [id number]

 2011 [employer]- All Rights Reserved                           rev-5/18



Reading location - IP/workstation name: [email or web address] [id number]

## 2018-11-27 NOTE — RADIOLOGY REPORT (SQ)
EXAM DESCRIPTION:  PICC INSERTION; U/S GUIDE FOR VASCULAR ACCESS; FLUORO/CV PLACEMENT



COMPLETED DATE/TIME:  11/27/2018 3:33 pm; 11/27/2018 3:34 pm



REASON FOR STUDY:  poor venous access; IV ACCESS; IV ABX



COMPARISON:  None.



FLUOROSCOPY TIME:  AP chest 11/24/2018

1 AP chest fluoroscopic image and 1 ultrasound images saved to PACS.



TECHNIQUE:  Fluoroscopic and ultrasound guided PICC placement.



LIMITATIONS:  None.



PROCEDURE:  After written consent and assessment were obtained, the patient was brought into the fluo
roscopy room and placed supine on the table. Ultrasound evaluation of potential access sites were per
formed. After successfully identifying a patent left basilic vein, the left arm was prepped and drape
d in a sterile fashion along with the ultrasound probe. The entry site was anesthetized with 1% lidoc
bird. A 21 gauge 7 cm needle was advanced through the skin and into the basilic vein under live ultra
sound guidance.  An ultrasound image was saved to PACS confirming access site.  A .018 guide wire was
 then inserted through the needle and into the venous system. The needle was then removed and an 11 b
lade scalpel was used to make a 1cm skin incision.  A 5 fr peel-away sheath was advanced over the wir
e and into the venous system. A measurement was then made using the existing wire and live fluoroscop
ic guidance. The wire was then removed and trimmed. The PICC was advanced through the peel-away sheat
h and into the venous system. The peel-away sheath was removed and the catheter was adhered to the pa
tients arm with a stat lock. The catheter was then aspirated and flushed and a sterile bandage was pl
aced over the access site.  A fluoroscopic spot image was saved to PACS confirming the catheter tip w
ithin the superior vena cava.



IMPRESSION:  SUCCESSFUL PLACEMENT OF A 5 FR DUAL LUMEN 42 CM PICC IN THE LEFT BASILIC VEIN.



COMMENT:  Patient medication list reviewed: Yes- Quality ID# [id number]:Eligible professional attests to doc
umenting in the medical record they obtained, updated, or reviewed the patient's current medications.
.

Quality :  Final reports for procedures using fluoroscopy that document radiation exposure fay
claire, or exposure time and number of fluorographic images (if radiation exposure indices are not avail
able)

Quality ID #76: The patient was prepped and draped using maximum sterile barrier technique including 
cap, mask, sterile gown, sterile gloves, a large sterile sheet, hand hygiene, and 2% Chlorhexidine fo
r cutaneous antisepsis. When ultrasound is used, sterile ultrasound techniques are followed requiring
 sterile gel and sterile probes.



TECHNICAL DOCUMENTATION:  JOB ID:  [id number]

 2011 [employer]- All Rights Reserved                           rev-5/18



Reading location - IP/workstation name: [email or web address] [id number]

## 2018-11-27 NOTE — RADIOLOGY REPORT (SQ)
EXAM DESCRIPTION:  PICC INSERTION; U/S GUIDE FOR VASCULAR ACCESS; FLUORO/CV PLACEMENT



COMPLETED DATE/TIME:  11/27/2018 3:33 pm; 11/27/2018 3:34 pm



REASON FOR STUDY:  poor venous access; IV ACCESS; IV ABX



COMPARISON:  None.



FLUOROSCOPY TIME:  AP chest 11/24/2018

1 AP chest fluoroscopic image and 1 ultrasound images saved to PACS.



TECHNIQUE:  Fluoroscopic and ultrasound guided PICC placement.



LIMITATIONS:  None.



PROCEDURE:  After written consent and assessment were obtained, the patient was brought into the fluo
roscopy room and placed supine on the table. Ultrasound evaluation of potential access sites were per
formed. After successfully identifying a patent left basilic vein, the left arm was prepped and drape
d in a sterile fashion along with the ultrasound probe. The entry site was anesthetized with 1% lidoc
bird. A 21 gauge 7 cm needle was advanced through the skin and into the basilic vein under live ultra
sound guidance.  An ultrasound image was saved to PACS confirming access site.  A .018 guide wire was
 then inserted through the needle and into the venous system. The needle was then removed and an 11 b
lade scalpel was used to make a 1cm skin incision.  A 5 fr peel-away sheath was advanced over the wir
e and into the venous system. A measurement was then made using the existing wire and live fluoroscop
ic guidance. The wire was then removed and trimmed. The PICC was advanced through the peel-away sheat
h and into the venous system. The peel-away sheath was removed and the catheter was adhered to the pa
tients arm with a stat lock. The catheter was then aspirated and flushed and a sterile bandage was pl
aced over the access site.  A fluoroscopic spot image was saved to PACS confirming the catheter tip w
ithin the superior vena cava.



IMPRESSION:  SUCCESSFUL PLACEMENT OF A 5 FR DUAL LUMEN 42 CM PICC IN THE LEFT BASILIC VEIN.



COMMENT:  Patient medication list reviewed: Yes- Quality ID# [id number]:Eligible professional attests to doc
umenting in the medical record they obtained, updated, or reviewed the patient's current medications.
.

Quality :  Final reports for procedures using fluoroscopy that document radiation exposure fay
claire, or exposure time and number of fluorographic images (if radiation exposure indices are not avail
able)

Quality ID #76: The patient was prepped and draped using maximum sterile barrier technique including 
cap, mask, sterile gown, sterile gloves, a large sterile sheet, hand hygiene, and 2% Chlorhexidine fo
r cutaneous antisepsis. When ultrasound is used, sterile ultrasound techniques are followed requiring
 sterile gel and sterile probes.



TECHNICAL DOCUMENTATION:  JOB ID:  [id number]

 2011 [employer]- All Rights Reserved                           rev-5/18



Reading location - IP/workstation name: [email or web address] [id number]

## 2018-11-27 NOTE — PDOC PROGRESS REPORT
Subjective


Progress Note for:: 11/27/18


Subjective:: 





The patient seemed to be slightly more awake.  He is only oriented to place.


He has significant amount of diureses.


His cultures were positive for IgG for mycoplasma.





Reason For Visit: 


ALTERED MENTAL STATUS,METABOLIC ENCEPHALOPATHY








Physical Exam


Vital Signs: 


 











Temp Pulse Resp BP Pulse Ox


 


 98.3 F   84   16   137/43 H  100 


 


 11/26/18 23:26  11/27/18 07:00  11/26/18 23:26  11/26/18 23:26  11/27/18 00:12








 Intake & Output











 11/26/18 11/27/18 11/28/18





 06:59 06:59 06:59


 


Intake Total 300 1050 


 


Output Total 1560 1150 


 


Balance -1260 -100 


 


Weight 77.6 kg 77.6 kg 











General appearance: PRESENT: disheveled, mild distress


Eye exam: PRESENT: conjunctival injection


Mouth exam: PRESENT: dry mucosa


Neck exam: PRESENT: carotid bruit.  ABSENT: JVD


Respiratory exam: PRESENT: rhonchi


Cardiovascular exam: PRESENT: irregular rhythm, +S1, +S2


GI/Abdominal exam: PRESENT: normal bowel sounds, soft


Extremities exam: PRESENT: tenderness


Musculoskeletal exam: PRESENT: tenderness


Neurological exam: PRESENT: awake.  ABSENT: oriented to person, oriented to time


Psychiatric exam: PRESENT: flat affect





Results


Laboratory Results: 


 





 11/27/18 05:39 





 











  11/27/18 11/27/18





  05:39 05:54


 


WBC  12.8 H 


 


RBC  4.15 L 


 


Hgb  10.3 L 


 


Hct  32.8 L 


 


MCV  79 L 


 


MCH  24.9 L 


 


MCHC  31.6 L 


 


RDW  18.7 H 


 


Plt Count  146 L 


 


Seg Neutrophils %  Not Reportable 


 


Lymphocytes %  Not Reportable 


 


Monocytes %  Not Reportable 


 


Eosinophils %  Not Reportable 


 


Basophils %  Not Reportable 


 


Absolute Neutrophils  Not Reportable 


 


Absolute Lymphocytes  Not Reportable 


 


Absolute Monocytes  Not Reportable 


 


Absolute Eosinophils  Not Reportable 


 


Absolute Basophils  Not Reportable 


 


TSH   2.59








 





11/23/18 12:46   Cerebral Spinal Fluid - Csf   Gram Stain - Final


11/23/18 12:46   Cerebral Spinal Fluid - Csf   CSF Culture - Final


                            NO GROWTH 3 DAYS





 











  11/22/18 11/22/18 11/23/18





  03:54 03:54 16:05


 


Creatine Kinase  617 H   367 H


 


NT-Pro-B Natriuret Pep   9380 H 














  11/24/18 11/24/18 11/25/18





  08:45 08:45 03:53


 


Creatine Kinase  291 H  


 


NT-Pro-B Natriuret Pep   8430 H  9560 H











Impressions: 


 





Abdomen/Pelvis CT  11/21/18 00:00


IMPRESSION:  Fluid overload with bilateral moderate pleural effusions and small 

amount of ascites.


Bibasilar consolidation atelectasis versus pneumonia


 








Head CT  11/21/18 10:00


IMPRESSION:  No acute intracranial pathology.  No noncontrast CT findings to 

explain symptoms.  MRI may be used to more sensitively evaluate for acute 

diffusion restricting infarction if suspected.


EVIDENCE OF ACUTE STROKE: NO.


 








Head MRI  11/22/18 14:54


IMPRESSION:  Mild involutional changes of aging with no acute intracranial 

imaging findings on this slightly limited study.


EVIDENCE OF ACUTE STROKE: NO.


 








Guidance Fluoroscopy  11/23/18 00:00


IMPRESSION:  Lumbar puncture under fluoroscopy. No immediate complication.


 








Lumbar Puncture  11/23/18 10:40


IMPRESSION:  Lumbar puncture under fluoroscopy. No immediate complication.


 








Thoracentesis Ultrasound  11/24/18 10:19


IMPRESSION:  SUCCESSFUL THORACENTESIS USING ULTRASOUND GUIDANCE.


 








Chest X-Ray  11/24/18 18:00


IMPRESSION:  Stable pulmonary exam demonstrating small bilateral pleural 

effusions.


 














Assessment & Plan





- Diagnosis


(1) Altered mental status


Qualifiers: 


   Altered mental status type: delirium   Qualified Code(s): R41.0 - 

Disorientation, unspecified   


Is this a current diagnosis for this admission?: Yes   


Plan: 


Possibly worsening dementia.  Loss of short-term memory.


We will restart the Zoloft and add Aricept








(2) Dehydration


Is this a current diagnosis for this admission?: Yes   


Plan: 


Possible cause of hyponatremia.  The patient right now is fluid overloaded.  

Will stop the D5 and start diuretics








(3) Hypernatremia


Is this a current diagnosis for this admission?: Yes   


Plan: 


Improved with diuretics








(4) New onset atrial fibrillation


Is this a current diagnosis for this admission?: Yes   


Plan: 


Rate controlled








(5) CKD (chronic kidney disease) stage 3, GFR 30-59 ml/min


Is this a current diagnosis for this admission?: Yes   





(6) Hypertension


Qualifiers: 


   Hypertension type: unspecified   Qualified Code(s): I10 - Essential (primary

) hypertension   


Is this a current diagnosis for this admission?: Yes   


Plan: 


Continue current treatment








(7) Physical deconditioning


Is this a current diagnosis for this admission?: Yes   


Plan: 


Will need PT OT








(8) Prostate cancer


Is this a current diagnosis for this admission?: Yes   


Plan: 


Stage IV with metastasis.








(9) Memory loss, short term


Is this a current diagnosis for this admission?: Yes   


Plan: 


Possibly worsening dementia.

## 2018-11-28 RX ADMIN — HYDRALAZINE HYDROCHLORIDE SCH: 10 TABLET, FILM COATED ORAL at 06:28

## 2018-11-28 RX ADMIN — HYDRALAZINE HYDROCHLORIDE SCH MG: 10 TABLET, FILM COATED ORAL at 23:54

## 2018-11-28 RX ADMIN — LEVOFLOXACIN SCH MG: 500 TABLET, FILM COATED ORAL at 12:24

## 2018-11-28 RX ADMIN — AMLODIPINE BESYLATE SCH MG: 10 TABLET ORAL at 12:24

## 2018-11-28 RX ADMIN — HEPARIN SODIUM SCH: 5000 INJECTION, SOLUTION INTRAVENOUS; SUBCUTANEOUS at 06:03

## 2018-11-28 RX ADMIN — HYDRALAZINE HYDROCHLORIDE SCH MG: 10 TABLET, FILM COATED ORAL at 22:35

## 2018-11-28 RX ADMIN — Medication SCH: at 22:36

## 2018-11-28 RX ADMIN — TAMSULOSIN HYDROCHLORIDE SCH: 0.4 CAPSULE ORAL at 22:58

## 2018-11-28 RX ADMIN — TAMSULOSIN HYDROCHLORIDE SCH MG: 0.4 CAPSULE ORAL at 22:35

## 2018-11-28 RX ADMIN — HEPARIN SODIUM SCH: 5000 INJECTION, SOLUTION INTRAVENOUS; SUBCUTANEOUS at 22:38

## 2018-11-28 RX ADMIN — ASPIRIN SCH MG: 81 TABLET, CHEWABLE ORAL at 12:24

## 2018-11-28 RX ADMIN — HYDRALAZINE HYDROCHLORIDE SCH MG: 10 TABLET, FILM COATED ORAL at 06:22

## 2018-11-28 RX ADMIN — PEDIATRIC MULTIPLE VITAMINS W/ IRON CHEW TAB 18 MG SCH TAB: 18 CHEW TAB at 12:20

## 2018-11-28 RX ADMIN — HYDRALAZINE HYDROCHLORIDE SCH: 10 TABLET, FILM COATED ORAL at 22:56

## 2018-11-28 RX ADMIN — HYDRALAZINE HYDROCHLORIDE SCH MG: 10 TABLET, FILM COATED ORAL at 15:25

## 2018-11-28 RX ADMIN — Medication SCH: at 12:24

## 2018-11-28 RX ADMIN — HEPARIN SODIUM SCH UNIT: 5000 INJECTION, SOLUTION INTRAVENOUS; SUBCUTANEOUS at 15:25

## 2018-11-28 NOTE — PDOC PROGRESS REPORT
Subjective


Progress Note for:: 11/28/18


Subjective:: 


Pt confused overnight, pulled PICC line, this am he is talkative, knows the 

place and date put not much else, is angry that he haves gloves on, wants to 

talk to his 





Reason For Visit: 


ALTERED MENTAL STATUS,METABOLIC ENCEPHALOPATHY








Physical Exam


Vital Signs: 


 











Temp Pulse Resp BP Pulse Ox


 


 97.8 F   80   16   145/46 H  99 


 


 11/28/18 03:00  11/28/18 07:00  11/28/18 03:00  11/28/18 03:00  11/28/18 03:00








 Intake & Output











 11/27/18 11/28/18 11/29/18





 06:59 06:59 06:59


 


Intake Total 1050 0 


 


Output Total 1150 1550 


 


Balance -100 -1550 


 


Weight 77.6 kg 77.6 kg 











General appearance: PRESENT: mild distress


Head exam: PRESENT: atraumatic


Mouth exam: PRESENT: dry mucosa


Respiratory exam: PRESENT: clear to auscultation venessa.  ABSENT: rales, rhonchi, 

wheezes


Cardiovascular exam: PRESENT: RRR.  ABSENT: diastolic murmur, rubs, systolic 

murmur


GI/Abdominal exam: PRESENT: normal bowel sounds, soft.  ABSENT: distended, 

guarding, mass, organolmegaly, rebound, tenderness


Rectal exam: PRESENT: deferred


Neurological exam: PRESENT: altered, oriented to place, oriented to time


Psychiatric exam: PRESENT: agitated, anxious


Focused psych exam: PRESENT: flight of ideas





Results


Laboratory Results: 


 





 11/27/18 05:39 





 11/27/18 10:24 





 











  11/27/18 11/27/18 11/27/18





  05:54 05:54 10:24


 


Sodium  Cancelled   148.7 H


 


Potassium  Cancelled   3.6


 


Chloride  Cancelled   115 H


 


Carbon Dioxide  Cancelled   21 L


 


Anion Gap  Cancelled   13


 


BUN  Cancelled   30 H


 


Creatinine  Cancelled   1.22


 


Est GFR ( Amer)  Cancelled   > 60


 


Est GFR (Non-Af Amer)  Cancelled   58 L


 


Glucose  Cancelled   172 H


 


Calcium  Cancelled   7.2 L


 


Total Bilirubin  Cancelled   0.6


 


AST  Cancelled   135 H


 


ALT  Cancelled   55


 


Alkaline Phosphatase  Cancelled   413 H


 


Total Protein  Cancelled   5.4 L


 


Albumin  Cancelled   2.6 L


 


Vitamin B12  Cancelled   719.0


 


Folate  Cancelled   3.83


 


TSH   2.59 








 











  11/22/18 11/22/18 11/23/18





  03:54 03:54 16:05


 


Creatine Kinase  617 H   367 H


 


NT-Pro-B Natriuret Pep   9380 H 














  11/24/18 11/24/18 11/25/18





  08:45 08:45 03:53


 


Creatine Kinase  291 H  


 


NT-Pro-B Natriuret Pep   8430 H  9560 H











Impressions: 


 





Abdomen/Pelvis CT  11/21/18 00:00


IMPRESSION:  Fluid overload with bilateral moderate pleural effusions and small 

amount of ascites.


Bibasilar consolidation atelectasis versus pneumonia


 








Head CT  11/21/18 10:00


IMPRESSION:  No acute intracranial pathology.  No noncontrast CT findings to 

explain symptoms.  MRI may be used to more sensitively evaluate for acute 

diffusion restricting infarction if suspected.


EVIDENCE OF ACUTE STROKE: NO.


 








Head MRI  11/22/18 14:54


IMPRESSION:  Mild involutional changes of aging with no acute intracranial 

imaging findings on this slightly limited study.


EVIDENCE OF ACUTE STROKE: NO.


 








Lumbar Puncture  11/23/18 10:40


IMPRESSION:  Lumbar puncture under fluoroscopy. No immediate complication.


 








Thoracentesis Ultrasound  11/24/18 10:19


IMPRESSION:  SUCCESSFUL THORACENTESIS USING ULTRASOUND GUIDANCE.


 








Chest X-Ray  11/24/18 18:00


IMPRESSION:  Stable pulmonary exam demonstrating small bilateral pleural 

effusions.


 








Guidance Fluoroscopy  11/27/18 00:00


IMPRESSION:  SUCCESSFUL PLACEMENT OF A 5 FR DUAL LUMEN 42 CM PICC IN THE LEFT 

BASILIC VEIN.


 








Interventional Vascular Procedure  11/27/18 00:00


IMPRESSION:  SUCCESSFUL PLACEMENT OF A 5 FR DUAL LUMEN 42 CM PICC IN THE LEFT 

BASILIC VEIN.


 








PICC Line Insertion  11/27/18 00:00


IMPRESSION:  SUCCESSFUL PLACEMENT OF A 5 FR DUAL LUMEN 42 CM PICC IN THE LEFT 

BASILIC VEIN.


 














Assessment & Plan





- Diagnosis


(1) Altered mental status


Qualifiers: 


   Altered mental status type: delirium   Qualified Code(s): R41.0 - 

Disorientation, unspecified   


Is this a current diagnosis for this admission?: Yes   


Plan: 


Worsened delirium overnight, probable sundowning. Will get sitter for pt, talk w

/ daughter








(2) Difficulty swallowing liquids


Is this a current diagnosis for this admission?: Yes   


Plan: 


Would like to advance but pt still w/ AMS








(3) Prostate cancer


Is this a current diagnosis for this admission?: Yes   


Plan: 


being rx w/ lupron and would like to cont rx as outpt if pt improves








- Time


Time Spent with patient: 35 or more minutes





- Inpatient Certification


Based on my medical assessment, after consideration of the patient's 

comorbidities, presenting symptoms, or acuity I expect that the services needed 

warrant INPATIENT care.: Yes


I certify that my determination is in accordance with my understanding of 

Medicare's requirements for reasonable and necessary INPATIENT services [42 CFR 

412.3e].: Yes


Medical Necessity: Need for Neurological Checks, Need for IV Antibiotics, Risk 

of Complication if Not Cared For in Hospital

## 2018-11-28 NOTE — PDOC PROGRESS REPORT
Subjective


Progress Note for:: 11/28/18


Subjective:: 





The patient appears to be worse today.  He had a sundowning with agitation last 

night and has pulled out his PICC line.  He presently has no access.  He is not 

taking anything by mouth.





Reason For Visit: 


ALTERED MENTAL STATUS,METABOLIC ENCEPHALOPATHY








Physical Exam


Vital Signs: 


 











Temp Pulse Resp BP Pulse Ox


 


 98.4 F   79   16   148/54 H  100 


 


 11/28/18 07:30  11/28/18 07:30  11/28/18 07:30  11/28/18 07:30  11/28/18 07:30








 Intake & Output











 11/27/18 11/28/18 11/29/18





 06:59 06:59 06:59


 


Intake Total 1050 0 


 


Output Total 1150 1550 


 


Balance -100 -1550 


 


Weight 77.6 kg 77.6 kg 











General appearance: PRESENT: mild distress


Eye exam: PRESENT: conjunctiva pink


Mouth exam: PRESENT: dry mucosa


Neck exam: PRESENT: carotid bruit.  ABSENT: JVD


Respiratory exam: PRESENT: crackles


Cardiovascular exam: PRESENT: irregular rhythm, +S1, +S2


GI/Abdominal exam: PRESENT: normal bowel sounds, soft


Extremities exam: PRESENT: tenderness


Musculoskeletal exam: PRESENT: tenderness


Neurological exam: PRESENT: awake


Psychiatric exam: PRESENT: agitated





Results


Laboratory Results: 


 





 11/27/18 05:39 





 11/27/18 10:24 





 











  11/27/18





  10:24


 


Sodium  148.7 H


 


Potassium  3.6


 


Chloride  115 H


 


Carbon Dioxide  21 L


 


Anion Gap  13


 


BUN  30 H


 


Creatinine  1.22


 


Est GFR ( Amer)  > 60


 


Est GFR (Non-Af Amer)  58 L


 


Glucose  172 H


 


Calcium  7.2 L


 


Total Bilirubin  0.6


 


AST  135 H


 


ALT  55


 


Alkaline Phosphatase  413 H


 


Total Protein  5.4 L


 


Albumin  2.6 L


 


Vitamin B12  719.0


 


Folate  3.83








 











  11/22/18 11/22/18 11/23/18





  03:54 03:54 16:05


 


Creatine Kinase  617 H   367 H


 


NT-Pro-B Natriuret Pep   9380 H 














  11/24/18 11/24/18 11/25/18





  08:45 08:45 03:53


 


Creatine Kinase  291 H  


 


NT-Pro-B Natriuret Pep   8430 H  9560 H











Impressions: 


 





Abdomen/Pelvis CT  11/21/18 00:00


IMPRESSION:  Fluid overload with bilateral moderate pleural effusions and small 

amount of ascites.


Bibasilar consolidation atelectasis versus pneumonia


 








Head CT  11/21/18 10:00


IMPRESSION:  No acute intracranial pathology.  No noncontrast CT findings to 

explain symptoms.  MRI may be used to more sensitively evaluate for acute 

diffusion restricting infarction if suspected.


EVIDENCE OF ACUTE STROKE: NO.


 








Head MRI  11/22/18 14:54


IMPRESSION:  Mild involutional changes of aging with no acute intracranial 

imaging findings on this slightly limited study.


EVIDENCE OF ACUTE STROKE: NO.


 








Lumbar Puncture  11/23/18 10:40


IMPRESSION:  Lumbar puncture under fluoroscopy. No immediate complication.


 








Thoracentesis Ultrasound  11/24/18 10:19


IMPRESSION:  SUCCESSFUL THORACENTESIS USING ULTRASOUND GUIDANCE.


 








Chest X-Ray  11/24/18 18:00


IMPRESSION:  Stable pulmonary exam demonstrating small bilateral pleural 

effusions.


 








Guidance Fluoroscopy  11/27/18 00:00


IMPRESSION:  SUCCESSFUL PLACEMENT OF A 5 FR DUAL LUMEN 42 CM PICC IN THE LEFT 

BASILIC VEIN.


 








Interventional Vascular Procedure  11/27/18 00:00


IMPRESSION:  SUCCESSFUL PLACEMENT OF A 5 FR DUAL LUMEN 42 CM PICC IN THE LEFT 

BASILIC VEIN.


 








PICC Line Insertion  11/27/18 00:00


IMPRESSION:  SUCCESSFUL PLACEMENT OF A 5 FR DUAL LUMEN 42 CM PICC IN THE LEFT 

BASILIC VEIN.


 














Assessment & Plan





- Diagnosis


(1) Altered mental status


Qualifiers: 


   Altered mental status type: delirium   Qualified Code(s): R41.0 - 

Disorientation, unspecified   


Is this a current diagnosis for this admission?: Yes   


Plan: 


Possibly worsening dementia.  Loss of short-term memory.


We will restart the Zoloft and add Aricept








(2) Dehydration


Is this a current diagnosis for this admission?: Yes   





(3) Hypernatremia


Is this a current diagnosis for this admission?: Yes   





(4) New onset atrial fibrillation


Is this a current diagnosis for this admission?: Yes   


Plan: 


Rate controlled








(5) CKD (chronic kidney disease) stage 3, GFR 30-59 ml/min


Is this a current diagnosis for this admission?: Yes   





(6) Hypertension


Qualifiers: 


   Hypertension type: unspecified   Qualified Code(s): I10 - Essential (primary

) hypertension   


Is this a current diagnosis for this admission?: Yes   





(7) Physical deconditioning


Is this a current diagnosis for this admission?: Yes   





(8) Prostate cancer


Is this a current diagnosis for this admission?: Yes   


Plan: 


Stage IV with metastasis.








(9) Memory loss, short term


Is this a current diagnosis for this admission?: Yes   





(10) Acute psychosis


Is this a current diagnosis for this admission?: Yes   


Plan: 


Possibly sundowning versus medication induced.  Will obtain psych and start 

Haldol

## 2018-11-28 NOTE — EKG REPORT
SEVERITY:- ABNORMAL ECG -

ATRIAL FIBRILLATION, V-RATE 62-77



BORDERLINE LEFT AXIS DEVIATION

:

Confirmed by: Jose Angel Hallman MD 28-Nov-2018 19:02:46

## 2018-11-29 RX ADMIN — HYDRALAZINE HYDROCHLORIDE SCH MG: 10 TABLET, FILM COATED ORAL at 22:32

## 2018-11-29 RX ADMIN — ASPIRIN SCH MG: 81 TABLET, CHEWABLE ORAL at 10:30

## 2018-11-29 RX ADMIN — HALOPERIDOL SCH: 1 TABLET ORAL at 17:05

## 2018-11-29 RX ADMIN — TAMSULOSIN HYDROCHLORIDE SCH: 0.4 CAPSULE ORAL at 22:34

## 2018-11-29 RX ADMIN — AMLODIPINE BESYLATE SCH MG: 10 TABLET ORAL at 10:29

## 2018-11-29 RX ADMIN — LEVOFLOXACIN SCH MG: 500 TABLET, FILM COATED ORAL at 10:30

## 2018-11-29 RX ADMIN — PEDIATRIC MULTIPLE VITAMINS W/ IRON CHEW TAB 18 MG SCH TAB: 18 CHEW TAB at 10:30

## 2018-11-29 RX ADMIN — HEPARIN SODIUM SCH UNIT: 5000 INJECTION, SOLUTION INTRAVENOUS; SUBCUTANEOUS at 14:55

## 2018-11-29 RX ADMIN — HEPARIN SODIUM SCH UNIT: 5000 INJECTION, SOLUTION INTRAVENOUS; SUBCUTANEOUS at 06:41

## 2018-11-29 RX ADMIN — Medication SCH: at 22:33

## 2018-11-29 RX ADMIN — HYDRALAZINE HYDROCHLORIDE SCH MG: 10 TABLET, FILM COATED ORAL at 14:55

## 2018-11-29 RX ADMIN — HYDRALAZINE HYDROCHLORIDE SCH MG: 10 TABLET, FILM COATED ORAL at 06:44

## 2018-11-29 RX ADMIN — Medication SCH: at 10:24

## 2018-11-29 RX ADMIN — HEPARIN SODIUM SCH: 5000 INJECTION, SOLUTION INTRAVENOUS; SUBCUTANEOUS at 22:33

## 2018-11-29 NOTE — PROGRESS NOTE
Provider Note


Provider Note: 





Urology progress note by Dr. Maryann Suarez on 11/29/2018.





SUBJECTIVE: The patient was agitated last night, and received Haldol.  He is 

much calmer this morning.  And is able to give some sort of a history.  He 

denies any chest pain or discomfort.  He continues to be in atrial fibrillation 

with slow ventricular response.  There is no TIA or CVA symptoms.  The patient 

denies any shortness of breath.  The patient apart from being slightly drowsy.  

Does not seem to be in any acute distress.  Today he did take his medications.  

He also ate some food.





PHYSICAL EXAMINATION: The patient appears to be chronically ill and 

malnourished.  At present in no acute distress.


 Selected Entries











  11/29/18 11/29/18





  10:00 11:19


 


Temperature  97.8 F


 


Temperature  Oral





Source  


 


Pulse Rate  74


 


Respiratory  16





Rate  


 


Blood Pressure  130/51 H


 


Blood Pressure  77





Mean  


 


BP Location  Right Arm


 


BP Position  Supine


 


O2 Sat by Pulse  100





Oximetry  


 


Oxygen Delivery Nasal Cannula 





Method (  





includes room  





air)  


 


Oxygen Flow 2 





Rate  


 


Oxygen Delivery  Nasal Cannula





Method  





HEAD: Atraumatic normocephalic.  EYES: Pupils are equal round regular react to 

light accommodation.  ENT: Is negative.  NECK: Is supple.  There is no JVD.  

Carotids equal there is no bruit.  There is no lymphadenopathy.  There is no 

goiter.  There is no accessory muscles of respiration in use.  Trachea central.

  LUNGS: Show diminished air entry in the bases with some area of tenderness 

which covers a small area of the bases bilaterally.  The rest of the lungs are 

clear.  There is no rhonchi rales or wheezing.  HEART: S1-S2 is heard there is 

no S3 gallop there is no S4 gallop S1 is of variable intensity..  There is 

murmur of mitral regurgitation and tricuspid regurgitation present.  There is 

no rub.  ABDOMEN: Soft.  Nontender there is no hepatosplenomegaly bowel sounds 

are well heard.  EXTREMITIES: Femorals are diminished.  There is no femoral 

bruits.  Leg pulses are diminished.  There is no pedal edema.  There is no DVT 

esophagitis.  There is no calf tenderness.  CNS: The patient's slightly drowsy 

but moves all 4 extremities.  PSYCHIATRIC exam the patient does not appear to 

be agitated.  He appears to be calm in-depth psychiatric examination not done.





IMPRESSION/RECOMMENDATION: 





1.Atrial fibrillation with slow ventricular response.  At present there is no 

need for AV jenifer blocking agents.  Note that the patient due to his frail build

, and malnourished state, and with ongoing problems with confusion is not a 

candidate for chronic anticoagulation therapy.


2.  ENCEPHALOPATHY: Seems to be slightly improving.


3.  ELECTROLYTE IMBALANCE.  Note that the patient's sodium is coming down, last 

known value was 148.7.


4.  Dehydration.


5.  Chronic kidney disease.  Seems to be improving.  His last GFR was greater 

than 60.


6.  Severe mitral regurgitation: Hydralazine has been started.  Will increase 

as tolerated by the patient.  Continue amlodipine.


7.  Severe pulmonary hypertension: Recommend continuing amlodipine and 

hydralazine.  May add sildenafil later on.


8.  Moderate to Severe tricuspid Regurgitation: Continue current medications.


9.  Prostate cancer with bony metastasis.





Medications have been reviewed, and medications adjusted.  Medical decision 

making is of high complexity.  Note that the patient is a full code, and the 

patient's daughter is her surrogate healthcare decision maker.  Discussed 

management plan with attending physician on the case.  40 minutes spent on this 

patient with more than 50% time spent in direct patient care.  We will follow 

with you.

## 2018-11-29 NOTE — PDOC PROGRESS REPORT
Subjective


Progress Note for:: 11/29/18


Subjective:: 


Overnight pt agitated once again likely 2nd sundowning, was given haldol w/ 

good effect. Yesterday pt w/ mits most of day but removed as pt was less 

agitated during the day. Po intake still poor, pt pockets food in mouth but 

does eventually follow instructions. Asked nursing to have more assistance w/ 

eating for pt





Reason For Visit: 


ALTERED MENTAL STATUS,METABOLIC ENCEPHALOPATHY








Physical Exam


Vital Signs: 


 











Temp Pulse Resp BP Pulse Ox


 


 98.4 F   90   18   168/57 H  100 


 


 11/29/18 03:50  11/29/18 03:50  11/29/18 03:50  11/29/18 03:50  11/29/18 03:50








 Intake & Output











 11/28/18 11/29/18 11/30/18





 06:59 06:59 06:59


 


Intake Total 0 20 


 


Output Total 1550 700 


 


Balance -1550 -680 


 


Weight 77.6 kg 76.2 kg 











General appearance: PRESENT: no acute distress, well-developed, well-nourished


Head exam: PRESENT: atraumatic, normocephalic


Eye exam: PRESENT: conjunctiva pink, EOMI, PERRLA.  ABSENT: scleral icterus


Ear exam: PRESENT: normal external ear exam


Mouth exam: PRESENT: moist, tongue midline


Neck exam: ABSENT: carotid bruit, JVD, lymphadenopathy, thyromegaly


Respiratory exam: PRESENT: clear to auscultation venessa.  ABSENT: rales, rhonchi, 

wheezes


Cardiovascular exam: PRESENT: RRR.  ABSENT: diastolic murmur, rubs, systolic 

murmur


Pulses: PRESENT: normal dorsalis pedis pul


Vascular exam: PRESENT: normal capillary refill


GI/Abdominal exam: PRESENT: normal bowel sounds, soft.  ABSENT: distended, 

guarding, mass, organolmegaly, rebound, tenderness


Rectal exam: PRESENT: deferred


Extremities exam: PRESENT: full ROM.  ABSENT: calf tenderness, clubbing, pedal 

edema


Neurological exam: PRESENT: alert, awake, oriented to person, oriented to place

, oriented to time, oriented to situation, CN II-XII grossly intact.  ABSENT: 

motor sensory deficit


Psychiatric exam: PRESENT: appropriate affect, normal mood.  ABSENT: homicidal 

ideation, suicidal ideation


Skin exam: PRESENT: dry, intact, warm.  ABSENT: cyanosis, rash





Results


Laboratory Results: 


 





 11/27/18 05:39 





 11/27/18 10:24 





 





11/24/18 16:15   Pleural Fluid   Gram Stain - Final


11/24/18 16:15   Pleural Fluid   Body Fluid Culture - Final


                            NO AEROBIC OR ANAEROBIC ORGANISMS RECOVERED





 











  11/22/18 11/22/18 11/23/18





  03:54 03:54 16:05


 


Creatine Kinase  617 H   367 H


 


NT-Pro-B Natriuret Pep   9380 H 














  11/24/18 11/24/18 11/25/18





  08:45 08:45 03:53


 


Creatine Kinase  291 H  


 


NT-Pro-B Natriuret Pep   8430 H  9560 H











Impressions: 


 





Abdomen/Pelvis CT  11/21/18 00:00


IMPRESSION:  Fluid overload with bilateral moderate pleural effusions and small 

amount of ascites.


Bibasilar consolidation atelectasis versus pneumonia


 








Head CT  11/21/18 10:00


IMPRESSION:  No acute intracranial pathology.  No noncontrast CT findings to 

explain symptoms.  MRI may be used to more sensitively evaluate for acute 

diffusion restricting infarction if suspected.


EVIDENCE OF ACUTE STROKE: NO.


 








Head MRI  11/22/18 14:54


IMPRESSION:  Mild involutional changes of aging with no acute intracranial 

imaging findings on this slightly limited study.


EVIDENCE OF ACUTE STROKE: NO.


 








Lumbar Puncture  11/23/18 10:40


IMPRESSION:  Lumbar puncture under fluoroscopy. No immediate complication.


 








Thoracentesis Ultrasound  11/24/18 10:19


IMPRESSION:  SUCCESSFUL THORACENTESIS USING ULTRASOUND GUIDANCE.


 








Chest X-Ray  11/24/18 18:00


IMPRESSION:  Stable pulmonary exam demonstrating small bilateral pleural 

effusions.


 








Guidance Fluoroscopy  11/27/18 00:00


IMPRESSION:  SUCCESSFUL PLACEMENT OF A 5 FR DUAL LUMEN 42 CM PICC IN THE LEFT 

BASILIC VEIN.


 








Interventional Vascular Procedure  11/27/18 00:00


IMPRESSION:  SUCCESSFUL PLACEMENT OF A 5 FR DUAL LUMEN 42 CM PICC IN THE LEFT 

BASILIC VEIN.


 








PICC Line Insertion  11/27/18 00:00


IMPRESSION:  SUCCESSFUL PLACEMENT OF A 5 FR DUAL LUMEN 42 CM PICC IN THE LEFT 

BASILIC VEIN.


 














Assessment & Plan





- Diagnosis


(1) Altered mental status


Qualifiers: 


   Altered mental status type: delirium   Qualified Code(s): R41.0 - 

Disorientation, unspecified   


Is this a current diagnosis for this admission?: Yes   


Plan: 


Delerium w/ underlying dementia, still very confused, trying to minimize any 

delirium inducing meds/events in hospital and hopefully pt will improve, pt 

started on zoloft/aricept to help also, haldol seems to improve his agitation.








(2) Difficulty swallowing liquids


Is this a current diagnosis for this admission?: Yes   


Plan: 


Cont w/ bedside nutrition orally as tolerated.








(3) Prostate cancer


Is this a current diagnosis for this admission?: Yes   


Plan: 


Stage IV, not needing further rx yet, just received lupron about 2-3 weeks ago.








- Time


Time Spent with patient: 35 or more minutes





- Inpatient Certification


Based on my medical assessment, after consideration of the patient's 

comorbidities, presenting symptoms, or acuity I expect that the services needed 

warrant INPATIENT care.: Yes


I certify that my determination is in accordance with my understanding of 

Medicare's requirements for reasonable and necessary INPATIENT services [42 CFR 

412.3e].: Yes


Medical Necessity: Need for Neurological Checks, Risk of Complication if Not 

Cared For in Hospital

## 2018-11-29 NOTE — PDOC PROGRESS REPORT
Subjective


Progress Note for:: 11/29/18


Subjective:: 





The patient appears to be much calmer this morning.  He is actually making 

sense during conversation.  He did have some food.


Reason For Visit: 


ALTERED MENTAL STATUS,METABOLIC ENCEPHALOPATHY








Physical Exam


Vital Signs: 


 











Temp Pulse Resp BP Pulse Ox


 


 97.8 F   74   16   130/51 H  100 


 


 11/29/18 11:19  11/29/18 11:19  11/29/18 11:19  11/29/18 11:19  11/29/18 11:19








 Intake & Output











 11/28/18 11/29/18 11/30/18





 06:59 06:59 06:59


 


Intake Total 0 20 


 


Output Total 1550 700 


 


Balance -1550 -680 


 


Weight 77.6 kg 76.2 kg 











General appearance: PRESENT: mild distress


Head exam: PRESENT: atraumatic


Eye exam: PRESENT: conjunctiva pink


Mouth exam: PRESENT: dry mucosa


Neck exam: PRESENT: carotid bruit.  ABSENT: JVD


Respiratory exam: PRESENT: rhonchi


Cardiovascular exam: PRESENT: irregular rhythm, +S1, +S2


GI/Abdominal exam: PRESENT: normal bowel sounds, soft


Extremities exam: PRESENT: tenderness


Musculoskeletal exam: PRESENT: tenderness


Neurological exam: PRESENT: alert, awake


Psychiatric exam: PRESENT: anxious





Results


Laboratory Results: 


 





 11/27/18 05:39 





 11/27/18 10:24 





 





11/24/18 16:15   Pleural Fluid   Gram Stain - Final


11/24/18 16:15   Pleural Fluid   Body Fluid Culture - Final


                            NO AEROBIC OR ANAEROBIC ORGANISMS RECOVERED





 











  11/22/18 11/22/18 11/23/18





  03:54 03:54 16:05


 


Creatine Kinase  617 H   367 H


 


NT-Pro-B Natriuret Pep   9380 H 














  11/24/18 11/24/18 11/25/18





  08:45 08:45 03:53


 


Creatine Kinase  291 H  


 


NT-Pro-B Natriuret Pep   8430 H  9560 H











Impressions: 


 





Abdomen/Pelvis CT  11/21/18 00:00


IMPRESSION:  Fluid overload with bilateral moderate pleural effusions and small 

amount of ascites.


Bibasilar consolidation atelectasis versus pneumonia


 








Head CT  11/21/18 10:00


IMPRESSION:  No acute intracranial pathology.  No noncontrast CT findings to 

explain symptoms.  MRI may be used to more sensitively evaluate for acute 

diffusion restricting infarction if suspected.


EVIDENCE OF ACUTE STROKE: NO.


 








Head MRI  11/22/18 14:54


IMPRESSION:  Mild involutional changes of aging with no acute intracranial 

imaging findings on this slightly limited study.


EVIDENCE OF ACUTE STROKE: NO.


 








Lumbar Puncture  11/23/18 10:40


IMPRESSION:  Lumbar puncture under fluoroscopy. No immediate complication.


 








Thoracentesis Ultrasound  11/24/18 10:19


IMPRESSION:  SUCCESSFUL THORACENTESIS USING ULTRASOUND GUIDANCE.


 








Chest X-Ray  11/24/18 18:00


IMPRESSION:  Stable pulmonary exam demonstrating small bilateral pleural 

effusions.


 








Guidance Fluoroscopy  11/27/18 00:00


IMPRESSION:  SUCCESSFUL PLACEMENT OF A 5 FR DUAL LUMEN 42 CM PICC IN THE LEFT 

BASILIC VEIN.


 








Interventional Vascular Procedure  11/27/18 00:00


IMPRESSION:  SUCCESSFUL PLACEMENT OF A 5 FR DUAL LUMEN 42 CM PICC IN THE LEFT 

BASILIC VEIN.


 








PICC Line Insertion  11/27/18 00:00


IMPRESSION:  SUCCESSFUL PLACEMENT OF A 5 FR DUAL LUMEN 42 CM PICC IN THE LEFT 

BASILIC VEIN.


 














Assessment & Plan





- Diagnosis


(1) Altered mental status


Qualifiers: 


   Altered mental status type: delirium   Qualified Code(s): R41.0 - 

Disorientation, unspecified   


Is this a current diagnosis for this admission?: Yes   


Plan: 


Stable continue current medications








(2) Dehydration


Is this a current diagnosis for this admission?: Yes   


Plan: 


Possible cause of hyponatremia.  The patient right now is fluid overloaded.  

Will stop the D5 and start diuretics








(3) Hypernatremia


Is this a current diagnosis for this admission?: Yes   





(4) New onset atrial fibrillation


Is this a current diagnosis for this admission?: Yes   


Plan: 


Rate controlled








(5) CKD (chronic kidney disease) stage 3, GFR 30-59 ml/min


Is this a current diagnosis for this admission?: Yes   





(6) Hypertension


Qualifiers: 


   Hypertension type: unspecified   Qualified Code(s): I10 - Essential (primary

) hypertension   


Is this a current diagnosis for this admission?: Yes   





(7) Physical deconditioning


Is this a current diagnosis for this admission?: Yes   


Plan: 


Will need PT OT








(8) Prostate cancer


Is this a current diagnosis for this admission?: Yes   


Plan: 


Stage IV with metastasis.








(9) Memory loss, short term


Is this a current diagnosis for this admission?: Yes   





(10) Acute psychosis


Is this a current diagnosis for this admission?: Yes   


Plan: 


Recurrent episodes.  We will start with Haldol 2 mg every 6 hours.  Awaiting 

psych consult

## 2018-11-30 RX ADMIN — HYDRALAZINE HYDROCHLORIDE SCH: 10 TABLET, FILM COATED ORAL at 05:59

## 2018-11-30 RX ADMIN — PEDIATRIC MULTIPLE VITAMINS W/ IRON CHEW TAB 18 MG SCH TAB: 18 CHEW TAB at 10:10

## 2018-11-30 RX ADMIN — DIVALPROEX SODIUM SCH MG: 250 TABLET, DELAYED RELEASE ORAL at 19:03

## 2018-11-30 RX ADMIN — ASPIRIN SCH MG: 81 TABLET, CHEWABLE ORAL at 10:10

## 2018-11-30 RX ADMIN — HEPARIN SODIUM SCH UNIT: 5000 INJECTION, SOLUTION INTRAVENOUS; SUBCUTANEOUS at 13:07

## 2018-11-30 RX ADMIN — HALOPERIDOL SCH: 1 TABLET ORAL at 01:32

## 2018-11-30 RX ADMIN — HALOPERIDOL SCH: 1 TABLET ORAL at 07:50

## 2018-11-30 RX ADMIN — TAMSULOSIN HYDROCHLORIDE SCH MG: 0.4 CAPSULE ORAL at 22:18

## 2018-11-30 RX ADMIN — HYDRALAZINE HYDROCHLORIDE SCH MG: 25 TABLET, FILM COATED ORAL at 22:18

## 2018-11-30 RX ADMIN — INSULIN LISPRO PRN UNIT: 100 INJECTION, SOLUTION INTRAVENOUS; SUBCUTANEOUS at 16:48

## 2018-11-30 RX ADMIN — INSULIN LISPRO PRN UNIT: 100 INJECTION, SOLUTION INTRAVENOUS; SUBCUTANEOUS at 13:07

## 2018-11-30 RX ADMIN — HEPARIN SODIUM SCH: 5000 INJECTION, SOLUTION INTRAVENOUS; SUBCUTANEOUS at 22:15

## 2018-11-30 RX ADMIN — AMLODIPINE BESYLATE SCH MG: 10 TABLET ORAL at 10:10

## 2018-11-30 RX ADMIN — BUSPIRONE HYDROCHLORIDE SCH MG: 10 TABLET ORAL at 19:03

## 2018-11-30 RX ADMIN — HYDRALAZINE HYDROCHLORIDE SCH MG: 25 TABLET, FILM COATED ORAL at 13:07

## 2018-11-30 RX ADMIN — HEPARIN SODIUM SCH UNIT: 5000 INJECTION, SOLUTION INTRAVENOUS; SUBCUTANEOUS at 05:59

## 2018-11-30 NOTE — PDOC PROGRESS REPORT
Subjective


Progress Note for:: 11/30/18


Subjective:: 





The patient appears to be slightly better.  He is less confused.


He is still complaining of not being very hungry.  He wants to go home for at 

least one day


Reason For Visit: 


ALTERED MENTAL STATUS,METABOLIC ENCEPHALOPATHY








Physical Exam


Vital Signs: 


 











Temp Pulse Resp BP Pulse Ox


 


 98.5 F   77   17   143/53 H  100 


 


 11/30/18 07:25  11/30/18 07:25  11/30/18 07:25  11/30/18 07:25  11/30/18 07:25








 Intake & Output











 11/29/18 11/30/18 12/01/18





 06:59 06:59 06:59


 


Intake Total 20 1178 


 


Output Total 700 800 


 


Balance -680 378 


 


Weight 76.2 kg 91.4 kg 











General appearance: PRESENT: mild distress


Head exam: PRESENT: atraumatic


Eye exam: PRESENT: conjunctiva pink


Mouth exam: PRESENT: moist


Neck exam: PRESENT: carotid bruit.  ABSENT: JVD


Respiratory exam: PRESENT: clear to auscultation venessa


Cardiovascular exam: PRESENT: irregular rhythm, +S1, +S2


GI/Abdominal exam: PRESENT: normal bowel sounds, soft


Extremities exam: PRESENT: tenderness


Musculoskeletal exam: PRESENT: tenderness


Neurological exam: PRESENT: alert, awake


Psychiatric exam: PRESENT: anxious





Results


Laboratory Results: 


 





 11/27/18 05:39 





 11/27/18 10:24 





 











  11/22/18 11/22/18 11/23/18





  03:54 03:54 16:05


 


Creatine Kinase  617 H   367 H


 


NT-Pro-B Natriuret Pep   9380 H 














  11/24/18 11/24/18 11/25/18





  08:45 08:45 03:53


 


Creatine Kinase  291 H  


 


NT-Pro-B Natriuret Pep   8430 H  9560 H











Impressions: 


 





Abdomen/Pelvis CT  11/21/18 00:00


IMPRESSION:  Fluid overload with bilateral moderate pleural effusions and small 

amount of ascites.


Bibasilar consolidation atelectasis versus pneumonia


 








Head CT  11/21/18 10:00


IMPRESSION:  No acute intracranial pathology.  No noncontrast CT findings to 

explain symptoms.  MRI may be used to more sensitively evaluate for acute 

diffusion restricting infarction if suspected.


EVIDENCE OF ACUTE STROKE: NO.


 








Head MRI  11/22/18 14:54


IMPRESSION:  Mild involutional changes of aging with no acute intracranial 

imaging findings on this slightly limited study.


EVIDENCE OF ACUTE STROKE: NO.


 








Lumbar Puncture  11/23/18 10:40


IMPRESSION:  Lumbar puncture under fluoroscopy. No immediate complication.


 








Thoracentesis Ultrasound  11/24/18 10:19


IMPRESSION:  SUCCESSFUL THORACENTESIS USING ULTRASOUND GUIDANCE.


 








Chest X-Ray  11/24/18 18:00


IMPRESSION:  Stable pulmonary exam demonstrating small bilateral pleural 

effusions.


 








Guidance Fluoroscopy  11/27/18 00:00


IMPRESSION:  SUCCESSFUL PLACEMENT OF A 5 FR DUAL LUMEN 42 CM PICC IN THE LEFT 

BASILIC VEIN.


 








Interventional Vascular Procedure  11/27/18 00:00


IMPRESSION:  SUCCESSFUL PLACEMENT OF A 5 FR DUAL LUMEN 42 CM PICC IN THE LEFT 

BASILIC VEIN.


 








PICC Line Insertion  11/27/18 00:00


IMPRESSION:  SUCCESSFUL PLACEMENT OF A 5 FR DUAL LUMEN 42 CM PICC IN THE LEFT 

BASILIC VEIN.


 














Assessment & Plan





- Diagnosis


(1) Altered mental status


Qualifiers: 


   Altered mental status type: delirium   Qualified Code(s): R41.0 - 

Disorientation, unspecified   


Is this a current diagnosis for this admission?: Yes   


Plan: 


Improving continue with current medications








(2) Dehydration


Is this a current diagnosis for this admission?: Yes   


Plan: 


Continue encouraging p.o. intake








(3) Hypernatremia


Is this a current diagnosis for this admission?: Yes   


Plan: 


Improved with diuretics








(4) New onset atrial fibrillation


Is this a current diagnosis for this admission?: Yes   


Plan: 


Rate controlled








(5) CKD (chronic kidney disease) stage 3, GFR 30-59 ml/min


Is this a current diagnosis for this admission?: Yes   





(6) Hypertension


Qualifiers: 


   Hypertension type: unspecified   Qualified Code(s): I10 - Essential (primary

) hypertension   


Is this a current diagnosis for this admission?: Yes   


Plan: 


Continue current treatment








(7) Physical deconditioning


Is this a current diagnosis for this admission?: Yes   


Plan: 


Will need PT OT








(8) Prostate cancer


Is this a current diagnosis for this admission?: Yes   


Plan: 


Stage IV with metastasis.








(9) Memory loss, short term


Is this a current diagnosis for this admission?: Yes   





(10) Acute psychosis


Is this a current diagnosis for this admission?: Yes   


Plan: 


Recurrent episodes.  We will start with Haldol 2 mg every 6 hours.  Awaiting 

psych consult








(11) Pulmonary hypertension


Is this a current diagnosis for this admission?: Yes   


Plan: 


We will continue with the cardiology recommendations

## 2018-11-30 NOTE — PDOC PROGRESS REPORT
Subjective


Progress Note for:: 11/30/18


Subjective:: 


Seems better, talking appropriately this am, doesn't like the taste of the food

, but really wants to get up, did take in pureed foods somewhat yesterday and 

diet advanced to chopped meats





Reason For Visit: 


ALTERED MENTAL STATUS,METABOLIC ENCEPHALOPATHY








Physical Exam


Vital Signs: 


 











Temp Pulse Resp BP Pulse Ox


 


 98.5 F   77   17   143/53 H  100 


 


 11/30/18 07:25  11/30/18 07:25  11/30/18 07:25  11/30/18 07:25  11/30/18 07:25








 Intake & Output











 11/29/18 11/30/18 12/01/18





 06:59 06:59 06:59


 


Intake Total 20 1178 


 


Output Total 700 800 


 


Balance -680 378 


 


Weight 76.2 kg 91.4 kg 











General appearance: PRESENT: no acute distress


Head exam: PRESENT: atraumatic


Mouth exam: PRESENT: dry mucosa


Respiratory exam: PRESENT: clear to auscultation venessa.  ABSENT: rales, rhonchi, 

wheezes


Cardiovascular exam: PRESENT: RRR.  ABSENT: diastolic murmur, rubs, systolic 

murmur


GI/Abdominal exam: PRESENT: normal bowel sounds, soft.  ABSENT: distended, 

guarding, mass, organolmegaly, rebound, tenderness


Rectal exam: PRESENT: deferred


Neurological exam: PRESENT: alert, awake, oriented to person, oriented to place





Results


Laboratory Results: 


 





 11/27/18 05:39 





 11/27/18 10:24 





 











  11/22/18 11/22/18 11/23/18





  03:54 03:54 16:05


 


Creatine Kinase  617 H   367 H


 


NT-Pro-B Natriuret Pep   9380 H 














  11/24/18 11/24/18 11/25/18





  08:45 08:45 03:53


 


Creatine Kinase  291 H  


 


NT-Pro-B Natriuret Pep   8430 H  9560 H











Impressions: 


 





Abdomen/Pelvis CT  11/21/18 00:00


IMPRESSION:  Fluid overload with bilateral moderate pleural effusions and small 

amount of ascites.


Bibasilar consolidation atelectasis versus pneumonia


 








Head CT  11/21/18 10:00


IMPRESSION:  No acute intracranial pathology.  No noncontrast CT findings to 

explain symptoms.  MRI may be used to more sensitively evaluate for acute 

diffusion restricting infarction if suspected.


EVIDENCE OF ACUTE STROKE: NO.


 








Head MRI  11/22/18 14:54


IMPRESSION:  Mild involutional changes of aging with no acute intracranial 

imaging findings on this slightly limited study.


EVIDENCE OF ACUTE STROKE: NO.


 








Lumbar Puncture  11/23/18 10:40


IMPRESSION:  Lumbar puncture under fluoroscopy. No immediate complication.


 








Thoracentesis Ultrasound  11/24/18 10:19


IMPRESSION:  SUCCESSFUL THORACENTESIS USING ULTRASOUND GUIDANCE.


 








Chest X-Ray  11/24/18 18:00


IMPRESSION:  Stable pulmonary exam demonstrating small bilateral pleural 

effusions.


 








Guidance Fluoroscopy  11/27/18 00:00


IMPRESSION:  SUCCESSFUL PLACEMENT OF A 5 FR DUAL LUMEN 42 CM PICC IN THE LEFT 

BASILIC VEIN.


 








Interventional Vascular Procedure  11/27/18 00:00


IMPRESSION:  SUCCESSFUL PLACEMENT OF A 5 FR DUAL LUMEN 42 CM PICC IN THE LEFT 

BASILIC VEIN.


 








PICC Line Insertion  11/27/18 00:00


IMPRESSION:  SUCCESSFUL PLACEMENT OF A 5 FR DUAL LUMEN 42 CM PICC IN THE LEFT 

BASILIC VEIN.


 














Assessment & Plan





- Diagnosis


(1) Altered mental status


Qualifiers: 


   Altered mental status type: delirium   Qualified Code(s): R41.0 - 

Disorientation, unspecified   


Is this a current diagnosis for this admission?: Yes   


Plan: 


Improved, apparently psych may have seen pt but don't see any notes or 

recommendations, but pt seems overall better








(2) Difficulty swallowing liquids


Is this a current diagnosis for this admission?: Yes   


Plan: 


Improving, related both to mental status and severe deconditioning both 

improving, will advance as tolerated, follow speech recs








(3) Prostate cancer


Is this a current diagnosis for this admission?: Yes   


Plan: 


Rx will con't as outpt








- Time


Time Spent with patient: 35 or more minutes





- Inpatient Certification


Based on my medical assessment, after consideration of the patient's 

comorbidities, presenting symptoms, or acuity I expect that the services needed 

warrant INPATIENT care.: Yes


I certify that my determination is in accordance with my understanding of 

Medicare's requirements for reasonable and necessary INPATIENT services [42 CFR 

412.3e].: Yes


Medical Necessity: Need for Neurological Checks, Risk of Complication if Not 

Cared For in Hospital

## 2018-11-30 NOTE — PSYCHOLOGICAL NOTE
Psych Note





- Psych Note


Date seen by psych provider: 11/29/18


Time seen by psych provider: 15:30


Psych Note: 


Reason for Consult: Medication recommendations/ Psychosis, dementia


Patient is orientated to place and person.  It is noted that he is unable to 

identify the date, week, month or year however does correctly identify 

Thanksgiving just past.  He is also on able to provide his entire birthdate 

only stating "[birth year] (clinician notes patient's YOB: 1945).  Patient 

was also unable to correctly identify the current president stating that he the 

president is Luiz Matt.  He is unable to engage in abstract thought 

processes or engage with memory, executive functioning or problem solving.  

Patient was able to correctly identify that he has high blood pressure and 

takes medication for that and denies thoughts of wanting to harm himself or 

others.  Clinician notes patient is in medical distress.





Head MRI 11/22/2018: Findings indicate prominence of the lateral ventricles 

secondary to mild cortical atrophy





Medication recommendations per [facility]'s contracted psychiatrist 


Please discontinue Haldol and Zoloft


Please start Depakote 250mg twice daily


Please start Buspar 10mg twice daily


Rispirdone 0.25mg twice daily as needed for agitation and aggression





R/O 799.59 (R41.9) unspecified neurocognitive disorder





Impression\plan: Patient is cleared from acute psychiatric services.  Patient 

is noted to have a possible diagnosis of dementia which is supported by both 

presentation and MRI results.  Patient is recommended to follow-up with 

neurology.  Prescribing physicians are asked to consider avoiding using any 

benzodiazepines and antipsychotics as they can increase confusion, aggression, 

agitation, and psychosis.  Clinician notes the patient is in medical crisis and 

an accurate capacity cannot be conducted until stabilization.  Please reconsult 

if new concerns arise once medically stable.  Dr. Nathan was consulted and the 

care management of this patient; attending physicians in agreement with 

recommendations and disposition.

## 2018-11-30 NOTE — PROGRESS NOTE
Provider Note


Provider Note: 





CARDIOLOGY PROGRESS NOTES by Dr. Maryann Suarez on 11/30/2018.





SUBJECTIVE: The patient appears to be less confused but still not able to give 

any history meaningfully.  He denies chest pain or discomfort.  He had a short 

few beats of nonsustained ventricular tachycardia yesterday and had no symptoms 

and there was no hemodynamic compromise.  There is no recurrence of the 

nonsustained.  There is no leg edema.  No syncopal episodes noted.  Ventricular 

tachycardia.  He moves all 4 extremities.





PHYSICAL EXAMINATION: The patient is a frail build, and appears to be 

chronically ill.  But in spite of this he does not appear to be in any acute 

distress.





 











  11/29/18 11/30/18





  10:00 11:51


 


Temperature  98.4 F


 


Temperature  Oral





Source  


 


Pulse Rate  77


 


Respiratory  16





Rate  


 


Blood Pressure  137/52 H





[Upper Arm]  


 


Blood Pressure  80





Mean [Upper Arm  





]  


 


Blood Pressure  Supine





Position [Upper  





Arm]  


 


O2 Sat by Pulse  100





Oximetry  


 


Oxygen Delivery Nasal Cannula Nasal Cannula





Method (  





includes room  





air)  


 


Oxygen Flow 2 





Rate  





 


HEAD: Atraumatic normocephalic.  EYES: Pupils are equal round regular react to 

light accommodation.  ENT: Is negative.  NECK: Is supple.  There is no JVD.  

Carotids equal there is no bruit.  There is no lymphadenopathy.  There is no 

goiter.  There is no accessory muscles of respiration in use.  Trachea central.

  LUNGS: Show diminished air entry in the bases with some area of tenderness 

which covers a small area of the bases bilaterally.  The rest of the lungs are 

clear.  There is no rhonchi rales or wheezing.  HEART: S1-S2 is heard there is 

no S3 gallop there is no S4 gallop S1 is of variable intensity..  There is 

murmur of mitral regurgitation and tricuspid regurgitation present.  There is 

no rub.  ABDOMEN: Soft.  Nontender there is no hepatosplenomegaly bowel sounds 

are well heard.  EXTREMITIES: Femorals are diminished.  There is no femoral 

bruits.  Leg pulses are diminished.  There is no pedal edema.  There is no DVT 

esophagitis.  There is no calf tenderness.  CNS: The patient's slightly drowsy 

but moves all 4 extremities.  PSYCHIATRIC exam the patient does not appear to 

be agitated.  He appears to be calm in-depth psychiatric examination not done.





IMPRESSION/RECOMMENDATION: 





1.Atrial fibrillation with slow ventricular response.  At present there is no 

need for AV jenifer blocking agents.  Note that the patient due to his frail build

, and malnourished state, and with ongoing problems with confusion is not a 

candidate for chronic anticoagulation therapy.


2.  ENCEPHALOPATHY: Seems to be slightly improving.


3.  ELECTROLYTE IMBALANCE.  Note that the patient's sodium is coming down, last 

known value was 148.7.


4.  Dehydration.


5.  Chronic kidney disease.  Seems to be improving.  His last GFR was greater 

than 60.


6.  Severe mitral regurgitation: Hydralazine has been started.  Will increase 

as tolerated by the patient.  Continue amlodipine.


7.  Severe pulmonary hypertension: Recommend continuing amlodipine and 

hydralazine has been increased.  May add sildenafil later on.


8.  Moderate to Severe tricuspid Regurgitation: Continue current medications.


9.  Prostate cancer with bony metastasis.











  11/30/18 11/30/18 11/30/18





  06:27 11:18 16:26


 


POC Glucose  172 H  220 H  187 H





MEDICATIONS reviewed.  Note hydralazine was increased by me today.  Discussed 

with attending physician, the management plan.  40 minutes spent on this 

patient more than 50% of time spent in direct patient care.  Medical decision 

making continues to be of high complexity.  Will hold off on sildenafil until 

blood pressure response to use hydralazine increase is known.  We will follow 

with you.

## 2018-12-01 LAB
ABSOLUTE LYMPHOCYTES# (MANUAL): 1.8 10^3/UL (ref 0.5–4.7)
ABSOLUTE MONOCYTES # (MANUAL): 0.6 10^3/UL (ref 0.1–1.4)
ABSOLUTE NEUTROPHILS# (MANUAL): 8.8 10^3/UL (ref 1.7–8.2)
ADD MANUAL DIFF: YES
ALBUMIN SERPL-MCNC: 2.6 G/DL (ref 3.5–5)
ALP SERPL-CCNC: 513 U/L (ref 38–126)
ALT SERPL-CCNC: 60 U/L (ref 21–72)
ANION GAP SERPL CALC-SCNC: 11 MMOL/L (ref 5–19)
ANISOCYTOSIS BLD QL SMEAR: (no result)
AST SERPL-CCNC: 135 U/L (ref 17–59)
BASOPHILS NFR BLD MANUAL: 0 % (ref 0–2)
BILIRUB DIRECT SERPL-MCNC: 0.4 MG/DL (ref 0–0.4)
BILIRUB SERPL-MCNC: 0.5 MG/DL (ref 0.2–1.3)
BUN SERPL-MCNC: 20 MG/DL (ref 7–20)
CALCIUM: 7.6 MG/DL (ref 8.4–10.2)
CHLORIDE SERPL-SCNC: 113 MMOL/L (ref 98–107)
CO2 SERPL-SCNC: 23 MMOL/L (ref 22–30)
EOSINOPHIL NFR BLD MANUAL: 0 % (ref 0–6)
ERYTHROCYTE [DISTWIDTH] IN BLOOD BY AUTOMATED COUNT: 18.5 % (ref 11.5–14)
GLUCOSE SERPL-MCNC: 159 MG/DL (ref 75–110)
HCT VFR BLD CALC: 28 % (ref 37.9–51)
HGB BLD-MCNC: 9.1 G/DL (ref 13.5–17)
MCH RBC QN AUTO: 25.4 PG (ref 27–33.4)
MCHC RBC AUTO-ENTMCNC: 32.6 G/DL (ref 32–36)
MCV RBC AUTO: 78 FL (ref 80–97)
MONOCYTES % (MANUAL): 5 % (ref 3–13)
PLATELET # BLD: 155 10^3/UL (ref 150–450)
PLATELET COMMENT: ADEQUATE
POLYCHROMASIA BLD QL SMEAR: (no result)
POTASSIUM SERPL-SCNC: 3.3 MMOL/L (ref 3.6–5)
PROT SERPL-MCNC: 5.4 G/DL (ref 6.3–8.2)
RBC # BLD AUTO: 3.59 10^6/UL (ref 4.35–5.55)
SEGMENTED NEUTROPHILS % (MAN): 79 % (ref 42–78)
SODIUM SERPL-SCNC: 146.8 MMOL/L (ref 137–145)
TOTAL CELLS COUNTED BLD: 100
VARIANT LYMPHS NFR BLD MANUAL: 16 % (ref 13–45)
WBC # BLD AUTO: 11.1 10^3/UL (ref 4–10.5)

## 2018-12-01 RX ADMIN — HYDRALAZINE HYDROCHLORIDE SCH MG: 25 TABLET, FILM COATED ORAL at 05:45

## 2018-12-01 RX ADMIN — ASPIRIN SCH MG: 81 TABLET, CHEWABLE ORAL at 09:49

## 2018-12-01 RX ADMIN — INSULIN LISPRO PRN UNIT: 100 INJECTION, SOLUTION INTRAVENOUS; SUBCUTANEOUS at 09:49

## 2018-12-01 RX ADMIN — BUSPIRONE HYDROCHLORIDE SCH MG: 10 TABLET ORAL at 18:30

## 2018-12-01 RX ADMIN — AMLODIPINE BESYLATE SCH MG: 10 TABLET ORAL at 09:48

## 2018-12-01 RX ADMIN — HYDRALAZINE HYDROCHLORIDE SCH MG: 25 TABLET, FILM COATED ORAL at 14:21

## 2018-12-01 RX ADMIN — TAMSULOSIN HYDROCHLORIDE SCH MG: 0.4 CAPSULE ORAL at 22:09

## 2018-12-01 RX ADMIN — INSULIN LISPRO PRN UNIT: 100 INJECTION, SOLUTION INTRAVENOUS; SUBCUTANEOUS at 12:12

## 2018-12-01 RX ADMIN — HEPARIN SODIUM SCH UNIT: 5000 INJECTION, SOLUTION INTRAVENOUS; SUBCUTANEOUS at 14:21

## 2018-12-01 RX ADMIN — APIXABAN SCH MG: 2.5 TABLET, FILM COATED ORAL at 15:39

## 2018-12-01 RX ADMIN — DIVALPROEX SODIUM SCH MG: 250 TABLET, DELAYED RELEASE ORAL at 18:30

## 2018-12-01 RX ADMIN — DIVALPROEX SODIUM SCH MG: 250 TABLET, DELAYED RELEASE ORAL at 09:49

## 2018-12-01 RX ADMIN — MEGESTROL ACETATE SCH MG: 40 SUSPENSION ORAL at 12:12

## 2018-12-01 RX ADMIN — HEPARIN SODIUM SCH UNIT: 5000 INJECTION, SOLUTION INTRAVENOUS; SUBCUTANEOUS at 22:08

## 2018-12-01 RX ADMIN — HYDRALAZINE HYDROCHLORIDE SCH MG: 25 TABLET, FILM COATED ORAL at 22:09

## 2018-12-01 RX ADMIN — HEPARIN SODIUM SCH: 5000 INJECTION, SOLUTION INTRAVENOUS; SUBCUTANEOUS at 05:10

## 2018-12-01 RX ADMIN — PEDIATRIC MULTIPLE VITAMINS W/ IRON CHEW TAB 18 MG SCH TAB: 18 CHEW TAB at 09:48

## 2018-12-01 RX ADMIN — INSULIN LISPRO PRN UNIT: 100 INJECTION, SOLUTION INTRAVENOUS; SUBCUTANEOUS at 19:32

## 2018-12-01 RX ADMIN — BUSPIRONE HYDROCHLORIDE SCH MG: 10 TABLET ORAL at 09:48

## 2018-12-01 RX ADMIN — APIXABAN SCH: 2.5 TABLET, FILM COATED ORAL at 18:29

## 2018-12-01 NOTE — PDOC PROGRESS REPORT
Subjective


Progress Note for:: 12/01/18


Subjective:: 


Uriel Tucker is a 72-year-old male who was admitted with decreased mental status 

and confusion.  He has gradually been improving over an extended hospital 

course.  He has stage IV prostate cancer and is planning on getting appropriate 

treatment once he can be discharged from our institution.  He states that he is 

feeling much better today and to a brief mental status exam he is alert and 

oriented with fair short-term and good long-term memory.  He is capable of 

higher level mental function and is very specifically conversant as he 

discusses the problems that he has had recently.  He would like to be seen by 

speech therapy, physical therapy and occupational therapy.  He would also like 

to have his Alcaraz catheter discontinued as soon as possible.  He is interested 

in starting on Megace to help improve his appetite as recommended by Dr. Rizvi.


Reason For Visit: 


ALTERED MENTAL STATUS,METABOLIC ENCEPHALOPATHY








Physical Exam


Vital Signs: 


 











Temp Pulse Resp BP Pulse Ox


 


 98.4 F   80   20   147/78 H  100 


 


 12/01/18 08:11  12/01/18 14:00  12/01/18 08:11  12/01/18 08:11  12/01/18 08:11








 Intake & Output











 11/29/18 11/30/18 12/01/18





 23:59 23:59 23:59


 


Intake Total 1098 1018 666


 


Output Total 600 850 400


 


Balance 498 168 266


 


Weight 76.2 kg 91.4 kg 90.3 kg











General appearance: PRESENT: no acute distress, cooperative


Head exam: PRESENT: atraumatic, normocephalic


Eye exam: PRESENT: conjunctiva pink, EOMI


Ear exam: PRESENT: normal external ear exam.  ABSENT: bleeding


Mouth exam: PRESENT: neck supple, other - Oral mucosa is moist and intact, 

there is mild depression of the left nasolabial fold.


Neck exam: ABSENT: JVD, tracheal deviation


Respiratory exam: PRESENT: clear to auscultation venessa, symmetrical, unlabored


Cardiovascular exam: PRESENT: RRR, systolic murmur, other - Frequent irregular 

beats.  ABSENT: clicks, gallop, rubs


Vascular exam: PRESENT: normal capillary refill.  ABSENT: pallor


GI/Abdominal exam: PRESENT: normal bowel sounds, soft


Rectal exam: PRESENT: deferred


Extremities exam: ABSENT: joint swelling, pedal edema


Musculoskeletal exam: ABSENT: deformity, dislocation


Neurological exam: PRESENT: alert, oriented to person, oriented to place, 

oriented to time, oriented to situation, motor sensory deficit - There is a 

mild left hemiparesis noted


Psychiatric exam: PRESENT: appropriate affect, normal mood


Skin exam: ABSENT: jaundice, rash, urticaria





Results


Laboratory Results: 


 





 12/01/18 04:46 





 12/01/18 04:46 





 











  12/01/18 12/01/18





  04:46 04:46


 


WBC  11.1 H 


 


RBC  3.59 L 


 


Hgb  9.1 L 


 


Hct  28.0 L 


 


MCV  78 L 


 


MCH  25.4 L 


 


MCHC  32.6 


 


RDW  18.5 H 


 


Plt Count  155 


 


Seg Neutrophils %  NP 


 


Lymphocytes %  NP 


 


Monocytes %  NP 


 


Eosinophils %  NP 


 


Basophils %  NP 


 


Absolute Neutrophils  NP 


 


Absolute Lymphocytes  NP 


 


Absolute Monocytes  NP 


 


Absolute Eosinophils  NP 


 


Absolute Basophils  NP 


 


Sodium   146.8 H


 


Potassium   3.3 L


 


Chloride   113 H


 


Carbon Dioxide   23


 


Anion Gap   11


 


BUN   20


 


Creatinine   1.10


 


Est GFR ( Amer)   > 60


 


Est GFR (Non-Af Amer)   > 60


 


Glucose   159 H


 


Calcium   7.6 L


 


Total Bilirubin   0.5


 


AST   135 H


 


ALT   60


 


Alkaline Phosphatase   513 H


 


Total Protein   5.4 L


 


Albumin   2.6 L








 











  11/22/18 11/22/18 11/23/18





  03:54 03:54 16:05


 


Creatine Kinase  617 H   367 H


 


NT-Pro-B Natriuret Pep   9380 H 














  11/24/18 11/24/18 11/25/18





  08:45 08:45 03:53


 


Creatine Kinase  291 H  


 


NT-Pro-B Natriuret Pep   8430 H  9560 H











Impressions: 


 





Abdomen/Pelvis CT  11/21/18 00:00


IMPRESSION:  Fluid overload with bilateral moderate pleural effusions and small 

amount of ascites.


Bibasilar consolidation atelectasis versus pneumonia


 








Head CT  11/21/18 10:00


IMPRESSION:  No acute intracranial pathology.  No noncontrast CT findings to 

explain symptoms.  MRI may be used to more sensitively evaluate for acute 

diffusion restricting infarction if suspected.


EVIDENCE OF ACUTE STROKE: NO.


 








Head MRI  11/22/18 14:54


IMPRESSION:  Mild involutional changes of aging with no acute intracranial 

imaging findings on this slightly limited study.


EVIDENCE OF ACUTE STROKE: NO.


 








Lumbar Puncture  11/23/18 10:40


IMPRESSION:  Lumbar puncture under fluoroscopy. No immediate complication.


 








Thoracentesis Ultrasound  11/24/18 10:19


IMPRESSION:  SUCCESSFUL THORACENTESIS USING ULTRASOUND GUIDANCE.


 








Chest X-Ray  11/24/18 18:00


IMPRESSION:  Stable pulmonary exam demonstrating small bilateral pleural 

effusions.


 








Guidance Fluoroscopy  11/27/18 00:00


IMPRESSION:  SUCCESSFUL PLACEMENT OF A 5 FR DUAL LUMEN 42 CM PICC IN THE LEFT 

BASILIC VEIN.


 








Interventional Vascular Procedure  11/27/18 00:00


IMPRESSION:  SUCCESSFUL PLACEMENT OF A 5 FR DUAL LUMEN 42 CM PICC IN THE LEFT 

BASILIC VEIN.


 








PICC Line Insertion  11/27/18 00:00


IMPRESSION:  SUCCESSFUL PLACEMENT OF A 5 FR DUAL LUMEN 42 CM PICC IN THE LEFT 

BASILIC VEIN.


 














Assessment & Plan





- Diagnosis


(1) Altered mental status


Qualifiers: 


   Altered mental status type: delirium   Qualified Code(s): R41.0 - 

Disorientation, unspecified   


Is this a current diagnosis for this admission?: Yes   


Plan: 


Patient's mental status is improving with current treatment.  He seems to be at 

a fairly stable point that he feels is reflective of his baseline.  I will try 

to avoid utilization of any drugs that may cause degradation in his current 

mental status.








(2) New onset atrial fibrillation


Is this a current diagnosis for this admission?: Yes   


Plan: 


Patient's atrial fibrillation is being treated by Dr. Suarez and is 

currently quite stable.








(3) Prostate cancer


Is this a current diagnosis for this admission?: Yes   


Plan: 


Patient's prostate cancer will be managed by Dr. Rizvi.  He will be started 

on Megace per Dr. Rizvi's preference.








(4) Hypokalemia


Is this a current diagnosis for this admission?: Yes   


Plan: 


12/1/2018:


Potassium was noted to be 3.3 today and it will be corrected with oral 

supplementation.








- Time


Time Spent with patient: 15-24 minutes


Medications reviewed and adjusted accordingly: Yes

## 2018-12-01 NOTE — PROGRESS NOTE
Provider Note


Provider Note: 


CARDIOLOGY PROGRESS NOTES by Dr. Maryann Suarez on 12/1/2018.





SUBJECTIVE: The patient's confusion has resolved totally.  He is now able to 

give  history.  He denies any chest pain or discomfort, there is no PND 

orthopnea, there is no shortness of breath.  There is no TIA or CVA symptoms.  

Denies chest pain or discomfort.  He had a short few beats of nonsustained 

ventricular tachycardia yesterday and had no symptoms and there was no 

hemodynamic compromise.  There is no recurrence of the nonsustained.  There is 

no leg edema.  No syncopal episodes noted.  Ventricular tachycardia.  He moves 

all 4 extremities.





PHYSICAL EXAMINATION: The patient is a frail build, and appears to be 

chronically ill.  But in spite of this he does not appear to be in any acute 

distress.


 Selected Entries











  12/01/18





  11:53


 


Temperature 97.4 F


 


Temperature Oral





Source 


 


Pulse Rate 81


 


Respiratory 18





Rate 


 


Blood Pressure 128/47 H


 


Blood Pressure 74





Mean 


 


BP Location Right Arm


 


BP Position Supine


 


O2 Sat by Pulse 100





Oximetry 


 


Oxygen Flow 2.00





Rate 


 


Oxygen Delivery Nasal Cannula





Method 





 


HEAD: Atraumatic normocephalic.  EYES: Pupils are equal round regular react to 

light accommodation.  ENT: Is negative.  NECK: Is supple.  There is no JVD.  

Carotids equal there is no bruit.  There is no lymphadenopathy.  There is no 

goiter.  There is no accessory muscles of respiration in use.  Trachea central.

  LUNGS: Show diminished air entry in the bases with some area of tenderness 

which covers a small area of the bases bilaterally.  The rest of the lungs are 

clear.  There is no rhonchi rales or wheezing.  HEART: S1-S2 is heard there is 

no S3 gallop there is no S4 gallop S1 is of variable intensity..  There is 

murmur of mitral regurgitation and tricuspid regurgitation present.  There is 

no rub.  ABDOMEN: Soft.  Nontender there is no hepatosplenomegaly bowel sounds 

are well heard.  EXTREMITIES: Femorals are diminished.  There is no femoral 

bruits.  Leg pulses are diminished.  There is no pedal edema.  There is no DVT 

esophagitis.  There is no calf tenderness.  CNS: The patient's slightly drowsy 

but moves all 4 extremities.  PSYCHIATRIC exam the patient does not appear to 

be agitated.  He appears to be calm in-depth psychiatric examination not done.








  12/01/18 12/01/18





  04:46 04:46


 


WBC  11.1 H 


 


Hgb  9.1 L 


 


Hct  28.0 L 


 


Plt Count  155 


 


Sodium   146.8 H


 


Potassium   3.3 L


 


Chloride   113 H


 


Carbon Dioxide   23


 


BUN   20


 


Creatinine   1.10


 


Est GFR (Non-Af Amer)   > 60


 


Glucose   159 H


 


Calcium   7.6 L


 


Total Bilirubin   0.5


 


Direct Bilirubin   0.4


 


Neonat Total Bilirubin   Not Reportable


 


Neonat Direct Bilirubin   Not Reportable


 


Neonat Indirect Bili   Not Reportable


 


AST   135 H


 


ALT   60


 


Alkaline Phosphatase   513 H

















IMPRESSION/RECOMMENDATION: 





1.Atrial fibrillation with slow ventricular response.  At present there is no 

need for AV jenifer blocking agents.  Note that the patient due to his frail build

, and malnourished state, and with ongoing problems with confusion is not a 

candidate for chronic anticoagulation therapy.


2.  ENCEPHALOPATHY: Seems to be slightly improving.


3.  ELECTROLYTE IMBALANCE.  Note that the patient's sodium is coming down, last 

known value was 148.7.


4.  Dehydration.


5.  Chronic kidney disease.  Seems to be improving.  His last GFR was greater 

than 60.


6.  Severe mitral regurgitation: Hydralazine has been started.  Will increase 

as tolerated by the patient.  Continue amlodipine.


7.  Severe pulmonary hypertension: Recommend continuing amlodipine and 

hydralazine has been increased.  May add sildenafil later on.


8.  Moderate to Severe tricuspid Regurgitation: Continue current medications.


9.  Prostate cancer with bony metastasis.


MEDICATIONS reviewed.  Note hydralazine was increased by me today.  Discussed 

with attending physician, the management plan.  40 minutes spent on this 

patient more than 50% of time spent in direct patient care.  Medical decision 

making continues to be of high complexity.  Data discussed with the patient's 

daughter about the starting of Eliquis, and the indications and the benefits 

and side effects including bleeding complications were discussed.











































































































IMPRESSION/RECOMMENDATION: 





1.Atrial fibrillation with slow ventricular response.  At present there is no 

need for AV jenifer blocking agents.  Note that the patient's confusion has 

completely resolved.  Also discussed with Dr. JOEL stearns.  In view of the patient's 

cancer, and the patient atrial fibrillation, he benefits from chronic 

anticoagulation, with the benefits being more than the risks of chronic 

anticoagulation.  Later we will speak to the patient's daughter.


2.  ENCEPHALOPATHY: Resolved


3.  ELECTROLYTE IMBALANCE.  Note that the patient's sodium is coming down, last 

known value was 148.7.


4.  Dehydration.  Patient has been rehydrated.


5.  Chronic kidney disease.  Seems to be improving.  His last GFR was greater 

than 60.  His decreased GFR most likely secondary to dehydration.  At present 

renal function is normal


6.  Severe mitral regurgitation: Hydralazine has been started.  Will increase 

as tolerated by the patient.  Continue amlodipine.


7.  Severe pulmonary hypertension: Recommend continuing amlodipine and 

hydralazine has been increased.  May add sildenafil later on.


8.  Moderate to Severe tricuspid Regurgitation: Continue current medications.


9.  Prostate cancer with bony metastasis.

## 2018-12-01 NOTE — PDOC PROGRESS REPORT
Subjective


Progress Note for:: 12/01/18


Subjective:: 


Yesterday patient was able to sit in a chair and with assistance walked a few 

feet, feels like his legs feel "heavy", but he does have his Alcaraz catheter 

strapped with the bag to his right leg, he seems mentally aware and is having 

appropriate use of the bedpan, so we discussed getting his Alcaraz out.  

Yesterday only ate a few bites of food, so we discussed the use of Megace today.





Reason For Visit: 


ALTERED MENTAL STATUS,METABOLIC ENCEPHALOPATHY








Physical Exam


Vital Signs: 


 











Temp Pulse Resp BP Pulse Ox


 


 98.4 F   82   20   147/78 H  100 


 


 12/01/18 08:11  12/01/18 08:11  12/01/18 08:11  12/01/18 08:11  12/01/18 08:11








 Intake & Output











 11/30/18 12/01/18 12/02/18





 06:59 06:59 06:59


 


Intake Total 1178 1584 


 


Output Total 800 750 


 


Balance 378 834 


 


Weight 91.4 kg 90.3 kg 











General appearance: PRESENT: no acute distress, well-developed, well-nourished


Head exam: PRESENT: atraumatic, normocephalic


Eye exam: PRESENT: conjunctiva pink, EOMI, PERRLA.  ABSENT: scleral icterus


Ear exam: PRESENT: normal external ear exam


Mouth exam: PRESENT: moist, tongue midline


Neck exam: ABSENT: carotid bruit, JVD, lymphadenopathy, thyromegaly


Respiratory exam: PRESENT: clear to auscultation venessa.  ABSENT: rales, rhonchi, 

wheezes


Cardiovascular exam: PRESENT: RRR.  ABSENT: diastolic murmur, rubs, systolic 

murmur


Pulses: PRESENT: normal dorsalis pedis pul


Vascular exam: PRESENT: normal capillary refill


GI/Abdominal exam: PRESENT: normal bowel sounds, soft.  ABSENT: distended, 

guarding, mass, organolmegaly, rebound, tenderness


Rectal exam: PRESENT: deferred


Extremities exam: PRESENT: full ROM.  ABSENT: calf tenderness, clubbing, pedal 

edema


Neurological exam: PRESENT: alert, awake, oriented to person, oriented to place

, oriented to time, oriented to situation, CN II-XII grossly intact.  ABSENT: 

motor sensory deficit


Psychiatric exam: PRESENT: appropriate affect, normal mood.  ABSENT: homicidal 

ideation, suicidal ideation


Skin exam: PRESENT: dry, intact, warm.  ABSENT: cyanosis, rash





Results


Laboratory Results: 


 





 12/01/18 04:46 





 12/01/18 04:46 





 











  12/01/18 12/01/18





  04:46 04:46


 


WBC  11.1 H 


 


RBC  3.59 L 


 


Hgb  9.1 L 


 


Hct  28.0 L 


 


MCV  78 L 


 


MCH  25.4 L 


 


MCHC  32.6 


 


RDW  18.5 H 


 


Plt Count  155 


 


Seg Neutrophils %  NP 


 


Lymphocytes %  NP 


 


Monocytes %  NP 


 


Eosinophils %  NP 


 


Basophils %  NP 


 


Absolute Neutrophils  NP 


 


Absolute Lymphocytes  NP 


 


Absolute Monocytes  NP 


 


Absolute Eosinophils  NP 


 


Absolute Basophils  NP 


 


Sodium   146.8 H


 


Potassium   3.3 L


 


Chloride   113 H


 


Carbon Dioxide   23


 


Anion Gap   11


 


BUN   20


 


Creatinine   1.10


 


Est GFR ( Amer)   > 60


 


Est GFR (Non-Af Amer)   > 60


 


Glucose   159 H


 


Calcium   7.6 L


 


Total Bilirubin   0.5


 


AST   135 H


 


ALT   60


 


Alkaline Phosphatase   513 H


 


Total Protein   5.4 L


 


Albumin   2.6 L








 











  11/22/18 11/22/18 11/23/18





  03:54 03:54 16:05


 


Creatine Kinase  617 H   367 H


 


NT-Pro-B Natriuret Pep   9380 H 














  11/24/18 11/24/18 11/25/18





  08:45 08:45 03:53


 


Creatine Kinase  291 H  


 


NT-Pro-B Natriuret Pep   8430 H  9560 H











Impressions: 


 





Abdomen/Pelvis CT  11/21/18 00:00


IMPRESSION:  Fluid overload with bilateral moderate pleural effusions and small 

amount of ascites.


Bibasilar consolidation atelectasis versus pneumonia


 








Head CT  11/21/18 10:00


IMPRESSION:  No acute intracranial pathology.  No noncontrast CT findings to 

explain symptoms.  MRI may be used to more sensitively evaluate for acute 

diffusion restricting infarction if suspected.


EVIDENCE OF ACUTE STROKE: NO.


 








Head MRI  11/22/18 14:54


IMPRESSION:  Mild involutional changes of aging with no acute intracranial 

imaging findings on this slightly limited study.


EVIDENCE OF ACUTE STROKE: NO.


 








Lumbar Puncture  11/23/18 10:40


IMPRESSION:  Lumbar puncture under fluoroscopy. No immediate complication.


 








Thoracentesis Ultrasound  11/24/18 10:19


IMPRESSION:  SUCCESSFUL THORACENTESIS USING ULTRASOUND GUIDANCE.


 








Chest X-Ray  11/24/18 18:00


IMPRESSION:  Stable pulmonary exam demonstrating small bilateral pleural 

effusions.


 








Guidance Fluoroscopy  11/27/18 00:00


IMPRESSION:  SUCCESSFUL PLACEMENT OF A 5 FR DUAL LUMEN 42 CM PICC IN THE LEFT 

BASILIC VEIN.


 








Interventional Vascular Procedure  11/27/18 00:00


IMPRESSION:  SUCCESSFUL PLACEMENT OF A 5 FR DUAL LUMEN 42 CM PICC IN THE LEFT 

BASILIC VEIN.


 








PICC Line Insertion  11/27/18 00:00


IMPRESSION:  SUCCESSFUL PLACEMENT OF A 5 FR DUAL LUMEN 42 CM PICC IN THE LEFT 

BASILIC VEIN.


 














Assessment & Plan





- Diagnosis


(1) Altered mental status


Qualifiers: 


   Altered mental status type: delirium   Qualified Code(s): R41.0 - 

Disorientation, unspecified   


Is this a current diagnosis for this admission?: Yes   


Plan: 


Improving, hopefully will continue to improve.








(2) Difficulty swallowing liquids


Is this a current diagnosis for this admission?: Yes   


Plan: 


Proved, secondary to dementia








(3) Prostate cancer


Is this a current diagnosis for this admission?: Yes   


Plan: 


For further treatment as an outpatient








(4) Malnutrition


Qualifiers: 


   Malnutrition type: protein-calorie malnutrition   Protein-calorie 

malnutrition severity: moderate   Qualified Code(s): E44.0 - Moderate protein-

calorie malnutrition   


Is this a current diagnosis for this admission?: Yes   


Plan: 


Secondary to dementia, and poor p.o. intake, need to continue to encourage 

patient to eat, use of boost supplement, will add Megace for appetite 

stimulation.  Today I had a long discussion with the patient, and we discussed 

his case with family over the last 24 hours, spent 45 minutes in discussion and 

coordination of care








- Time


Time Spent with patient: 35 or more minutes





- Inpatient Certification


Based on my medical assessment, after consideration of the patient's 

comorbidities, presenting symptoms, or acuity I expect that the services needed 

warrant INPATIENT care.: Yes


I certify that my determination is in accordance with my understanding of 

Medicare's requirements for reasonable and necessary INPATIENT services [42 CFR 

412.3e].: Yes


Medical Necessity: Need for Neurological Checks, Risk of Complication if Not 

Cared For in Hospital

## 2018-12-02 LAB
ALBUMIN SERPL-MCNC: 2.6 G/DL (ref 3.5–5)
ALP SERPL-CCNC: 541 U/L (ref 38–126)
ALT SERPL-CCNC: 51 U/L (ref 21–72)
ANION GAP SERPL CALC-SCNC: 11 MMOL/L (ref 5–19)
AST SERPL-CCNC: 213 U/L (ref 17–59)
BILIRUB DIRECT SERPL-MCNC: 0.5 MG/DL (ref 0–0.4)
BILIRUB SERPL-MCNC: 0.7 MG/DL (ref 0.2–1.3)
BUN SERPL-MCNC: 25 MG/DL (ref 7–20)
CALCIUM: 7.6 MG/DL (ref 8.4–10.2)
CHLORIDE SERPL-SCNC: 111 MMOL/L (ref 98–107)
CO2 SERPL-SCNC: 21 MMOL/L (ref 22–30)
GLUCOSE SERPL-MCNC: 193 MG/DL (ref 75–110)
POTASSIUM SERPL-SCNC: 4 MMOL/L (ref 3.6–5)
PROT SERPL-MCNC: 5.5 G/DL (ref 6.3–8.2)
SODIUM SERPL-SCNC: 143.2 MMOL/L (ref 137–145)

## 2018-12-02 RX ADMIN — AMLODIPINE BESYLATE SCH MG: 10 TABLET ORAL at 09:03

## 2018-12-02 RX ADMIN — HYDRALAZINE HYDROCHLORIDE SCH MG: 25 TABLET, FILM COATED ORAL at 14:03

## 2018-12-02 RX ADMIN — PEDIATRIC MULTIPLE VITAMINS W/ IRON CHEW TAB 18 MG SCH TAB: 18 CHEW TAB at 09:03

## 2018-12-02 RX ADMIN — HYDRALAZINE HYDROCHLORIDE SCH MG: 25 TABLET, FILM COATED ORAL at 22:18

## 2018-12-02 RX ADMIN — INSULIN LISPRO PRN UNIT: 100 INJECTION, SOLUTION INTRAVENOUS; SUBCUTANEOUS at 09:04

## 2018-12-02 RX ADMIN — BUSPIRONE HYDROCHLORIDE SCH MG: 10 TABLET ORAL at 17:44

## 2018-12-02 RX ADMIN — MEGESTROL ACETATE SCH MG: 40 SUSPENSION ORAL at 09:04

## 2018-12-02 RX ADMIN — BUSPIRONE HYDROCHLORIDE SCH: 10 TABLET ORAL at 18:47

## 2018-12-02 RX ADMIN — DIVALPROEX SODIUM SCH: 250 TABLET, DELAYED RELEASE ORAL at 18:48

## 2018-12-02 RX ADMIN — APIXABAN SCH: 2.5 TABLET, FILM COATED ORAL at 18:48

## 2018-12-02 RX ADMIN — TAMSULOSIN HYDROCHLORIDE SCH MG: 0.4 CAPSULE ORAL at 22:18

## 2018-12-02 RX ADMIN — APIXABAN SCH MG: 2.5 TABLET, FILM COATED ORAL at 17:44

## 2018-12-02 RX ADMIN — INSULIN LISPRO PRN UNIT: 100 INJECTION, SOLUTION INTRAVENOUS; SUBCUTANEOUS at 12:52

## 2018-12-02 RX ADMIN — INSULIN LISPRO PRN UNIT: 100 INJECTION, SOLUTION INTRAVENOUS; SUBCUTANEOUS at 17:43

## 2018-12-02 RX ADMIN — BUSPIRONE HYDROCHLORIDE SCH MG: 10 TABLET ORAL at 09:03

## 2018-12-02 RX ADMIN — HYDRALAZINE HYDROCHLORIDE SCH MG: 25 TABLET, FILM COATED ORAL at 05:24

## 2018-12-02 RX ADMIN — DIVALPROEX SODIUM SCH MG: 250 TABLET, DELAYED RELEASE ORAL at 17:44

## 2018-12-02 RX ADMIN — HEPARIN SODIUM SCH UNIT: 5000 INJECTION, SOLUTION INTRAVENOUS; SUBCUTANEOUS at 05:23

## 2018-12-02 RX ADMIN — APIXABAN SCH MG: 2.5 TABLET, FILM COATED ORAL at 09:03

## 2018-12-02 RX ADMIN — ASPIRIN SCH MG: 81 TABLET, CHEWABLE ORAL at 09:03

## 2018-12-02 RX ADMIN — DIVALPROEX SODIUM SCH MG: 250 TABLET, DELAYED RELEASE ORAL at 09:02

## 2018-12-02 NOTE — PROGRESS NOTE
Provider Note


Provider Note: 





 


CARDIOLOGY PROGRESS NOTES by Dr. Maryann Suarez on 12/2/2018.





SUBJECTIVE: The patient appears to be awake alert and oriented x3 at present..  

He denies chest pain or discomfort.  He denies any shortness of breath there is 

no recurrence of the nonsustainedVentricular tachycardia.  There is no leg 

edema.  No syncopal episodes noted.  . He is awake alert oriented x3 and he 

moves all 4 extremities.  No bleeding on Eliquis.  There is no leg edema.





PHYSICAL EXAMINATION: The patient is a frail build, and appears to be 

chronically ill.  But in spite of this he does not appear to be in any acute 

distress.





 Selected Entries











  12/02/18 12/02/18





  10:00 11:20


 


Temperature  98.1 F


 


Temperature  Oral





Source  


 


Pulse Rate  95


 


Respiratory  16





Rate  


 


Blood Pressure  116/40 L


 


Blood Pressure  65





Mean  


 


BP Location  Left Arm


 


BP Position  Sitting


 


O2 Sat by Pulse  100





Oximetry  


 


Oxygen Flow 2 





Rate  


 


Oxygen Delivery  Nasal Cannula





Method  





HEAD: Atraumatic normocephalic.  EYES: Pupils are equal round regular react to 

light accommodation.  ENT: Is negative.  NECK: Is supple.  There is no JVD.  

Carotids equal there is no bruit.  There is no lymphadenopathy.  There is no 

goiter.  There is no accessory muscles of respiration in use.  Trachea central.

  LUNGS: Show diminished air entry in the bases with some area of tenderness 

which covers a small area of the bases bilaterally.  The rest of the lungs are 

clear.  There is no rhonchi rales or wheezing.  HEART: S1-S2 is heard there is 

no S3 gallop there is no S4 gallop S1 is of variable intensity..  There is 

murmur of mitral regurgitation and tricuspid regurgitation present.  There is 

no rub.  ABDOMEN: Soft.  Nontender there is no hepatosplenomegaly bowel sounds 

are well heard.  EXTREMITIES: Femorals are diminished.  There is no femoral 

bruits.  Leg pulses are diminished.  There is no pedal edema.  There is no DVT 

esophagitis.  There is no calf tenderness.  CNS: The patient's slightly drowsy 

but moves all 4 extremities.  PSYCHIATRIC exam the patient does not appear to 

be agitated.  His judgment and insight today seem to be intact.








IMPRESSION/RECOMMENDATION: 





1.Atrial fibrillation with slow ventricular response.  At present there is no 

need for AV jenifer blocking agents.  Note that the patient due to his frail build

, and malnourished state, and with ongoing problems with confusion is not a 

candidate for chronic anticoagulation therapy.


2.  ENCEPHALOPATHY: 12.


3.  ELECTROLYTE IMBALANCE.  Note that the patient's sodium is coming down, last 

known value was 148.7.


4.  Dehydration.


5.  Chronic kidney disease.  Seems to be improving.  His last GFR was greater 

than 60.


6.  Severe mitral regurgitation: Hydralazine has been started.  Will increase 

as tolerated by the patient.  Continue amlodipine.


7.  Severe pulmonary hypertension: Recommend continuing amlodipine and 

hydralazine has been increased.  May add sildenafil later on.


8.  Moderate to Severe tricuspid Regurgitation: Continue current medications.


9.  Prostate cancer with bony metastasis.





Medications reviewed.  Plan of care discussed with the change patient in care 

of the patient.  40 minutes spent on this patient, with more than 50% of time 

spent in direct patient care.  Medical decision making is of high complexity.  

Will follow with you.

## 2018-12-02 NOTE — PDOC PROGRESS REPORT
Subjective


Progress Note for:: 12/02/18


Subjective:: 


Uriel Tucker is a 72-year-old male who was admitted with decreased mental status 

and confusion.  He has gradually been improving over an extended hospital 

course.  He has stage IV prostate cancer and is planning on getting appropriate 

treatment once he can be discharged from our institution.  He states that he is 

feeling much better today and to a brief mental status exam he is alert and 

oriented with fair short-term and good long-term memory.  He is capable of 

higher level mental function and is very specifically conversant as he 

discusses the problems that he has had recently.  He would like to be seen by 

speech therapy, physical therapy and occupational therapy.  He would also like 

to have his Alcaraz catheter discontinued as soon as possible.  He is interested 

in starting on Megace to help improve his appetite as recommended by Dr. Rizvi.





12/2/2018:


Mr. Tucker continues to improve on a daily basis.  He is up in a chair today 

and feeling very alert and oriented.  He has been eating a little better and 

drinking more fluids.  He has been able to void and denies constipation.  He is 

looking forward to Dr. Guadalupe his return tomorrow.


Reason For Visit: 


ALTERED MENTAL STATUS,METABOLIC ENCEPHALOPATHY








Physical Exam


Vital Signs: 


 











Temp Pulse Resp BP Pulse Ox


 


 98.1 F   87   16   116/40 L  100 


 


 12/02/18 11:20  12/02/18 14:00  12/02/18 11:20  12/02/18 11:20  12/02/18 11:20








 Intake & Output











 11/30/18 12/01/18 12/02/18





 23:59 23:59 23:59


 


Intake Total 1018 1348 655


 


Output Total 850 900 200


 


Balance 168 448 455


 


Weight 91.4 kg 90.3 kg 90.3 kg











General appearance: PRESENT: no acute distress, cooperative


Head exam: PRESENT: atraumatic, normocephalic


Eye exam: PRESENT: conjunctiva pale, EOMI


Ear exam: PRESENT: normal external ear exam.  ABSENT: drainage


Mouth exam: PRESENT: neck supple, other - Oral mucosa is moist and intact


Neck exam: ABSENT: JVD, tracheal deviation


Respiratory exam: PRESENT: clear to auscultation venessa, symmetrical, unlabored


Cardiovascular exam: PRESENT: irregular rhythm - Irregularly irregular rate and 

rhythm, systolic murmur - Mitral insufficiency.  ABSENT: clicks, gallop, rubs


Vascular exam: PRESENT: normal capillary refill.  ABSENT: pallor


GI/Abdominal exam: PRESENT: normal bowel sounds, soft


Rectal exam: PRESENT: deferred


Extremities exam: ABSENT: joint swelling, pedal edema


Musculoskeletal exam: ABSENT: deformity, dislocation


Neurological exam: PRESENT: alert, oriented to person, oriented to place, 

oriented to time


Psychiatric exam: PRESENT: appropriate affect, normal mood


Skin exam: PRESENT: dry, intact, warm





Results


Laboratory Results: 


 





 12/01/18 04:46 





 12/01/18 04:46 





 











  11/22/18 11/22/18 11/23/18





  03:54 03:54 16:05


 


Creatine Kinase  617 H   367 H


 


NT-Pro-B Natriuret Pep   9380 H 














  11/24/18 11/24/18 11/25/18





  08:45 08:45 03:53


 


Creatine Kinase  291 H  


 


NT-Pro-B Natriuret Pep   8430 H  9560 H











Impressions: 


 





Abdomen/Pelvis CT  11/21/18 00:00


IMPRESSION:  Fluid overload with bilateral moderate pleural effusions and small 

amount of ascites.


Bibasilar consolidation atelectasis versus pneumonia


 








Head CT  11/21/18 10:00


IMPRESSION:  No acute intracranial pathology.  No noncontrast CT findings to 

explain symptoms.  MRI may be used to more sensitively evaluate for acute 

diffusion restricting infarction if suspected.


EVIDENCE OF ACUTE STROKE: NO.


 








Head MRI  11/22/18 14:54


IMPRESSION:  Mild involutional changes of aging with no acute intracranial 

imaging findings on this slightly limited study.


EVIDENCE OF ACUTE STROKE: NO.


 








Lumbar Puncture  11/23/18 10:40


IMPRESSION:  Lumbar puncture under fluoroscopy. No immediate complication.


 








Thoracentesis Ultrasound  11/24/18 10:19


IMPRESSION:  SUCCESSFUL THORACENTESIS USING ULTRASOUND GUIDANCE.


 








Chest X-Ray  11/24/18 18:00


IMPRESSION:  Stable pulmonary exam demonstrating small bilateral pleural 

effusions.


 








Guidance Fluoroscopy  11/27/18 00:00


IMPRESSION:  SUCCESSFUL PLACEMENT OF A 5 FR DUAL LUMEN 42 CM PICC IN THE LEFT 

BASILIC VEIN.


 








Interventional Vascular Procedure  11/27/18 00:00


IMPRESSION:  SUCCESSFUL PLACEMENT OF A 5 FR DUAL LUMEN 42 CM PICC IN THE LEFT 

BASILIC VEIN.


 








PICC Line Insertion  11/27/18 00:00


IMPRESSION:  SUCCESSFUL PLACEMENT OF A 5 FR DUAL LUMEN 42 CM PICC IN THE LEFT 

BASILIC VEIN.


 














Assessment & Plan





- Diagnosis


(1) Altered mental status


Qualifiers: 


   Altered mental status type: delirium   Qualified Code(s): R41.0 - 

Disorientation, unspecified   


Is this a current diagnosis for this admission?: Yes   


Plan: 


Patient's mental status is improving with current treatment.  He seems to be at 

a fairly stable point that he feels is reflective of his baseline.  I will try 

to avoid utilization of any drugs that may cause degradation in his current 

mental status.








(2) New onset atrial fibrillation


Is this a current diagnosis for this admission?: Yes   


Plan: 


Patient's atrial fibrillation is being treated by Dr. Suarez and is 

currently quite stable.








(3) Prostate cancer


Is this a current diagnosis for this admission?: Yes   


Plan: 


Patient's prostate cancer will be managed by Dr. Rizvi.  He will be started 

on Megace per Dr. Rizvi's preference.








(4) Hypokalemia


Is this a current diagnosis for this admission?: Yes   


Plan: 


12/1/2018:


Potassium was noted to be 3.3 today and it will be corrected with oral 

supplementation.





12/2/2018:


We will recheck metabolic profile in a.m.








- Time


Time Spent with patient: 15-24 minutes


Medications reviewed and adjusted accordingly: No

## 2018-12-03 RX ADMIN — AMLODIPINE BESYLATE SCH MG: 10 TABLET ORAL at 10:04

## 2018-12-03 RX ADMIN — PEDIATRIC MULTIPLE VITAMINS W/ IRON CHEW TAB 18 MG SCH TAB: 18 CHEW TAB at 10:03

## 2018-12-03 RX ADMIN — VALPROIC ACID SCH MG: 250 SOLUTION ORAL at 22:07

## 2018-12-03 RX ADMIN — TAMSULOSIN HYDROCHLORIDE SCH MG: 0.4 CAPSULE ORAL at 22:07

## 2018-12-03 RX ADMIN — DIVALPROEX SODIUM SCH MG: 250 TABLET, DELAYED RELEASE ORAL at 10:04

## 2018-12-03 RX ADMIN — INSULIN LISPRO PRN UNIT: 100 INJECTION, SOLUTION INTRAVENOUS; SUBCUTANEOUS at 13:03

## 2018-12-03 RX ADMIN — HYDRALAZINE HYDROCHLORIDE SCH MG: 25 TABLET, FILM COATED ORAL at 13:06

## 2018-12-03 RX ADMIN — BUSPIRONE HYDROCHLORIDE SCH MG: 10 TABLET ORAL at 17:32

## 2018-12-03 RX ADMIN — HYDRALAZINE HYDROCHLORIDE SCH MG: 25 TABLET, FILM COATED ORAL at 05:58

## 2018-12-03 RX ADMIN — MEGESTROL ACETATE SCH MG: 40 SUSPENSION ORAL at 10:03

## 2018-12-03 RX ADMIN — APIXABAN SCH MG: 2.5 TABLET, FILM COATED ORAL at 10:04

## 2018-12-03 RX ADMIN — BUSPIRONE HYDROCHLORIDE SCH MG: 10 TABLET ORAL at 10:04

## 2018-12-03 RX ADMIN — APIXABAN SCH MG: 2.5 TABLET, FILM COATED ORAL at 17:32

## 2018-12-03 RX ADMIN — DIVALPROEX SODIUM SCH MG: 250 TABLET, DELAYED RELEASE ORAL at 17:32

## 2018-12-03 RX ADMIN — ASPIRIN SCH MG: 81 TABLET, CHEWABLE ORAL at 10:04

## 2018-12-03 NOTE — PROGRESS NOTE
Provider Note


Provider Note: 





CARDIOLOGY PROGRESS TOWARDS by Dr. Wolf has been on 12/3/2018.





OBJECTIVE: The patient is awake alert oriented x3.  He continues to be in 

atrial fibrillation with controlled ventricular response, in spite of the 

patient not being on any AV jenifer blocking agents.  There is no TIA or CVA 

symptoms.  The patient denies any chest pain or discomfort.  There is no PND 

orthopnea or leg edema.  There is no recurrence of ventricular tachycardia.  

His dysphagia to liquids is improving.  There is no clinical events of 

aspiration.  He has no bleeding on Eliquis.





 











  12/03/18





  12:00


 


Temperature 99.6 F


 


Temperature Oral





Source 


 


Pulse Rate 78


 


Respiratory 18





Rate 


 


Blood Pressure 125/42 L





[Upper Arm] 


 


Blood Pressure 69





Mean [Upper Arm 





] 


 


Blood Pressure Supine





Position [Upper 





Arm] 


 


O2 Sat by Pulse 100





Oximetry 


 


Oxygen Delivery Room Air





Method ( 





includes room 





air) 





 











  11/21/18 11/22/18 11/27/18





  15:35 14:13 05:54


 


POC Glucose   


 


RPR    NONREACTIVE


 


C. difficile Tox (PCR)   


 


Hepatitis A IgM Ab  Negative  


 


Hep Bs Antigen  Negative  


 


Hep B Core IgM Ab  Negative  


 


Hepatitis C Antibody  <0.1  


 


Mycoplasma pneumon IgG   375 H 


 


Mycoplasma pneumon IgM   <770 














  11/27/18 12/03/18 12/03/18





  19:40 11:22 16:07


 


POC Glucose   208 H  154 H


 


RPR   


 


C. difficile Tox (PCR)  NEGATIVE  


 


Hepatitis A IgM Ab   


 


Hep Bs Antigen   


 


Hep B Core IgM Ab   


 


Hepatitis C Antibody   


 


Mycoplasma pneumon IgG   


 


Mycoplasma pneumon IgM   








PHYSICAL EXAMINATION: The patient is a frail build, and appears to be 

chronically ill.  He is in no acute distress.





HEAD: Atraumatic normocephalic.  EYES: Pupils are equal round regular react to 

light accommodation.  ENT: Is negative.  NECK: Is supple.  There is no JVD.  

Carotids equal there is no bruit.  There is no lymphadenopathy.  There is no 

goiter.  There is no accessory muscles of respiration in use.  Trachea central.

  LUNGS: Show diminished air entry in the bases with some area of tenderness 

which covers a small area of the bases bilaterally.  The rest of the lungs are 

clear.  There is no rhonchi rales or wheezing.  HEART: S1-S2 is heard there is 

no S3 gallop there is no S4 gallop S1 is of variable intensity..  There is 

murmur of mitral regurgitation and tricuspid regurgitation present.  There is 

no rub.  ABDOMEN: Soft.  Nontender there is no hepatosplenomegaly bowel sounds 

are well heard.  EXTREMITIES: Femorals are diminished.  There is no femoral 

bruits.  Leg pulses are diminished.  There is no pedal edema.  There is no DVT 

esophagitis.  There is no calf tenderness.  CNS: The patient's slightly drowsy 

but moves all 4 extremities.  PSYCHIATRIC exam the patient does not appear to 

be agitated.  His judgment and insight today seem to be intact.














IMPRESSION/RECOMMENDATION: 





1.Atrial fibrillation with slow ventricular response.  At present there is no 

need for AV jenifer blocking agents.  Note that the patient due to his frail build

, and malnourished state, and with ongoing problems with confusion is not a 

candidate for chronic anticoagulation therapy.


2.  ENCEPHALOPATHY: 12.


3.  ELECTROLYTE IMBALANCE.  Note that the patient's sodium is coming down, last 

known value was 148.7.


4.  Dehydration.


5.  Chronic kidney disease.  Seems to be improving.  His last GFR was greater 

than 60.


6.  Severe mitral regurgitation: Hydralazine has been started.  Will increase 

Dralzine to 25 mg p.o. every 6 hours..  Continue amlodipine.


7.  Severe pulmonary hypertension: Recommend continuing amlodipine and 

hydralazine has been increased.  May add sildenafil later on.


8.  Moderate to Severe tricuspid Regurgitation: Continue current medications.


9.  Prostate cancer with bony metastasis.





Medications reviewed.  Medication dose increased.  Plan of care discussed with 

the change patient in care of the patient.  40 minutes spent on this patient, 

with more than 50% of time spent in direct patient care.  Medical decision 

making is of high complexity.  Will follow with you.

## 2018-12-03 NOTE — PDOC PROGRESS REPORT
Subjective


Progress Note for:: 12/03/18


Subjective:: 





The patient appears to be much better this morning.  We had a good conversation 

which actually made sense.  There is a walker in the room and patient 

apparently has walked to some degree.  He has worked with physical therapy and 

occupational therapy.  Discussed the need to increase p.o. intake and return to 

the rehab


Reason For Visit: 


ALTERED MENTAL STATUS,METABOLIC ENCEPHALOPATHY








Physical Exam


Vital Signs: 


 











Temp Pulse Resp BP Pulse Ox


 


 98.7 F   88   18   136/42 H  99 


 


 12/03/18 03:37  12/03/18 03:37  12/03/18 03:37  12/03/18 03:37  12/03/18 03:37








 Intake & Output











 12/02/18 12/03/18 12/04/18





 06:59 06:59 06:59


 


Intake Total 1337 1095 


 


Output Total 700 550 


 


Balance 637 545 


 


Weight 90.3 kg 70.9 kg 











General appearance: PRESENT: mild distress


Head exam: PRESENT: atraumatic


Eye exam: PRESENT: conjunctiva pink


Mouth exam: PRESENT: moist


Neck exam: PRESENT: carotid bruit.  ABSENT: JVD


Respiratory exam: PRESENT: clear to auscultation venessa


Cardiovascular exam: PRESENT: irregular rhythm, +S1, +S2


GI/Abdominal exam: PRESENT: normal bowel sounds, soft


Extremities exam: PRESENT: tenderness


Musculoskeletal exam: PRESENT: tenderness


Neurological exam: PRESENT: awake





Results


Laboratory Results: 


 





 12/01/18 04:46 





 12/02/18 17:00 





 











  12/02/18





  17:00


 


Sodium  143.2


 


Potassium  4.0


 


Chloride  111 H


 


Carbon Dioxide  21 L


 


Anion Gap  11


 


BUN  25 H


 


Creatinine  1.07


 


Est GFR ( Amer)  > 60


 


Est GFR (Non-Af Amer)  > 60


 


Glucose  193 H


 


Calcium  7.6 L


 


Total Bilirubin  0.7


 


AST  213 H


 


ALT  51


 


Alkaline Phosphatase  541 H


 


Total Protein  5.5 L


 


Albumin  2.6 L








 





11/23/18 12:46   Cerebral Spinal Fluid - Csf   Viral Culture - Final





 











  11/22/18 11/22/18 11/23/18





  03:54 03:54 16:05


 


Creatine Kinase  617 H   367 H


 


NT-Pro-B Natriuret Pep   9380 H 














  11/24/18 11/24/18 11/25/18





  08:45 08:45 03:53


 


Creatine Kinase  291 H  


 


NT-Pro-B Natriuret Pep   8430 H  9560 H











Impressions: 


 





Abdomen/Pelvis CT  11/21/18 00:00


IMPRESSION:  Fluid overload with bilateral moderate pleural effusions and small 

amount of ascites.


Bibasilar consolidation atelectasis versus pneumonia


 








Head CT  11/21/18 10:00


IMPRESSION:  No acute intracranial pathology.  No noncontrast CT findings to 

explain symptoms.  MRI may be used to more sensitively evaluate for acute 

diffusion restricting infarction if suspected.


EVIDENCE OF ACUTE STROKE: NO.


 








Head MRI  11/22/18 14:54


IMPRESSION:  Mild involutional changes of aging with no acute intracranial 

imaging findings on this slightly limited study.


EVIDENCE OF ACUTE STROKE: NO.


 








Lumbar Puncture  11/23/18 10:40


IMPRESSION:  Lumbar puncture under fluoroscopy. No immediate complication.


 








Thoracentesis Ultrasound  11/24/18 10:19


IMPRESSION:  SUCCESSFUL THORACENTESIS USING ULTRASOUND GUIDANCE.


 








Chest X-Ray  11/24/18 18:00


IMPRESSION:  Stable pulmonary exam demonstrating small bilateral pleural 

effusions.


 








Guidance Fluoroscopy  11/27/18 00:00


IMPRESSION:  SUCCESSFUL PLACEMENT OF A 5 FR DUAL LUMEN 42 CM PICC IN THE LEFT 

BASILIC VEIN.


 








Interventional Vascular Procedure  11/27/18 00:00


IMPRESSION:  SUCCESSFUL PLACEMENT OF A 5 FR DUAL LUMEN 42 CM PICC IN THE LEFT 

BASILIC VEIN.


 








PICC Line Insertion  11/27/18 00:00


IMPRESSION:  SUCCESSFUL PLACEMENT OF A 5 FR DUAL LUMEN 42 CM PICC IN THE LEFT 

BASILIC VEIN.


 














Assessment & Plan





- Diagnosis


(1) Altered mental status


Qualifiers: 


   Altered mental status type: delirium   Qualified Code(s): R41.0 - 

Disorientation, unspecified   


Is this a current diagnosis for this admission?: Yes   





(2) Dehydration


Is this a current diagnosis for this admission?: Yes   





(3) Hypernatremia


Is this a current diagnosis for this admission?: Yes   





(4) New onset atrial fibrillation


Is this a current diagnosis for this admission?: Yes   


Plan: 


Rate control presently on anticoagulation








(5) CKD (chronic kidney disease) stage 3, GFR 30-59 ml/min


Is this a current diagnosis for this admission?: Yes   


Plan: 


Stable continue hydration and current treatment








(6) Hypertension


Qualifiers: 


   Hypertension type: unspecified   Qualified Code(s): I10 - Essential (primary

) hypertension   


Is this a current diagnosis for this admission?: Yes   


Plan: 


We will continue with current medications








(7) Physical deconditioning


Is this a current diagnosis for this admission?: Yes   





(8) Prostate cancer


Is this a current diagnosis for this admission?: Yes   


Plan: 


Stage IV with metastasis.








(9) Memory loss, short term


Is this a current diagnosis for this admission?: Yes   





(10) Acute psychosis


Is this a current diagnosis for this admission?: Yes   


Plan: 


Resolved with medications








(11) Pulmonary hypertension


Is this a current diagnosis for this admission?: Yes   


Plan: 


We will continue with the cardiology recommendations

## 2018-12-04 LAB
ANION GAP SERPL CALC-SCNC: 13 MMOL/L (ref 5–19)
BUN SERPL-MCNC: 32 MG/DL (ref 7–20)
CALCIUM: 6.6 MG/DL (ref 8.4–10.2)
CHLORIDE SERPL-SCNC: 108 MMOL/L (ref 98–107)
CO2 SERPL-SCNC: 20 MMOL/L (ref 22–30)
GLUCOSE SERPL-MCNC: 146 MG/DL (ref 75–110)
POTASSIUM SERPL-SCNC: 3.4 MMOL/L (ref 3.6–5)
SODIUM SERPL-SCNC: 140.6 MMOL/L (ref 137–145)

## 2018-12-04 RX ADMIN — AMLODIPINE BESYLATE SCH: 10 TABLET ORAL at 09:41

## 2018-12-04 RX ADMIN — PEDIATRIC MULTIPLE VITAMINS W/ IRON CHEW TAB 18 MG SCH: 18 CHEW TAB at 09:42

## 2018-12-04 RX ADMIN — HYDRALAZINE HYDROCHLORIDE SCH: 25 TABLET, FILM COATED ORAL at 18:27

## 2018-12-04 RX ADMIN — VALPROIC ACID SCH MG: 250 SOLUTION ORAL at 09:30

## 2018-12-04 RX ADMIN — HYDRALAZINE HYDROCHLORIDE SCH MG: 25 TABLET, FILM COATED ORAL at 00:41

## 2018-12-04 RX ADMIN — BUSPIRONE HYDROCHLORIDE SCH: 10 TABLET ORAL at 10:03

## 2018-12-04 RX ADMIN — TAMSULOSIN HYDROCHLORIDE SCH: 0.4 CAPSULE ORAL at 22:16

## 2018-12-04 RX ADMIN — VALPROIC ACID SCH: 250 SOLUTION ORAL at 22:16

## 2018-12-04 RX ADMIN — APIXABAN SCH MG: 2.5 TABLET, FILM COATED ORAL at 09:34

## 2018-12-04 RX ADMIN — APIXABAN SCH: 2.5 TABLET, FILM COATED ORAL at 18:28

## 2018-12-04 RX ADMIN — BUSPIRONE HYDROCHLORIDE SCH MG: 10 TABLET ORAL at 09:33

## 2018-12-04 RX ADMIN — APIXABAN SCH: 2.5 TABLET, FILM COATED ORAL at 10:03

## 2018-12-04 RX ADMIN — VALPROIC ACID SCH: 250 SOLUTION ORAL at 18:28

## 2018-12-04 RX ADMIN — HYDRALAZINE HYDROCHLORIDE SCH MG: 25 TABLET, FILM COATED ORAL at 06:18

## 2018-12-04 RX ADMIN — MEGESTROL ACETATE SCH MG: 40 SUSPENSION ORAL at 09:33

## 2018-12-04 RX ADMIN — BUSPIRONE HYDROCHLORIDE SCH: 10 TABLET ORAL at 18:28

## 2018-12-04 RX ADMIN — HYDRALAZINE HYDROCHLORIDE SCH: 25 TABLET, FILM COATED ORAL at 12:10

## 2018-12-04 RX ADMIN — INSULIN LISPRO PRN UNIT: 100 INJECTION, SOLUTION INTRAVENOUS; SUBCUTANEOUS at 08:42

## 2018-12-04 RX ADMIN — VALPROIC ACID SCH MG: 250 SOLUTION ORAL at 13:52

## 2018-12-04 NOTE — PDOC PROGRESS REPORT
Subjective


Progress Note for:: 12/04/18


Subjective:: 


still w/ significant sundowning, seems to be getting towards a point where we 

can consider inpt rehab placement





Reason For Visit: 


ALTERED MENTAL STATUS,METABOLIC ENCEPHALOPATHY








Physical Exam


Vital Signs: 


 











Temp Pulse Resp BP Pulse Ox


 


 98.6 F   84   19   134/47 H  100 


 


 12/04/18 03:39  12/04/18 07:00  12/04/18 03:39  12/04/18 03:39  12/04/18 03:39








 Intake & Output











 12/03/18 12/04/18 12/05/18





 06:59 06:59 06:59


 


Intake Total 1095 562 


 


Output Total 550  


 


Balance 545 562 


 


Weight 70.9 kg 90.4 kg 











General appearance: PRESENT: no acute distress, well-developed, well-nourished


Head exam: PRESENT: atraumatic, normocephalic


Eye exam: PRESENT: conjunctiva pink, EOMI, PERRLA.  ABSENT: scleral icterus


Ear exam: PRESENT: normal external ear exam


Mouth exam: PRESENT: moist, tongue midline


Neck exam: ABSENT: carotid bruit, JVD, lymphadenopathy, thyromegaly


Respiratory exam: PRESENT: clear to auscultation venessa.  ABSENT: rales, rhonchi, 

wheezes


Cardiovascular exam: PRESENT: RRR.  ABSENT: diastolic murmur, rubs, systolic 

murmur


Pulses: PRESENT: normal dorsalis pedis pul


Vascular exam: PRESENT: normal capillary refill


GI/Abdominal exam: PRESENT: normal bowel sounds, soft.  ABSENT: distended, 

guarding, mass, organolmegaly, rebound, tenderness


Rectal exam: PRESENT: deferred


Extremities exam: PRESENT: full ROM.  ABSENT: calf tenderness, clubbing, pedal 

edema


Neurological exam: PRESENT: alert, awake, oriented to person, oriented to place

, oriented to time, oriented to situation, CN II-XII grossly intact.  ABSENT: 

motor sensory deficit


Psychiatric exam: PRESENT: appropriate affect, normal mood.  ABSENT: homicidal 

ideation, suicidal ideation


Skin exam: PRESENT: dry, intact, warm.  ABSENT: cyanosis, rash





Results


Laboratory Results: 


 





 12/01/18 04:46 





 12/02/18 17:00 





 





11/23/18 12:46   Cerebral Spinal Fluid - Csf   Viral Culture - Final





 











  11/22/18 11/22/18 11/23/18





  03:54 03:54 16:05


 


Creatine Kinase  617 H   367 H


 


NT-Pro-B Natriuret Pep   9380 H 














  11/24/18 11/24/18 11/25/18





  08:45 08:45 03:53


 


Creatine Kinase  291 H  


 


NT-Pro-B Natriuret Pep   8430 H  9560 H











Impressions: 


 





Abdomen/Pelvis CT  11/21/18 00:00


IMPRESSION:  Fluid overload with bilateral moderate pleural effusions and small 

amount of ascites.


Bibasilar consolidation atelectasis versus pneumonia


 








Head CT  11/21/18 10:00


IMPRESSION:  No acute intracranial pathology.  No noncontrast CT findings to 

explain symptoms.  MRI may be used to more sensitively evaluate for acute 

diffusion restricting infarction if suspected.


EVIDENCE OF ACUTE STROKE: NO.


 








Head MRI  11/22/18 14:54


IMPRESSION:  Mild involutional changes of aging with no acute intracranial 

imaging findings on this slightly limited study.


EVIDENCE OF ACUTE STROKE: NO.


 








Lumbar Puncture  11/23/18 10:40


IMPRESSION:  Lumbar puncture under fluoroscopy. No immediate complication.


 








Thoracentesis Ultrasound  11/24/18 10:19


IMPRESSION:  SUCCESSFUL THORACENTESIS USING ULTRASOUND GUIDANCE.


 








Chest X-Ray  11/24/18 18:00


IMPRESSION:  Stable pulmonary exam demonstrating small bilateral pleural 

effusions.


 








Guidance Fluoroscopy  11/27/18 00:00


IMPRESSION:  SUCCESSFUL PLACEMENT OF A 5 FR DUAL LUMEN 42 CM PICC IN THE LEFT 

BASILIC VEIN.


 








Interventional Vascular Procedure  11/27/18 00:00


IMPRESSION:  SUCCESSFUL PLACEMENT OF A 5 FR DUAL LUMEN 42 CM PICC IN THE LEFT 

BASILIC VEIN.


 








PICC Line Insertion  11/27/18 00:00


IMPRESSION:  SUCCESSFUL PLACEMENT OF A 5 FR DUAL LUMEN 42 CM PICC IN THE LEFT 

BASILIC VEIN.


 














Assessment & Plan





- Diagnosis


(1) Altered mental status


Qualifiers: 


   Altered mental status type: delirium   Qualified Code(s): R41.0 - 

Disorientation, unspecified   


Is this a current diagnosis for this admission?: Yes   


Plan: 


Better








(2) Difficulty swallowing liquids


Is this a current diagnosis for this admission?: Yes   


Plan: 


Improved








(3) Prostate cancer


Is this a current diagnosis for this admission?: Yes   


Plan: 


will have f/u as oupt








(4) Malnutrition


Qualifiers: 


   Malnutrition type: protein-calorie malnutrition   Protein-calorie 

malnutrition severity: moderate   Qualified Code(s): E44.0 - Moderate protein-

calorie malnutrition   


Is this a current diagnosis for this admission?: Yes   


Plan: 


Cont to encourage po intake

## 2018-12-04 NOTE — PDOC PROGRESS REPORT
Subjective


Progress Note for:: 12/04/18


Subjective:: 





The patient states to feel well.  He is apparently eating a little bit better.  

He is having good bowel movements.  Again discussed the need for rehab


Reason For Visit: 


ALTERED MENTAL STATUS,METABOLIC ENCEPHALOPATHY








Physical Exam


Vital Signs: 


 











Temp Pulse Resp BP Pulse Ox


 


 98.6 F   84   19   134/47 H  100 


 


 12/04/18 03:39  12/04/18 07:00  12/04/18 03:39  12/04/18 03:39  12/04/18 03:39








 Intake & Output











 12/03/18 12/04/18 12/05/18





 06:59 06:59 06:59


 


Intake Total 1095 562 


 


Output Total 550  


 


Balance 545 562 


 


Weight 70.9 kg 90.4 kg 











General appearance: PRESENT: mild distress


Head exam: PRESENT: atraumatic


Eye exam: PRESENT: conjunctiva pink


Mouth exam: PRESENT: moist


Neck exam: PRESENT: carotid bruit.  ABSENT: JVD


Respiratory exam: PRESENT: clear to auscultation venessa


Cardiovascular exam: PRESENT: irregular rhythm, +S1, +S2


GI/Abdominal exam: PRESENT: normal bowel sounds, soft


Extremities exam: PRESENT: tenderness


Musculoskeletal exam: PRESENT: tenderness


Neurological exam: PRESENT: alert, awake


Psychiatric exam: PRESENT: anxious, flat affect





Results


Laboratory Results: 


 





 12/01/18 04:46 





 12/02/18 17:00 





 





11/23/18 12:46   Cerebral Spinal Fluid - Csf   Viral Culture - Final





 











  11/22/18 11/22/18 11/23/18





  03:54 03:54 16:05


 


Creatine Kinase  617 H   367 H


 


NT-Pro-B Natriuret Pep   9380 H 














  11/24/18 11/24/18 11/25/18





  08:45 08:45 03:53


 


Creatine Kinase  291 H  


 


NT-Pro-B Natriuret Pep   8430 H  9560 H











Impressions: 


 





Abdomen/Pelvis CT  11/21/18 00:00


IMPRESSION:  Fluid overload with bilateral moderate pleural effusions and small 

amount of ascites.


Bibasilar consolidation atelectasis versus pneumonia


 








Head CT  11/21/18 10:00


IMPRESSION:  No acute intracranial pathology.  No noncontrast CT findings to 

explain symptoms.  MRI may be used to more sensitively evaluate for acute 

diffusion restricting infarction if suspected.


EVIDENCE OF ACUTE STROKE: NO.


 








Head MRI  11/22/18 14:54


IMPRESSION:  Mild involutional changes of aging with no acute intracranial 

imaging findings on this slightly limited study.


EVIDENCE OF ACUTE STROKE: NO.


 








Lumbar Puncture  11/23/18 10:40


IMPRESSION:  Lumbar puncture under fluoroscopy. No immediate complication.


 








Thoracentesis Ultrasound  11/24/18 10:19


IMPRESSION:  SUCCESSFUL THORACENTESIS USING ULTRASOUND GUIDANCE.


 








Chest X-Ray  11/24/18 18:00


IMPRESSION:  Stable pulmonary exam demonstrating small bilateral pleural 

effusions.


 








Guidance Fluoroscopy  11/27/18 00:00


IMPRESSION:  SUCCESSFUL PLACEMENT OF A 5 FR DUAL LUMEN 42 CM PICC IN THE LEFT 

BASILIC VEIN.


 








Interventional Vascular Procedure  11/27/18 00:00


IMPRESSION:  SUCCESSFUL PLACEMENT OF A 5 FR DUAL LUMEN 42 CM PICC IN THE LEFT 

BASILIC VEIN.


 








PICC Line Insertion  11/27/18 00:00


IMPRESSION:  SUCCESSFUL PLACEMENT OF A 5 FR DUAL LUMEN 42 CM PICC IN THE LEFT 

BASILIC VEIN.


 














Assessment & Plan





- Diagnosis


(1) Altered mental status


Qualifiers: 


   Altered mental status type: delirium   Qualified Code(s): R41.0 - 

Disorientation, unspecified   


Is this a current diagnosis for this admission?: Yes   





(2) Dehydration


Is this a current diagnosis for this admission?: Yes   





(3) Hypernatremia


Is this a current diagnosis for this admission?: Yes   





(4) New onset atrial fibrillation


Is this a current diagnosis for this admission?: Yes   


Plan: 


Rate control presently on anticoagulation








(5) CKD (chronic kidney disease) stage 3, GFR 30-59 ml/min


Is this a current diagnosis for this admission?: Yes   


Plan: 


Stable continue hydration and current treatment








(6) Hypertension


Qualifiers: 


   Hypertension type: unspecified   Qualified Code(s): I10 - Essential (primary

) hypertension   


Is this a current diagnosis for this admission?: Yes   


Plan: 


We will continue with current medications








(7) Physical deconditioning


Is this a current diagnosis for this admission?: Yes   


Plan: 


Will need PT OT








(8) Prostate cancer


Is this a current diagnosis for this admission?: Yes   





(9) Memory loss, short term


Is this a current diagnosis for this admission?: Yes   


Plan: 


Possibly worsening dementia.








(10) Acute psychosis


Is this a current diagnosis for this admission?: Yes   





(11) Pulmonary hypertension


Is this a current diagnosis for this admission?: Yes   


Plan: 


We will continue with the cardiology recommendations

## 2018-12-05 LAB
ANION GAP SERPL CALC-SCNC: 12 MMOL/L (ref 5–19)
ANION GAP SERPL CALC-SCNC: 13 MMOL/L (ref 5–19)
BUN SERPL-MCNC: 32 MG/DL (ref 7–20)
BUN SERPL-MCNC: 35 MG/DL (ref 7–20)
CALCIUM: 6.4 MG/DL (ref 8.4–10.2)
CALCIUM: 6.8 MG/DL (ref 8.4–10.2)
CHLORIDE SERPL-SCNC: 108 MMOL/L (ref 98–107)
CHLORIDE SERPL-SCNC: 109 MMOL/L (ref 98–107)
CO2 SERPL-SCNC: 17 MMOL/L (ref 22–30)
CO2 SERPL-SCNC: 20 MMOL/L (ref 22–30)
GLUCOSE SERPL-MCNC: 140 MG/DL (ref 75–110)
GLUCOSE SERPL-MCNC: 204 MG/DL (ref 75–110)
POTASSIUM SERPL-SCNC: 3.5 MMOL/L (ref 3.6–5)
POTASSIUM SERPL-SCNC: 3.6 MMOL/L (ref 3.6–5)
SODIUM SERPL-SCNC: 138.4 MMOL/L (ref 137–145)
SODIUM SERPL-SCNC: 141.4 MMOL/L (ref 137–145)

## 2018-12-05 RX ADMIN — PEDIATRIC MULTIPLE VITAMINS W/ IRON CHEW TAB 18 MG SCH: 18 CHEW TAB at 11:22

## 2018-12-05 RX ADMIN — AMLODIPINE BESYLATE SCH MG: 10 TABLET ORAL at 11:18

## 2018-12-05 RX ADMIN — INSULIN LISPRO PRN UNIT: 100 INJECTION, SOLUTION INTRAVENOUS; SUBCUTANEOUS at 11:26

## 2018-12-05 RX ADMIN — BUSPIRONE HYDROCHLORIDE SCH MG: 10 TABLET ORAL at 11:19

## 2018-12-05 RX ADMIN — INSULIN LISPRO PRN UNIT: 100 INJECTION, SOLUTION INTRAVENOUS; SUBCUTANEOUS at 17:53

## 2018-12-05 RX ADMIN — POTASSIUM CHLORIDE SCH: 1.5 SOLUTION ORAL at 11:22

## 2018-12-05 RX ADMIN — VALPROIC ACID SCH MG: 250 SOLUTION ORAL at 23:19

## 2018-12-05 RX ADMIN — CALCIUM CARBONATE-CHOLECALCIFEROL TAB 250 MG-125 UNIT SCH TAB: 250-125 TAB at 17:52

## 2018-12-05 RX ADMIN — VALPROIC ACID SCH: 250 SOLUTION ORAL at 14:21

## 2018-12-05 RX ADMIN — INSULIN LISPRO PRN UNIT: 100 INJECTION, SOLUTION INTRAVENOUS; SUBCUTANEOUS at 23:18

## 2018-12-05 RX ADMIN — POTASSIUM CHLORIDE SCH MEQ: 1.5 SOLUTION ORAL at 23:19

## 2018-12-05 RX ADMIN — HYDRALAZINE HYDROCHLORIDE SCH: 25 TABLET, FILM COATED ORAL at 05:31

## 2018-12-05 RX ADMIN — HYDRALAZINE HYDROCHLORIDE SCH: 25 TABLET, FILM COATED ORAL at 02:14

## 2018-12-05 RX ADMIN — VALPROIC ACID SCH: 250 SOLUTION ORAL at 11:22

## 2018-12-05 RX ADMIN — TAMSULOSIN HYDROCHLORIDE SCH MG: 0.4 CAPSULE ORAL at 23:18

## 2018-12-05 RX ADMIN — VALPROIC ACID SCH: 250 SOLUTION ORAL at 17:53

## 2018-12-05 RX ADMIN — HYDRALAZINE HYDROCHLORIDE SCH MG: 25 TABLET, FILM COATED ORAL at 17:53

## 2018-12-05 RX ADMIN — MEGESTROL ACETATE SCH: 40 SUSPENSION ORAL at 11:23

## 2018-12-05 RX ADMIN — HYDRALAZINE HYDROCHLORIDE SCH MG: 25 TABLET, FILM COATED ORAL at 11:19

## 2018-12-05 RX ADMIN — BUSPIRONE HYDROCHLORIDE SCH MG: 10 TABLET ORAL at 17:52

## 2018-12-05 RX ADMIN — CALCIUM CARBONATE-CHOLECALCIFEROL TAB 250 MG-125 UNIT SCH TAB: 250-125 TAB at 11:23

## 2018-12-05 RX ADMIN — APIXABAN SCH MG: 2.5 TABLET, FILM COATED ORAL at 17:53

## 2018-12-05 RX ADMIN — APIXABAN SCH MG: 2.5 TABLET, FILM COATED ORAL at 11:18

## 2018-12-05 NOTE — PDOC PROGRESS REPORT
Subjective


Progress Note for:: 12/05/18


Subjective:: 


No acute events overnight





Reason For Visit: 


ALTERED MENTAL STATUS,METABOLIC ENCEPHALOPATHY








Physical Exam


Vital Signs: 


 











Temp Pulse Resp BP Pulse Ox


 


 98.5 F   95   13   112/48 L  98 


 


 12/05/18 04:00  12/05/18 07:00  12/05/18 04:00  12/05/18 04:00  12/05/18 04:00








 Intake & Output











 12/04/18 12/05/18 12/06/18





 06:59 06:59 06:59


 


Intake Total 562 831 


 


Balance 562 831 


 


Weight 90.4 kg 91.2 kg 











General appearance: PRESENT: no acute distress, well-developed, well-nourished


Head exam: PRESENT: atraumatic, normocephalic


Eye exam: PRESENT: conjunctiva pink, EOMI, PERRLA.  ABSENT: scleral icterus


Ear exam: PRESENT: normal external ear exam


Mouth exam: PRESENT: moist, tongue midline


Neck exam: ABSENT: carotid bruit, JVD, lymphadenopathy, thyromegaly


Respiratory exam: PRESENT: clear to auscultation venessa.  ABSENT: rales, rhonchi, 

wheezes


Cardiovascular exam: PRESENT: RRR.  ABSENT: diastolic murmur, rubs, systolic 

murmur


Pulses: PRESENT: normal dorsalis pedis pul


Vascular exam: PRESENT: normal capillary refill


GI/Abdominal exam: PRESENT: normal bowel sounds, soft.  ABSENT: distended, 

guarding, mass, organolmegaly, rebound, tenderness


Rectal exam: PRESENT: deferred


Extremities exam: PRESENT: full ROM.  ABSENT: calf tenderness, clubbing, pedal 

edema


Neurological exam: PRESENT: alert, awake, oriented to person, oriented to place

, oriented to time, oriented to situation, CN II-XII grossly intact.  ABSENT: 

motor sensory deficit


Psychiatric exam: PRESENT: appropriate affect, normal mood.  ABSENT: homicidal 

ideation, suicidal ideation


Skin exam: PRESENT: dry, intact, warm.  ABSENT: cyanosis, rash





Results


Laboratory Results: 


 





 12/01/18 04:46 





 12/05/18 03:58 





 











  12/04/18 12/05/18





  20:47 03:58


 


Sodium  140.6  141.4


 


Potassium  3.4 L  3.5 L


 


Chloride  108 H  109 H


 


Carbon Dioxide  20 L  20 L


 


Anion Gap  13  12


 


BUN  32 H  32 H


 


Creatinine  1.26 H  1.22


 


Est GFR ( Amer)  > 60  > 60


 


Est GFR (Non-Af Amer)  56 L  58 L


 


Glucose  146 H  140 H


 


Calcium  6.6 L*  6.8 L*


 


Magnesium  1.7  1.7








 





11/24/18 16:15   Pleural Fluid   Viral Culture - Final





 











  11/22/18 11/22/18 11/23/18





  03:54 03:54 16:05


 


Creatine Kinase  617 H   367 H


 


NT-Pro-B Natriuret Pep   9380 H 














  11/24/18 11/24/18 11/25/18





  08:45 08:45 03:53


 


Creatine Kinase  291 H  


 


NT-Pro-B Natriuret Pep   8430 H  9560 H











Impressions: 


 





Abdomen/Pelvis CT  11/21/18 00:00


IMPRESSION:  Fluid overload with bilateral moderate pleural effusions and small 

amount of ascites.


Bibasilar consolidation atelectasis versus pneumonia


 








Head CT  11/21/18 10:00


IMPRESSION:  No acute intracranial pathology.  No noncontrast CT findings to 

explain symptoms.  MRI may be used to more sensitively evaluate for acute 

diffusion restricting infarction if suspected.


EVIDENCE OF ACUTE STROKE: NO.


 








Head MRI  11/22/18 14:54


IMPRESSION:  Mild involutional changes of aging with no acute intracranial 

imaging findings on this slightly limited study.


EVIDENCE OF ACUTE STROKE: NO.


 








Lumbar Puncture  11/23/18 10:40


IMPRESSION:  Lumbar puncture under fluoroscopy. No immediate complication.


 








Thoracentesis Ultrasound  11/24/18 10:19


IMPRESSION:  SUCCESSFUL THORACENTESIS USING ULTRASOUND GUIDANCE.


 








Chest X-Ray  11/24/18 18:00


IMPRESSION:  Stable pulmonary exam demonstrating small bilateral pleural 

effusions.


 








Guidance Fluoroscopy  11/27/18 00:00


IMPRESSION:  SUCCESSFUL PLACEMENT OF A 5 FR DUAL LUMEN 42 CM PICC IN THE LEFT 

BASILIC VEIN.


 








Interventional Vascular Procedure  11/27/18 00:00


IMPRESSION:  SUCCESSFUL PLACEMENT OF A 5 FR DUAL LUMEN 42 CM PICC IN THE LEFT 

BASILIC VEIN.


 








PICC Line Insertion  11/27/18 00:00


IMPRESSION:  SUCCESSFUL PLACEMENT OF A 5 FR DUAL LUMEN 42 CM PICC IN THE LEFT 

BASILIC VEIN.


 














Assessment & Plan





- Diagnosis


(1) Altered mental status


Qualifiers: 


   Altered mental status type: delirium   Qualified Code(s): R41.0 - 

Disorientation, unspecified   


Is this a current diagnosis for this admission?: Yes   


Plan: 


Stable at this point, being evaluated for rehab placement now








(2) Difficulty swallowing liquids


Is this a current diagnosis for this admission?: Yes   


Plan: 


Improved and prev 2nd mental status








(3) Prostate cancer


Is this a current diagnosis for this admission?: Yes   


Plan: 


F/u as outpt, will not plan further rx while pt in inpt rehab








(4) Malnutrition


Qualifiers: 


   Malnutrition type: protein-calorie malnutrition   Protein-calorie 

malnutrition severity: moderate   Qualified Code(s): E44.0 - Moderate protein-

calorie malnutrition   


Is this a current diagnosis for this admission?: Yes   


Plan: 


Needs encouragement and hand feeding, but does eat








(5) Physical deconditioning


Is this a current diagnosis for this admission?: Yes   


Plan: 


Will need rehab placement, maría urena evaluating pt

## 2018-12-05 NOTE — PDOC TRANSFER SUMMARY
General





- Admit/Disc Date/PCP


Admission Date/Primary Care Provider: 


  11/21/18 12:55





  CARLOS JACOB MD





Discharge Date: 12/05/18





- Discharge Diagnosis


(1) Altered mental status


Is this a current diagnosis for this admission?: Yes   





(2) Dehydration


Is this a current diagnosis for this admission?: Yes   





(3) Hypernatremia


Is this a current diagnosis for this admission?: Yes   





(4) New onset atrial fibrillation


Is this a current diagnosis for this admission?: Yes   


Summary: 


Continue current medications.


Follow-up with cardiology upon discharge








(5) CKD (chronic kidney disease) stage 3, GFR 30-59 ml/min


Is this a current diagnosis for this admission?: Yes   


Summary: 


Stable continue current medications








(6) Hypertension


Is this a current diagnosis for this admission?: Yes   


Summary: 


Stable continue current medications








(7) Physical deconditioning


Is this a current diagnosis for this admission?: Yes   


Summary: 


The patient will require physical therapy twice a day and increase oral intake








(8) Prostate cancer


Is this a current diagnosis for this admission?: Yes   


Summary: 


Stage IV prostate cancer we will need to follow-up with the oncologist Dr. Rizvi








(9) Memory loss, short term


Is this a current diagnosis for this admission?: Yes   


Summary: 


Dementia with worsening sun downs during the hospitalization.  Much improved 

with psychiatric evaluation and adjustment of medication.  We will continue 

with current treatment








(10) Acute psychosis


Is this a current diagnosis for this admission?: Yes   





(11) Pulmonary hypertension


Is this a current diagnosis for this admission?: Yes   


Summary: 


Continue current medications








- Additional Information


Resuscitation Status: Full Code


Discharge Diet: Regular


Discharge Activity: Activity As Tolerated


Home Medications: 








Allopurinol [Zyloprim 300 mg Tablet] 300 mg PO DAILY 11/08/18 


Atorvastatin Calcium [Lipitor 10 mg Tablet] 10 mg PO QHS 11/08/18 


Latanoprost/Pf [Latanoprost 0.005% Eye Drop] 1 drop OS QHS 11/08/18 


Tamsulosin HCl [Flomax 0.4 mg Cap.sr] 0.4 mg PO QHS 11/08/18 


Acetaminophen [Tylenol 325 mg Tablet] 650 mg PO Q4HP PRN  tablet 11/15/18 


Amlodipine Besylate [Norvasc 10 mg Tablet] 10 mg PO DAILY  tablet 11/15/18 


Bicalutamide [Casodex 50 mg Tablet] 50 mg PO DAILY  tablet 11/15/18 


Acetaminophen [Tylenol 325 mg Tablet] 650 mg PO Q6HP PRN  tablet 12/05/18 


Apixaban [Eliquis 2.5 mg Tablet] 2.5 mg PO BID  tablet 12/05/18 


Buspirone HCl [Buspar 10 mg Tablet] 10 mg PO BID  tablet 12/05/18 


Calcium Carbonate/Vitamin D3 [Os-Junior 250 mg with Vitamin D 125 Units] 1 tab PO 

BID  tablet 12/05/18 


Hydralazine HCl [Apresoline 25 mg Tablet] 25 mg PO Q6  tablet 12/05/18 


Loperamide HCl [Imodium 2 mg Capsule] 2 mg PO Q4HP PRN  capsule 12/05/18 


Megestrol Acetate [Megace Peggy 400 mg/10 ml Udcup] 400 mg PO DAILY  udc 12/05/ 18 


Multivitamins W-Iron [Flintstones Chewable Multivit W/Fe Tab] 2 tab PO DAILY  

tab.chew 12/05/18 


Potassium Chloride [Kaon-Cl 20 Meq/15 ml Udcup] 10 meq PO Q12  udc 12/05/18 


Risperidone [Risperdal 0.25 mg Tablet] 0.25 mg PO BIDP PRN  tablet 12/05/18 


Valproate Sodium [Depakene Syrup 250 mg/5 ml Udcup] 125 mg PO QID  udc 12/05/18 











History of Present Illness


Admission Date/PCP: 


  11/21/18 12:55





  CARLOS JACOB MD





History of Present Illness: 


SULMA MURPHY is a 72 year old male








Hospital Course


Hospital Course: 





The patient was admitted from the rehab with altered mental status.


He appeared to be dehydrated with hyper natremia.


He was hydrated.  The patient has developed acute onset of new atrial 

fibrillation.  Several of his medications have been readjusted.  He was seen by 

cardiology.


His kidney function has remained stable.  Unfortunately because of altered 

mental status the patient was not eating or drinking well.  The PICC line has 

been placed unfortunately because of patient's dementia and sun downs he has 

pulled out the PICC line.  After starting medications that have helped with his 

mental status the patient's mental status has improved.  He started eating much 

more.  He is still malnourished but it seems to be improving.


On the day of discharge he appeared more comfortable he did have a bowel 

movement.


He is more awake alert and oriented.  He is participating in several activities 

of daily living.  Discussed the need for a follow-up to improve he is 

functional status at the rehab.





Physical Exam


Vital Signs: 


 











Temp Pulse Resp BP Pulse Ox


 


 98.4 F   82   16   137/48 H  100 


 


 12/05/18 11:47  12/05/18 11:47  12/05/18 11:47  12/05/18 11:47  12/05/18 11:47








 Intake & Output











 12/04/18 12/05/18 12/06/18





 06:59 06:59 06:59


 


Intake Total 562 831 


 


Balance 562 831 


 


Weight 90.4 kg 91.2 kg 











General appearance: PRESENT: mild distress


Head exam: PRESENT: atraumatic


Eye exam: PRESENT: conjunctiva pink


Mouth exam: PRESENT: moist


Neck exam: PRESENT: carotid bruit.  ABSENT: JVD


Respiratory exam: PRESENT: clear to auscultation venessa


Cardiovascular exam: PRESENT: irregular rhythm, +S1, +S2


GI/Abdominal exam: PRESENT: normal bowel sounds, soft


Extremities exam: PRESENT: tenderness


Musculoskeletal exam: PRESENT: tenderness


Neurological exam: PRESENT: alert, awake





Results


Laboratory Results: 


 





 12/01/18 04:46 





 12/05/18 03:58 





 











  12/04/18 12/05/18





  20:47 03:58


 


Sodium  140.6  141.4


 


Potassium  3.4 L  3.5 L


 


Chloride  108 H  109 H


 


Carbon Dioxide  20 L  20 L


 


Anion Gap  13  12


 


BUN  32 H  32 H


 


Creatinine  1.26 H  1.22


 


Est GFR ( Amer)  > 60  > 60


 


Est GFR (Non-Af Amer)  56 L  58 L


 


Glucose  146 H  140 H


 


Calcium  6.6 L*  6.8 L*


 


Magnesium  1.7  1.7








 





11/24/18 16:15   Pleural Fluid   Viral Culture - Final





 











  11/22/18 11/22/18 11/23/18





  03:54 03:54 16:05


 


Creatine Kinase  617 H   367 H


 


NT-Pro-B Natriuret Pep   9380 H 














  11/24/18 11/24/18 11/25/18





  08:45 08:45 03:53


 


Creatine Kinase  291 H  


 


NT-Pro-B Natriuret Pep   8430 H  9560 H











Impressions: 


 





Abdomen/Pelvis CT  11/21/18 00:00


IMPRESSION:  Fluid overload with bilateral moderate pleural effusions and small 

amount of ascites.


Bibasilar consolidation atelectasis versus pneumonia


 








Head CT  11/21/18 10:00


IMPRESSION:  No acute intracranial pathology.  No noncontrast CT findings to 

explain symptoms.  MRI may be used to more sensitively evaluate for acute 

diffusion restricting infarction if suspected.


EVIDENCE OF ACUTE STROKE: NO.


 








Head MRI  11/22/18 14:54


IMPRESSION:  Mild involutional changes of aging with no acute intracranial 

imaging findings on this slightly limited study.


EVIDENCE OF ACUTE STROKE: NO.


 








Lumbar Puncture  11/23/18 10:40


IMPRESSION:  Lumbar puncture under fluoroscopy. No immediate complication.


 








Thoracentesis Ultrasound  11/24/18 10:19


IMPRESSION:  SUCCESSFUL THORACENTESIS USING ULTRASOUND GUIDANCE.


 








Chest X-Ray  11/24/18 18:00


IMPRESSION:  Stable pulmonary exam demonstrating small bilateral pleural 

effusions.


 








Guidance Fluoroscopy  11/27/18 00:00


IMPRESSION:  SUCCESSFUL PLACEMENT OF A 5 FR DUAL LUMEN 42 CM PICC IN THE LEFT 

BASILIC VEIN.


 








Interventional Vascular Procedure  11/27/18 00:00


IMPRESSION:  SUCCESSFUL PLACEMENT OF A 5 FR DUAL LUMEN 42 CM PICC IN THE LEFT 

BASILIC VEIN.


 








PICC Line Insertion  11/27/18 00:00


IMPRESSION:  SUCCESSFUL PLACEMENT OF A 5 FR DUAL LUMEN 42 CM PICC IN THE LEFT 

BASILIC VEIN.


 














Qualifiers





- *


PATIENT BEING DISCHARGED WITH ANY OF THE FOLLOWING DIAGNOSIS: No

## 2018-12-05 NOTE — PDOC PROGRESS REPORT
Subjective


Progress Note for:: 12/05/18


Subjective:: 





The patient is more alert and awake.  He is ready to be transferred to the 

rehab.  Discussed the need for physical therapy


Reason For Visit: 


ALTERED MENTAL STATUS,METABOLIC ENCEPHALOPATHY








Physical Exam


Vital Signs: 


 











Temp Pulse Resp BP Pulse Ox


 


 98.5 F   95   13   112/48 L  98 


 


 12/05/18 04:00  12/05/18 07:00  12/05/18 04:00  12/05/18 04:00  12/05/18 04:00








 Intake & Output











 12/04/18 12/05/18 12/06/18





 06:59 06:59 06:59


 


Intake Total 562 831 


 


Balance 562 831 


 


Weight 90.4 kg 91.2 kg 











General appearance: PRESENT: mild distress


Head exam: PRESENT: atraumatic


Eye exam: PRESENT: conjunctiva pink


Mouth exam: PRESENT: moist


Neck exam: PRESENT: carotid bruit.  ABSENT: JVD


Cardiovascular exam: PRESENT: irregular rhythm, +S1, +S2


GI/Abdominal exam: PRESENT: normal bowel sounds, soft


Extremities exam: PRESENT: tenderness


Musculoskeletal exam: PRESENT: tenderness


Neurological exam: PRESENT: alert, awake





Results


Laboratory Results: 


 





 12/01/18 04:46 





 12/05/18 03:58 





 











  12/04/18 12/05/18





  20:47 03:58


 


Sodium  140.6  141.4


 


Potassium  3.4 L  3.5 L


 


Chloride  108 H  109 H


 


Carbon Dioxide  20 L  20 L


 


Anion Gap  13  12


 


BUN  32 H  32 H


 


Creatinine  1.26 H  1.22


 


Est GFR ( Amer)  > 60  > 60


 


Est GFR (Non-Af Amer)  56 L  58 L


 


Glucose  146 H  140 H


 


Calcium  6.6 L*  6.8 L*


 


Magnesium  1.7  1.7








 





11/24/18 16:15   Pleural Fluid   Viral Culture - Final





 











  11/22/18 11/22/18 11/23/18





  03:54 03:54 16:05


 


Creatine Kinase  617 H   367 H


 


NT-Pro-B Natriuret Pep   9380 H 














  11/24/18 11/24/18 11/25/18





  08:45 08:45 03:53


 


Creatine Kinase  291 H  


 


NT-Pro-B Natriuret Pep   8430 H  9560 H











Impressions: 


 





Abdomen/Pelvis CT  11/21/18 00:00


IMPRESSION:  Fluid overload with bilateral moderate pleural effusions and small 

amount of ascites.


Bibasilar consolidation atelectasis versus pneumonia


 








Head CT  11/21/18 10:00


IMPRESSION:  No acute intracranial pathology.  No noncontrast CT findings to 

explain symptoms.  MRI may be used to more sensitively evaluate for acute 

diffusion restricting infarction if suspected.


EVIDENCE OF ACUTE STROKE: NO.


 








Head MRI  11/22/18 14:54


IMPRESSION:  Mild involutional changes of aging with no acute intracranial 

imaging findings on this slightly limited study.


EVIDENCE OF ACUTE STROKE: NO.


 








Lumbar Puncture  11/23/18 10:40


IMPRESSION:  Lumbar puncture under fluoroscopy. No immediate complication.


 








Thoracentesis Ultrasound  11/24/18 10:19


IMPRESSION:  SUCCESSFUL THORACENTESIS USING ULTRASOUND GUIDANCE.


 








Chest X-Ray  11/24/18 18:00


IMPRESSION:  Stable pulmonary exam demonstrating small bilateral pleural 

effusions.


 








Guidance Fluoroscopy  11/27/18 00:00


IMPRESSION:  SUCCESSFUL PLACEMENT OF A 5 FR DUAL LUMEN 42 CM PICC IN THE LEFT 

BASILIC VEIN.


 








Interventional Vascular Procedure  11/27/18 00:00


IMPRESSION:  SUCCESSFUL PLACEMENT OF A 5 FR DUAL LUMEN 42 CM PICC IN THE LEFT 

BASILIC VEIN.


 








PICC Line Insertion  11/27/18 00:00


IMPRESSION:  SUCCESSFUL PLACEMENT OF A 5 FR DUAL LUMEN 42 CM PICC IN THE LEFT 

BASILIC VEIN.


 














Assessment & Plan





- Diagnosis


(1) Altered mental status


Qualifiers: 


   Altered mental status type: delirium   Qualified Code(s): R41.0 - 

Disorientation, unspecified   


Is this a current diagnosis for this admission?: Yes   


Plan: 


Slight improvement with medication most probably dementia has been exacerbated 

by the long-term hospitalization








(2) Dehydration


Is this a current diagnosis for this admission?: Yes   





(3) Hypernatremia


Is this a current diagnosis for this admission?: Yes   





(4) New onset atrial fibrillation


Is this a current diagnosis for this admission?: Yes   


Plan: 


Stable with medication.  Rate controlled.  Had a run of irregular heartbeats 

last night which were corrected with an infusion of calcium








(5) CKD (chronic kidney disease) stage 3, GFR 30-59 ml/min


Is this a current diagnosis for this admission?: Yes   


Plan: 


Stable continue hydration and current treatment








(6) Hypertension


Qualifiers: 


   Hypertension type: unspecified   Qualified Code(s): I10 - Essential (primary

) hypertension   


Is this a current diagnosis for this admission?: Yes   


Plan: 


We will continue with current medications








(7) Physical deconditioning


Is this a current diagnosis for this admission?: Yes   


Plan: 


Needs to have rehab at the nursing home with PT twice a day








(8) Prostate cancer


Is this a current diagnosis for this admission?: Yes   


Plan: 


Stage IV with metastasis.








(9) Memory loss, short term


Is this a current diagnosis for this admission?: Yes   





(10) Acute psychosis


Is this a current diagnosis for this admission?: Yes   





(11) Pulmonary hypertension


Is this a current diagnosis for this admission?: Yes   


Plan: 


Stable continue current medications

## 2018-12-05 NOTE — RADIOLOGY REPORT (SQ)
PROCEDURE: CT of the head without intravenous contrast



HISTORY: Seizures



Indication: Same as above



Comparison: 11/21/2018



Technique: The study was done on 12/5/2018 at 9:03 PM



CT of the head was done without intravenous contrast was done in

the orthogonal planes.



This exam was performed according to our departmental

dose-optimization program, which includes automated exposure

control, adjustment of the mA and/or KV according to the

patient's size and/or use of iterative reconstruction technique.





FINDINGS: 



There is no intracranial hemorrhage, midline shift mass effect or

acute focal infarct.



If clinical concern exists regarding an acute ischemic/vascular

pathology being responsible for patient's symptomatology, an MRI

of the brain is more sensitive than the current study, in ruling

out such a possibility.



There is good gray/white matter differentiation.



The ventricular system is normal.



The mastoid air cells are unremarkable . 



The paranasal sinuses are unremarkable .



There is no visualization of acute fractures involving the

calvarium or the skull base. 



IMPRESSION: 



There is no acute intracranial abnormality.

## 2018-12-06 VITALS — SYSTOLIC BLOOD PRESSURE: 113 MMHG | DIASTOLIC BLOOD PRESSURE: 48 MMHG

## 2018-12-06 LAB
%HYPO/RBC NFR BLD AUTO: (no result) %
ABSOLUTE LYMPHOCYTES# (MANUAL): 1.3 10^3/UL (ref 0.5–4.7)
ABSOLUTE MONOCYTES # (MANUAL): 0.5 10^3/UL (ref 0.1–1.4)
ABSOLUTE NEUTROPHILS# (MANUAL): 9 10^3/UL (ref 1.7–8.2)
ADD MANUAL DIFF: YES
ALBUMIN SERPL-MCNC: 2.5 G/DL (ref 3.5–5)
ALP SERPL-CCNC: 544 U/L (ref 38–126)
ALT SERPL-CCNC: 32 U/L (ref 21–72)
ANION GAP SERPL CALC-SCNC: 12 MMOL/L (ref 5–19)
ANISOCYTOSIS BLD QL SMEAR: (no result)
AST SERPL-CCNC: 217 U/L (ref 17–59)
BASOPHILS NFR BLD MANUAL: 0 % (ref 0–2)
BILIRUB DIRECT SERPL-MCNC: 0.5 MG/DL (ref 0–0.4)
BILIRUB SERPL-MCNC: 0.7 MG/DL (ref 0.2–1.3)
BUN SERPL-MCNC: 32 MG/DL (ref 7–20)
CALCIUM: 6.4 MG/DL (ref 8.4–10.2)
CHLORIDE SERPL-SCNC: 107 MMOL/L (ref 98–107)
CO2 SERPL-SCNC: 20 MMOL/L (ref 22–30)
EOSINOPHIL NFR BLD MANUAL: 0 % (ref 0–6)
ERYTHROCYTE [DISTWIDTH] IN BLOOD BY AUTOMATED COUNT: 19.2 % (ref 11.5–14)
GLUCOSE SERPL-MCNC: 200 MG/DL (ref 75–110)
HCT VFR BLD CALC: 26.3 % (ref 37.9–51)
HGB BLD-MCNC: 8.9 G/DL (ref 13.5–17)
MCH RBC QN AUTO: 25.8 PG (ref 27–33.4)
MCHC RBC AUTO-ENTMCNC: 34.1 G/DL (ref 32–36)
MCV RBC AUTO: 76 FL (ref 80–97)
MONOCYTES % (MANUAL): 5 % (ref 3–13)
NEUTS BAND NFR BLD MANUAL: 1 % (ref 3–5)
OVALOCYTES BLD QL SMEAR: (no result)
PLATELET # BLD: 185 10^3/UL (ref 150–450)
PLATELET COMMENT: ADEQUATE
POIKILOCYTOSIS BLD QL SMEAR: (no result)
POLYCHROMASIA BLD QL SMEAR: SLIGHT
POTASSIUM SERPL-SCNC: 3.7 MMOL/L (ref 3.6–5)
PROT SERPL-MCNC: 5.5 G/DL (ref 6.3–8.2)
RBC # BLD AUTO: 3.47 10^6/UL (ref 4.35–5.55)
SCHISTOCYTES BLD QL SMEAR: SLIGHT
SEGMENTED NEUTROPHILS % (MAN): 82 % (ref 42–78)
SODIUM SERPL-SCNC: 138.9 MMOL/L (ref 137–145)
TOTAL CELLS COUNTED BLD: 100
TOXIC GRANULES BLD QL SMEAR: SLIGHT
VARIANT LYMPHS NFR BLD MANUAL: 12 % (ref 13–45)
WBC # BLD AUTO: 10.9 10^3/UL (ref 4–10.5)
WBC TOXIC VACUOLES BLD QL SMEAR: PRESENT

## 2018-12-06 RX ADMIN — APIXABAN SCH MG: 2.5 TABLET, FILM COATED ORAL at 09:25

## 2018-12-06 RX ADMIN — HYDRALAZINE HYDROCHLORIDE SCH: 25 TABLET, FILM COATED ORAL at 13:40

## 2018-12-06 RX ADMIN — POTASSIUM CHLORIDE SCH MEQ: 1.5 SOLUTION ORAL at 09:25

## 2018-12-06 RX ADMIN — BUSPIRONE HYDROCHLORIDE SCH MG: 10 TABLET ORAL at 09:25

## 2018-12-06 RX ADMIN — CALCIUM CARBONATE-CHOLECALCIFEROL TAB 250 MG-125 UNIT SCH TAB: 250-125 TAB at 09:25

## 2018-12-06 RX ADMIN — MEGESTROL ACETATE SCH MG: 40 SUSPENSION ORAL at 09:26

## 2018-12-06 RX ADMIN — VALPROIC ACID SCH: 250 SOLUTION ORAL at 13:40

## 2018-12-06 RX ADMIN — AMLODIPINE BESYLATE SCH MG: 10 TABLET ORAL at 09:24

## 2018-12-06 RX ADMIN — PEDIATRIC MULTIPLE VITAMINS W/ IRON CHEW TAB 18 MG SCH: 18 CHEW TAB at 09:27

## 2018-12-06 RX ADMIN — VALPROIC ACID SCH MG: 250 SOLUTION ORAL at 09:26

## 2018-12-06 RX ADMIN — HYDRALAZINE HYDROCHLORIDE SCH: 25 TABLET, FILM COATED ORAL at 05:51

## 2018-12-06 NOTE — PDOC PROGRESS REPORT
Subjective


Progress Note for:: 12/06/18


Subjective:: 





The patient is seen on the rounds this morning.  He is awake and alert.


He is discharged to the nursing home rehab has been postponed from yesterday 

because of late completion of the discharge and apparently the patient had what 

sounds to be like a pseudoseizure.


Lab results were reviewed today they all appeared to be within normal limits 

except for some abnormal LFTs most probably related to the prostate cancer with 

metastasis and medication.


The patient is stable for the discharge and will be transferred to nursing home 

rehab


Reason For Visit: 


ALTERED MENTAL STATUS,METABOLIC ENCEPHALOPATHY








Physical Exam


Vital Signs: 


 











Temp Pulse Resp BP Pulse Ox


 


 98.8 F   77   18   113/48 L  99 


 


 12/06/18 12:00  12/06/18 12:00  12/06/18 12:00  12/06/18 12:00  12/06/18 12:00








 Intake & Output











 12/05/18 12/06/18 12/07/18





 06:59 06:59 06:59


 


Intake Total 831 598 


 


Balance 831 598 


 


Weight 91.2 kg 91.1 kg 











General appearance: PRESENT: mild distress


Head exam: PRESENT: atraumatic


Eye exam: PRESENT: conjunctiva pink


Mouth exam: PRESENT: moist


Neck exam: PRESENT: carotid bruit.  ABSENT: JVD


Cardiovascular exam: PRESENT: irregular rhythm, +S1, +S2


GI/Abdominal exam: PRESENT: normal bowel sounds, soft


Neurological exam: PRESENT: alert, awake


Psychiatric exam: PRESENT: anxious, flat affect





Results


Laboratory Results: 


 





 12/06/18 11:10 





 12/06/18 11:10 





 











  12/05/18 12/06/18 12/06/18





  20:40 11:10 11:10


 


WBC    10.9 H


 


RBC    3.47 L


 


Hgb    8.9 L


 


Hct    26.3 L


 


MCV    76 L


 


MCH    25.8 L


 


MCHC    34.1


 


RDW    19.2 H


 


Plt Count    185


 


Seg Neutrophils %    Not Reportable


 


Lymphocytes %    Not Reportable


 


Monocytes %    Not Reportable


 


Eosinophils %    Not Reportable


 


Basophils %    Not Reportable


 


Absolute Neutrophils    Not Reportable


 


Absolute Lymphocytes    Not Reportable


 


Absolute Monocytes    Not Reportable


 


Absolute Eosinophils    Not Reportable


 


Absolute Basophils    Not Reportable


 


Sodium  138.4  138.9 


 


Potassium  3.6  3.7 


 


Chloride  108 H  107 


 


Carbon Dioxide  17 L  20 L 


 


Anion Gap  13  12 


 


BUN  35 H  32 H 


 


Creatinine  1.20  1.21 


 


Est GFR ( Amer)  > 60  > 60 


 


Est GFR (Non-Af Amer)  59 L  59 L 


 


Glucose  204 H  200 H 


 


Calcium  6.4 L*  6.4 L* 


 


Magnesium   1.7 


 


Total Bilirubin   0.7 


 


AST   217 H 


 


ALT   32 


 


Alkaline Phosphatase   544 H 


 


Total Protein   5.5 L 


 


Albumin   2.5 L 








 











  11/22/18 11/22/18 11/23/18





  03:54 03:54 16:05


 


Creatine Kinase  617 H   367 H


 


NT-Pro-B Natriuret Pep   9380 H 














  11/24/18 11/24/18 11/25/18





  08:45 08:45 03:53


 


Creatine Kinase  291 H  


 


NT-Pro-B Natriuret Pep   8430 H  9560 H











Impressions: 


 





Abdomen/Pelvis CT  11/21/18 00:00


IMPRESSION:  Fluid overload with bilateral moderate pleural effusions and small 

amount of ascites.


Bibasilar consolidation atelectasis versus pneumonia


 








Head MRI  11/22/18 14:54


IMPRESSION:  Mild involutional changes of aging with no acute intracranial 

imaging findings on this slightly limited study.


EVIDENCE OF ACUTE STROKE: NO.


 








Lumbar Puncture  11/23/18 10:40


IMPRESSION:  Lumbar puncture under fluoroscopy. No immediate complication.


 








Thoracentesis Ultrasound  11/24/18 10:19


IMPRESSION:  SUCCESSFUL THORACENTESIS USING ULTRASOUND GUIDANCE.


 








Chest X-Ray  11/24/18 18:00


IMPRESSION:  Stable pulmonary exam demonstrating small bilateral pleural 

effusions.


 








Guidance Fluoroscopy  11/27/18 00:00


IMPRESSION:  SUCCESSFUL PLACEMENT OF A 5 FR DUAL LUMEN 42 CM PICC IN THE LEFT 

BASILIC VEIN.


 








Interventional Vascular Procedure  11/27/18 00:00


IMPRESSION:  SUCCESSFUL PLACEMENT OF A 5 FR DUAL LUMEN 42 CM PICC IN THE LEFT 

BASILIC VEIN.


 








PICC Line Insertion  11/27/18 00:00


IMPRESSION:  SUCCESSFUL PLACEMENT OF A 5 FR DUAL LUMEN 42 CM PICC IN THE LEFT 

BASILIC VEIN.


 








Head CT  12/05/18 00:00


IMPRESSION: 


 


There is no acute intracranial abnormality.


 














Assessment & Plan





- Diagnosis


(1) Altered mental status


Qualifiers: 


   Altered mental status type: delirium   Qualified Code(s): R41.0 - 

Disorientation, unspecified   


Is this a current diagnosis for this admission?: Yes   





(2) Dehydration


Is this a current diagnosis for this admission?: Yes   





(3) Hypernatremia


Is this a current diagnosis for this admission?: Yes   





(4) New onset atrial fibrillation


Is this a current diagnosis for this admission?: Yes   


Plan: 


Stable with medication.  Rate controlled.  Had a run of irregular heartbeats 

last night which were corrected with an infusion of calcium








(5) CKD (chronic kidney disease) stage 3, GFR 30-59 ml/min


Is this a current diagnosis for this admission?: Yes   


Plan: 


Stable continue hydration and current treatment








(6) Hypertension


Qualifiers: 


   Hypertension type: unspecified   Qualified Code(s): I10 - Essential (primary

) hypertension   


Is this a current diagnosis for this admission?: Yes   





(7) Physical deconditioning


Is this a current diagnosis for this admission?: Yes   


Plan: 


Needs to have rehab at the nursing home with PT twice a day








(8) Prostate cancer


Is this a current diagnosis for this admission?: Yes   





(9) Memory loss, short term


Is this a current diagnosis for this admission?: Yes   





(10) Acute psychosis


Is this a current diagnosis for this admission?: Yes   





(11) Pulmonary hypertension


Is this a current diagnosis for this admission?: Yes

## 2018-12-07 ENCOUNTER — HOSPITAL ENCOUNTER (INPATIENT)
Dept: HOSPITAL 62 - ER | Age: 73
LOS: 15 days | DRG: 641 | End: 2018-12-22
Attending: INTERNAL MEDICINE | Admitting: INTERNAL MEDICINE
Payer: MEDICARE

## 2018-12-07 DIAGNOSIS — R62.7: ICD-10-CM

## 2018-12-07 DIAGNOSIS — I13.0: ICD-10-CM

## 2018-12-07 DIAGNOSIS — I50.9: ICD-10-CM

## 2018-12-07 DIAGNOSIS — Z96.652: ICD-10-CM

## 2018-12-07 DIAGNOSIS — E87.6: ICD-10-CM

## 2018-12-07 DIAGNOSIS — C61: ICD-10-CM

## 2018-12-07 DIAGNOSIS — N18.3: ICD-10-CM

## 2018-12-07 DIAGNOSIS — N17.9: ICD-10-CM

## 2018-12-07 DIAGNOSIS — D63.8: ICD-10-CM

## 2018-12-07 DIAGNOSIS — M10.9: ICD-10-CM

## 2018-12-07 DIAGNOSIS — F41.9: ICD-10-CM

## 2018-12-07 DIAGNOSIS — F23: ICD-10-CM

## 2018-12-07 DIAGNOSIS — E87.2: ICD-10-CM

## 2018-12-07 DIAGNOSIS — F32.9: ICD-10-CM

## 2018-12-07 DIAGNOSIS — E83.51: Primary | ICD-10-CM

## 2018-12-07 DIAGNOSIS — Z78.1: ICD-10-CM

## 2018-12-07 DIAGNOSIS — C79.51: ICD-10-CM

## 2018-12-07 DIAGNOSIS — E21.1: ICD-10-CM

## 2018-12-07 DIAGNOSIS — D50.9: ICD-10-CM

## 2018-12-07 DIAGNOSIS — Z66: ICD-10-CM

## 2018-12-07 DIAGNOSIS — E88.09: ICD-10-CM

## 2018-12-07 DIAGNOSIS — G40.909: ICD-10-CM

## 2018-12-07 DIAGNOSIS — E44.0: ICD-10-CM

## 2018-12-07 DIAGNOSIS — E11.22: ICD-10-CM

## 2018-12-07 DIAGNOSIS — Z79.899: ICD-10-CM

## 2018-12-07 DIAGNOSIS — E86.0: ICD-10-CM

## 2018-12-07 DIAGNOSIS — I48.2: ICD-10-CM

## 2018-12-07 DIAGNOSIS — L89.152: ICD-10-CM

## 2018-12-07 LAB
ABSOLUTE LYMPHOCYTES# (MANUAL): 0.4 10^3/UL (ref 0.5–4.7)
ABSOLUTE MONOCYTES # (MANUAL): 1.1 10^3/UL (ref 0.1–1.4)
ABSOLUTE NEUTROPHILS# (MANUAL): 10.4 10^3/UL (ref 1.7–8.2)
ADD MANUAL DIFF: YES
ALBUMIN SERPL-MCNC: 2.8 G/DL (ref 3.5–5)
ALP SERPL-CCNC: 645 U/L (ref 38–126)
ALT SERPL-CCNC: 30 U/L (ref 21–72)
ANION GAP SERPL CALC-SCNC: 16 MMOL/L (ref 5–19)
ANISOCYTOSIS BLD QL SMEAR: (no result)
APPEARANCE UR: (no result)
APTT PPP: YELLOW S
AST SERPL-CCNC: 235 U/L (ref 17–59)
BASOPHILS NFR BLD MANUAL: 1 % (ref 0–2)
BILIRUB DIRECT SERPL-MCNC: 0.6 MG/DL (ref 0–0.4)
BILIRUB SERPL-MCNC: 0.8 MG/DL (ref 0.2–1.3)
BILIRUB UR QL STRIP: NEGATIVE
BUN SERPL-MCNC: 34 MG/DL (ref 7–20)
CALCIUM: 5.8 MG/DL (ref 8.4–10.2)
CHLORIDE SERPL-SCNC: 108 MMOL/L (ref 98–107)
CO2 SERPL-SCNC: 17 MMOL/L (ref 22–30)
EOSINOPHIL NFR BLD MANUAL: 0 % (ref 0–6)
ERYTHROCYTE [DISTWIDTH] IN BLOOD BY AUTOMATED COUNT: 19.2 % (ref 11.5–14)
GLUCOSE SERPL-MCNC: 177 MG/DL (ref 75–110)
GLUCOSE UR STRIP-MCNC: NEGATIVE MG/DL
HCT VFR BLD CALC: 26.9 % (ref 37.9–51)
HGB BLD-MCNC: 8.9 G/DL (ref 13.5–17)
KETONES UR STRIP-MCNC: NEGATIVE MG/DL
MCH RBC QN AUTO: 25.5 PG (ref 27–33.4)
MCHC RBC AUTO-ENTMCNC: 33.2 G/DL (ref 32–36)
MCV RBC AUTO: 77 FL (ref 80–97)
MONOCYTES % (MANUAL): 9 % (ref 3–13)
NITRITE UR QL STRIP: NEGATIVE
OVALOCYTES BLD QL SMEAR: SLIGHT
PH UR STRIP: 5 [PH] (ref 5–9)
PLATELET # BLD: 203 10^3/UL (ref 150–450)
PLATELET COMMENT: ADEQUATE
PLATELET LARGE: PRESENT
POIKILOCYTOSIS BLD QL SMEAR: SLIGHT
POLYCHROMASIA BLD QL SMEAR: SLIGHT
POTASSIUM SERPL-SCNC: 3.6 MMOL/L (ref 3.6–5)
PROT SERPL-MCNC: 6.1 G/DL (ref 6.3–8.2)
PROT UR STRIP-MCNC: 100 MG/DL
RBC # BLD AUTO: 3.51 10^6/UL (ref 4.35–5.55)
SEGMENTED NEUTROPHILS % (MAN): 87 % (ref 42–78)
SODIUM SERPL-SCNC: 141.3 MMOL/L (ref 137–145)
SP GR UR STRIP: 1.01
TOTAL CELLS COUNTED BLD: 100
UROBILINOGEN UR-MCNC: NEGATIVE MG/DL (ref ?–2)
VARIANT LYMPHS NFR BLD MANUAL: 3 % (ref 13–45)
WBC # BLD AUTO: 11.9 10^3/UL (ref 4–10.5)
WBC TOXIC VACUOLES BLD QL SMEAR: PRESENT

## 2018-12-07 PROCEDURE — 82962 GLUCOSE BLOOD TEST: CPT

## 2018-12-07 PROCEDURE — 99285 EMERGENCY DEPT VISIT HI MDM: CPT

## 2018-12-07 PROCEDURE — 83550 IRON BINDING TEST: CPT

## 2018-12-07 PROCEDURE — 71045 X-RAY EXAM CHEST 1 VIEW: CPT

## 2018-12-07 PROCEDURE — 94660 CPAP INITIATION&MGMT: CPT

## 2018-12-07 PROCEDURE — 84100 ASSAY OF PHOSPHORUS: CPT

## 2018-12-07 PROCEDURE — 36415 COLL VENOUS BLD VENIPUNCTURE: CPT

## 2018-12-07 PROCEDURE — 80053 COMPREHEN METABOLIC PANEL: CPT

## 2018-12-07 PROCEDURE — 36600 WITHDRAWAL OF ARTERIAL BLOOD: CPT

## 2018-12-07 PROCEDURE — 36569 INSJ PICC 5 YR+ W/O IMAGING: CPT

## 2018-12-07 PROCEDURE — 93005 ELECTROCARDIOGRAM TRACING: CPT

## 2018-12-07 PROCEDURE — 83883 ASSAY NEPHELOMETRY NOT SPEC: CPT

## 2018-12-07 PROCEDURE — 84484 ASSAY OF TROPONIN QUANT: CPT

## 2018-12-07 PROCEDURE — 84134 ASSAY OF PREALBUMIN: CPT

## 2018-12-07 PROCEDURE — 83970 ASSAY OF PARATHORMONE: CPT

## 2018-12-07 PROCEDURE — 82330 ASSAY OF CALCIUM: CPT

## 2018-12-07 PROCEDURE — 82977 ASSAY OF GGT: CPT

## 2018-12-07 PROCEDURE — 82728 ASSAY OF FERRITIN: CPT

## 2018-12-07 PROCEDURE — 84460 ALANINE AMINO (ALT) (SGPT): CPT

## 2018-12-07 PROCEDURE — 87040 BLOOD CULTURE FOR BACTERIA: CPT

## 2018-12-07 PROCEDURE — 82247 BILIRUBIN TOTAL: CPT

## 2018-12-07 PROCEDURE — 85045 AUTOMATED RETICULOCYTE COUNT: CPT

## 2018-12-07 PROCEDURE — 87045 FECES CULTURE AEROBIC BACT: CPT

## 2018-12-07 PROCEDURE — 86850 RBC ANTIBODY SCREEN: CPT

## 2018-12-07 PROCEDURE — 87205 SMEAR GRAM STAIN: CPT

## 2018-12-07 PROCEDURE — 82746 ASSAY OF FOLIC ACID SERUM: CPT

## 2018-12-07 PROCEDURE — 77001 FLUOROGUIDE FOR VEIN DEVICE: CPT

## 2018-12-07 PROCEDURE — 82306 VITAMIN D 25 HYDROXY: CPT

## 2018-12-07 PROCEDURE — 81001 URINALYSIS AUTO W/SCOPE: CPT

## 2018-12-07 PROCEDURE — 87086 URINE CULTURE/COLONY COUNT: CPT

## 2018-12-07 PROCEDURE — 80069 RENAL FUNCTION PANEL: CPT

## 2018-12-07 PROCEDURE — 87493 C DIFF AMPLIFIED PROBE: CPT

## 2018-12-07 PROCEDURE — 80048 BASIC METABOLIC PNL TOTAL CA: CPT

## 2018-12-07 PROCEDURE — 72220 X-RAY EXAM SACRUM TAILBONE: CPT

## 2018-12-07 PROCEDURE — 83010 ASSAY OF HAPTOGLOBIN QUANT: CPT

## 2018-12-07 PROCEDURE — P9016 RBC LEUKOCYTES REDUCED: HCPCS

## 2018-12-07 PROCEDURE — 85025 COMPLETE CBC W/AUTO DIFF WBC: CPT

## 2018-12-07 PROCEDURE — 84443 ASSAY THYROID STIM HORMONE: CPT

## 2018-12-07 PROCEDURE — 36430 TRANSFUSION BLD/BLD COMPNT: CPT

## 2018-12-07 PROCEDURE — 87340 HEPATITIS B SURFACE AG IA: CPT

## 2018-12-07 PROCEDURE — 82607 VITAMIN B-12: CPT

## 2018-12-07 PROCEDURE — 82803 BLOOD GASES ANY COMBINATION: CPT

## 2018-12-07 PROCEDURE — 76937 US GUIDE VASCULAR ACCESS: CPT

## 2018-12-07 PROCEDURE — 82172 ASSAY OF APOLIPOPROTEIN: CPT

## 2018-12-07 PROCEDURE — 96365 THER/PROPH/DIAG IV INF INIT: CPT

## 2018-12-07 PROCEDURE — 82553 CREATINE MB FRACTION: CPT

## 2018-12-07 PROCEDURE — 86901 BLOOD TYPING SEROLOGIC RH(D): CPT

## 2018-12-07 PROCEDURE — 86900 BLOOD TYPING SEROLOGIC ABO: CPT

## 2018-12-07 PROCEDURE — 93010 ELECTROCARDIOGRAM REPORT: CPT

## 2018-12-07 PROCEDURE — 85027 COMPLETE CBC AUTOMATED: CPT

## 2018-12-07 PROCEDURE — 86920 COMPATIBILITY TEST SPIN: CPT

## 2018-12-07 PROCEDURE — 83540 ASSAY OF IRON: CPT

## 2018-12-07 PROCEDURE — 82140 ASSAY OF AMMONIA: CPT

## 2018-12-07 PROCEDURE — 70450 CT HEAD/BRAIN W/O DYE: CPT

## 2018-12-07 PROCEDURE — 82397 CHEMILUMINESCENT ASSAY: CPT

## 2018-12-07 PROCEDURE — 82550 ASSAY OF CK (CPK): CPT

## 2018-12-07 PROCEDURE — 83735 ASSAY OF MAGNESIUM: CPT

## 2018-12-07 PROCEDURE — 83605 ASSAY OF LACTIC ACID: CPT

## 2018-12-07 PROCEDURE — 83880 ASSAY OF NATRIURETIC PEPTIDE: CPT

## 2018-12-07 NOTE — ER DOCUMENT REPORT
ED General





- General


Stated Complaint: BUTTOCKS PAIN


Time Seen by Provider: 12/07/18 20:18


Mode of Arrival: Stretcher


Information source: Outside Facility Records


TRAVEL OUTSIDE OF THE [place] IN LAST 30 DAYS: No





- HPI


Patient complains to provider of: Buttock pain


Quality of pain: Achy


Associated symptoms: None


Exacerbated by: Denies


Relieved by: Denies


Notes: 





Patient is a 72-year-old male with a history of dementia sent by Gloria Horn for evaluation of buttock pain, patient is unable to provide me with any 

meaningful detail regarding his history, he is noted to have a small sacral 

decubitus ulcer, stage I measuring approximately 1 cm, it is tender to palpate 

over this area, he denies any injury, no abdominal pain, he does report that he 

he is not been eating or drinking well lately, denies any vomiting, no diarrhea





- Related Data


Allergies/Adverse Reactions: 


 





No Known Allergies Allergy (Verified 12/07/18 20:45)


 











Past Medical History





- General


Information source: Patient, Outside Facility Records





- Social History


Smoking Status: Unknown if Ever Smoked


Family History: Reviewed & Not Pertinent





- Past Medical History


Cardiac Medical History: Reports: Hx Congestive Heart Failure, Hx Hypertension


   Denies: Hx Heart Attack


Pulmonary Medical History: Reports: Hx Pneumonia


   Denies: Hx Asthma


Neurological Medical History: Denies: Hx Cerebrovascular Accident, Hx Seizures


Endocrine Medical History: Reports: Hx Diabetes Mellitus Type 1


Renal/ Medical History: Reports: Hx Kidney Stones.  Denies: Hx Peritoneal 

Dialysis


Malignancy Medical History: Reports Hx Prostate Cancer - stage IV with mets to 

lung


GI Medical History: Denies: Hx Hepatitis, Hx Hiatal Hernia, Hx Ulcer


Musculoskeletal Medical History: Reports Hx Gout


Psychiatric Medical History: Reports: Hx Depression


Infectious Medical History: Denies: Hx Hepatitis


Past Surgical History: Reports: Hx Orthopedic Surgery - lt knee replacement, Hx 

Tonsillectomy, Other - Prostate biopsy, fiducial placement.  Denies: Hx Open 

Heart Surgery, Hx Pacemaker





- Immunizations


Hx Diphtheria, Pertussis, Tetanus Vaccination: Yes


Hx Pneumococcal Vaccination: 01/01/18





Review of Systems





- Review of Systems


Constitutional: No symptoms reported


EENT: No symptoms reported


Cardiovascular: No symptoms reported


Respiratory: No symptoms reported


Gastrointestinal: No symptoms reported


Genitourinary: No symptoms reported


Male Genitourinary: No symptoms reported


Musculoskeletal: See HPI


Skin: See HPI


Hematologic/Lymphatic: No symptoms reported


Neurological/Psychological: No symptoms reported


-: Yes All other systems reviewed and negative





Physical Exam





- Vital signs


Vitals: 


 











Pulse Ox


 


 99 


 


 12/07/18 20:16











Interpretation: Normal





- General


General appearance: Alert


In distress: None





- HEENT


Head: Normocephalic, Atraumatic


Eyes: Normal


Pupils: PERRL


Mucous membranes: Dry





- Respiratory


Respiratory status: No respiratory distress


Chest status: Nontender


Breath sounds: Normal


Chest palpation: Normal





- Cardiovascular


Rhythm: Regular


Heart sounds: Normal auscultation


Murmur: No





- Abdominal


Inspection: Normal


Distension: No distension


Bowel sounds: Normal


Tenderness: Nontender


Organomegaly: No organomegaly





- Genitourinary


Inspection: Normal





- Back


Back: Normal





- Extremities


General upper extremity: Edema


General lower extremity: Edema





- Neurological


Cognition: Confused


Orientation: Disoriented to events


[place]sie Coma Scale Eye Opening: Spontaneous


Chelsie Coma Scale Verbal: Confused


[place] Coma Scale Motor: Obeys Commands


Chelsie Coma Scale Total: 14





- Skin


Skin Temperature: Warm


Skin Moisture: Dry


Skin Color: Normal


Skin irregularity: Decubitus ulcer - Stage I measuring approximately 1 cm, no 

drainage, no swelling, mild erythema





Course





- Re-evaluation


Re-evalutation: 





12/07/18 22:41


Patient with complaint of buttock/coccyx pain, he does have a new stage I 

sacral decubitus ulcer, there is mild tenderness when palpating this area, 

there is no drainage or surrounding erythema, we did do basic labs on patient 

and he is noted to have a calcium of 5.8 which is slightly lower than his 

baseline of 6.2 on previous admission, his BNP is also elevated, he is noted to 

have edema in upper and lower extremities, chest x-ray does show some evidence 

of vascular congestion, however he is satting 99 on room air and has no 

complaints of shortness of breath, he remains in atrial fibrillation which 

appears to be rate controlled in the 80-90 range, urinalysis shows no signs of 

an fraction, chest x-ray shows no signs of an infection and patient is afebrile 

with otherwise stable vital signs, therefore patient will be given 2 A of IV 

calcium gluconate, patient was discussed with the hospitalist who knew this 

patient quite well from previous admissions, and is agreeable to admitting for 

further evaluation and treatment


12/07/18 23:03








- Vital Signs


Vital signs: 


 











Temp Pulse Resp BP Pulse Ox


 


 97.6 F      18   110/53 L  98 


 


 12/07/18 20:27     12/07/18 22:57  12/07/18 22:57  12/07/18 22:57














- Laboratory


Result Diagrams: 


 12/07/18 21:35





 12/07/18 21:35


Laboratory results interpreted by me: 


 











  12/07/18 12/07/18 12/07/18





  21:09 21:35 21:35


 


WBC   11.9 H 


 


RBC   3.51 L 


 


Hgb   8.9 L 


 


Hct   26.9 L 


 


MCV   77 L 


 


MCH   25.5 L 


 


RDW   19.2 H 


 


Seg Neuts % (Manual)   87 H 


 


Lymphocytes % (Manual)   3 L 


 


Abs Neuts (Manual)   10.4 H 


 


Abs Lymphs (Manual)   0.4 L 


 


Chloride    108 H


 


Carbon Dioxide    17 L


 


BUN    34 H


 


Glucose    177 H


 


Calcium    5.8 L*


 


Direct Bilirubin    0.6 H


 


AST    235 H


 


Alkaline Phosphatase    645 H


 


NT-Pro-B Natriuret Pep   


 


Total Protein    6.1 L


 


Albumin    2.8 L


 


Urine Protein  100 H  


 


Urine Blood  SMALL H  


 


Ur Leukocyte Esterase  SMALL H  














  12/07/18





  21:35


 


WBC 


 


RBC 


 


Hgb 


 


Hct 


 


MCV 


 


MCH 


 


RDW 


 


Seg Neuts % (Manual) 


 


Lymphocytes % (Manual) 


 


Abs Neuts (Manual) 


 


Abs Lymphs (Manual) 


 


Chloride 


 


Carbon Dioxide 


 


BUN 


 


Glucose 


 


Calcium 


 


Direct Bilirubin 


 


AST 


 


Alkaline Phosphatase 


 


NT-Pro-B Natriuret Pep  27429 H


 


Total Protein 


 


Albumin 


 


Urine Protein 


 


Urine Blood 


 


Ur Leukocyte Esterase 














- Diagnostic Test


Radiology reviewed: Image reviewed, Reports reviewed





- EKG Interpretation by Me


Rate: Normal


Rhythm: A.Fib


When compared to previous EKG there are: No significant change





Procedures





- Additional Procedures


  ** IV insertion


Time performed: 21:28


Additional Procedures: IV insertion - 20-gauge IV catheter placed in right 

antecubital space using ultrasound guidance





Discharge





- Discharge


Clinical Impression: 


 Decubitus ulcer of sacral region, stage 1, Hypocalcemia





Congestive heart failure


Qualifiers:


 Heart failure type: unspecified Heart failure chronicity: chronic Qualified 

Code(s): I50.9 - Heart failure, unspecified





Condition: Fair


Disposition: ADMITTED AS INPATIENT


Admitting Provider: Hospitalist


Unit Admitted: Telemetry


Referrals: 


CARLOS JACOB MD [Primary Care Provider] - Follow up as needed

## 2018-12-07 NOTE — RADIOLOGY REPORT (SQ)
EXAM DESCRIPTION:

XR CHEST 1 VIEW



COMPLETED DATE/TME:  12/07/2018 22:18



CLINICAL HISTORY:

72 years Male, chf



COMPARISON:

11/24/18



NUMBER OF VIEWS/TECHNIQUE:

1/AP 



FINDINGS:



Bilateral lower thoracic opacity/effusion. Normal cardiac

silhouette size.  No pneumothorax. Stable bony thorax.



IMPRESSION:

Increased bilateral lower thoracic opacity/effusion.

## 2018-12-07 NOTE — RADIOLOGY REPORT (SQ)
EXAM DESCRIPTION: 



XR SACRUM COCCYX 2 OR MORE VIEWS



COMPLETED DATE/TME:  12/07/2018 20:24



CLINICAL HISTORY: 



72 years, Male, pain



COMPARISON:

None.



NUMBER OF VIEWS:





TECHNIQUE:





LIMITATIONS:

None.



FINDINGS:



No evidence of fracture involving the sacrum and coccyx.



There are degenerative changes in the lower lumbar spine.



IMPRESSION:



No fracture. 



copyright 2011 [employer]- All Rights Reserved

## 2018-12-08 LAB
%HYPO/RBC NFR BLD AUTO: (no result) %
ABSOLUTE LYMPHOCYTES# (MANUAL): 0.5 10^3/UL (ref 0.5–4.7)
ABSOLUTE MONOCYTES # (MANUAL): 0.6 10^3/UL (ref 0.1–1.4)
ABSOLUTE NEUTROPHILS# (MANUAL): 8.7 10^3/UL (ref 1.7–8.2)
ADD MANUAL DIFF: YES
ANION GAP SERPL CALC-SCNC: 13 MMOL/L (ref 5–19)
ANISOCYTOSIS BLD QL SMEAR: (no result)
BASOPHILS NFR BLD MANUAL: 0 % (ref 0–2)
BUN SERPL-MCNC: 35 MG/DL (ref 7–20)
CALCIUM: 6.1 MG/DL (ref 8.4–10.2)
CHLORIDE SERPL-SCNC: 111 MMOL/L (ref 98–107)
CO2 SERPL-SCNC: 19 MMOL/L (ref 22–30)
DACRYOCYTES BLD QL SMEAR: SLIGHT
EOSINOPHIL NFR BLD MANUAL: 0 % (ref 0–6)
ERYTHROCYTE [DISTWIDTH] IN BLOOD BY AUTOMATED COUNT: 18.7 % (ref 11.5–14)
GLUCOSE SERPL-MCNC: 168 MG/DL (ref 75–110)
HCT VFR BLD CALC: 23.9 % (ref 37.9–51)
HGB BLD-MCNC: 8.2 G/DL (ref 13.5–17)
MCH RBC QN AUTO: 25.7 PG (ref 27–33.4)
MCHC RBC AUTO-ENTMCNC: 34.2 G/DL (ref 32–36)
MCV RBC AUTO: 75 FL (ref 80–97)
MONOCYTES % (MANUAL): 6 % (ref 3–13)
OVALOCYTES BLD QL SMEAR: (no result)
PHOSPHATE SERPL-MCNC: 3.8 MG/DL (ref 2.5–4.5)
PLATELET # BLD: 168 10^3/UL (ref 150–450)
PLATELET COMMENT: ADEQUATE
PLATELET GIANT: PRESENT
POIKILOCYTOSIS BLD QL SMEAR: (no result)
POTASSIUM SERPL-SCNC: 3.3 MMOL/L (ref 3.6–5)
RBC # BLD AUTO: 3.17 10^6/UL (ref 4.35–5.55)
SEGMENTED NEUTROPHILS % (MAN): 89 % (ref 42–78)
SODIUM SERPL-SCNC: 143.3 MMOL/L (ref 137–145)
TARGETS BLD QL SMEAR: SLIGHT
TOTAL CELLS COUNTED BLD: 100
VARIANT LYMPHS NFR BLD MANUAL: 5 % (ref 13–45)
WBC # BLD AUTO: 9.8 10^3/UL (ref 4–10.5)

## 2018-12-08 RX ADMIN — RISPERIDONE PRN MG: 0.25 TABLET ORAL at 14:34

## 2018-12-08 RX ADMIN — BUSPIRONE HYDROCHLORIDE SCH MG: 10 TABLET ORAL at 18:20

## 2018-12-08 RX ADMIN — ATORVASTATIN CALCIUM SCH MG: 10 TABLET, FILM COATED ORAL at 23:34

## 2018-12-08 RX ADMIN — MEGESTROL ACETATE SCH MG: 40 SUSPENSION ORAL at 10:53

## 2018-12-08 RX ADMIN — LEVETIRACETAM SCH MG: 500 TABLET ORAL at 10:52

## 2018-12-08 RX ADMIN — APIXABAN SCH MG: 2.5 TABLET, FILM COATED ORAL at 10:53

## 2018-12-08 RX ADMIN — HYDRALAZINE HYDROCHLORIDE SCH MG: 25 TABLET, FILM COATED ORAL at 18:20

## 2018-12-08 RX ADMIN — VALPROIC ACID SCH MG: 250 SOLUTION ORAL at 13:21

## 2018-12-08 RX ADMIN — ACETAMINOPHEN PRN MG: 325 TABLET ORAL at 05:14

## 2018-12-08 RX ADMIN — HYDRALAZINE HYDROCHLORIDE SCH: 25 TABLET, FILM COATED ORAL at 00:07

## 2018-12-08 RX ADMIN — TAMSULOSIN HYDROCHLORIDE SCH MG: 0.4 CAPSULE ORAL at 23:34

## 2018-12-08 RX ADMIN — VITAMIN D, TAB 1000IU (100/BT) SCH UNIT: 25 TAB at 10:53

## 2018-12-08 RX ADMIN — VALPROIC ACID SCH MG: 250 SOLUTION ORAL at 10:53

## 2018-12-08 RX ADMIN — VALPROIC ACID SCH MG: 250 SOLUTION ORAL at 23:34

## 2018-12-08 RX ADMIN — APIXABAN SCH MG: 2.5 TABLET, FILM COATED ORAL at 18:20

## 2018-12-08 RX ADMIN — LEVETIRACETAM SCH MG: 500 TABLET ORAL at 23:34

## 2018-12-08 RX ADMIN — HYDRALAZINE HYDROCHLORIDE SCH MG: 25 TABLET, FILM COATED ORAL at 05:14

## 2018-12-08 RX ADMIN — HYDRALAZINE HYDROCHLORIDE SCH MG: 25 TABLET, FILM COATED ORAL at 23:35

## 2018-12-08 RX ADMIN — BUSPIRONE HYDROCHLORIDE SCH MG: 10 TABLET ORAL at 10:52

## 2018-12-08 RX ADMIN — RISPERIDONE PRN MG: 0.25 TABLET ORAL at 23:39

## 2018-12-08 RX ADMIN — ACETAMINOPHEN PRN MG: 325 TABLET ORAL at 14:33

## 2018-12-08 RX ADMIN — VALPROIC ACID SCH MG: 250 SOLUTION ORAL at 18:20

## 2018-12-08 RX ADMIN — HYDRALAZINE HYDROCHLORIDE SCH MG: 25 TABLET, FILM COATED ORAL at 12:59

## 2018-12-08 NOTE — PDOC H&P
History of Present Illness


Admission Date/PCP: 


  12/07/18 23:30





  CARLOS JACOB MD





Patient complains of: Sacral pain


History of Present Illness: 


SULMA MURPHY is a 72 year old male resident of assisted living with a past 

medical history of prostate cancer with bone metastasis, type 2 diabetes, stage 

III chronic kidney disease, congestive heart failure, hypertension, malnutrition

, hypocalcemia and seizure.  Patient presents 24 hours after discharge with 

complaints of sacral pain.  He is found to have a 1 cm stage II decubitus of 

the sacrum, workup reveals hypocalcemia, malnutrition, metabolic acidosis and 

congestive heart failure.  He receives IV calcium and referred to the 

hospitalist for admission.  Patient is a poor historian and unable to provide 

history and subsequently obtained by the medical record.





Past Medical History


Cardiac Medical History: Reports: Congestive Heart Failure, Hypertension


   Denies: Myocardial Infarction


Pulmonary Medical History: Reports: Pneumonia


   Denies: Asthma


Neurological Medical History: 


   Denies: Seizures


Endocrine Medical History: Reports: Diabetes Mellitus Type 1


Malignancy Medical History: Reports: Other - Prostate cancer with bony 

metastases


GI Medical History: 


   Denies: Hepatitis, Hiatal Hernia


Musculoskeltal Medical History: Reports: Gout


Psychiatric Medical History: Reports: Depression


Hematology: Reports: Anemia


   Denies: Sickle Cell Disease





Past Surgical History


Past Surgical History: Reports: Orthopedic Surgery - lt knee replacement, 

Tonsillectomy, Other - Prostate biopsy, fiducial placement


   Denies: Pacemaker





Social History


Information Source: Emergency Med Personnel, [facility] Records


Lives with: Nursing Home


Smoking Status: Unknown if Ever Smoked


Frequency of Alcohol Use: None


Hx Recreational Drug Use: No


Drugs: None


Hx Prescription Drug Abuse: No





- Advance Directive


Resuscitation Status: Full Code





Family History


Family History: None - Unobtainable secondary to delirium, Other


Parental Family History Reviewed: No


Children Family History Reviewed: No


Sibling(s) Family History Reviewed.: No





Medication/Allergy


Home Medications: 








Allopurinol [Zyloprim 300 mg Tablet] 300 mg PO DAILY 11/08/18 


Atorvastatin Calcium [Lipitor 10 mg Tablet] 10 mg PO QHS 11/08/18 


Latanoprost/Pf [Latanoprost 0.005% Eye Drop] 1 drop OS QHS 11/08/18 


Tamsulosin HCl [Flomax 0.4 mg Cap.sr] 0.4 mg PO QHS 11/08/18 


Acetaminophen [Tylenol 325 mg Tablet] 650 mg PO Q4HP PRN  tablet 11/15/18 


Amlodipine Besylate [Norvasc 10 mg Tablet] 10 mg PO DAILY  tablet 11/15/18 


Bicalutamide [Casodex 50 mg Tablet] 50 mg PO DAILY  tablet 11/15/18 


Acetaminophen [Tylenol 325 mg Tablet] 650 mg PO Q6HP PRN  tablet 12/05/18 


Apixaban [Eliquis 2.5 mg Tablet] 2.5 mg PO BID  tablet 12/05/18 


Buspirone HCl [Buspar 10 mg Tablet] 10 mg PO BID  tablet 12/05/18 


Calcium Carbonate/Vitamin D3 [Os-Junior 250 mg with Vitamin D 125 Units] 1 tab PO 

BID  tablet 12/05/18 


Hydralazine HCl [Apresoline 25 mg Tablet] 25 mg PO Q6  tablet 12/05/18 


Loperamide HCl [Imodium 2 mg Capsule] 2 mg PO Q4HP PRN  capsule 12/05/18 


Megestrol Acetate [Megace Peggy 400 mg/10 ml Udcup] 400 mg PO DAILY  udc 12/05/ 18 


Multivitamins W-Iron [Flintstones Chewable Multivit W/Fe Tab] 2 tab PO DAILY  

tab.chew 12/05/18 


Potassium Chloride [Kaon-Cl 20 Meq/15 ml Udcup] 10 meq PO Q12  udc 12/05/18 


Risperidone [Risperdal 0.25 mg Tablet] 0.25 mg PO BIDP PRN  tablet 12/05/18 


Valproate Sodium [Depakene Syrup 250 mg/5 ml Udcup] 125 mg PO QID  udc 12/05/18 


Glimepiride [Amaryl 1 mg Tablet] 1 mg PO QAM  tablet 12/06/18 


Levetiracetam [Keppra 500 mg Tablet] 500 mg PO Q12  tablet 12/06/18 








Allergies/Adverse Reactions: 


 





No Known Allergies Allergy (Verified 12/07/18 20:45)


 











Review of Systems


ROS unobtainable: Due to mental status





Physical Exam


Vital Signs: 


 











Temp Pulse Resp BP Pulse Ox


 


 97.5 F   64   20   119/59 L  100 


 


 12/08/18 04:00  12/08/18 04:00  12/08/18 04:00  12/08/18 04:00  12/08/18 04:00








 Intake & Output











 12/06/18 12/07/18 12/08/18





 11:59 11:59 11:59


 


Weight   74.5 kg











General appearance: PRESENT: no acute distress, cooperative, mild distress, 

thin.  ABSENT: disheveled


Head exam: PRESENT: atraumatic, normocephalic


Eye exam: PRESENT: conjunctiva pink, EOMI, PERRLA.  ABSENT: scleral icterus


Ear exam: PRESENT: normal external ear exam


Mouth exam: PRESENT: moist, tongue midline.  ABSENT: dry mucosa


Neck exam: ABSENT: carotid bruit, JVD, lymphadenopathy, thyromegaly


Respiratory exam: PRESENT: crackles, prolonged expiratory phas, symmetrical.  

ABSENT: rhonchi, stridor


Cardiovascular exam: PRESENT: RRR.  ABSENT: diastolic murmur, rubs, systolic 

murmur


Pulses: PRESENT: normal dorsalis pedis pul


Vascular exam: PRESENT: normal capillary refill


GI/Abdominal exam: PRESENT: normal bowel sounds, soft.  ABSENT: distended, 

guarding, mass, organolmegaly, rebound, tenderness


Rectal exam: PRESENT: deferred


Extremities exam: PRESENT: full ROM.  ABSENT: calf tenderness, clubbing, pedal 

edema


Musculoskeletal exam: PRESENT: other - Global muscular atrophy


Neurological exam: PRESENT: altered, oriented to person, CN II-XII grossly 

intact


Psychiatric exam: PRESENT: unusual affect


Skin exam: PRESENT: dry, warm.  ABSENT: cyanosis, intact - 1 cm stage II sacral 

decubiti, rash


Adult Front & Back Image: 


  __________________________














  __________________________





 1 - 1*1 cm stage II sacral decubiti








Results


Laboratory Results: 


 











  12/07/18





  23:59


 


Ionized Calcium Marquis  0.90 L











Impressions: 


 





Sacrum and Coccyx X-Ray  12/07/18 20:24


IMPRESSION:


 


No fracture. 


 


copyright 2011 [employer]- All Rights Reserved


 








Chest X-Ray  12/07/18 22:18


IMPRESSION:


Increased bilateral lower thoracic opacity/effusion. 


 














Assessment & Plan





- Diagnosis


(1) Hypocalcemia


Is this a current diagnosis for this admission?: Yes   


Plan: 


Unclear cause follow-up ionized calcium and workup, likely vitamin D deficiency

, calcium gluconate and vitamin D replacement ordered.  Follow-up chemistry








(2) Vitamin D deficiency


Is this a current diagnosis for this admission?: Yes   


Plan: 


Empiric replacement, follow-up vitamin the 25 hydroxy level








(3) Congestive heart failure


Qualifiers: 


   Heart failure type: unspecified   Heart failure chronicity: chronic   

Qualified Code(s): I50.9 - Heart failure, unspecified   


Is this a current diagnosis for this admission?: Yes   


Plan: 


Complicated by malnutrition, hypocalcemia, hypoalbuminemia and prostate cancer.

  Diuresis as tolerated








(4) Decubitus ulcer of sacral region, stage 1


Is this a current diagnosis for this admission?: Yes   


Plan: 


Specialty bed, repositioning








(5) Abnormal LFTs


Is this a current diagnosis for this admission?: Yes   


Plan: 


Prostate cancer with liver metastasis.  Avoid hepatotoxic meds








(6) Altered mental status


Qualifiers: 


   Altered mental status type: delirium   Qualified Code(s): R41.0 - 

Disorientation, unspecified   


Is this a current diagnosis for this admission?: Yes   


Plan: 


Underlying depression and hypocalcemia hopefully improves with calcium 

resuscitation.








(7) CKD (chronic kidney disease) stage 3, GFR 30-59 ml/min


Is this a current diagnosis for this admission?: Yes   


Plan: 


Likely secondary to poor GFR with intravascular depletion and low oncotic state

, avoid nephrotoxic meds and doses chemistry








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


Is this a current diagnosis for this admission?: Yes   


Plan: 


Outpatient regiment with Humalog sliding scale ordered








(9) Failure to thrive


Is this a current diagnosis for this admission?: Yes   


Plan: 


Strongly consider readdressing CODE STATUS and consideration of hospice when 

mental status improves versus consult power of  given recurrent recent 

hospitalizations.








- Time


Time Spent: 50 to 70 Minutes





- Inpatient Certification


Medical Necessity: Need Close Monitoring Due to Risk of Patient Decompensation

## 2018-12-09 LAB
%HYPO/RBC NFR BLD AUTO: (no result) %
ABSOLUTE LYMPHOCYTES# (MANUAL): 1.3 10^3/UL (ref 0.5–4.7)
ABSOLUTE MONOCYTES # (MANUAL): 0.6 10^3/UL (ref 0.1–1.4)
ABSOLUTE NEUTROPHILS# (MANUAL): 8.9 10^3/UL (ref 1.7–8.2)
ADD MANUAL DIFF: YES
ANION GAP SERPL CALC-SCNC: 15 MMOL/L (ref 5–19)
ANISOCYTOSIS BLD QL SMEAR: (no result)
BASOPHILS NFR BLD MANUAL: 0 % (ref 0–2)
BUN SERPL-MCNC: 37 MG/DL (ref 7–20)
CALCIUM: 6.4 MG/DL (ref 8.4–10.2)
CHLORIDE SERPL-SCNC: 110 MMOL/L (ref 98–107)
CK MB SERPL-MCNC: 0.64 NG/ML (ref ?–4.55)
CK MB SERPL-MCNC: 0.77 NG/ML (ref ?–4.55)
CO2 SERPL-SCNC: 18 MMOL/L (ref 22–30)
DACRYOCYTES BLD QL SMEAR: SLIGHT
EOSINOPHIL NFR BLD MANUAL: 0 % (ref 0–6)
ERYTHROCYTE [DISTWIDTH] IN BLOOD BY AUTOMATED COUNT: 19.2 % (ref 11.5–14)
GLUCOSE SERPL-MCNC: 172 MG/DL (ref 75–110)
HCT VFR BLD CALC: 26.1 % (ref 37.9–51)
HGB BLD-MCNC: 8.8 G/DL (ref 13.5–17)
MCH RBC QN AUTO: 25.2 PG (ref 27–33.4)
MCHC RBC AUTO-ENTMCNC: 33.6 G/DL (ref 32–36)
MCV RBC AUTO: 75 FL (ref 80–97)
MONOCYTES % (MANUAL): 6 % (ref 3–13)
OVALOCYTES BLD QL SMEAR: (no result)
PLATELET # BLD: 203 10^3/UL (ref 150–450)
PLATELET COMMENT: ADEQUATE
POIKILOCYTOSIS BLD QL SMEAR: (no result)
POLYCHROMASIA BLD QL SMEAR: SLIGHT
POTASSIUM SERPL-SCNC: 3.3 MMOL/L (ref 3.6–5)
RBC # BLD AUTO: 3.49 10^6/UL (ref 4.35–5.55)
SEGMENTED NEUTROPHILS % (MAN): 82 % (ref 42–78)
SODIUM SERPL-SCNC: 142.8 MMOL/L (ref 137–145)
TOTAL CELLS COUNTED BLD: 100
TROPONIN I SERPL-MCNC: 0.01 NG/ML
TROPONIN I SERPL-MCNC: 0.02 NG/ML
VARIANT LYMPHS NFR BLD MANUAL: 12 % (ref 13–45)
WBC # BLD AUTO: 10.8 10^3/UL (ref 4–10.5)

## 2018-12-09 RX ADMIN — TAMSULOSIN HYDROCHLORIDE SCH MG: 0.4 CAPSULE ORAL at 23:14

## 2018-12-09 RX ADMIN — BUSPIRONE HYDROCHLORIDE SCH MG: 10 TABLET ORAL at 11:33

## 2018-12-09 RX ADMIN — ACETAMINOPHEN PRN MG: 325 TABLET ORAL at 13:02

## 2018-12-09 RX ADMIN — CALCITRIOL SCH MCG: 0.25 CAPSULE, LIQUID FILLED ORAL at 12:10

## 2018-12-09 RX ADMIN — APIXABAN SCH MG: 2.5 TABLET, FILM COATED ORAL at 18:34

## 2018-12-09 RX ADMIN — VALPROIC ACID SCH MG: 250 SOLUTION ORAL at 14:27

## 2018-12-09 RX ADMIN — VALPROIC ACID SCH MG: 250 SOLUTION ORAL at 11:34

## 2018-12-09 RX ADMIN — VITAMIN D, TAB 1000IU (100/BT) SCH UNIT: 25 TAB at 11:33

## 2018-12-09 RX ADMIN — BUSPIRONE HYDROCHLORIDE SCH MG: 10 TABLET ORAL at 18:34

## 2018-12-09 RX ADMIN — HYDRALAZINE HYDROCHLORIDE SCH MG: 25 TABLET, FILM COATED ORAL at 11:34

## 2018-12-09 RX ADMIN — ATORVASTATIN CALCIUM SCH MG: 10 TABLET, FILM COATED ORAL at 23:14

## 2018-12-09 RX ADMIN — CALCIUM SCH MG: 500 TABLET ORAL at 18:33

## 2018-12-09 RX ADMIN — APIXABAN SCH MG: 2.5 TABLET, FILM COATED ORAL at 11:33

## 2018-12-09 RX ADMIN — RISPERIDONE PRN MG: 0.25 TABLET ORAL at 11:33

## 2018-12-09 RX ADMIN — HYDRALAZINE HYDROCHLORIDE SCH MG: 25 TABLET, FILM COATED ORAL at 05:09

## 2018-12-09 RX ADMIN — INSULIN LISPRO PRN UNIT: 100 INJECTION, SOLUTION INTRAVENOUS; SUBCUTANEOUS at 19:06

## 2018-12-09 RX ADMIN — HYDRALAZINE HYDROCHLORIDE SCH MG: 25 TABLET, FILM COATED ORAL at 18:34

## 2018-12-09 RX ADMIN — VALPROIC ACID SCH MG: 250 SOLUTION ORAL at 23:15

## 2018-12-09 RX ADMIN — MEGESTROL ACETATE SCH MG: 40 SUSPENSION ORAL at 11:33

## 2018-12-09 RX ADMIN — VALPROIC ACID SCH MG: 250 SOLUTION ORAL at 18:33

## 2018-12-09 RX ADMIN — LEVETIRACETAM SCH MG: 500 TABLET ORAL at 23:14

## 2018-12-09 RX ADMIN — LEVETIRACETAM SCH MG: 500 TABLET ORAL at 11:34

## 2018-12-09 RX ADMIN — INSULIN LISPRO PRN UNIT: 100 INJECTION, SOLUTION INTRAVENOUS; SUBCUTANEOUS at 13:40

## 2018-12-09 RX ADMIN — POTASSIUM CHLORIDE SCH MEQ: 750 TABLET, FILM COATED, EXTENDED RELEASE ORAL at 11:33

## 2018-12-09 NOTE — RADIOLOGY REPORT (SQ)
EXAM DESCRIPTION:  CHEST SINGLE VIEW



COMPLETED DATE/TIME:  12/9/2018 4:15 pm



REASON FOR STUDY:  SOB



COMPARISON:  12/7/2018



EXAM PARAMETERS:  NUMBER OF VIEWS: One view.

TECHNIQUE: Single frontal radiographic view of the chest acquired.

RADIATION DOSE: NA

LIMITATIONS: None.



FINDINGS:  LUNGS AND PLEURA: Basilar opacities.  Pleural effusions.  Slightly worse.

MEDIASTINUM AND HILAR STRUCTURES: No masses.  Contour normal.

HEART AND VASCULAR STRUCTURES: Heart enlarged.  Vascular congestion.

BONES: No acute findings.

HARDWARE: None in the chest.

OTHER: No other significant finding.



IMPRESSION:  Vascular congestion.  Increasing basilar opacities and pleural effusions.



TECHNICAL DOCUMENTATION:  JOB ID:  [id number]

 2011 [employer]- All Rights Reserved



Reading location - IP/workstation name: [email or web address]

## 2018-12-09 NOTE — EKG REPORT
SEVERITY:- ABNORMAL ECG -

ATRIAL FIBRILLATION

BORDERLINE LEFT AXIS DEVIATION

LOW VOLTAGE IN FRONTAL LEADS

:

Confirmed by: Nancy Gonzalez 09-Dec-2018 22:20:11

## 2018-12-09 NOTE — EKG REPORT
SEVERITY:- ABNORMAL ECG -

ATRIAL FIBRILLATION

LOW VOLTAGE IN FRONTAL LEADS

:

Confirmed by: Nancy Gonzalez 09-Dec-2018 10:51:38

## 2018-12-09 NOTE — PDOC PROGRESS REPORT
Subjective


Progress Note for:: 12/09/18


Subjective:: 





The patient is feeling better today


Reason For Visit: 


HYPOCALCEMIA, VIT D DEF, MALNUTRITION ACIDOSIS








Physical Exam


Vital Signs: 


 











Temp Pulse Resp BP Pulse Ox


 


 98.5 F   101 H  14   128/49 H  98 


 


 12/09/18 07:23  12/09/18 08:42  12/09/18 08:42  12/09/18 07:23  12/09/18 08:42








 Intake & Output











 12/08/18 12/09/18 12/10/18





 06:59 06:59 06:59


 


Intake Total 222 840 


 


Output Total  110 


 


Balance 222 730 


 


Weight 74.5 kg 74.9 kg 











General appearance: PRESENT: no acute distress


Head exam: PRESENT: atraumatic, normocephalic


Eye exam: PRESENT: PERRLA.  ABSENT: conjunctival injection, scleral icterus


Ear exam: PRESENT: normal external ear exam


Mouth exam: PRESENT: moist


Respiratory exam: PRESENT: clear to auscultation venessa, symmetrical


Cardiovascular exam: PRESENT: RRR, +S1, +S2


GI/Abdominal exam: PRESENT: normal bowel sounds, soft.  ABSENT: tenderness


Extremities exam: ABSENT: pedal edema





Results


Laboratory Results: 


 





 12/08/18 06:58 





 12/09/18 05:15 





 











  12/09/18





  05:15


 


Sodium  142.8


 


Potassium  3.3 L


 


Chloride  110 H


 


Carbon Dioxide  18 L


 


Anion Gap  15


 


BUN  37 H


 


Creatinine  1.18


 


Est GFR ( Amer)  > 60


 


Est GFR (Non-Af Amer)  > 60


 


Glucose  172 H


 


Calcium  6.4 L*











Impressions: 


 





Sacrum and Coccyx X-Ray  12/07/18 20:24


IMPRESSION:


 


No fracture. 


 


copyright 2011 [employer]- All Rights Reserved


 








Chest X-Ray  12/07/18 22:18


IMPRESSION:


Increased bilateral lower thoracic opacity/effusion. 


 














Assessment & Plan





- Diagnosis


(1) Acute kidney injury


Is this a current diagnosis for this admission?: Yes   


Plan: 


Acute kidney injury resolved








(2) Congestive heart failure


Qualifiers: 


   Heart failure type: unspecified   Heart failure chronicity: chronic 


Is this a current diagnosis for this admission?: Yes   





(3) Hypocalcemia


Is this a current diagnosis for this admission?: Yes   


Plan: 


Receive a calcium gluconate IV this morning and was started on calcium 

carbonate 1 g twice a day .will replace vitamin D








(4) Vitamin D deficiency


Is this a current diagnosis for this admission?: Yes   


Plan: 


Started on ergocalciferol, as well as cholecalciferol , will start him on 

calcitriol today because PTH was high








(5) Abnormal LFTs


Is this a current diagnosis for this admission?: Yes

## 2018-12-10 LAB
ALBUMIN SERPL-MCNC: 2.4 G/DL (ref 3.5–5)
ANION GAP SERPL CALC-SCNC: 14 MMOL/L (ref 5–19)
ANION GAP SERPL CALC-SCNC: 14 MMOL/L (ref 5–19)
BUN SERPL-MCNC: 38 MG/DL (ref 7–20)
BUN SERPL-MCNC: 38 MG/DL (ref 7–20)
CALCIUM: 6.5 MG/DL (ref 8.4–10.2)
CALCIUM: 6.5 MG/DL (ref 8.4–10.2)
CHLORIDE SERPL-SCNC: 112 MMOL/L (ref 98–107)
CHLORIDE SERPL-SCNC: 112 MMOL/L (ref 98–107)
CK MB SERPL-MCNC: 0.59 NG/ML (ref ?–4.55)
CK SERPL-CCNC: 756 U/L (ref 55–170)
CO2 SERPL-SCNC: 18 MMOL/L (ref 22–30)
CO2 SERPL-SCNC: 18 MMOL/L (ref 22–30)
GLUCOSE SERPL-MCNC: 129 MG/DL (ref 75–110)
GLUCOSE SERPL-MCNC: 129 MG/DL (ref 75–110)
PHOSPHATE SERPL-MCNC: 3.3 MG/DL (ref 2.5–4.5)
POTASSIUM SERPL-SCNC: 3.9 MMOL/L (ref 3.6–5)
POTASSIUM SERPL-SCNC: 3.9 MMOL/L (ref 3.6–5)
SODIUM SERPL-SCNC: 144.1 MMOL/L (ref 137–145)
SODIUM SERPL-SCNC: 144.1 MMOL/L (ref 137–145)
TROPONIN I SERPL-MCNC: 0.02 NG/ML

## 2018-12-10 RX ADMIN — DILTIAZEM HYDROCHLORIDE SCH MG: 30 TABLET, FILM COATED ORAL at 12:46

## 2018-12-10 RX ADMIN — VITAMIN D, TAB 1000IU (100/BT) SCH UNIT: 25 TAB at 12:41

## 2018-12-10 RX ADMIN — LEVETIRACETAM SCH MG: 100 SOLUTION ORAL at 15:00

## 2018-12-10 RX ADMIN — MEGESTROL ACETATE SCH MG: 40 SUSPENSION ORAL at 12:40

## 2018-12-10 RX ADMIN — Medication SCH MG: at 21:13

## 2018-12-10 RX ADMIN — INSULIN LISPRO PRN UNIT: 100 INJECTION, SOLUTION INTRAVENOUS; SUBCUTANEOUS at 18:35

## 2018-12-10 RX ADMIN — ATORVASTATIN CALCIUM SCH MG: 10 TABLET, FILM COATED ORAL at 21:04

## 2018-12-10 RX ADMIN — CALCIUM SCH MG: 500 TABLET ORAL at 18:30

## 2018-12-10 RX ADMIN — BUSPIRONE HYDROCHLORIDE SCH MG: 10 TABLET ORAL at 12:42

## 2018-12-10 RX ADMIN — DILTIAZEM HYDROCHLORIDE SCH MG: 30 TABLET, FILM COATED ORAL at 18:34

## 2018-12-10 RX ADMIN — CALCITRIOL SCH MCG: 0.25 CAPSULE, LIQUID FILLED ORAL at 12:39

## 2018-12-10 RX ADMIN — HYDRALAZINE HYDROCHLORIDE SCH MG: 25 TABLET, FILM COATED ORAL at 18:29

## 2018-12-10 RX ADMIN — LEVETIRACETAM SCH MG: 100 SOLUTION ORAL at 21:05

## 2018-12-10 RX ADMIN — VALPROIC ACID SCH MG: 250 SOLUTION ORAL at 18:31

## 2018-12-10 RX ADMIN — CALCIUM SCH MG: 500 TABLET ORAL at 12:37

## 2018-12-10 RX ADMIN — POTASSIUM CHLORIDE SCH MEQ: 1.5 SOLUTION ORAL at 15:01

## 2018-12-10 RX ADMIN — APIXABAN SCH MG: 2.5 TABLET, FILM COATED ORAL at 18:30

## 2018-12-10 RX ADMIN — LEVETIRACETAM SCH: 500 TABLET ORAL at 18:37

## 2018-12-10 RX ADMIN — VALPROIC ACID SCH MG: 250 SOLUTION ORAL at 12:32

## 2018-12-10 RX ADMIN — HYDRALAZINE HYDROCHLORIDE SCH MG: 25 TABLET, FILM COATED ORAL at 12:43

## 2018-12-10 RX ADMIN — VALPROIC ACID SCH MG: 250 SOLUTION ORAL at 21:05

## 2018-12-10 RX ADMIN — HYDRALAZINE HYDROCHLORIDE SCH MG: 25 TABLET, FILM COATED ORAL at 00:34

## 2018-12-10 RX ADMIN — HYDRALAZINE HYDROCHLORIDE SCH MG: 25 TABLET, FILM COATED ORAL at 23:55

## 2018-12-10 RX ADMIN — POTASSIUM CHLORIDE SCH: 750 TABLET, FILM COATED, EXTENDED RELEASE ORAL at 18:37

## 2018-12-10 RX ADMIN — TAMSULOSIN HYDROCHLORIDE SCH MG: 0.4 CAPSULE ORAL at 21:04

## 2018-12-10 RX ADMIN — HYDRALAZINE HYDROCHLORIDE SCH MG: 25 TABLET, FILM COATED ORAL at 05:45

## 2018-12-10 RX ADMIN — APIXABAN SCH MG: 2.5 TABLET, FILM COATED ORAL at 12:39

## 2018-12-10 RX ADMIN — VALPROIC ACID SCH MG: 250 SOLUTION ORAL at 14:59

## 2018-12-10 RX ADMIN — BUSPIRONE HYDROCHLORIDE SCH MG: 10 TABLET ORAL at 18:28

## 2018-12-10 NOTE — PDOC CONSULTATION
Consultation


Consult Date: 12/10/18


Consult reason:: hypocalcemia





History of Present Illness


Admission Date/PCP: 


  12/07/18 23:30





  CARLOS JACOB MD





History of Present Illness: 


SULMA MURPHY is a 72 year old male resident of assisted living with a past 

medical history of prostate cancer with bone metastasis, type 2 diabetes, stage 

III chronic kidney disease, congestive heart failure, hypertension, malnutrition

, hypocalcemia and seizure. Patient was brought to the ER 24 hours after being 

discharge from the hospital. He comes in complaining of a sacral pain and was 

found to have a 1 cm stage 22 decubitus ulcer in the sacrum. Labs were also 

drawn and a calcium workup reveals hypocalcemia of 6.6, albumin is 2.4.  He 

received IV calcium and IV lasix. 





While in the hospital an ionized calcium was draw that was 0.9, Vitamin D 

levels were low, magnesium was normal 





Upon examination today albumin was down to 2.4, which gives a corrected calcium 

of 7.8. Patient was altered at the time of examination. History was not able to 

be obtained.








Past Medical History


Cardiac Medical History: Reports: Hypertension-primary


   Denies: Myocardial Infarction


Pulmonary Medical History: Reports: Pneumonia


   Denies: Asthma


Neurological Medical History: 


   Denies: Seizures


Endocrine Medical History: Reports: Diabetes Mellitus Type 1


Renal/ Medical History: Reports: Chronic Kidney Disease Stage III


Malignancy Medical History: Reports: Other - Prostate cancer with bony 

metastases


GI Medical History: 


   Denies: Hepatitis, Hiatal Hernia


Musculoskeltal Medical History: Reports: Gout


Psychiatric Medical History: Reports: Depression





Past Surgical History


Past Surgical History: Reports: Orthopedic Surgery - lt knee replacement, 

Tonsillectomy, Other - Prostate biopsy, fiducial placement


   Denies: Pacemaker





Social History


Lives with: Nursing Home


Smoking Status: Unknown if Ever Smoked


Frequency of Alcohol Use: None


Hx Recreational Drug Use: No


Drugs: None


Hx Prescription Drug Abuse: No





- Advance Directive


Resuscitation Status: Full Code





Family History


Parental Family History Reviewed: No


Children Family History Reviewed: Unknown


Sibling(s) Family History Reviewed.: Unknown





Medication/Allergy


Home Medications: 








Allopurinol [Zyloprim 300 mg Tablet] 300 mg PO DAILY 12/08/18 


Atorvastatin Calcium [Lipitor 10 mg Tablet] 10 mg PO DAILY 12/08/18 


Cetirizine HCl [Zyrtec 10 mg Tablet] 10 mg PO DAILY 12/08/18 


Fluticasone Propionate [Flonase Nasal Spray 50 Mcg/Spray 16 gm] 1 spray NASL 

BID 12/08/18 


Hydralazine HCl [Apresoline 50 mg Tablet] 50 mg PO Q8 12/08/18 


Latanoprost [Xalatan 0.005% Oph Soln 2.5 ml] 1 drop OU QHS 12/08/18 


Lisinopril [Prinivil 40 mg Tablet] 40 mg PO QPM 12/08/18 


Tamsulosin HCl [Flomax 0.4 mg Cap.sr] 0.4 mg PO DAILY 12/08/18 








Allergies/Adverse Reactions: 


 





No Known Allergies Allergy (Verified 12/07/18 20:45)


 











Review of Systems


ROS unobtainable: Due to mental status


Cardiovascular: ABSENT: chest pain, dyspnea on exertion





Physical Exam


Vital Signs: 


 











Temp Pulse Resp BP Pulse Ox


 


 97.7 F   89   20   124/43 L  97 


 


 12/10/18 16:15  12/10/18 19:00  12/10/18 16:15  12/10/18 16:15  12/10/18 16:15








 Intake & Output











 12/09/18 12/10/18 12/11/18





 06:59 06:59 06:59


 


Intake Total 840 655 


 


Output Total 110  


 


Balance 730 655 


 


Weight 74.9 kg 75.9 kg 











General appearance: PRESENT: no acute distress.  ABSENT: well-developed, well-

nourished


Neck exam: PRESENT: full ROM.  ABSENT: JVD, tracheal deviation


Cardiovascular exam: PRESENT: irregular rhythm, +S1, +S2


GI/Abdominal exam: PRESENT: soft.  ABSENT: tenderness


Extremities exam: ABSENT: pedal edema, +1 edema, +2 edema


Neurological exam: PRESENT: altered, awake.  ABSENT: alert, oriented to person, 

oriented to place, oriented to time, oriented to situation


Skin exam: PRESENT: dry, intact, warm





Results


Laboratory Results: 


 





 12/09/18 11:22 





 12/10/18 03:39 





 











  12/10/18 12/10/18





  03:39 03:39


 


Sodium  144.1  144.1


 


Potassium  3.9  3.9


 


Chloride  112 H  112 H


 


Carbon Dioxide  18 L  18 L


 


Anion Gap  14  14


 


BUN  38 H  38 H


 


Creatinine  1.26 H  1.26 H


 


Est GFR ( Amer)  > 60  > 60


 


Est GFR (Non-Af Amer)  56 L  56 L


 


Glucose  129 H  129 H


 


Calcium  6.5 L*  6.5 L*


 


Phosphorus  3.3 


 


Magnesium   1.8


 


Albumin  2.4 L 








 











  12/09/18 12/09/18 12/09/18





  16:45 16:45 23:10


 


Creatine Kinase  728 H   706 H


 


CK-MB (CK-2)   0.77 


 


Troponin I   0.015 














  12/09/18 12/10/18 12/10/18





  23:10 03:39 03:39


 


Creatine Kinase   756 H 


 


CK-MB (CK-2)  0.64   0.59


 


Troponin I  0.018   0.019











Impressions: 


 





Sacrum and Coccyx X-Ray  12/07/18 20:24


IMPRESSION:


 


No fracture. 


 


copyright 2011 [employer]- All Rights Reserved


 








Chest X-Ray  12/09/18 00:00


IMPRESSION:  Vascular congestion.  Increasing basilar opacities and pleural 

effusions.


 














Assessment & Plan





- Diagnosis


(1) Hypocalcemia


Is this a current diagnosis for this admission?: Yes   


Plan: 


Possible causes of hypocalcemia include vitamin deficiency, malnutrition  and 

possible metabolic acidosis. Other concerns for osteoblastic metastases that 

could be causing hypocalcemia. PTH is also elevated, continue vitamin D 

supplements, calcium supplements, start sodium bicarbonate. Does not look to be 

due to hyperphosphotemia or low magnesium.








(2) Congestive heart failure


Qualifiers: 


   Heart failure type: unspecified   Heart failure chronicity: chronic   

Qualified Code(s): I50.9 - Heart failure, unspecified   


Is this a current diagnosis for this admission?: Yes   


Plan: 


looks to be stabilizing








(3) Decubitus ulcer of sacral region, stage 1


Is this a current diagnosis for this admission?: Yes   


Plan: 


per primary








(4) Metastatic malignant neoplasm to prostate


Is this a current diagnosis for this admission?: Yes   


Plan: 


per primary








(5) Vitamin D deficiency


Is this a current diagnosis for this admission?: Yes   


Plan: 


recently place on vitamin D supplements








(6) Anemia, chronic disease


Is this a current diagnosis for this admission?: Yes   


Plan: 


will reassess to see the underlining cause of anemia.








(7) CKD (chronic kidney disease) stage 3, GFR 30-59 ml/min


Is this a current diagnosis for this admission?: Yes   


Plan: 


currently at baseline.








(8) Atrial fibrillation


Qualifiers: 


   Atrial fibrillation type: chronic   Qualified Code(s): I48.2 - Chronic 

atrial fibrillation   


Is this a current diagnosis for this admission?: Yes   


Plan: 


per primary








(9) Failure to thrive


Is this a current diagnosis for this admission?: Yes   


Plan: 


currently on megace to help increase appetite

## 2018-12-10 NOTE — PROGRESS NOTE E
Progress Note



NAME: SULMA MURPHY

MRN: [id number]

: 1945             AGE: 72Y

DATE: 2018            ROOM: 421

 

SUBJECTIVE:

The patient is a pleasant 72-year-old male who has a past medical history

of prostate cancer metastatic to the bone, type 2 diabetes, chronic kidney

disease, congestive heart failure, malnutrition, hypocalcemia and

seizures.  The patient admitted.  He was discharged from the hospital 24

hours ago and came back with sacral pain.  Has a decubitus ulcer.  Was

found to have calcium that was low; was around 6.  Received calcium

gluconate.  Feeling better.



OBJECTIVE:

Patient lying in bed, comfortable.  Not in distress.



VITAL SIGNS:  Blood pressure is 111/45, temperature 98.4, heart rate 88.



HEENT:  Head normocephalic, atraumatic.  Pupils round, reactive to light

and accommodation bilaterally.  Extraocular movements intact.  Ears:

Tympanic membranes intact bilaterally.  No discharge from the ear.  No

discharge from the nose.



NECK:  Supple, no increased JVD, no thyromegaly, no lymphadenopathy.

 

CARDIOVASCULAR:  Normal S1, S2.  Regular rate and rhythm.  No murmur, no

gallop.



RESPIRATORY:  Lungs clear.



ABDOMEN: Soft, nontender.



MUSCULOSKELETAL:  No edema.



NEUROLOGIC:  Awake, alert.



SKIN:  No rash.



LABORATORY DATA:

White blood count 9.8, hemoglobin 8.2, hematocrit is 24.  Sodium 143,

potassium 3.3, CO2 is 19, chloride 111, calcium 6.1.  PTH is high, it is

467.  Vitamin D level is low as well as calcium also was low.



ASSESSMENT AND PLAN:

1.  HYPOCALCEMIA.  Patient hypocalcemic.  Vitamin D was low and will

replace his vitamin D.  Will start him on ergocalciferol to 50,000 units

every week, ______ today.  Will increase his cholecalciferol to ________

units daily.  His PTH also is high and vitamin D level less than 12.  The

patient also now _______ vitamin D deficiency, hypocalcemia.

2.  VITAMIN D DEFICIENCY.  Will replace in setting of high PTH.  Will give

also calcitriol 0.25 mg daily.

3.  PROSTATE CANCER METASTATIC TO THE BONE.

4.  DECUBITUS ULCER STAGE II.



MEDICAL NECESSITY:

Patient needs to stay for treatment for severe hypocalcemia.  Replace his

potassium.



TIME SPENT:

Thirty-three minutes.





DICTATING PHYSICIAN:  BEULAH MAYER M.D.





[id number]                  DT: 2018    [id number]

PHY#: [id number]            DD: 2018    1010

ID:   [id number]           JOB#: [id number]       ACCT: [id number]



cc:

>

## 2018-12-10 NOTE — PDOC PROGRESS REPORT
Subjective


Progress Note for:: 12/10/18


Subjective:: 





The patient appears to be slightly more confused.


He appears to be slightly more dehydrated than when he was discharged to the 

nursing home.


His serum calcium is 6.5 but his corrected calcium is 7.8.


The patient has previously pulled his IV and the PICC line so I would not be 

attempting to place another one.


We will discuss with nephrology.


Reason For Visit: 


HYPOCALCEMIA, VIT D DEF, MALNUTRITION ACIDOSIS








Physical Exam


Vital Signs: 


 











Temp Pulse Resp BP Pulse Ox


 


 98.7 F   113 H  18   138/65 H  95 


 


 12/10/18 07:30  12/10/18 07:30  12/10/18 07:30  12/10/18 07:30  12/10/18 07:30








 Intake & Output











 12/09/18 12/10/18 12/11/18





 06:59 06:59 06:59


 


Intake Total 840 655 


 


Output Total 110  


 


Balance 730 655 


 


Weight 74.9 kg 75.9 kg 











General appearance: PRESENT: mild distress


Head exam: PRESENT: atraumatic


Eye exam: PRESENT: conjunctiva pink


Neck exam: PRESENT: carotid bruit.  ABSENT: JVD


Cardiovascular exam: PRESENT: irregular rhythm, +S1, +S2


GI/Abdominal exam: PRESENT: normal bowel sounds, soft


Extremities exam: PRESENT: tenderness


Musculoskeletal exam: PRESENT: tenderness


Neurological exam: PRESENT: awake


Focused psych exam: PRESENT: restlessness





Results


Laboratory Results: 


 





 12/09/18 11:22 





 12/10/18 03:39 





 











  12/09/18 12/10/18 12/10/18





  11:22 03:39 03:39


 


WBC  10.8 H  


 


RBC  3.49 L  


 


Hgb  8.8 L  


 


Hct  26.1 L  


 


MCV  75 L  


 


MCH  25.2 L  


 


MCHC  33.6  


 


RDW  19.2 H  


 


Plt Count  203  


 


Seg Neutrophils %  Not Reportable  


 


Lymphocytes %  Not Reportable  


 


Monocytes %  Not Reportable  


 


Eosinophils %  Not Reportable  


 


Basophils %  Not Reportable  


 


Absolute Neutrophils  Not Reportable  


 


Absolute Lymphocytes  Not Reportable  


 


Absolute Monocytes  Not Reportable  


 


Absolute Eosinophils  Not Reportable  


 


Absolute Basophils  Not Reportable  


 


Sodium   144.1  144.1


 


Potassium   3.9  3.9


 


Chloride   112 H  112 H


 


Carbon Dioxide   18 L  18 L


 


Anion Gap   14  14


 


BUN   38 H  38 H


 


Creatinine   1.26 H  1.26 H


 


Est GFR ( Amer)   > 60  > 60


 


Est GFR (Non-Af Amer)   56 L  56 L


 


Glucose   129 H  129 H


 


Calcium   6.5 L*  6.5 L*


 


Phosphorus   3.3 


 


Magnesium    1.8


 


Albumin   2.4 L 








 











  12/09/18 12/09/18 12/09/18





  16:45 16:45 23:10


 


Creatine Kinase  728 H   706 H


 


CK-MB (CK-2)   0.77 


 


Troponin I   0.015 














  12/09/18 12/10/18 12/10/18





  23:10 03:39 03:39


 


Creatine Kinase   756 H 


 


CK-MB (CK-2)  0.64   0.59


 


Troponin I  0.018   0.019











Impressions: 


 





Sacrum and Coccyx X-Ray  12/07/18 20:24


IMPRESSION:


 


No fracture. 


 


copyright 2011 [employer]- All Rights Reserved


 








Chest X-Ray  12/09/18 00:00


IMPRESSION:  Vascular congestion.  Increasing basilar opacities and pleural 

effusions.


 














Assessment & Plan





- Diagnosis


(1) Atrial fibrillation


Qualifiers: 


   Atrial fibrillation type: chronic   Qualified Code(s): I48.2 - Chronic 

atrial fibrillation   


Is this a current diagnosis for this admission?: Yes   


Plan: 


Recurrent episodes of fast A. fib.  Will reconsult with cardiology and add a 

small dose of calcium channel block








(2) Decubitus ulcer of sacral region, stage 1


Is this a current diagnosis for this admission?: Yes   


Plan: 


We will continue local skin care.  Will add specialty bed








(3) Failure to thrive


Is this a current diagnosis for this admission?: Yes   


Plan: 


Continue current treatment








(4) Hypocalcemia


Is this a current diagnosis for this admission?: Yes   


Plan: 


Corrected calcium is 7.8.


We will obtain PTH and vitamin D.  Will consult nephrology








(5) Anemia, chronic disease


Is this a current diagnosis for this admission?: Yes   


Plan: 


Continue monitoring I would avoid transfusion at this point








(6) CKD (chronic kidney disease) stage 3, GFR 30-59 ml/min


Is this a current diagnosis for this admission?: Yes   


Plan: 


We will consult nephrology








(7) Metastatic malignant neoplasm to prostate


Is this a current diagnosis for this admission?: Yes   


Plan: 


We will reconsult hematology oncology

## 2018-12-11 LAB
ABSOLUTE RETICS #: 0.03 10^6/UL (ref 0.03–0.12)
ANION GAP SERPL CALC-SCNC: 12 MMOL/L (ref 5–19)
ARTERIAL BLOOD FIO2: (no result)
ARTERIAL BLOOD H2CO3: 0.67 MMOL/L (ref 1.05–1.35)
ARTERIAL BLOOD HCO3: 18.7 MMOL/L (ref 20–24)
ARTERIAL BLOOD PCO2: 22.1 MMHG (ref 35–45)
ARTERIAL BLOOD PH: 7.55 (ref 7.35–7.45)
ARTERIAL BLOOD PO2: 75.6 MMHG (ref 80–100)
ARTERIAL BLOOD TOTAL CO2: 19.4 MMOL/L (ref 23–27)
BASE EXCESS BLDA CALC-SCNC: -2.6 MMOL/L
BUN SERPL-MCNC: 46 MG/DL (ref 7–20)
CALCIUM: 6.7 MG/DL (ref 8.4–10.2)
CHLORIDE SERPL-SCNC: 114 MMOL/L (ref 98–107)
CO2 SERPL-SCNC: 19 MMOL/L (ref 22–30)
ERYTHROCYTE [DISTWIDTH] IN BLOOD BY AUTOMATED COUNT: 19.2 % (ref 11.5–14)
FOLATE SERPL-MCNC: 6.36 NG/ML (ref 2.76–?)
GLUCOSE SERPL-MCNC: 237 MG/DL (ref 75–110)
HCT VFR BLD CALC: 24.2 % (ref 37.9–51)
HGB BLD-MCNC: 8.1 G/DL (ref 13.5–17)
IRON(TIBC): 19.7 UG/DL (ref 49–181)
MCH RBC QN AUTO: 25.2 PG (ref 27–33.4)
MCHC RBC AUTO-ENTMCNC: 33.4 G/DL (ref 32–36)
MCV RBC AUTO: 76 FL (ref 80–97)
PLATELET # BLD: 188 10^3/UL (ref 150–450)
POTASSIUM SERPL-SCNC: 4.6 MMOL/L (ref 3.6–5)
RBC # BLD AUTO: 3.21 10^6/UL (ref 4.35–5.55)
RETICULOCYTE COUNT (AUTO): 0.93 % (ref 0.66–2.85)
SAO2 % BLDA: 96.8 % (ref 94–98)
SODIUM SERPL-SCNC: 145.1 MMOL/L (ref 137–145)
VIT B12 SERPL-MCNC: 924 PG/ML (ref 239–931)
WBC # BLD AUTO: 9.7 10^3/UL (ref 4–10.5)

## 2018-12-11 RX ADMIN — HYDRALAZINE HYDROCHLORIDE SCH MG: 25 TABLET, FILM COATED ORAL at 12:04

## 2018-12-11 RX ADMIN — BUSPIRONE HYDROCHLORIDE SCH MG: 10 TABLET ORAL at 18:08

## 2018-12-11 RX ADMIN — DILTIAZEM HYDROCHLORIDE SCH MG: 30 TABLET, FILM COATED ORAL at 12:05

## 2018-12-11 RX ADMIN — CALCIUM SCH MG: 500 TABLET ORAL at 10:57

## 2018-12-11 RX ADMIN — INSULIN LISPRO PRN UNIT: 100 INJECTION, SOLUTION INTRAVENOUS; SUBCUTANEOUS at 18:07

## 2018-12-11 RX ADMIN — VITAMIN D, TAB 1000IU (100/BT) SCH UNIT: 25 TAB at 10:57

## 2018-12-11 RX ADMIN — LEVETIRACETAM SCH MG: 100 SOLUTION ORAL at 22:20

## 2018-12-11 RX ADMIN — Medication SCH MG: at 10:57

## 2018-12-11 RX ADMIN — ATORVASTATIN CALCIUM SCH MG: 10 TABLET, FILM COATED ORAL at 22:20

## 2018-12-11 RX ADMIN — VALPROIC ACID SCH MG: 250 SOLUTION ORAL at 10:56

## 2018-12-11 RX ADMIN — HYDRALAZINE HYDROCHLORIDE SCH MG: 25 TABLET, FILM COATED ORAL at 18:08

## 2018-12-11 RX ADMIN — DILTIAZEM HYDROCHLORIDE SCH: 30 TABLET, FILM COATED ORAL at 00:36

## 2018-12-11 RX ADMIN — BUSPIRONE HYDROCHLORIDE SCH MG: 10 TABLET ORAL at 10:58

## 2018-12-11 RX ADMIN — ACETAMINOPHEN PRN MG: 325 TABLET ORAL at 18:09

## 2018-12-11 RX ADMIN — LEVETIRACETAM SCH MG: 100 SOLUTION ORAL at 12:04

## 2018-12-11 RX ADMIN — TAMSULOSIN HYDROCHLORIDE SCH MG: 0.4 CAPSULE ORAL at 22:20

## 2018-12-11 RX ADMIN — CALCIUM SCH MG: 500 TABLET ORAL at 18:08

## 2018-12-11 RX ADMIN — MEGESTROL ACETATE SCH MG: 40 SUSPENSION ORAL at 10:56

## 2018-12-11 RX ADMIN — HYDRALAZINE HYDROCHLORIDE SCH MG: 25 TABLET, FILM COATED ORAL at 06:06

## 2018-12-11 RX ADMIN — APIXABAN SCH MG: 2.5 TABLET, FILM COATED ORAL at 18:08

## 2018-12-11 RX ADMIN — APIXABAN SCH MG: 2.5 TABLET, FILM COATED ORAL at 10:57

## 2018-12-11 RX ADMIN — VALPROIC ACID SCH MG: 250 SOLUTION ORAL at 18:08

## 2018-12-11 RX ADMIN — POTASSIUM CHLORIDE SCH MEQ: 1.5 SOLUTION ORAL at 10:57

## 2018-12-11 RX ADMIN — VALPROIC ACID SCH MG: 250 SOLUTION ORAL at 22:19

## 2018-12-11 RX ADMIN — DILTIAZEM HYDROCHLORIDE SCH MG: 30 TABLET, FILM COATED ORAL at 06:06

## 2018-12-11 RX ADMIN — DILTIAZEM HYDROCHLORIDE SCH MG: 30 TABLET, FILM COATED ORAL at 18:10

## 2018-12-11 RX ADMIN — VALPROIC ACID SCH MG: 250 SOLUTION ORAL at 14:12

## 2018-12-11 NOTE — PDOC PROGRESS REPORT
Subjective


Progress Note for:: 12/11/18


Subjective:: 





The patient is more lethargic this morning he had a very agitated night last 

night and feels tired.  He had good urine output.  He did have a bowel 

movement.  He is still not eating or drinking well.  He is confused this morning


Reason For Visit: 


HYPOCALCEMIA, VIT D DEF, MALNUTRITION ACIDOSIS








Physical Exam


Vital Signs: 


 











Temp Pulse Resp BP Pulse Ox


 


 98.1 F   80   18   101/46 L  96 


 


 12/11/18 08:21  12/11/18 08:21  12/11/18 08:21  12/11/18 08:21  12/11/18 08:21








 Intake & Output











 12/10/18 12/11/18 12/12/18





 06:59 06:59 06:59


 


Intake Total 655 320 


 


Balance 655 320 


 


Weight 75.9 kg 74.9 kg 











General appearance: PRESENT: mild distress


Head exam: PRESENT: atraumatic


Eye exam: PRESENT: conjunctiva pink


Mouth exam: PRESENT: dry mucosa


Neck exam: PRESENT: carotid bruit.  ABSENT: JVD


Respiratory exam: PRESENT: clear to auscultation venessa


Cardiovascular exam: PRESENT: irregular rhythm, +S1, +S2


GI/Abdominal exam: PRESENT: soft


Extremities exam: PRESENT: tenderness


Musculoskeletal exam: PRESENT: tenderness


Psychiatric exam: PRESENT: agitated





Results


Laboratory Results: 


 





 12/09/18 11:22 





 12/10/18 03:39 





 











  12/09/18 12/09/18 12/09/18





  16:45 16:45 23:10


 


Creatine Kinase  728 H   706 H


 


CK-MB (CK-2)   0.77 


 


Troponin I   0.015 














  12/09/18 12/10/18 12/10/18





  23:10 03:39 03:39


 


Creatine Kinase   756 H 


 


CK-MB (CK-2)  0.64   0.59


 


Troponin I  0.018   0.019











Impressions: 


 





Sacrum and Coccyx X-Ray  12/07/18 20:24


IMPRESSION:


 


No fracture. 


 


copyright 2011 [employer]- All Rights Reserved


 








Chest X-Ray  12/09/18 00:00


IMPRESSION:  Vascular congestion.  Increasing basilar opacities and pleural 

effusions.


 














Assessment & Plan





- Diagnosis


(1) Atrial fibrillation


Qualifiers: 


   Atrial fibrillation type: chronic   Qualified Code(s): I48.2 - Chronic 

atrial fibrillation   


Is this a current diagnosis for this admission?: Yes   


Plan: 


Recurrent episodes of fast A. fib.  Will reconsult with cardiology and add a 

small dose of calcium channel block








(2) Decubitus ulcer of sacral region, stage 1


Is this a current diagnosis for this admission?: Yes   


Plan: 


We will continue local skin care.  Will add specialty bed








(3) Failure to thrive


Is this a current diagnosis for this admission?: Yes   


Plan: 


Continue current treatment








(4) Hypocalcemia


Is this a current diagnosis for this admission?: Yes   


Plan: 


Corrected calcium is 7.8.


We will obtain PTH and vitamin D.  Will consult nephrology








(5) Anemia, chronic disease


Is this a current diagnosis for this admission?: Yes   





(6) CKD (chronic kidney disease) stage 3, GFR 30-59 ml/min


Is this a current diagnosis for this admission?: Yes   


Plan: 


We will consult nephrology








(7) Metastatic malignant neoplasm to prostate


Is this a current diagnosis for this admission?: Yes   


Plan: 


We will reconsult hematology oncology








(8) Dementia


Is this a current diagnosis for this admission?: Yes   


Plan: 


We will continue with current treatment








(9) Acute psychosis


Is this a current diagnosis for this admission?: Yes   


Plan: 


Continue current treatment most probably related to sound down








(10) Memory loss, short term


Is this a current diagnosis for this admission?: Yes   


Plan: 


Continue supportive treatment








(11) Vitamin D deficiency


Is this a current diagnosis for this admission?: Yes   


Plan: 


Most probably related to hypocalcemia.  We will add vitamin D

## 2018-12-11 NOTE — PDOC PROGRESS REPORT
Subjective


Progress Note for:: 12/11/18


Subjective:: 


Patient was seen today laying in his bed with his daughter by his side. He was 

altered more than yesterday. He appeared to be breathing rapidly. He did not 

claim to be short of breath at the time. He denied chest pain. 


Reason For Visit: 


HYPOCALCEMIA, VIT D DEF, MALNUTRITION ACIDOSIS








Physical Exam


Vital Signs: 


 











Temp Pulse Resp BP Pulse Ox


 


 97.5 F   102 H  18   108/48 L  97 


 


 12/11/18 15:20  12/11/18 15:20  12/11/18 15:20  12/11/18 15:20  12/11/18 15:20








 Intake & Output











 12/10/18 12/11/18 12/12/18





 06:59 06:59 06:59


 


Intake Total 655 320 540


 


Balance 655 320 540


 


Weight 75.9 kg 74.9 kg 











General appearance: PRESENT: mild distress, well-developed, well-nourished


Mouth exam: PRESENT: moist, neck supple


Neck exam: ABSENT: JVD, tracheal deviation


Respiratory exam: PRESENT: accessory muscle use, clear to auscultation venessa, 

tachypnea.  ABSENT: crackles, rales, rhonchi, wheezes


Cardiovascular exam: PRESENT: irregular rhythm, +S1, +S2


GI/Abdominal exam: PRESENT: soft.  ABSENT: tenderness


Extremities exam: ABSENT: pedal edema, tenderness, +1 edema, +2 edema


Musculoskeletal exam: PRESENT: normal inspection.  ABSENT: tenderness


Neurological exam: PRESENT: altered, awake.  ABSENT: oriented to person, 

oriented to place, oriented to time, oriented to situation


Psychiatric exam: PRESENT: agitated, anxious


Skin exam: PRESENT: dry, intact, warm.  ABSENT: cyanosis





Results


Laboratory Results: 


 





 12/11/18 15:50 





 12/11/18 15:50 





 











  12/11/18 12/11/18 12/11/18





  15:50 15:50 16:40


 


WBC   9.7 


 


RBC   3.21 L 


 


Hgb   8.1 L 


 


Hct   24.2 L 


 


MCV   76 L 


 


MCH   25.2 L 


 


MCHC   33.4 


 


RDW   19.2 H 


 


Plt Count   188 


 


Retic Count (auto)   0.93 


 


Absolute Retic   0.030 


 


Carbonic Acid   


 


HCO3/H2CO3 Ratio   


 


ABG pH   


 


ABG pCO2   


 


ABG pO2   


 


ABG HCO3   


 


ABG O2 Saturation   


 


ABG Base Excess   


 


FiO2   


 


Sodium  145.1 H  


 


Potassium  4.6  


 


Chloride  114 H  


 


Carbon Dioxide  19 L  


 


Anion Gap  12  


 


BUN  46 H  


 


Creatinine  1.44 H  


 


Est GFR ( Amer)  58 L  


 


Est GFR (Non-Af Amer)  48 L  


 


Glucose  237 H  


 


Calcium  6.7 L*  


 


Ionized Calcium Marquis    0.91 L


 


Iron  19.7 L  


 


TIBC  153 L  


 


% Saturation  13  


 


Ferritin  2920.00 H  


 


Vitamin B12  924.0  


 


Folate  6.36  














  12/11/18





  16:40


 


WBC 


 


RBC 


 


Hgb 


 


Hct 


 


MCV 


 


MCH 


 


MCHC 


 


RDW 


 


Plt Count 


 


Retic Count (auto) 


 


Absolute Retic 


 


Carbonic Acid  0.67 L


 


HCO3/H2CO3 Ratio  27:1


 


ABG pH  7.55 H


 


ABG pCO2  22.1 L


 


ABG pO2  75.6 L


 


ABG HCO3  18.7 L


 


ABG O2 Saturation  96.8


 


ABG Base Excess  -2.6


 


FiO2  ROOM AIR


 


Sodium 


 


Potassium 


 


Chloride 


 


Carbon Dioxide 


 


Anion Gap 


 


BUN 


 


Creatinine 


 


Est GFR ( Amer) 


 


Est GFR (Non-Af Amer) 


 


Glucose 


 


Calcium 


 


Ionized Calcium Marquis 


 


Iron 


 


TIBC 


 


% Saturation 


 


Ferritin 


 


Vitamin B12 


 


Folate 








 











  12/09/18 12/09/18 12/09/18





  16:45 16:45 23:10


 


Creatine Kinase  728 H   706 H


 


CK-MB (CK-2)   0.77 


 


Troponin I   0.015 














  12/09/18 12/10/18 12/10/18





  23:10 03:39 03:39


 


Creatine Kinase   756 H 


 


CK-MB (CK-2)  0.64   0.59


 


Troponin I  0.018   0.019











Impressions: 


 





Sacrum and Coccyx X-Ray  12/07/18 20:24


IMPRESSION:


 


No fracture. 


 


copyright 2011 [employer]- All Rights Reserved


 








Chest X-Ray  12/09/18 00:00


IMPRESSION:  Vascular congestion.  Increasing basilar opacities and pleural 

effusions.


 














Assessment & Plan





- Diagnosis


(1) Hypocalcemia


Is this a current diagnosis for this admission?: Yes   


Plan: 


slightly increased, continue with current medications.








(2) Congestive heart failure


Qualifiers: 


   Heart failure type: unspecified   Heart failure chronicity: chronic   

Qualified Code(s): I50.9 - Heart failure, unspecified   


Is this a current diagnosis for this admission?: Yes   


Plan: 


currently stable off of lasix








(3) Metastatic malignant neoplasm to prostate


Is this a current diagnosis for this admission?: Yes   


Plan: 


per primary








(4) Vitamin D deficiency


Is this a current diagnosis for this admission?: Yes   


Plan: 


continue vitamin D








(5) Anemia, chronic disease


Is this a current diagnosis for this admission?: Yes   


Plan: 


awaiting iron panel








(6) CKD (chronic kidney disease) stage 3, GFR 30-59 ml/min


Is this a current diagnosis for this admission?: Yes   


Plan: 


if patient continues to not hydrate well, some saline will be needed to keep 

him hydrated to prevent an NILA.








(7) Atrial fibrillation


Qualifiers: 


   Atrial fibrillation type: chronic   Qualified Code(s): I48.2 - Chronic 

atrial fibrillation   


Is this a current diagnosis for this admission?: Yes   





(8) Failure to thrive


Is this a current diagnosis for this admission?: Yes   


Plan: 


on megace, still not eating much








(9) Decubitus ulcer of sacral region, stage 1


Is this a current diagnosis for this admission?: Yes   


Plan: 


per primary








(10) Respiratory alkalosis


Plan: 


looks be due to some pain and decreased PO2, will look to give some oxygen at 

2L via NC.








- Notes


Notes: 


Case and care plan was discussed with Dr. Ross.

## 2018-12-12 RX ADMIN — HYDRALAZINE HYDROCHLORIDE SCH: 25 TABLET, FILM COATED ORAL at 05:10

## 2018-12-12 RX ADMIN — DILTIAZEM HYDROCHLORIDE SCH: 30 TABLET, FILM COATED ORAL at 12:09

## 2018-12-12 RX ADMIN — MINERAL SUPPLEMENT IRON 300 MG / 5 ML STRENGTH LIQUID 100 PER BOX UNFLAVORED SCH MG: at 10:30

## 2018-12-12 RX ADMIN — DILTIAZEM HYDROCHLORIDE SCH: 30 TABLET, FILM COATED ORAL at 17:01

## 2018-12-12 RX ADMIN — MINERAL SUPPLEMENT IRON 300 MG / 5 ML STRENGTH LIQUID 100 PER BOX UNFLAVORED SCH MG: at 14:39

## 2018-12-12 RX ADMIN — MEGESTROL ACETATE SCH MG: 40 SUSPENSION ORAL at 10:31

## 2018-12-12 RX ADMIN — HYDRALAZINE HYDROCHLORIDE SCH: 25 TABLET, FILM COATED ORAL at 00:27

## 2018-12-12 RX ADMIN — BUSPIRONE HYDROCHLORIDE SCH MG: 10 TABLET ORAL at 17:06

## 2018-12-12 RX ADMIN — APIXABAN SCH MG: 2.5 TABLET, FILM COATED ORAL at 17:06

## 2018-12-12 RX ADMIN — RISPERIDONE PRN MG: 0.25 TABLET ORAL at 22:19

## 2018-12-12 RX ADMIN — TAMSULOSIN HYDROCHLORIDE SCH MG: 0.4 CAPSULE ORAL at 22:20

## 2018-12-12 RX ADMIN — CALCIUM SCH MG: 500 TABLET ORAL at 10:32

## 2018-12-12 RX ADMIN — VALPROIC ACID SCH MG: 250 SOLUTION ORAL at 17:06

## 2018-12-12 RX ADMIN — VALPROIC ACID SCH MG: 250 SOLUTION ORAL at 22:20

## 2018-12-12 RX ADMIN — CALCIUM SCH MG: 500 TABLET ORAL at 17:06

## 2018-12-12 RX ADMIN — LEVETIRACETAM SCH MG: 100 SOLUTION ORAL at 12:17

## 2018-12-12 RX ADMIN — RISPERIDONE PRN MG: 0.25 TABLET ORAL at 08:06

## 2018-12-12 RX ADMIN — VITAMIN D, TAB 1000IU (100/BT) SCH UNIT: 25 TAB at 10:31

## 2018-12-12 RX ADMIN — LEVETIRACETAM SCH MG: 100 SOLUTION ORAL at 22:20

## 2018-12-12 RX ADMIN — POTASSIUM CHLORIDE SCH MEQ: 1.5 SOLUTION ORAL at 10:31

## 2018-12-12 RX ADMIN — MINERAL SUPPLEMENT IRON 300 MG / 5 ML STRENGTH LIQUID 100 PER BOX UNFLAVORED SCH MG: at 17:06

## 2018-12-12 RX ADMIN — DILTIAZEM HYDROCHLORIDE SCH: 30 TABLET, FILM COATED ORAL at 00:27

## 2018-12-12 RX ADMIN — INSULIN LISPRO PRN UNIT: 100 INJECTION, SOLUTION INTRAVENOUS; SUBCUTANEOUS at 06:47

## 2018-12-12 RX ADMIN — VALPROIC ACID SCH MG: 250 SOLUTION ORAL at 14:39

## 2018-12-12 RX ADMIN — ATORVASTATIN CALCIUM SCH MG: 10 TABLET, FILM COATED ORAL at 22:20

## 2018-12-12 RX ADMIN — APIXABAN SCH MG: 2.5 TABLET, FILM COATED ORAL at 10:31

## 2018-12-12 RX ADMIN — DILTIAZEM HYDROCHLORIDE SCH: 30 TABLET, FILM COATED ORAL at 05:10

## 2018-12-12 RX ADMIN — BUSPIRONE HYDROCHLORIDE SCH MG: 10 TABLET ORAL at 10:32

## 2018-12-12 RX ADMIN — VALPROIC ACID SCH MG: 250 SOLUTION ORAL at 10:30

## 2018-12-12 RX ADMIN — VITAMIN D, TAB 1000IU (100/BT) SCH UNIT: 25 TAB at 22:19

## 2018-12-12 RX ADMIN — ACETAMINOPHEN PRN MG: 325 TABLET ORAL at 08:06

## 2018-12-12 RX ADMIN — ACETAMINOPHEN PRN MG: 325 TABLET ORAL at 17:07

## 2018-12-12 NOTE — PDOC PROGRESS REPORT
Subjective


Progress Note for:: 12/12/18


Subjective:: 





The patient is a little bit more awake this morning.  He still had some sun 

downs like night.  Apparently he had a round of 4 beats of V. tach but they 

have held his Cardizem.  His blood pressure was low last night and they have 

both the Cardizem and hydralazine.  Discussed with nursing to avoid holding 

diltiazem and I will stop the hydralazine.


Reason For Visit: 


HYPOCALCEMIA, VIT D DEF, MALNUTRITION ACIDOSIS








Physical Exam


Vital Signs: 


 











Temp Pulse Resp BP Pulse Ox


 


 97.4 F   88   17   111/43 L  97 


 


 12/12/18 07:33  12/12/18 07:33  12/12/18 07:33  12/12/18 07:33  12/12/18 07:33








 Intake & Output











 12/11/18 12/12/18 12/13/18





 06:59 06:59 06:59


 


Intake Total 320 1100 


 


Balance 320 1100 


 


Weight 74.9 kg 74.5 kg 











General appearance: PRESENT: mild distress


Head exam: PRESENT: atraumatic


Eye exam: PRESENT: conjunctival injection


Neck exam: PRESENT: carotid bruit.  ABSENT: JVD


Respiratory exam: PRESENT: rhonchi


Cardiovascular exam: PRESENT: irregular rhythm, +S1, +S2


GI/Abdominal exam: PRESENT: normal bowel sounds, soft


Extremities exam: PRESENT: tenderness


Musculoskeletal exam: PRESENT: tenderness


Psychiatric exam: PRESENT: anxious


Focused psych exam: PRESENT: restlessness





Results


Laboratory Results: 


 





 12/11/18 15:50 





 12/11/18 15:50 





 











  12/11/18 12/11/18 12/11/18





  15:50 15:50 16:40


 


WBC   9.7 


 


RBC   3.21 L 


 


Hgb   8.1 L 


 


Hct   24.2 L 


 


MCV   76 L 


 


MCH   25.2 L 


 


MCHC   33.4 


 


RDW   19.2 H 


 


Plt Count   188 


 


Retic Count (auto)   0.93 


 


Absolute Retic   0.030 


 


Carbonic Acid   


 


HCO3/H2CO3 Ratio   


 


ABG pH   


 


ABG pCO2   


 


ABG pO2   


 


ABG HCO3   


 


ABG O2 Saturation   


 


ABG Base Excess   


 


FiO2   


 


Sodium  145.1 H  


 


Potassium  4.6  


 


Chloride  114 H  


 


Carbon Dioxide  19 L  


 


Anion Gap  12  


 


BUN  46 H  


 


Creatinine  1.44 H  


 


Est GFR ( Amer)  58 L  


 


Est GFR (Non-Af Amer)  48 L  


 


Glucose  237 H  


 


Calcium  6.7 L*  


 


Ionized Calcium Marquis    0.91 L


 


Iron  19.7 L  


 


TIBC  153 L  


 


% Saturation  13  


 


Ferritin  2920.00 H  


 


Vitamin B12  924.0  


 


Folate  6.36  














  12/11/18





  16:40


 


WBC 


 


RBC 


 


Hgb 


 


Hct 


 


MCV 


 


MCH 


 


MCHC 


 


RDW 


 


Plt Count 


 


Retic Count (auto) 


 


Absolute Retic 


 


Carbonic Acid  0.67 L


 


HCO3/H2CO3 Ratio  27:1


 


ABG pH  7.55 H


 


ABG pCO2  22.1 L


 


ABG pO2  75.6 L


 


ABG HCO3  18.7 L


 


ABG O2 Saturation  96.8


 


ABG Base Excess  -2.6


 


FiO2  ROOM AIR


 


Sodium 


 


Potassium 


 


Chloride 


 


Carbon Dioxide 


 


Anion Gap 


 


BUN 


 


Creatinine 


 


Est GFR ( Amer) 


 


Est GFR (Non-Af Amer) 


 


Glucose 


 


Calcium 


 


Ionized Calcium Marquis 


 


Iron 


 


TIBC 


 


% Saturation 


 


Ferritin 


 


Vitamin B12 


 


Folate 








 











  12/09/18 12/09/18 12/09/18





  16:45 16:45 23:10


 


Creatine Kinase  728 H   706 H


 


CK-MB (CK-2)   0.77 


 


Troponin I   0.015 














  12/09/18 12/10/18 12/10/18





  23:10 03:39 03:39


 


Creatine Kinase   756 H 


 


CK-MB (CK-2)  0.64   0.59


 


Troponin I  0.018   0.019











Impressions: 


 





Sacrum and Coccyx X-Ray  12/07/18 20:24


IMPRESSION:


 


No fracture. 


 


copyright 2011 [employer]- All Rights Reserved


 








Chest X-Ray  12/09/18 00:00


IMPRESSION:  Vascular congestion.  Increasing basilar opacities and pleural 

effusions.


 














Assessment & Plan





- Diagnosis


(1) Atrial fibrillation


Qualifiers: 


   Atrial fibrillation type: chronic   Qualified Code(s): I48.2 - Chronic 

atrial fibrillation   


Is this a current diagnosis for this admission?: Yes   


Plan: 


Recurrent episodes of fast A. fib.  Will reconsult with cardiology and add a 

small dose of calcium channel block








(2) Decubitus ulcer of sacral region, stage 1


Is this a current diagnosis for this admission?: Yes   


Plan: 


We will continue local skin care.  Will add specialty bed








(3) Failure to thrive


Is this a current diagnosis for this admission?: Yes   


Plan: 


Continue current treatment








(4) Hypocalcemia


Is this a current diagnosis for this admission?: Yes   


Plan: 


Corrected calcium is 7.8.


We will obtain PTH and vitamin D.  Will consult nephrology








(5) Anemia, chronic disease


Is this a current diagnosis for this admission?: Yes   


Plan: 


We will add iron








(6) CKD (chronic kidney disease) stage 3, GFR 30-59 ml/min


Is this a current diagnosis for this admission?: Yes   


Plan: 


We will consult nephrology








(7) Metastatic malignant neoplasm to prostate


Is this a current diagnosis for this admission?: Yes   


Plan: 


We will reconsult hematology oncology








(8) Dementia


Is this a current diagnosis for this admission?: Yes   


Plan: 


We will continue with current treatment








(9) Acute psychosis


Is this a current diagnosis for this admission?: Yes   


Plan: 


Continue current treatment most probably related to sound down








(10) Memory loss, short term


Is this a current diagnosis for this admission?: Yes   





(11) Vitamin D deficiency


Is this a current diagnosis for this admission?: Yes

## 2018-12-12 NOTE — PDOC PROGRESS REPORT
Subjective


Progress Note for:: 12/12/18


Subjective:: 





I saw the patient tonight from nephrology service.  He seems to be a little bit 

more calm today compared to yesterday and not complaining of pain.  However he 

also does not answer coherently to questioning.  When I asked him if he is 

eating he said he was but according to the daughter at bedside he is not really 

eating much.  Daughter is concerned because he has not eaten much for the last 

5 days.  Daughter is asking if he should be getting some IV fluids.  However 

apparently the patient actually removed his IV line previously that is why he 

does not have any IV going on.  He continues to have very poor intake.


Reason For Visit: 


HYPOCALCEMIA, VIT D DEF, MALNUTRITION ACIDOSIS








Physical Exam


Vital Signs: 


 











Temp Pulse Resp BP Pulse Ox


 


 97.6 F   105 H  18   124/56 L  99 


 


 12/12/18 19:08  12/12/18 19:08  12/12/18 19:08  12/12/18 19:08  12/12/18 19:08








 Intake & Output











 12/11/18 12/12/18 12/13/18





 06:59 06:59 06:59


 


Intake Total 320 1100 300


 


Output Total   4


 


Balance 320 1100 296


 


Weight 74.9 kg 74.5 kg 











Exam: 





General appearance: PRESENT: no acute distress, cooperative, fairly developed 

and fairly nourished


Head exam: PRESENT: atraumatic, normocephalic


Eye exam: PRESENT: conjunctiva pale, PERRLA.  ABSENT: scleral icterus


Neck exam: ABSENT: JVD


Respiratory exam: PRESENT: Diminished breath sounds.  ABSENT: crackles, rales, 

rhonchi, unlabored, wheezes


Cardiovascular exam: PRESENT: Irregular rate rhythm -+S1, +S2.  ABSENT: 

diastolic murmur, systolic murmur


GI/Abdominal exam: PRESENT: normal bowel sounds, soft.  ABSENT: guarding, mass, 

tenderness


Extremities exam: ABSENT: No edema


Neurological exam: PRESENT: alert, awake, does not really answer questions 

coherently.


Skin exam: PRESENT: dry, warm,


Cardiovascular exam: PRESENT: irregular rhythm, +S1, +S2


GI/Abdominal exam: PRESENT: soft.  ABSENT: tenderness





Results


Laboratory Results: 


 





 12/11/18 15:50 





 12/11/18 15:50 





 











  12/09/18 12/09/18 12/09/18





  16:45 16:45 23:10


 


Creatine Kinase  728 H   706 H


 


CK-MB (CK-2)   0.77 


 


Troponin I   0.015 














  12/09/18 12/10/18 12/10/18





  23:10 03:39 03:39


 


Creatine Kinase   756 H 


 


CK-MB (CK-2)  0.64   0.59


 


Troponin I  0.018   0.019











Impressions: 


 





Sacrum and Coccyx X-Ray  12/07/18 20:24


IMPRESSION:


 


No fracture. 


 


copyright 2011 [employer]- All Rights Reserved


 








Chest X-Ray  12/09/18 00:00


IMPRESSION:  Vascular congestion.  Increasing basilar opacities and pleural 

effusions.


 














Assessment & Plan





- Diagnosis


(1) Hypocalcemia


Is this a current diagnosis for this admission?: Yes   


Plan: 


Corrected calcium is 7.98.  This is due to severe vitamin D deficiency due to 

poor oral intake and malnutrition.  Increase calcium carbonate to 3 times a 

day.  Corrected vitamin D deficiency.








(2) Vitamin D deficiency


Is this a current diagnosis for this admission?: Yes   


Plan: 


Increase vitamin D3 to 5000 units once a day.








(3) Hyperparathyroidism


Is this a current diagnosis for this admission?: Yes   


Plan: 


This is due to vitamin D deficiency and hypocalcemia.








(4) Acute kidney injury


Is this a current diagnosis for this admission?: Yes   


Plan: 


Kidney function seems to be slowly worsening.  I think this is due to acute 

prerenal azotemia due to volume depletion due to poor oral intake.  I did have 

patient needs to have some IV fluids since he is not taking much orally.  

However if the patient is not going to continue to start drinking on his own 

then this is not sustainable.  Patient is being worked up to go to hospice.  

The family including the daughter and wife at bedside seems to be agreeable to 

that but apparently he did not qualify today for hospice so he will be 

reevaluated tomorrow.








(5) Iron deficiency anemia


Is this a current diagnosis for this admission?: Yes   


Plan: 


Ferrous sulfate started.








(6) Anemia, chronic disease


Is this a current diagnosis for this admission?: Yes   





(7) Metabolic acidosis


Is this a current diagnosis for this admission?: Yes   


Plan: 


On sodium bicarbonate.








(8) Acute hyponatremia


Is this a current diagnosis for this admission?: Yes   


Plan: 


Due to volume depletion due to poor p.o. intake.








(9) Atrial fibrillation


Qualifiers: 


   Atrial fibrillation type: chronic   Qualified Code(s): I48.2 - Chronic 

atrial fibrillation   


Is this a current diagnosis for this admission?: Yes   





(10) Dementia


Is this a current diagnosis for this admission?: Yes   


Plan: 


With psychotic episodes.








(11) Failure to thrive


Is this a current diagnosis for this admission?: Yes   





(12) Dehydration


Is this a current diagnosis for this admission?: Yes   





(13) Malnutrition


Qualifiers: 


   Malnutrition type: protein-calorie malnutrition   Protein-calorie 

malnutrition severity: moderate   Qualified Code(s): E44.0 - Moderate protein-

calorie malnutrition   


Is this a current diagnosis for this admission?: Yes   


Plan: 


On Megace but does not seem to be working.








- Notes


Notes: 





The patient has generalized debility and failure to thrive.  I agree with 

working on hospice care for this patient.





- Time


Time with patient: 15-25 minutes

## 2018-12-13 LAB
ABSOLUTE LYMPHOCYTES# (MANUAL): 1.5 10^3/UL (ref 0.5–4.7)
ABSOLUTE MONOCYTES # (MANUAL): 0.7 10^3/UL (ref 0.1–1.4)
ABSOLUTE NEUTROPHILS# (MANUAL): 7.2 10^3/UL (ref 1.7–8.2)
ADD MANUAL DIFF: YES
ALBUMIN SERPL-MCNC: 2.2 G/DL (ref 3.5–5)
ALP SERPL-CCNC: 545 U/L (ref 38–126)
ALT SERPL-CCNC: 28 U/L (ref 21–72)
ANION GAP SERPL CALC-SCNC: 12 MMOL/L (ref 5–19)
ANISOCYTOSIS BLD QL SMEAR: (no result)
AST SERPL-CCNC: 135 U/L (ref 17–59)
BASOPHILS NFR BLD MANUAL: 0 % (ref 0–2)
BILIRUB DIRECT SERPL-MCNC: 0.5 MG/DL (ref 0–0.4)
BILIRUB SERPL-MCNC: 0.5 MG/DL (ref 0.2–1.3)
BUN SERPL-MCNC: 48 MG/DL (ref 7–20)
BURR CELLS BLD QL SMEAR: SLIGHT
CALCIUM: 6.1 MG/DL (ref 8.4–10.2)
CHLORIDE SERPL-SCNC: 112 MMOL/L (ref 98–107)
CO2 SERPL-SCNC: 20 MMOL/L (ref 22–30)
DACRYOCYTES BLD QL SMEAR: SLIGHT
EOSINOPHIL NFR BLD MANUAL: 0 % (ref 0–6)
ERYTHROCYTE [DISTWIDTH] IN BLOOD BY AUTOMATED COUNT: 19.1 % (ref 11.5–14)
GLUCOSE SERPL-MCNC: 147 MG/DL (ref 75–110)
HCT VFR BLD CALC: 22.7 % (ref 37.9–51)
HGB BLD-MCNC: 7.5 G/DL (ref 13.5–17)
MCH RBC QN AUTO: 24.9 PG (ref 27–33.4)
MCHC RBC AUTO-ENTMCNC: 33.1 G/DL (ref 32–36)
MCV RBC AUTO: 75 FL (ref 80–97)
MONOCYTES % (MANUAL): 7 % (ref 3–13)
NRBC BLD AUTO-RTO: 1 /100 WBC
OVALOCYTES BLD QL SMEAR: SLIGHT
PLATELET # BLD: 167 10^3/UL (ref 150–450)
PLATELET COMMENT: (no result)
POIKILOCYTOSIS BLD QL SMEAR: (no result)
POLYCHROMASIA BLD QL SMEAR: SLIGHT
POTASSIUM SERPL-SCNC: 4 MMOL/L (ref 3.6–5)
PROT SERPL-MCNC: 5 G/DL (ref 6.3–8.2)
RBC # BLD AUTO: 3.02 10^6/UL (ref 4.35–5.55)
SCHISTOCYTES BLD QL SMEAR: SLIGHT
SEGMENTED NEUTROPHILS % (MAN): 77 % (ref 42–78)
SODIUM SERPL-SCNC: 143.5 MMOL/L (ref 137–145)
TARGETS BLD QL SMEAR: SLIGHT
TOTAL CELLS COUNTED BLD: 100
TOXIC GRANULES BLD QL SMEAR: SLIGHT
VARIANT LYMPHS NFR BLD MANUAL: 16 % (ref 13–45)
WBC # BLD AUTO: 9.3 10^3/UL (ref 4–10.5)

## 2018-12-13 PROCEDURE — B518ZZA FLUOROSCOPY OF SUPERIOR VENA CAVA, GUIDANCE: ICD-10-PCS | Performed by: RADIOLOGY

## 2018-12-13 PROCEDURE — 30233N1 TRANSFUSION OF NONAUTOLOGOUS RED BLOOD CELLS INTO PERIPHERAL VEIN, PERCUTANEOUS APPROACH: ICD-10-PCS | Performed by: INTERNAL MEDICINE

## 2018-12-13 PROCEDURE — B548ZZA ULTRASONOGRAPHY OF SUPERIOR VENA CAVA, GUIDANCE: ICD-10-PCS | Performed by: RADIOLOGY

## 2018-12-13 PROCEDURE — 02HV33Z INSERTION OF INFUSION DEVICE INTO SUPERIOR VENA CAVA, PERCUTANEOUS APPROACH: ICD-10-PCS | Performed by: RADIOLOGY

## 2018-12-13 RX ADMIN — INSULIN LISPRO PRN UNIT: 100 INJECTION, SOLUTION INTRAVENOUS; SUBCUTANEOUS at 16:46

## 2018-12-13 RX ADMIN — SODIUM CHLORIDE PRN MLS/HR: 9 INJECTION, SOLUTION INTRAVENOUS at 19:01

## 2018-12-13 RX ADMIN — APIXABAN SCH MG: 2.5 TABLET, FILM COATED ORAL at 09:56

## 2018-12-13 RX ADMIN — VITAMIN D, TAB 1000IU (100/BT) SCH UNIT: 25 TAB at 09:56

## 2018-12-13 RX ADMIN — CALCIUM SCH MG: 500 TABLET ORAL at 16:02

## 2018-12-13 RX ADMIN — VALPROIC ACID SCH MG: 250 SOLUTION ORAL at 19:02

## 2018-12-13 RX ADMIN — DILTIAZEM HYDROCHLORIDE SCH MG: 30 TABLET, FILM COATED ORAL at 00:37

## 2018-12-13 RX ADMIN — MINERAL SUPPLEMENT IRON 300 MG / 5 ML STRENGTH LIQUID 100 PER BOX UNFLAVORED SCH: at 19:01

## 2018-12-13 RX ADMIN — APIXABAN SCH MG: 2.5 TABLET, FILM COATED ORAL at 19:02

## 2018-12-13 RX ADMIN — BUSPIRONE HYDROCHLORIDE SCH MG: 10 TABLET ORAL at 09:55

## 2018-12-13 RX ADMIN — MEGESTROL ACETATE SCH MG: 40 SUSPENSION ORAL at 10:02

## 2018-12-13 RX ADMIN — DILTIAZEM HYDROCHLORIDE SCH MG: 30 TABLET, FILM COATED ORAL at 16:08

## 2018-12-13 RX ADMIN — GLIMEPIRIDE SCH MG: 1 TABLET ORAL at 11:37

## 2018-12-13 RX ADMIN — VALPROIC ACID SCH MG: 250 SOLUTION ORAL at 16:01

## 2018-12-13 RX ADMIN — VALPROIC ACID SCH MG: 250 SOLUTION ORAL at 22:12

## 2018-12-13 RX ADMIN — CALCIUM SCH MG: 500 TABLET ORAL at 09:57

## 2018-12-13 RX ADMIN — VALPROIC ACID SCH MG: 250 SOLUTION ORAL at 09:58

## 2018-12-13 RX ADMIN — MINERAL SUPPLEMENT IRON 300 MG / 5 ML STRENGTH LIQUID 100 PER BOX UNFLAVORED SCH MG: at 16:00

## 2018-12-13 RX ADMIN — LEVETIRACETAM SCH MG: 100 SOLUTION ORAL at 22:12

## 2018-12-13 RX ADMIN — CALCIUM SCH MG: 500 TABLET ORAL at 17:44

## 2018-12-13 RX ADMIN — BUSPIRONE HYDROCHLORIDE SCH MG: 10 TABLET ORAL at 17:44

## 2018-12-13 RX ADMIN — SODIUM CHLORIDE SCH: 9 INJECTION INTRAMUSCULAR; INTRAVENOUS; SUBCUTANEOUS at 22:08

## 2018-12-13 RX ADMIN — DILTIAZEM HYDROCHLORIDE SCH MG: 30 TABLET, FILM COATED ORAL at 19:10

## 2018-12-13 RX ADMIN — LEVETIRACETAM SCH MG: 100 SOLUTION ORAL at 10:02

## 2018-12-13 RX ADMIN — MINERAL SUPPLEMENT IRON 300 MG / 5 ML STRENGTH LIQUID 100 PER BOX UNFLAVORED SCH MG: at 09:59

## 2018-12-13 RX ADMIN — DILTIAZEM HYDROCHLORIDE SCH MG: 30 TABLET, FILM COATED ORAL at 05:25

## 2018-12-13 RX ADMIN — POTASSIUM CHLORIDE SCH MEQ: 1.5 SOLUTION ORAL at 09:55

## 2018-12-13 RX ADMIN — TAMSULOSIN HYDROCHLORIDE SCH MG: 0.4 CAPSULE ORAL at 22:12

## 2018-12-13 RX ADMIN — Medication SCH: at 22:08

## 2018-12-13 RX ADMIN — ATORVASTATIN CALCIUM SCH MG: 10 TABLET, FILM COATED ORAL at 22:12

## 2018-12-13 NOTE — RADIOLOGY REPORT (SQ)
EXAM DESCRIPTION:  PICC INSERTION; U/S GUIDE FOR VASCULAR ACCESS; FLUORO/CV PLACEMENT



COMPLETED DATE/TIME:  12/13/2018 2:10 pm



REASON FOR STUDY:  IV ACCESS



COMPARISON:  None.



FLUOROSCOPY TIME:  0.5 minutes

2 images saved to PACS.



TECHNIQUE:  Fluoroscopic and ultrasound guided PICC placement.



LIMITATIONS:  None.



PROCEDURE:  After written consent and assessment were obtained, the patient was brought into the fluo
roscopy room and placed supine on the table. Ultrasound evaluation of potential access sites were per
formed. After successfully identifying a patent right basilic vein, the right arm was prepped and dilan
ped in a sterile fashion along with the ultrasound probe. The entry site was anesthetized with 1% lid
ocaine. A 21 gauge 7 cm needle was advanced through the skin and into the basilic vein under live ult
rasound guidance.  An ultrasound image was saved to PACS confirming access site.  A .018 guide wire w
as then inserted through the needle and into the venous system. The needle was then removed and an 11
 blade scalpel was used to make a 1cm skin incision.  A 5 fr peel-away sheath was advanced over the w
meri and into the venous system. A measurement was then made using the existing wire and live fluorosc
opic guidance. The wire was then removed and trimmed. The PICC was advanced through the peel-away she
ath and into the venous system. The peel-away sheath was removed and the catheter was adhered to the 
patients arm with a stat lock. The catheter was then aspirated and flushed and a sterile bandage was 
placed over the access site.  A fluoroscopic spot image was saved to PACS confirming the catheter tip
 within the superior vena cava.



IMPRESSION:  SUCCESSFUL PLACEMENT OF A 5 FR DUAL LUMEN 41 CM PICC IN THE RIGHT BASILIC VEIN.



COMMENT:  Patient medication list reviewed: Yes- Quality ID# [id number]:Eligible professional attests to doc
umenting in the medical record they obtained, updated, or reviewed the patient's current medications.
.

Quality :  Final reports for procedures using fluoroscopy that document radiation exposure fay
claire, or exposure time and number of fluorographic images (if radiation exposure indices are not avail
able)

Quality ID #[id number]: The patient was prepped and draped using maximum sterile barrier technique including 
cap, mask, sterile gown, sterile gloves, a large sterile sheet, hand hygiene, and 2% Chlorhexidine fo
r cutaneous antisepsis. When ultrasound is used, sterile ultrasound techniques are followed requiring
 sterile gel and sterile probes.



TECHNICAL DOCUMENTATION:  JOB ID:  [id number]

 2011 [employer]- All Rights Reserved                           rev-5/18



Reading location - IP/workstation name: [facility]

## 2018-12-13 NOTE — RADIOLOGY REPORT (SQ)
EXAM DESCRIPTION:  PICC INSERTION; U/S GUIDE FOR VASCULAR ACCESS; FLUORO/CV PLACEMENT



COMPLETED DATE/TIME:  12/13/2018 2:10 pm



REASON FOR STUDY:  IV ACCESS



COMPARISON:  None.



FLUOROSCOPY TIME:  0.5 minutes

2 images saved to PACS.



TECHNIQUE:  Fluoroscopic and ultrasound guided PICC placement.



LIMITATIONS:  None.



PROCEDURE:  After written consent and assessment were obtained, the patient was brought into the fluo
roscopy room and placed supine on the table. Ultrasound evaluation of potential access sites were per
formed. After successfully identifying a patent right basilic vein, the right arm was prepped and dilan
ped in a sterile fashion along with the ultrasound probe. The entry site was anesthetized with 1% lid
ocaine. A 21 gauge 7 cm needle was advanced through the skin and into the basilic vein under live ult
rasound guidance.  An ultrasound image was saved to PACS confirming access site.  A .018 guide wire w
as then inserted through the needle and into the venous system. The needle was then removed and an 11
 blade scalpel was used to make a 1cm skin incision.  A 5 fr peel-away sheath was advanced over the w
meri and into the venous system. A measurement was then made using the existing wire and live fluorosc
opic guidance. The wire was then removed and trimmed. The PICC was advanced through the peel-away she
ath and into the venous system. The peel-away sheath was removed and the catheter was adhered to the 
patients arm with a stat lock. The catheter was then aspirated and flushed and a sterile bandage was 
placed over the access site.  A fluoroscopic spot image was saved to PACS confirming the catheter tip
 within the superior vena cava.



IMPRESSION:  SUCCESSFUL PLACEMENT OF A 5 FR DUAL LUMEN 41 CM PICC IN THE RIGHT BASILIC VEIN.



COMMENT:  Patient medication list reviewed: Yes- Quality ID# [id number]:Eligible professional attests to doc
umenting in the medical record they obtained, updated, or reviewed the patient's current medications.
.

Quality :  Final reports for procedures using fluoroscopy that document radiation exposure fay
claire, or exposure time and number of fluorographic images (if radiation exposure indices are not avail
able)

Quality ID #76: The patient was prepped and draped using maximum sterile barrier technique including 
cap, mask, sterile gown, sterile gloves, a large sterile sheet, hand hygiene, and 2% Chlorhexidine fo
r cutaneous antisepsis. When ultrasound is used, sterile ultrasound techniques are followed requiring
 sterile gel and sterile probes.



TECHNICAL DOCUMENTATION:  JOB ID:  [id number]

 2011 [employer]- All Rights Reserved                           rev-5/18



Reading location - IP/workstation name: [facility]

## 2018-12-13 NOTE — PDOC PROGRESS REPORT
Subjective


Progress Note for:: 12/13/18


Subjective:: 





Patient's clinical condition remains the same.  He still does not eat much and 

still confused.  Does not been decided whether his plan to be going to hospice 

being comfort care.  Today his hemoglobin dropped to 7.5 so he is going to get 

a unit of blood transfusion after PICC line placement.  He also now has an 

order for IV fluids.  Alcaraz catheter was also placed.


Reason For Visit: 


HYPOCALCEMIA, VIT D DEF, MALNUTRITION ACIDOSIS








Physical Exam


Vital Signs: 


 











Temp Pulse Resp BP Pulse Ox


 


 98.1 F   73   16   106/54 L  99 


 


 12/13/18 20:51  12/13/18 20:51  12/13/18 20:51  12/13/18 20:51  12/13/18 20:51








 Intake & Output











 12/12/18 12/13/18 12/14/18





 06:59 06:59 06:59


 


Intake Total 1100 774 425


 


Output Total  6 325


 


Balance 1100 768 100


 


Weight 74.5 kg 74.5 kg 











Exam: 





General appearance: PRESENT: Sleeping with arousable, no acute distress, 

cooperative, fairly developed and fairly nourished


Head exam: PRESENT: atraumatic, normocephalic


Eye exam: PRESENT: conjunctiva pale, PERRLA.  ABSENT: scleral icterus


Neck exam: ABSENT: JVD


Respiratory exam: PRESENT: Diminished breath sounds.  ABSENT: crackles, rales, 

rhonchi, unlabored, wheezes


Cardiovascular exam: PRESENT: Irregular rate rhythm -+S1, +S2.  ABSENT: 

diastolic murmur, systolic murmur


GI/Abdominal exam: PRESENT: normal bowel sounds, soft.  ABSENT: guarding, mass, 

tenderness


Extremities exam: Bilateral ankle edema


Neurological exam: PRESENT: alert, awake, oriented to person, place and time.


Skin exam: PRESENT: dry, warm,


Cardiovascular exam: PRESENT: irregular rhythm, +S1, +S2


GI/Abdominal exam: PRESENT: soft.  ABSENT: tenderness





Results


Laboratory Results: 


 





 12/13/18 08:13 





 12/13/18 08:13 





 











  12/13/18 12/13/18 12/13/18





  05:38 05:38 08:13


 


WBC  Cancelled  


 


RBC  Cancelled  


 


Hgb  Cancelled  


 


Hct  Cancelled  


 


MCV  Cancelled  


 


MCH  Cancelled  


 


MCHC  Cancelled  


 


RDW  Cancelled  


 


Plt Count  Cancelled  


 


Seg Neutrophils %  Cancelled  


 


Lymphocytes %  Cancelled  


 


Monocytes %  Cancelled  


 


Eosinophils %  Cancelled  


 


Basophils %  Cancelled  


 


Absolute Neutrophils  Cancelled  


 


Absolute Lymphocytes  Cancelled  


 


Absolute Monocytes  Cancelled  


 


Absolute Eosinophils  Cancelled  


 


Absolute Basophils  Cancelled  


 


Sodium   Cancelled  143.5


 


Potassium   Cancelled  4.0


 


Chloride   Cancelled  112 H


 


Carbon Dioxide   Cancelled  20 L


 


Anion Gap   Cancelled  12


 


BUN   Cancelled  48 H


 


Creatinine   Cancelled  1.42 H


 


Est GFR ( Amer)   Cancelled  59 L


 


Est GFR (Non-Af Amer)   Cancelled  49 L


 


Glucose   Cancelled  147 H


 


Calcium   Cancelled  6.1 L*


 


Total Bilirubin   Cancelled  0.5


 


AST   Cancelled  135 H


 


ALT   Cancelled  28


 


Alkaline Phosphatase   Cancelled  545 H


 


Total Protein   Cancelled  5.0 L


 


Albumin   Cancelled  2.2 L


 


Blood Type   


 


Antibody Screen   














  12/13/18 12/13/18





  08:13 10:48


 


WBC  9.3 


 


RBC  3.02 L 


 


Hgb  7.5 L 


 


Hct  22.7 L 


 


MCV  75 L 


 


MCH  24.9 L 


 


MCHC  33.1 


 


RDW  19.1 H 


 


Plt Count  167 


 


Seg Neutrophils %  Not Reportable 


 


Lymphocytes %  Not Reportable 


 


Monocytes %  Not Reportable 


 


Eosinophils %  Not Reportable 


 


Basophils %  Not Reportable 


 


Absolute Neutrophils  Not Reportable 


 


Absolute Lymphocytes  Not Reportable 


 


Absolute Monocytes  Not Reportable 


 


Absolute Eosinophils  Not Reportable 


 


Absolute Basophils  Not Reportable 


 


Sodium  


 


Potassium  


 


Chloride  


 


Carbon Dioxide  


 


Anion Gap  


 


BUN  


 


Creatinine  


 


Est GFR ( Amer)  


 


Est GFR (Non-Af Amer)  


 


Glucose  


 


Calcium  


 


Total Bilirubin  


 


AST  


 


ALT  


 


Alkaline Phosphatase  


 


Total Protein  


 


Albumin  


 


Blood Type   O POSITIVE


 


Antibody Screen   NEGATIVE








 











  12/09/18 12/09/18 12/09/18





  16:45 16:45 23:10


 


Creatine Kinase  728 H   706 H


 


CK-MB (CK-2)   0.77 


 


Troponin I   0.015 














  12/09/18 12/10/18 12/10/18





  23:10 03:39 03:39


 


Creatine Kinase   756 H 


 


CK-MB (CK-2)  0.64   0.59


 


Troponin I  0.018   0.019











Impressions: 


 





Sacrum and Coccyx X-Ray  12/07/18 20:24


IMPRESSION:


 


No fracture. 


 


copyright 2011 [employer]- All Rights Reserved


 








Chest X-Ray  12/09/18 00:00


IMPRESSION:  Vascular congestion.  Increasing basilar opacities and pleural 

effusions.


 








Guidance Fluoroscopy  12/13/18 00:00


IMPRESSION:  SUCCESSFUL PLACEMENT OF A 5 FR DUAL LUMEN 41 CM PICC IN THE RIGHT 

BASILIC VEIN.


 








Interventional Vascular Procedure  12/13/18 00:00


IMPRESSION:  SUCCESSFUL PLACEMENT OF A 5 FR DUAL LUMEN 41 CM PICC IN THE RIGHT 

BASILIC VEIN.


 








PICC Line Insertion  12/13/18 00:00


IMPRESSION:  SUCCESSFUL PLACEMENT OF A 5 FR DUAL LUMEN 41 CM PICC IN THE RIGHT 

BASILIC VEIN.


 














Assessment & Plan





- Diagnosis


(1) Hypocalcemia


Is this a current diagnosis for this admission?: Yes   


Plan: 


Corrected calcium is 7.54.  This is due to severe vitamin D deficiency due to 

poor oral intake and malnutrition.  Increase calcium carbonate to 3 times a 

day.  Correct vitamin D deficiency.








(2) Vitamin D deficiency


Is this a current diagnosis for this admission?: Yes   


Plan: 


Increased vitamin D3 to 5000 units once a day.








(3) Hyperparathyroidism


Is this a current diagnosis for this admission?: Yes   


Plan: 


This is due to vitamin D deficiency and hypocalcemia.








(4) Acute kidney injury


Is this a current diagnosis for this admission?: Yes   


Plan: 


Kidney function seems to be slowly worsening.  I think this is due to acute 

prerenal azotemia due to volume depletion due to poor oral intake.  Patient was 

started on IV fluids today which is most likely going to help kidney function.  

However patient really needs to start eating and drinking fluids on his own 

otherwise this is going to recur.








(5) Iron deficiency anemia


Is this a current diagnosis for this admission?: Yes   


Plan: 


Ferrous sulfate started.








(6) Anemia, chronic disease


Is this a current diagnosis for this admission?: Yes   


Plan: 


Patient will receive 2 units of packed RBC today.








(7) Metabolic acidosis


Is this a current diagnosis for this admission?: Yes   


Plan: 


Mild and slightly improving.








(8) Acute hyponatremia


Is this a current diagnosis for this admission?: Yes   


Plan: 


Resolved.








(9) Atrial fibrillation


Qualifiers: 


   Atrial fibrillation type: chronic   Qualified Code(s): I48.2 - Chronic 

atrial fibrillation   


Is this a current diagnosis for this admission?: Yes   





(10) Dementia


Is this a current diagnosis for this admission?: Yes   





(11) Failure to thrive


Is this a current diagnosis for this admission?: Yes   





(12) Dehydration


Is this a current diagnosis for this admission?: Yes   


Plan: 


Patient on IV fluids.








(13) Malnutrition


Qualifiers: 


   Malnutrition type: protein-calorie malnutrition   Protein-calorie 

malnutrition severity: moderate   Qualified Code(s): E44.0 - Moderate protein-

calorie malnutrition   


Is this a current diagnosis for this admission?: Yes   





- Time


Time with patient: 15-25 minutes

## 2018-12-13 NOTE — RADIOLOGY REPORT (SQ)
EXAM DESCRIPTION:  PICC INSERTION; U/S GUIDE FOR VASCULAR ACCESS; FLUORO/CV PLACEMENT



COMPLETED DATE/TIME:  12/13/2018 2:10 pm



REASON FOR STUDY:  IV ACCESS



COMPARISON:  None.



FLUOROSCOPY TIME:  0.5 minutes

2 images saved to PACS.



TECHNIQUE:  Fluoroscopic and ultrasound guided PICC placement.



LIMITATIONS:  None.



PROCEDURE:  After written consent and assessment were obtained, the patient was brought into the fluo
roscopy room and placed supine on the table. Ultrasound evaluation of potential access sites were per
formed. After successfully identifying a patent right basilic vein, the right arm was prepped and dilan
ped in a sterile fashion along with the ultrasound probe. The entry site was anesthetized with 1% lid
ocaine. A 21 gauge 7 cm needle was advanced through the skin and into the basilic vein under live ult
rasound guidance.  An ultrasound image was saved to PACS confirming access site.  A .018 guide wire w
as then inserted through the needle and into the venous system. The needle was then removed and an 11
 blade scalpel was used to make a 1cm skin incision.  A 5 fr peel-away sheath was advanced over the w
meri and into the venous system. A measurement was then made using the existing wire and live fluorosc
opic guidance. The wire was then removed and trimmed. The PICC was advanced through the peel-away she
ath and into the venous system. The peel-away sheath was removed and the catheter was adhered to the 
patients arm with a stat lock. The catheter was then aspirated and flushed and a sterile bandage was 
placed over the access site.  A fluoroscopic spot image was saved to PACS confirming the catheter tip
 within the superior vena cava.



IMPRESSION:  SUCCESSFUL PLACEMENT OF A 5 FR DUAL LUMEN 41 CM PICC IN THE RIGHT BASILIC VEIN.



COMMENT:  Patient medication list reviewed: Yes- Quality ID# [id number]:Eligible professional attests to doc
umenting in the medical record they obtained, updated, or reviewed the patient's current medications.
.

Quality :  Final reports for procedures using fluoroscopy that document radiation exposure fay
claire, or exposure time and number of fluorographic images (if radiation exposure indices are not avail
able)

Quality ID #76: The patient was prepped and draped using maximum sterile barrier technique including 
cap, mask, sterile gown, sterile gloves, a large sterile sheet, hand hygiene, and 2% Chlorhexidine fo
r cutaneous antisepsis. When ultrasound is used, sterile ultrasound techniques are followed requiring
 sterile gel and sterile probes.



TECHNICAL DOCUMENTATION:  JOB ID:  [id number]

 2011 [employer]- All Rights Reserved                           rev-5/18



Reading location - IP/workstation name: [facility]

## 2018-12-13 NOTE — PDOC PROGRESS REPORT
Subjective


Progress Note for:: 12/13/18


Subjective:: 





The patient is still confused.  He is still not eating well.


Discussed with nursing about the need to place a PICC line and have restraints 

so the patient does not pull it out again so we can give him blood and fluids.


Discussed with the daughter yesterday about DNR and hospice.


Reason For Visit: 


HYPOCALCEMIA, VIT D DEF, MALNUTRITION ACIDOSIS








Physical Exam


Vital Signs: 


 











Temp Pulse Resp BP Pulse Ox


 


 97.4 F   92   16   106/56 L  99 


 


 12/13/18 15:22  12/13/18 15:22  12/13/18 15:22  12/13/18 15:22  12/13/18 15:22








 Intake & Output











 12/12/18 12/13/18 12/14/18





 06:59 06:59 06:59


 


Intake Total 1100 774 


 


Output Total  6 


 


Balance 1100 768 


 


Weight 74.5 kg 74.5 kg 











General appearance: PRESENT: mild distress


Head exam: PRESENT: atraumatic


Eye exam: PRESENT: conjunctival injection


Mouth exam: PRESENT: dry mucosa


Neck exam: PRESENT: carotid bruit.  ABSENT: JVD


Respiratory exam: PRESENT: rhonchi


Cardiovascular exam: PRESENT: irregular rhythm, +S1, +S2


GI/Abdominal exam: PRESENT: normal bowel sounds, soft


Extremities exam: PRESENT: tenderness


Musculoskeletal exam: PRESENT: tenderness


Neurological exam: PRESENT: awake


Psychiatric exam: PRESENT: anxious, flat affect





Results


Laboratory Results: 


 





 12/13/18 08:13 





 12/13/18 08:13 





 











  12/13/18 12/13/18 12/13/18





  05:38 05:38 08:13


 


WBC  Cancelled  


 


RBC  Cancelled  


 


Hgb  Cancelled  


 


Hct  Cancelled  


 


MCV  Cancelled  


 


MCH  Cancelled  


 


MCHC  Cancelled  


 


RDW  Cancelled  


 


Plt Count  Cancelled  


 


Seg Neutrophils %  Cancelled  


 


Lymphocytes %  Cancelled  


 


Monocytes %  Cancelled  


 


Eosinophils %  Cancelled  


 


Basophils %  Cancelled  


 


Absolute Neutrophils  Cancelled  


 


Absolute Lymphocytes  Cancelled  


 


Absolute Monocytes  Cancelled  


 


Absolute Eosinophils  Cancelled  


 


Absolute Basophils  Cancelled  


 


Sodium   Cancelled  143.5


 


Potassium   Cancelled  4.0


 


Chloride   Cancelled  112 H


 


Carbon Dioxide   Cancelled  20 L


 


Anion Gap   Cancelled  12


 


BUN   Cancelled  48 H


 


Creatinine   Cancelled  1.42 H


 


Est GFR ( Amer)   Cancelled  59 L


 


Est GFR (Non-Af Amer)   Cancelled  49 L


 


Glucose   Cancelled  147 H


 


Calcium   Cancelled  6.1 L*


 


Total Bilirubin   Cancelled  0.5


 


AST   Cancelled  135 H


 


ALT   Cancelled  28


 


Alkaline Phosphatase   Cancelled  545 H


 


Total Protein   Cancelled  5.0 L


 


Albumin   Cancelled  2.2 L


 


Blood Type   


 


Antibody Screen   














  12/13/18 12/13/18





  08:13 10:48


 


WBC  9.3 


 


RBC  3.02 L 


 


Hgb  7.5 L 


 


Hct  22.7 L 


 


MCV  75 L 


 


MCH  24.9 L 


 


MCHC  33.1 


 


RDW  19.1 H 


 


Plt Count  167 


 


Seg Neutrophils %  Not Reportable 


 


Lymphocytes %  Not Reportable 


 


Monocytes %  Not Reportable 


 


Eosinophils %  Not Reportable 


 


Basophils %  Not Reportable 


 


Absolute Neutrophils  Not Reportable 


 


Absolute Lymphocytes  Not Reportable 


 


Absolute Monocytes  Not Reportable 


 


Absolute Eosinophils  Not Reportable 


 


Absolute Basophils  Not Reportable 


 


Sodium  


 


Potassium  


 


Chloride  


 


Carbon Dioxide  


 


Anion Gap  


 


BUN  


 


Creatinine  


 


Est GFR ( Amer)  


 


Est GFR (Non-Af Amer)  


 


Glucose  


 


Calcium  


 


Total Bilirubin  


 


AST  


 


ALT  


 


Alkaline Phosphatase  


 


Total Protein  


 


Albumin  


 


Blood Type   O POSITIVE


 


Antibody Screen   NEGATIVE








 











  12/09/18 12/09/18 12/09/18





  16:45 16:45 23:10


 


Creatine Kinase  728 H   706 H


 


CK-MB (CK-2)   0.77 


 


Troponin I   0.015 














  12/09/18 12/10/18 12/10/18





  23:10 03:39 03:39


 


Creatine Kinase   756 H 


 


CK-MB (CK-2)  0.64   0.59


 


Troponin I  0.018   0.019











Impressions: 


 





Sacrum and Coccyx X-Ray  12/07/18 20:24


IMPRESSION:


 


No fracture. 


 


copyright 2011 [employer]- All Rights Reserved


 








Chest X-Ray  12/09/18 00:00


IMPRESSION:  Vascular congestion.  Increasing basilar opacities and pleural 

effusions.


 








Guidance Fluoroscopy  12/13/18 00:00


IMPRESSION:  SUCCESSFUL PLACEMENT OF A 5 FR DUAL LUMEN 41 CM PICC IN THE RIGHT 

BASILIC VEIN.


 








Interventional Vascular Procedure  12/13/18 00:00


IMPRESSION:  SUCCESSFUL PLACEMENT OF A 5 FR DUAL LUMEN 41 CM PICC IN THE RIGHT 

BASILIC VEIN.


 








PICC Line Insertion  12/13/18 00:00


IMPRESSION:  SUCCESSFUL PLACEMENT OF A 5 FR DUAL LUMEN 41 CM PICC IN THE RIGHT 

BASILIC VEIN.


 














Assessment & Plan





- Diagnosis


(1) Atrial fibrillation


Qualifiers: 


   Atrial fibrillation type: chronic   Qualified Code(s): I48.2 - Chronic 

atrial fibrillation   


Is this a current diagnosis for this admission?: Yes   


Plan: 


Recurrent episodes of fast A. fib.  Will reconsult with cardiology and add a 

small dose of calcium channel block








(2) Decubitus ulcer of sacral region, stage 1


Is this a current diagnosis for this admission?: Yes   





(3) Failure to thrive


Is this a current diagnosis for this admission?: Yes   


Plan: 


Continue current treatment








(4) Hypocalcemia


Is this a current diagnosis for this admission?: Yes   





(5) Anemia, chronic disease


Is this a current diagnosis for this admission?: Yes   


Plan: 


His H&H this morning of less than 7.5.  We will transfuse 2 units of packed red 

cells








(6) CKD (chronic kidney disease) stage 3, GFR 30-59 ml/min


Is this a current diagnosis for this admission?: Yes   


Plan: 


Will use some hydration and insert a Alcaraz for strict MARISOL's








(7) Metastatic malignant neoplasm to prostate


Is this a current diagnosis for this admission?: Yes   





(8) Dementia


Is this a current diagnosis for this admission?: Yes   


Plan: 


We will continue with current treatment








(9) Acute psychosis


Is this a current diagnosis for this admission?: Yes   





(10) Memory loss, short term


Is this a current diagnosis for this admission?: Yes   





(11) Vitamin D deficiency


Is this a current diagnosis for this admission?: Yes   


Plan: 


Most probably related to hypocalcemia.  We will add vitamin D

## 2018-12-14 LAB
%HYPO/RBC NFR BLD AUTO: (no result) %
ABSOLUTE LYMPHOCYTES# (MANUAL): 2.2 10^3/UL (ref 0.5–4.7)
ABSOLUTE MONOCYTES # (MANUAL): 0.7 10^3/UL (ref 0.1–1.4)
ABSOLUTE NEUTROPHILS# (MANUAL): 7.6 10^3/UL (ref 1.7–8.2)
ADD MANUAL DIFF: YES
ALBUMIN SERPL-MCNC: 2.2 G/DL (ref 3.5–5)
ALP SERPL-CCNC: 583 U/L (ref 38–126)
ALT SERPL-CCNC: 29 U/L (ref 21–72)
ANION GAP SERPL CALC-SCNC: 15 MMOL/L (ref 5–19)
ANISOCYTOSIS BLD QL SMEAR: (no result)
AST SERPL-CCNC: 126 U/L (ref 17–59)
BASOPHILS NFR BLD MANUAL: 0 % (ref 0–2)
BILIRUB DIRECT SERPL-MCNC: 0.5 MG/DL (ref 0–0.4)
BILIRUB SERPL-MCNC: 0.6 MG/DL (ref 0.2–1.3)
BUN SERPL-MCNC: 47 MG/DL (ref 7–20)
BURR CELLS BLD QL SMEAR: SLIGHT
CALCIUM: 6.1 MG/DL (ref 8.4–10.2)
CHLORIDE SERPL-SCNC: 113 MMOL/L (ref 98–107)
CO2 SERPL-SCNC: 17 MMOL/L (ref 22–30)
EOSINOPHIL NFR BLD MANUAL: 0 % (ref 0–6)
ERYTHROCYTE [DISTWIDTH] IN BLOOD BY AUTOMATED COUNT: 19.6 % (ref 11.5–14)
GLUCOSE SERPL-MCNC: 107 MG/DL (ref 75–110)
HCT VFR BLD CALC: 26.8 % (ref 37.9–51)
HELMET CELLS BLD QL SMEAR: SLIGHT
HGB BLD-MCNC: 9.2 G/DL (ref 13.5–17)
MCH RBC QN AUTO: 26.7 PG (ref 27–33.4)
MCHC RBC AUTO-ENTMCNC: 34.4 G/DL (ref 32–36)
MCV RBC AUTO: 78 FL (ref 80–97)
MONOCYTES % (MANUAL): 7 % (ref 3–13)
OVALOCYTES BLD QL SMEAR: SLIGHT
PLATELET # BLD: 155 10^3/UL (ref 150–450)
PLATELET COMMENT: ADEQUATE
POIKILOCYTOSIS BLD QL SMEAR: (no result)
POTASSIUM SERPL-SCNC: 4.1 MMOL/L (ref 3.6–5)
PROT SERPL-MCNC: 5.1 G/DL (ref 6.3–8.2)
RBC # BLD AUTO: 3.45 10^6/UL (ref 4.35–5.55)
SEGMENTED NEUTROPHILS % (MAN): 72 % (ref 42–78)
SODIUM SERPL-SCNC: 144.9 MMOL/L (ref 137–145)
TOTAL CELLS COUNTED BLD: 100
VARIANT LYMPHS NFR BLD MANUAL: 21 % (ref 13–45)
WBC # BLD AUTO: 10.5 10^3/UL (ref 4–10.5)

## 2018-12-14 PROCEDURE — 30233N1 TRANSFUSION OF NONAUTOLOGOUS RED BLOOD CELLS INTO PERIPHERAL VEIN, PERCUTANEOUS APPROACH: ICD-10-PCS | Performed by: INTERNAL MEDICINE

## 2018-12-14 RX ADMIN — GLIMEPIRIDE SCH: 1 TABLET ORAL at 12:55

## 2018-12-14 RX ADMIN — DILTIAZEM HYDROCHLORIDE SCH: 30 TABLET, FILM COATED ORAL at 12:00

## 2018-12-14 RX ADMIN — SODIUM CHLORIDE PRN MLS/HR: 9 INJECTION, SOLUTION INTRAVENOUS at 17:50

## 2018-12-14 RX ADMIN — SODIUM CHLORIDE PRN MLS/HR: 9 INJECTION, SOLUTION INTRAVENOUS at 05:45

## 2018-12-14 RX ADMIN — DILTIAZEM HYDROCHLORIDE SCH MG: 30 TABLET, FILM COATED ORAL at 17:42

## 2018-12-14 RX ADMIN — INSULIN LISPRO PRN UNIT: 100 INJECTION, SOLUTION INTRAVENOUS; SUBCUTANEOUS at 17:46

## 2018-12-14 RX ADMIN — GLIMEPIRIDE SCH MG: 1 TABLET ORAL at 11:18

## 2018-12-14 RX ADMIN — SODIUM CHLORIDE SCH ML: 9 INJECTION INTRAMUSCULAR; INTRAVENOUS; SUBCUTANEOUS at 21:44

## 2018-12-14 RX ADMIN — BUSPIRONE HYDROCHLORIDE SCH MG: 10 TABLET ORAL at 11:09

## 2018-12-14 RX ADMIN — SODIUM CHLORIDE PRN MLS/HR: 9 INJECTION, SOLUTION INTRAVENOUS at 20:00

## 2018-12-14 RX ADMIN — LEVETIRACETAM SCH MG: 100 SOLUTION ORAL at 11:13

## 2018-12-14 RX ADMIN — TAMSULOSIN HYDROCHLORIDE SCH MG: 0.4 CAPSULE ORAL at 21:44

## 2018-12-14 RX ADMIN — CALCIUM SCH: 500 TABLET ORAL at 17:43

## 2018-12-14 RX ADMIN — VITAMIN D, TAB 1000IU (100/BT) SCH UNIT: 25 TAB at 11:16

## 2018-12-14 RX ADMIN — ACETAMINOPHEN PRN MG: 325 TABLET ORAL at 08:48

## 2018-12-14 RX ADMIN — APIXABAN SCH MG: 2.5 TABLET, FILM COATED ORAL at 11:13

## 2018-12-14 RX ADMIN — MINERAL SUPPLEMENT IRON 300 MG / 5 ML STRENGTH LIQUID 100 PER BOX UNFLAVORED SCH: at 14:00

## 2018-12-14 RX ADMIN — APIXABAN SCH: 2.5 TABLET, FILM COATED ORAL at 11:00

## 2018-12-14 RX ADMIN — DILTIAZEM HYDROCHLORIDE SCH: 30 TABLET, FILM COATED ORAL at 00:00

## 2018-12-14 RX ADMIN — VITAMIN D, TAB 1000IU (100/BT) SCH: 25 TAB at 11:00

## 2018-12-14 RX ADMIN — VALPROIC ACID SCH MG: 250 SOLUTION ORAL at 21:46

## 2018-12-14 RX ADMIN — CALCIUM SCH: 500 TABLET ORAL at 14:00

## 2018-12-14 RX ADMIN — SODIUM CHLORIDE SCH ML: 9 INJECTION INTRAMUSCULAR; INTRAVENOUS; SUBCUTANEOUS at 11:12

## 2018-12-14 RX ADMIN — CALCIUM SCH MG: 500 TABLET ORAL at 11:10

## 2018-12-14 RX ADMIN — VALPROIC ACID SCH MG: 250 SOLUTION ORAL at 17:41

## 2018-12-14 RX ADMIN — MINERAL SUPPLEMENT IRON 300 MG / 5 ML STRENGTH LIQUID 100 PER BOX UNFLAVORED SCH MG: at 11:13

## 2018-12-14 RX ADMIN — APIXABAN SCH MG: 2.5 TABLET, FILM COATED ORAL at 18:54

## 2018-12-14 RX ADMIN — VALPROIC ACID SCH: 250 SOLUTION ORAL at 14:00

## 2018-12-14 RX ADMIN — CALCIUM SCH: 500 TABLET ORAL at 11:00

## 2018-12-14 RX ADMIN — DILTIAZEM HYDROCHLORIDE SCH: 30 TABLET, FILM COATED ORAL at 05:44

## 2018-12-14 RX ADMIN — BUSPIRONE HYDROCHLORIDE SCH MG: 10 TABLET ORAL at 17:44

## 2018-12-14 RX ADMIN — MEGESTROL ACETATE SCH MG: 40 SUSPENSION ORAL at 11:12

## 2018-12-14 RX ADMIN — VALPROIC ACID SCH MG: 250 SOLUTION ORAL at 11:10

## 2018-12-14 RX ADMIN — POTASSIUM CHLORIDE SCH MEQ: 1.5 SOLUTION ORAL at 11:10

## 2018-12-14 RX ADMIN — Medication SCH UNIT: at 21:43

## 2018-12-14 RX ADMIN — Medication SCH UNIT: at 11:09

## 2018-12-14 RX ADMIN — LEVETIRACETAM SCH MG: 100 SOLUTION ORAL at 21:43

## 2018-12-14 RX ADMIN — ATORVASTATIN CALCIUM SCH MG: 10 TABLET, FILM COATED ORAL at 21:44

## 2018-12-14 RX ADMIN — MINERAL SUPPLEMENT IRON 300 MG / 5 ML STRENGTH LIQUID 100 PER BOX UNFLAVORED SCH: at 17:43

## 2018-12-14 RX ADMIN — BUSPIRONE HYDROCHLORIDE SCH: 10 TABLET ORAL at 11:00

## 2018-12-14 NOTE — PDOC PROGRESS REPORT
Subjective


Progress Note for:: 12/14/18


Subjective:: 





Patient's clinical condition has not really changed.  He is still confused and 

complains of pain intermittently.  He received blood transfusion yesterday.  He 

still currently receiving IV fluids.


Reason For Visit: 


HYPOCALCEMIA, VIT D DEF, MALNUTRITION ACIDOSIS








Physical Exam


Vital Signs: 


 











Temp Pulse Resp BP Pulse Ox


 


 98.5 F   93   20   121/53 L  99 


 


 12/14/18 13:30  12/14/18 13:30  12/14/18 13:30  12/14/18 13:30  12/14/18 13:30








 Intake & Output











 12/13/18 12/14/18 12/15/18





 06:59 06:59 06:59


 


Intake Total 774 2597 


 


Output Total 6 627 


 


Balance 768 1970 


 


Weight 74.5 kg 74.5 kg 











Exam: 





General appearance: PRESENT: no acute distress, confused, fairly developed and 

fairly nourished


Head exam: PRESENT: atraumatic, normocephalic


Eye exam: PRESENT: conjunctiva pale, PERRLA.  ABSENT: scleral icterus


Neck exam: ABSENT: JVD


Respiratory exam: PRESENT: Diminished breath sounds.  ABSENT: crackles, rales, 

rhonchi, unlabored, wheezes


Cardiovascular exam: PRESENT: Regular rate rhythm -+S1, +S2.  ABSENT: diastolic 

murmur, systolic murmur


GI/Abdominal exam: PRESENT: normal bowel sounds, soft.  ABSENT: guarding, mass, 

tenderness


Extremities exam: ABSENT: No edema


Neurological exam: PRESENT: alert, awake, confused on every once in a while he 

will scream in pain.


Skin exam: PRESENT: dry, warm,


Cardiovascular exam: PRESENT: irregular rhythm, +S1, +S2


GI/Abdominal exam: PRESENT: soft.  ABSENT: tenderness





Results


Laboratory Results: 


 





 12/14/18 05:25 





 12/14/18 05:25 





 











  12/13/18 12/14/18 12/14/18





  10:48 05:25 05:25


 


WBC   10.5 


 


RBC   3.45 L 


 


Hgb   9.2 L 


 


Hct   26.8 L 


 


MCV   78 L 


 


MCH   26.7 L 


 


MCHC   34.4 


 


RDW   19.6 H 


 


Plt Count   155 


 


Seg Neutrophils %   Not Reportable 


 


Lymphocytes %   Not Reportable 


 


Monocytes %   Not Reportable 


 


Eosinophils %   Not Reportable 


 


Basophils %   Not Reportable 


 


Absolute Neutrophils   Not Reportable 


 


Absolute Lymphocytes   Not Reportable 


 


Absolute Monocytes   Not Reportable 


 


Absolute Eosinophils   Not Reportable 


 


Absolute Basophils   Not Reportable 


 


Sodium    144.9


 


Potassium    4.1


 


Chloride    113 H


 


Carbon Dioxide    17 L


 


Anion Gap    15


 


BUN    47 H


 


Creatinine    1.33 H


 


Est GFR ( Amer)    > 60


 


Est GFR (Non-Af Amer)    53 L


 


Glucose    107


 


Calcium    6.1 L*


 


Total Bilirubin    0.6


 


AST    126 H


 


ALT    29


 


Alkaline Phosphatase    583 H


 


Total Protein    5.1 L


 


Albumin    2.2 L


 


Blood Type  O POSITIVE  


 


Antibody Screen  NEGATIVE  








 











  12/09/18 12/09/18 12/09/18





  16:45 16:45 23:10


 


Creatine Kinase  728 H   706 H


 


CK-MB (CK-2)   0.77 


 


Troponin I   0.015 














  12/09/18 12/10/18 12/10/18





  23:10 03:39 03:39


 


Creatine Kinase   756 H 


 


CK-MB (CK-2)  0.64   0.59


 


Troponin I  0.018   0.019











Impressions: 


 





Sacrum and Coccyx X-Ray  12/07/18 20:24


IMPRESSION:


 


No fracture. 


 


copyright 2011 [employer]- All Rights Reserved


 








Chest X-Ray  12/09/18 00:00


IMPRESSION:  Vascular congestion.  Increasing basilar opacities and pleural 

effusions.


 








Guidance Fluoroscopy  12/13/18 00:00


IMPRESSION:  SUCCESSFUL PLACEMENT OF A 5 FR DUAL LUMEN 41 CM PICC IN THE RIGHT 

BASILIC VEIN.


 








Interventional Vascular Procedure  12/13/18 00:00


IMPRESSION:  SUCCESSFUL PLACEMENT OF A 5 FR DUAL LUMEN 41 CM PICC IN THE RIGHT 

BASILIC VEIN.


 








PICC Line Insertion  12/13/18 00:00


IMPRESSION:  SUCCESSFUL PLACEMENT OF A 5 FR DUAL LUMEN 41 CM PICC IN THE RIGHT 

BASILIC VEIN.


 














Assessment & Plan





- Diagnosis


(1) Hypocalcemia


Is this a current diagnosis for this admission?: Yes   


Plan: 


Corrected calcium is 7.54.  This is due to severe vitamin D deficiency due to 

poor oral intake and malnutrition.  Increase calcium carbonate to 3 times a 

day.  Correct vitamin D deficiency with vitamin D replacement.








(2) Vitamin D deficiency


Is this a current diagnosis for this admission?: Yes   


Plan: 


Increased vitamin D3 to 5000 units once a day.








(3) Hyperparathyroidism


Is this a current diagnosis for this admission?: Yes   


Plan: 


This is due to vitamin D deficiency and hypocalcemia.








(4) Acute kidney injury


Is this a current diagnosis for this admission?: Yes   


Plan: 


Kidney function seems to be slowly worsening.  I think this is due to acute 

prerenal azotemia due to volume depletion due to poor oral intake.  Patient was 

started on IV fluids today which is most likely going to help kidney function.  

His kidney function is improved today.  However patient really needs to start 

eating and drinking fluids on his own otherwise this is going to recur.  

Considering how the patient has been behaving I do not think this is 

sustainable.  I think patient really just needs to be on comfort care at this 

point.








(5) Iron deficiency anemia


Is this a current diagnosis for this admission?: Yes   


Plan: 


Ferrous sulfate started.








(6) Anemia, chronic disease


Is this a current diagnosis for this admission?: Yes   


Plan: 


Patient received 2 units packed RBC yesterday.








(7) Metabolic acidosis


Is this a current diagnosis for this admission?: Yes   


Plan: 


Slightly worse today due to IV fluids.








(8) Atrial fibrillation


Qualifiers: 


   Atrial fibrillation type: chronic   Qualified Code(s): I48.2 - Chronic 

atrial fibrillation   


Is this a current diagnosis for this admission?: Yes   





(9) Dementia


Is this a current diagnosis for this admission?: Yes   





(10) Failure to thrive


Is this a current diagnosis for this admission?: Yes   





(11) Dehydration


Is this a current diagnosis for this admission?: Yes   


Plan: 


Patient on IV fluids.








(12) Malnutrition


Qualifiers: 


   Malnutrition type: protein-calorie malnutrition   Protein-calorie 

malnutrition severity: moderate   Qualified Code(s): E44.0 - Moderate protein-

calorie malnutrition   


Is this a current diagnosis for this admission?: Yes   


Plan: 


On Megace but does not seem to be working.








- Notes


Notes: 





No further recommendations from nephrology standpoint.  The patient continues 

to have poor oral intake which is essentially failure to thrive, I think the 

patient needs to be chosen comfort care measures.





- Time


Time with patient: 15-25 minutes

## 2018-12-14 NOTE — PDOC PROGRESS REPORT
Subjective


Progress Note for:: 12/14/18


Subjective:: 





The patient is more awake this morning.  He states that he is hungry.  He has 

received IV fluids and 2 units of packed red blood cells.


Reason For Visit: 


HYPOCALCEMIA, VIT D DEF, MALNUTRITION ACIDOSIS








Physical Exam


Vital Signs: 


 











Temp Pulse Resp BP Pulse Ox


 


 98.2 F   113 H  22 H  124/56 L  96 


 


 12/14/18 07:44  12/14/18 07:44  12/14/18 07:44  12/14/18 07:44  12/14/18 07:44








 Intake & Output











 12/13/18 12/14/18 12/15/18





 06:59 06:59 06:59


 


Intake Total 774 2597 


 


Output Total 6 627 


 


Balance 768 1970 


 


Weight 74.5 kg 74.5 kg 











General appearance: PRESENT: mild distress


Head exam: PRESENT: atraumatic


Eye exam: PRESENT: conjunctiva pink


Respiratory exam: PRESENT: rhonchi


Cardiovascular exam: PRESENT: irregular rhythm, +S1, +S2


GI/Abdominal exam: PRESENT: normal bowel sounds, soft


Extremities exam: PRESENT: tenderness


Musculoskeletal exam: PRESENT: tenderness


Neurological exam: PRESENT: awake


Psychiatric exam: PRESENT: anxious, flat affect





Results


Laboratory Results: 


 





 12/14/18 05:25 





 12/14/18 05:25 





 











  12/13/18 12/14/18 12/14/18





  10:48 05:25 05:25


 


WBC   10.5 


 


RBC   3.45 L 


 


Hgb   9.2 L 


 


Hct   26.8 L 


 


MCV   78 L 


 


MCH   26.7 L 


 


MCHC   34.4 


 


RDW   19.6 H 


 


Plt Count   155 


 


Seg Neutrophils %   Not Reportable 


 


Lymphocytes %   Not Reportable 


 


Monocytes %   Not Reportable 


 


Eosinophils %   Not Reportable 


 


Basophils %   Not Reportable 


 


Absolute Neutrophils   Not Reportable 


 


Absolute Lymphocytes   Not Reportable 


 


Absolute Monocytes   Not Reportable 


 


Absolute Eosinophils   Not Reportable 


 


Absolute Basophils   Not Reportable 


 


Sodium    144.9


 


Potassium    4.1


 


Chloride    113 H


 


Carbon Dioxide    17 L


 


Anion Gap    15


 


BUN    47 H


 


Creatinine    1.33 H


 


Est GFR ( Amer)    > 60


 


Est GFR (Non-Af Amer)    53 L


 


Glucose    107


 


Calcium    6.1 L*


 


Total Bilirubin    0.6


 


AST    126 H


 


ALT    29


 


Alkaline Phosphatase    583 H


 


Total Protein    5.1 L


 


Albumin    2.2 L


 


Blood Type  O POSITIVE  


 


Antibody Screen  NEGATIVE  








 











  12/09/18 12/09/18 12/09/18





  16:45 16:45 23:10


 


Creatine Kinase  728 H   706 H


 


CK-MB (CK-2)   0.77 


 


Troponin I   0.015 














  12/09/18 12/10/18 12/10/18





  23:10 03:39 03:39


 


Creatine Kinase   756 H 


 


CK-MB (CK-2)  0.64   0.59


 


Troponin I  0.018   0.019











Impressions: 


 





Sacrum and Coccyx X-Ray  12/07/18 20:24


IMPRESSION:


 


No fracture. 


 


copyright 2011 [employer]- All Rights Reserved


 








Chest X-Ray  12/09/18 00:00


IMPRESSION:  Vascular congestion.  Increasing basilar opacities and pleural 

effusions.


 








Guidance Fluoroscopy  12/13/18 00:00


IMPRESSION:  SUCCESSFUL PLACEMENT OF A 5 FR DUAL LUMEN 41 CM PICC IN THE RIGHT 

BASILIC VEIN.


 








Interventional Vascular Procedure  12/13/18 00:00


IMPRESSION:  SUCCESSFUL PLACEMENT OF A 5 FR DUAL LUMEN 41 CM PICC IN THE RIGHT 

BASILIC VEIN.


 








PICC Line Insertion  12/13/18 00:00


IMPRESSION:  SUCCESSFUL PLACEMENT OF A 5 FR DUAL LUMEN 41 CM PICC IN THE RIGHT 

BASILIC VEIN.


 














Assessment & Plan





- Diagnosis


(1) Atrial fibrillation


Qualifiers: 


   Atrial fibrillation type: chronic   Qualified Code(s): I48.2 - Chronic 

atrial fibrillation   


Is this a current diagnosis for this admission?: Yes   


Plan: 


Recurrent episodes of fast A. fib.  Will reconsult with cardiology and add a 

small dose of calcium channel block








(2) Decubitus ulcer of sacral region, stage 1


Is this a current diagnosis for this admission?: Yes   


Plan: 


We will continue local skin care.  Will add specialty bed








(3) Failure to thrive


Is this a current diagnosis for this admission?: Yes   


Plan: 


We will continue with IV fluids and try to increase oral intake








(4) Hypocalcemia


Is this a current diagnosis for this admission?: Yes   


Plan: 


Corrected calcium is 7.8.


We will obtain PTH and vitamin D.  Will consult nephrology








(5) Anemia, chronic disease


Is this a current diagnosis for this admission?: Yes   


Plan: 


Improving after the transfusion of 2 units of cells








(6) CKD (chronic kidney disease) stage 3, GFR 30-59 ml/min


Is this a current diagnosis for this admission?: Yes   





(7) Metastatic malignant neoplasm to prostate


Is this a current diagnosis for this admission?: Yes   


Plan: 


We will reconsult hematology oncology








(8) Dementia


Is this a current diagnosis for this admission?: Yes   


Plan: 


We will continue with current treatment








(9) Acute psychosis


Is this a current diagnosis for this admission?: Yes   





(10) Memory loss, short term


Is this a current diagnosis for this admission?: Yes   





(11) Vitamin D deficiency


Is this a current diagnosis for this admission?: Yes   


Plan: 


Most probably related to hypocalcemia.  We will add vitamin D

## 2018-12-15 RX ADMIN — Medication SCH UNIT: at 21:32

## 2018-12-15 RX ADMIN — RISPERIDONE PRN MG: 0.25 TABLET ORAL at 01:37

## 2018-12-15 RX ADMIN — SODIUM CHLORIDE PRN MLS/HR: 9 INJECTION, SOLUTION INTRAVENOUS at 04:17

## 2018-12-15 RX ADMIN — ATORVASTATIN CALCIUM SCH MG: 10 TABLET, FILM COATED ORAL at 21:31

## 2018-12-15 RX ADMIN — MINERAL SUPPLEMENT IRON 300 MG / 5 ML STRENGTH LIQUID 100 PER BOX UNFLAVORED SCH MG: at 10:40

## 2018-12-15 RX ADMIN — TAMSULOSIN HYDROCHLORIDE SCH MG: 0.4 CAPSULE ORAL at 21:31

## 2018-12-15 RX ADMIN — DILTIAZEM HYDROCHLORIDE SCH MG: 30 TABLET, FILM COATED ORAL at 00:43

## 2018-12-15 RX ADMIN — LEVETIRACETAM SCH MG: 100 SOLUTION ORAL at 10:41

## 2018-12-15 RX ADMIN — ACETAMINOPHEN PRN MG: 325 TABLET ORAL at 01:37

## 2018-12-15 RX ADMIN — POTASSIUM CHLORIDE SCH MEQ: 1.5 SOLUTION ORAL at 10:41

## 2018-12-15 RX ADMIN — BUSPIRONE HYDROCHLORIDE SCH MG: 10 TABLET ORAL at 18:02

## 2018-12-15 RX ADMIN — MINERAL SUPPLEMENT IRON 300 MG / 5 ML STRENGTH LIQUID 100 PER BOX UNFLAVORED SCH MG: at 15:24

## 2018-12-15 RX ADMIN — CALCIUM SCH MG: 500 TABLET ORAL at 18:06

## 2018-12-15 RX ADMIN — HALOPERIDOL LACTATE PRN MG: 5 INJECTION, SOLUTION INTRAMUSCULAR at 21:29

## 2018-12-15 RX ADMIN — Medication SCH UNIT: at 10:40

## 2018-12-15 RX ADMIN — SODIUM CHLORIDE PRN MLS/HR: 9 INJECTION, SOLUTION INTRAVENOUS at 06:19

## 2018-12-15 RX ADMIN — CALCIUM SCH MG: 500 TABLET ORAL at 10:43

## 2018-12-15 RX ADMIN — DILTIAZEM HYDROCHLORIDE SCH MG: 30 TABLET, FILM COATED ORAL at 15:24

## 2018-12-15 RX ADMIN — VALPROIC ACID SCH MG: 250 SOLUTION ORAL at 15:24

## 2018-12-15 RX ADMIN — APIXABAN SCH MG: 2.5 TABLET, FILM COATED ORAL at 18:04

## 2018-12-15 RX ADMIN — VALPROIC ACID SCH MG: 250 SOLUTION ORAL at 18:03

## 2018-12-15 RX ADMIN — SODIUM CHLORIDE SCH ML: 9 INJECTION INTRAMUSCULAR; INTRAVENOUS; SUBCUTANEOUS at 10:43

## 2018-12-15 RX ADMIN — VITAMIN D, TAB 1000IU (100/BT) SCH UNIT: 25 TAB at 10:44

## 2018-12-15 RX ADMIN — VALPROIC ACID SCH MG: 250 SOLUTION ORAL at 21:33

## 2018-12-15 RX ADMIN — DILTIAZEM HYDROCHLORIDE SCH MG: 30 TABLET, FILM COATED ORAL at 06:18

## 2018-12-15 RX ADMIN — SODIUM CHLORIDE PRN MLS/HR: 9 INJECTION, SOLUTION INTRAVENOUS at 21:30

## 2018-12-15 RX ADMIN — BUSPIRONE HYDROCHLORIDE SCH MG: 10 TABLET ORAL at 10:37

## 2018-12-15 RX ADMIN — GLIMEPIRIDE SCH MG: 1 TABLET ORAL at 08:44

## 2018-12-15 RX ADMIN — APIXABAN SCH MG: 2.5 TABLET, FILM COATED ORAL at 10:39

## 2018-12-15 RX ADMIN — MEGESTROL ACETATE SCH MG: 40 SUSPENSION ORAL at 10:42

## 2018-12-15 RX ADMIN — VALPROIC ACID SCH MG: 250 SOLUTION ORAL at 10:38

## 2018-12-15 RX ADMIN — MINERAL SUPPLEMENT IRON 300 MG / 5 ML STRENGTH LIQUID 100 PER BOX UNFLAVORED SCH MG: at 18:05

## 2018-12-15 RX ADMIN — CALCIUM SCH MG: 500 TABLET ORAL at 15:25

## 2018-12-15 RX ADMIN — DILTIAZEM HYDROCHLORIDE SCH MG: 30 TABLET, FILM COATED ORAL at 18:03

## 2018-12-15 RX ADMIN — LEVETIRACETAM SCH MG: 100 SOLUTION ORAL at 21:33

## 2018-12-15 RX ADMIN — SODIUM CHLORIDE SCH ML: 9 INJECTION INTRAMUSCULAR; INTRAVENOUS; SUBCUTANEOUS at 21:32

## 2018-12-15 NOTE — RADIOLOGY REPORT (SQ)
EXAM DESCRIPTION:  CT HEAD WITHOUT



COMPLETED DATE/TIME:  12/15/2018 2:45 pm



REASON FOR STUDY:  S/P fall



COMPARISON:  12/5/2018



TECHNIQUE:  Axial images acquired through the brain without intravenous contrast.  Images reviewed wi
th bone, brain and subdural windows.   Images stored on PACS.

All CT scanners at this facility use dose modulation, iterative reconstruction, and/or weight based d
osing when appropriate to reduce radiation dose to as low as reasonably achievable (ALARA).

[facility]: Dose Right  [facility]: CareDose    [facility]: Dose Right    [facility]: Teradose 4D    [facility]: [employer] Technologies



RADIATION DOSE:  CT Rad equipment meets quality standard of care and radiation dose reduction techniq
ues were employed. CTDIvol: 53.2 mGy. DLP: 1017 mGy-cm. mGy.



LIMITATIONS:  None.



FINDINGS:  VENTRICLES: Normal size and contour.

CEREBRUM: No masses.  No hemorrhage.  No midline shift.  No evidence for acute infarction. Normal gra
y/white matter differentiation. No areas of low density in the white matter.

CEREBELLUM: No masses.  No hemorrhage.  No alteration of density.  No evidence for acute infarction.

EXTRAAXIAL SPACES: No fluid collections.  No masses.

ORBITS AND GLOBE: No intra- or extraconal masses.  Normal contour of globe without masses.

CALVARIUM: No fracture.

PARANASAL SINUSES: No fluid or mucosal thickening.

SOFT TISSUES: No mass or hematoma.

OTHER: No other significant finding.



IMPRESSION:  NORMAL BRAIN CT WITHOUT CONTRAST.

EVIDENCE OF ACUTE STROKE: NO.



COMMENT:  Quality ID # 436: Final reports with documentation of one or more dose reduction techniques
 (e.g., Automated exposure control, adjustment of the mA and/or kV according to patient size, use of 
iterative reconstruction technique)



TECHNICAL DOCUMENTATION:  JOB ID:  [id number]

 2011 [employer]- All Rights Reserved



Reading location - IP/workstation name: [email or web address]

## 2018-12-15 NOTE — PDOC PROGRESS REPORT
Subjective


Progress Note for:: 12/15/18


Subjective:: 





Covering the weekend for Dr. Guadalupe





Patient was seen and examined.  He is confused and demented but appears in no 

acute distress.


Reason For Visit: 


HYPOCALCEMIA, VIT D DEF, MALNUTRITION ACIDOSIS








Physical Exam


Vital Signs: 


 











Temp Pulse Resp BP Pulse Ox


 


 97.8 F   84   18   134/73 H  100 


 


 12/15/18 11:17  12/15/18 11:17  12/15/18 11:17  12/15/18 11:17  12/15/18 11:17








 Intake & Output











 12/14/18 12/15/18 12/16/18





 06:59 06:59 06:59


 


Intake Total 2597 6363 


 


Output Total 627 2086 


 


Balance 1970 4277 


 


Weight 164 lb 3.91 oz 153 lb 7.068 oz 











General appearance: PRESENT: no acute distress, obese


Head exam: PRESENT: atraumatic


Eye exam: ABSENT: conjunctival injection


Mouth exam: PRESENT: neck supple


Neck exam: ABSENT: meningismus


Respiratory exam: PRESENT: clear to auscultation venessa.  ABSENT: accessory muscle 

use


Cardiovascular exam: PRESENT: irregular rhythm


GI/Abdominal exam: PRESENT: normal bowel sounds, soft


Neurological exam: PRESENT: awake.  ABSENT: oriented to person, oriented to 

place, oriented to time, oriented to situation





Results


Laboratory Results: 


 





 12/14/18 05:25 





 12/14/18 05:25 





 











  12/09/18 12/09/18 12/09/18





  16:45 16:45 23:10


 


Creatine Kinase  728 H   706 H


 


CK-MB (CK-2)   0.77 


 


Troponin I   0.015 














  12/09/18 12/10/18 12/10/18





  23:10 03:39 03:39


 


Creatine Kinase   756 H 


 


CK-MB (CK-2)  0.64   0.59


 


Troponin I  0.018   0.019











Impressions: 


 





Sacrum and Coccyx X-Ray  12/07/18 20:24


IMPRESSION:


 


No fracture. 


 


copyright 2011 [employer]- All Rights Reserved


 








Chest X-Ray  12/09/18 00:00


IMPRESSION:  Vascular congestion.  Increasing basilar opacities and pleural 

effusions.


 








Guidance Fluoroscopy  12/13/18 00:00


IMPRESSION:  SUCCESSFUL PLACEMENT OF A 5 FR DUAL LUMEN 41 CM PICC IN THE RIGHT 

BASILIC VEIN.


 








Interventional Vascular Procedure  12/13/18 00:00


IMPRESSION:  SUCCESSFUL PLACEMENT OF A 5 FR DUAL LUMEN 41 CM PICC IN THE RIGHT 

BASILIC VEIN.


 








PICC Line Insertion  12/13/18 00:00


IMPRESSION:  SUCCESSFUL PLACEMENT OF A 5 FR DUAL LUMEN 41 CM PICC IN THE RIGHT 

BASILIC VEIN.


 














Assessment & Plan





- Diagnosis


(1) Failure to thrive


Is this a current diagnosis for this admission?: Yes   


Plan: 


Continue Megace 








(2) Malnutrition


Qualifiers: 


   Malnutrition type: protein-calorie malnutrition   Protein-calorie 

malnutrition severity: moderate   Qualified Code(s): E44.0 - Moderate protein-

calorie malnutrition   


Is this a current diagnosis for this admission?: Yes   


Plan: 


Recommend nutrition supplements and dietitian consult








(3) Atrial fibrillation


Qualifiers: 


   Atrial fibrillation type: chronic   Qualified Code(s): I48.2 - Chronic 

atrial fibrillation   


Is this a current diagnosis for this admission?: Yes   


Plan: 


Currently rate controlled.  Cardiology consulted by Dr. Guadalupe








(4) Decubitus ulcer of sacral region, stage 1


Is this a current diagnosis for this admission?: Yes   


Plan: 


Continue wound care








(5) Dementia


Is this a current diagnosis for this admission?: Yes   


Plan: 


Continue current medications








(6) Hypocalcemia


Is this a current diagnosis for this admission?: Yes   


Plan: 


Continue current supplements








(7) Chronic kidney disease


Qualifiers: 


   Chronic kidney disease stage: stage 2 (mild) 


Is this a current diagnosis for this admission?: Yes   


Plan: 


Monitor renal function








(8) Dehydration


Is this a current diagnosis for this admission?: Yes   


Plan: 


Patient on IV fluids








(9) Physical deconditioning


Is this a current diagnosis for this admission?: Yes   





(10) Metastatic malignant neoplasm to prostate


Is this a current diagnosis for this admission?: Yes   





- Plan Summary


Plan Summary: 





Recommend comfort care

## 2018-12-16 LAB
ALBUMIN SERPL-MCNC: 2.1 G/DL (ref 3.5–5)
ALP SERPL-CCNC: 606 U/L (ref 38–126)
ALT SERPL-CCNC: 35 U/L (ref 21–72)
ANION GAP SERPL CALC-SCNC: 9 MMOL/L (ref 5–19)
AST SERPL-CCNC: 141 U/L (ref 17–59)
BILIRUB DIRECT SERPL-MCNC: 0.6 MG/DL (ref 0–0.4)
BILIRUB SERPL-MCNC: 0.7 MG/DL (ref 0.2–1.3)
BUN SERPL-MCNC: 28 MG/DL (ref 7–20)
CALCIUM: 6.1 MG/DL (ref 8.4–10.2)
CHLORIDE SERPL-SCNC: 115 MMOL/L (ref 98–107)
CO2 SERPL-SCNC: 16 MMOL/L (ref 22–30)
ERYTHROCYTE [DISTWIDTH] IN BLOOD BY AUTOMATED COUNT: 20.3 % (ref 11.5–14)
GLUCOSE SERPL-MCNC: 144 MG/DL (ref 75–110)
HCT VFR BLD CALC: 26.1 % (ref 37.9–51)
HGB BLD-MCNC: 8.9 G/DL (ref 13.5–17)
MCH RBC QN AUTO: 26.3 PG (ref 27–33.4)
MCHC RBC AUTO-ENTMCNC: 33.9 G/DL (ref 32–36)
MCV RBC AUTO: 78 FL (ref 80–97)
PLATELET # BLD: 141 10^3/UL (ref 150–450)
POTASSIUM SERPL-SCNC: 3.5 MMOL/L (ref 3.6–5)
PROT SERPL-MCNC: 4.7 G/DL (ref 6.3–8.2)
RBC # BLD AUTO: 3.37 10^6/UL (ref 4.35–5.55)
SODIUM SERPL-SCNC: 139.6 MMOL/L (ref 137–145)
WBC # BLD AUTO: 10.9 10^3/UL (ref 4–10.5)

## 2018-12-16 RX ADMIN — SODIUM CHLORIDE SCH ML: 9 INJECTION INTRAMUSCULAR; INTRAVENOUS; SUBCUTANEOUS at 10:58

## 2018-12-16 RX ADMIN — VALPROIC ACID SCH MG: 250 SOLUTION ORAL at 13:55

## 2018-12-16 RX ADMIN — MINERAL SUPPLEMENT IRON 300 MG / 5 ML STRENGTH LIQUID 100 PER BOX UNFLAVORED SCH MG: at 13:56

## 2018-12-16 RX ADMIN — CALCIUM SCH MG: 500 TABLET ORAL at 13:56

## 2018-12-16 RX ADMIN — HALOPERIDOL LACTATE PRN MG: 5 INJECTION, SOLUTION INTRAMUSCULAR at 23:30

## 2018-12-16 RX ADMIN — BUSPIRONE HYDROCHLORIDE SCH MG: 10 TABLET ORAL at 17:50

## 2018-12-16 RX ADMIN — MEGESTROL ACETATE SCH MG: 40 SUSPENSION ORAL at 10:57

## 2018-12-16 RX ADMIN — DILTIAZEM HYDROCHLORIDE SCH MG: 30 TABLET, FILM COATED ORAL at 13:53

## 2018-12-16 RX ADMIN — VALPROIC ACID SCH MG: 250 SOLUTION ORAL at 10:54

## 2018-12-16 RX ADMIN — APIXABAN SCH MG: 2.5 TABLET, FILM COATED ORAL at 10:55

## 2018-12-16 RX ADMIN — VALPROIC ACID SCH MG: 250 SOLUTION ORAL at 21:49

## 2018-12-16 RX ADMIN — SODIUM CHLORIDE PRN MLS/HR: 9 INJECTION, SOLUTION INTRAVENOUS at 17:48

## 2018-12-16 RX ADMIN — CALCIUM SCH MG: 500 TABLET ORAL at 10:58

## 2018-12-16 RX ADMIN — DILTIAZEM HYDROCHLORIDE SCH MG: 30 TABLET, FILM COATED ORAL at 05:56

## 2018-12-16 RX ADMIN — TAMSULOSIN HYDROCHLORIDE SCH MG: 0.4 CAPSULE ORAL at 21:49

## 2018-12-16 RX ADMIN — VALPROIC ACID SCH MG: 250 SOLUTION ORAL at 17:53

## 2018-12-16 RX ADMIN — SODIUM CHLORIDE SCH ML: 9 INJECTION INTRAMUSCULAR; INTRAVENOUS; SUBCUTANEOUS at 21:50

## 2018-12-16 RX ADMIN — Medication SCH UNIT: at 10:56

## 2018-12-16 RX ADMIN — CALCIUM SCH MG: 500 TABLET ORAL at 17:55

## 2018-12-16 RX ADMIN — LEVETIRACETAM SCH MG: 100 SOLUTION ORAL at 10:57

## 2018-12-16 RX ADMIN — DILTIAZEM HYDROCHLORIDE SCH MG: 30 TABLET, FILM COATED ORAL at 01:35

## 2018-12-16 RX ADMIN — Medication SCH UNIT: at 21:49

## 2018-12-16 RX ADMIN — MINERAL SUPPLEMENT IRON 300 MG / 5 ML STRENGTH LIQUID 100 PER BOX UNFLAVORED SCH MG: at 10:55

## 2018-12-16 RX ADMIN — POTASSIUM CHLORIDE SCH MEQ: 1.5 SOLUTION ORAL at 10:57

## 2018-12-16 RX ADMIN — VITAMIN D, TAB 1000IU (100/BT) SCH UNIT: 25 TAB at 10:58

## 2018-12-16 RX ADMIN — GLIMEPIRIDE SCH MG: 1 TABLET ORAL at 08:40

## 2018-12-16 RX ADMIN — BUSPIRONE HYDROCHLORIDE SCH MG: 10 TABLET ORAL at 10:53

## 2018-12-16 RX ADMIN — LEVETIRACETAM SCH MG: 100 SOLUTION ORAL at 21:49

## 2018-12-16 RX ADMIN — MINERAL SUPPLEMENT IRON 300 MG / 5 ML STRENGTH LIQUID 100 PER BOX UNFLAVORED SCH MG: at 17:55

## 2018-12-16 RX ADMIN — DILTIAZEM HYDROCHLORIDE SCH MG: 30 TABLET, FILM COATED ORAL at 17:52

## 2018-12-16 RX ADMIN — ATORVASTATIN CALCIUM SCH MG: 10 TABLET, FILM COATED ORAL at 21:49

## 2018-12-16 RX ADMIN — APIXABAN SCH MG: 2.5 TABLET, FILM COATED ORAL at 17:54

## 2018-12-16 NOTE — PDOC PROGRESS REPORT
Subjective


Subjective:: 





Covering the weekend for Dr. Guadalupe





Patient was seen and examined.  He is confused and demented but appears in no 

acute distress.  Patient apparently had a fall last night and CT scan was 

ordered of the brain and was unremarkable.  Daughter is at bedside.  She was 

inquiring about pacemaker for her father.  I did not think it is indicated and 

discussed with her.


Reason For Visit: 


HYPOCALCEMIA, VIT D DEF, MALNUTRITION ACIDOSIS








Physical Exam


Vital Signs: 


 











Temp Pulse Resp BP Pulse Ox


 


 97.8 F   102 H  17   123/68   100 


 


 12/16/18 07:09  12/16/18 07:09  12/16/18 07:09  12/16/18 07:09  12/16/18 07:09








 Intake & Output











 12/15/18 12/16/18 12/17/18





 06:59 06:59 06:59


 


Intake Total 6363 1800 


 


Output Total 2086 1502 


 


Balance 4277 298 


 


Weight 153 lb 7.068 oz 134 lb 14.766 oz 











Exam: 





General appearance: PRESENT: no acute distress, obese


Head exam: PRESENT: atraumatic


Eye exam: ABSENT: conjunctival injection


Mouth exam: PRESENT: neck supple


Neck exam: ABSENT: meningismus


Respiratory exam: PRESENT: clear to auscultation venessa.  ABSENT: accessory muscle 

use


Cardiovascular exam: PRESENT: irregular rhythm


GI/Abdominal exam: PRESENT: normal bowel sounds, soft


Neurological exam: PRESENT: awake.  ABSENT: oriented to person, oriented to 

place, oriented to time, oriented to situation





Results


Laboratory Results: 


 





 12/14/18 05:25 





 12/14/18 05:25 





 











  12/09/18 12/09/18 12/09/18





  16:45 16:45 23:10


 


Creatine Kinase  728 H   706 H


 


CK-MB (CK-2)   0.77 


 


Troponin I   0.015 














  12/09/18 12/10/18 12/10/18





  23:10 03:39 03:39


 


Creatine Kinase   756 H 


 


CK-MB (CK-2)  0.64   0.59


 


Troponin I  0.018   0.019











Impressions: 


 





Sacrum and Coccyx X-Ray  12/07/18 20:24


IMPRESSION:


 


No fracture. 


 


copyright 2011 [employer]- All Rights Reserved


 








Chest X-Ray  12/09/18 00:00


IMPRESSION:  Vascular congestion.  Increasing basilar opacities and pleural 

effusions.


 








Guidance Fluoroscopy  12/13/18 00:00


IMPRESSION:  SUCCESSFUL PLACEMENT OF A 5 FR DUAL LUMEN 41 CM PICC IN THE RIGHT 

BASILIC VEIN.


 








Interventional Vascular Procedure  12/13/18 00:00


IMPRESSION:  SUCCESSFUL PLACEMENT OF A 5 FR DUAL LUMEN 41 CM PICC IN THE RIGHT 

BASILIC VEIN.


 








PICC Line Insertion  12/13/18 00:00


IMPRESSION:  SUCCESSFUL PLACEMENT OF A 5 FR DUAL LUMEN 41 CM PICC IN THE RIGHT 

BASILIC VEIN.


 








Head CT  12/15/18 00:00


IMPRESSION:  NORMAL BRAIN CT WITHOUT CONTRAST.


EVIDENCE OF ACUTE STROKE: NO.


 














Assessment & Plan





- Diagnosis


(1) Failure to thrive


Is this a current diagnosis for this admission?: Yes   


Plan: 


Continue Megace 








(2) Malnutrition


Qualifiers: 


   Malnutrition type: protein-calorie malnutrition   Protein-calorie 

malnutrition severity: moderate   Qualified Code(s): E44.0 - Moderate protein-

calorie malnutrition   


Is this a current diagnosis for this admission?: Yes   


Plan: 


Recommend nutrition supplements and dietitian consult








(3) Atrial fibrillation


Qualifiers: 


   Atrial fibrillation type: chronic   Qualified Code(s): I48.2 - Chronic 

atrial fibrillation   


Is this a current diagnosis for this admission?: Yes   


Plan: 


Currently rate in 100s.  Cardiology consulted by Dr. Guadalupe.  Continue 

current management.








(4) Decubitus ulcer of sacral region, stage 1


Is this a current diagnosis for this admission?: Yes   


Plan: 


Continue wound care








(5) Dementia


Is this a current diagnosis for this admission?: Yes   


Plan: 


Continue current medications








(6) Hypocalcemia


Is this a current diagnosis for this admission?: Yes   


Plan: 


Continue current supplements








(7) Chronic kidney disease


Qualifiers: 


   Chronic kidney disease stage: stage 2 (mild) 


Is this a current diagnosis for this admission?: Yes   


Plan: 


Monitor renal function.  Nephrology signed off.








(8) Dehydration


Is this a current diagnosis for this admission?: Yes   


Plan: 


Patient on IV fluids








(9) Physical deconditioning


Is this a current diagnosis for this admission?: Yes   





(10) Metastatic malignant neoplasm to prostate


Is this a current diagnosis for this admission?: Yes   





- Plan Summary


Plan Summary: 





Patient is a candidate for hospice in my opinion.

## 2018-12-17 RX ADMIN — SODIUM CHLORIDE PRN MLS/HR: 9 INJECTION, SOLUTION INTRAVENOUS at 14:35

## 2018-12-17 RX ADMIN — SODIUM CHLORIDE PRN MLS/HR: 9 INJECTION, SOLUTION INTRAVENOUS at 06:37

## 2018-12-17 RX ADMIN — POTASSIUM CHLORIDE SCH MEQ: 1.5 SOLUTION ORAL at 11:19

## 2018-12-17 RX ADMIN — CALCIUM SCH MG: 500 TABLET ORAL at 14:54

## 2018-12-17 RX ADMIN — Medication SCH UNIT: at 22:39

## 2018-12-17 RX ADMIN — VALPROIC ACID SCH MG: 250 SOLUTION ORAL at 14:44

## 2018-12-17 RX ADMIN — Medication SCH UNIT: at 11:17

## 2018-12-17 RX ADMIN — VITAMIN D, TAB 1000IU (100/BT) SCH UNIT: 25 TAB at 11:11

## 2018-12-17 RX ADMIN — APIXABAN SCH MG: 2.5 TABLET, FILM COATED ORAL at 19:01

## 2018-12-17 RX ADMIN — VALPROIC ACID SCH MG: 250 SOLUTION ORAL at 19:00

## 2018-12-17 RX ADMIN — BUSPIRONE HYDROCHLORIDE SCH MG: 10 TABLET ORAL at 11:17

## 2018-12-17 RX ADMIN — BUSPIRONE HYDROCHLORIDE SCH MG: 10 TABLET ORAL at 19:00

## 2018-12-17 RX ADMIN — APIXABAN SCH MG: 2.5 TABLET, FILM COATED ORAL at 11:20

## 2018-12-17 RX ADMIN — ATORVASTATIN CALCIUM SCH MG: 10 TABLET, FILM COATED ORAL at 22:39

## 2018-12-17 RX ADMIN — SODIUM CHLORIDE SCH MG: 234 INJECTION INTRAMUSCULAR; INTRAVENOUS; SUBCUTANEOUS at 22:33

## 2018-12-17 RX ADMIN — TAMSULOSIN HYDROCHLORIDE SCH MG: 0.4 CAPSULE ORAL at 22:39

## 2018-12-17 RX ADMIN — DILTIAZEM HYDROCHLORIDE SCH MG: 30 TABLET, FILM COATED ORAL at 05:20

## 2018-12-17 RX ADMIN — MEGESTROL ACETATE SCH MG: 40 SUSPENSION ORAL at 11:12

## 2018-12-17 RX ADMIN — DILTIAZEM HYDROCHLORIDE SCH MG: 30 TABLET, FILM COATED ORAL at 00:17

## 2018-12-17 RX ADMIN — CALCIUM SCH MG: 500 TABLET ORAL at 19:03

## 2018-12-17 RX ADMIN — SODIUM CHLORIDE SCH ML: 9 INJECTION INTRAMUSCULAR; INTRAVENOUS; SUBCUTANEOUS at 11:19

## 2018-12-17 RX ADMIN — DILTIAZEM HYDROCHLORIDE SCH MG: 30 TABLET, FILM COATED ORAL at 11:10

## 2018-12-17 RX ADMIN — VALPROIC ACID SCH MG: 250 SOLUTION ORAL at 22:38

## 2018-12-17 RX ADMIN — GLIMEPIRIDE SCH MG: 1 TABLET ORAL at 11:10

## 2018-12-17 RX ADMIN — SODIUM CHLORIDE SCH ML: 9 INJECTION INTRAMUSCULAR; INTRAVENOUS; SUBCUTANEOUS at 22:42

## 2018-12-17 RX ADMIN — SODIUM CHLORIDE SCH MG: 234 INJECTION INTRAMUSCULAR; INTRAVENOUS; SUBCUTANEOUS at 14:46

## 2018-12-17 RX ADMIN — VALPROIC ACID SCH MG: 250 SOLUTION ORAL at 11:09

## 2018-12-17 RX ADMIN — CALCIUM SCH MG: 500 TABLET ORAL at 11:11

## 2018-12-17 RX ADMIN — LEVETIRACETAM SCH MG: 100 SOLUTION ORAL at 22:38

## 2018-12-17 RX ADMIN — LEVETIRACETAM SCH MG: 100 SOLUTION ORAL at 11:12

## 2018-12-17 RX ADMIN — LATANOPROST SCH DROP: 50 SOLUTION OPHTHALMIC at 23:17

## 2018-12-17 RX ADMIN — MINERAL SUPPLEMENT IRON 300 MG / 5 ML STRENGTH LIQUID 100 PER BOX UNFLAVORED SCH MG: at 11:08

## 2018-12-17 RX ADMIN — DILTIAZEM HYDROCHLORIDE SCH MG: 30 TABLET, FILM COATED ORAL at 19:00

## 2018-12-17 NOTE — PDOC PROGRESS REPORT
Subjective


Progress Note for:: 12/17/18


Subjective:: 





The patient is slightly more awake.  He is still lethargic.  He still has IV 

fluids.  He is still not eating much.


His blood pressure is well controlled.  He is not appropriate for hospice and 

has.  We will discussed with the daughter about returning back to [facility]


Reason For Visit: 


HYPOCALCEMIA, VIT D DEF, MALNUTRITION ACIDOSIS








Physical Exam


Vital Signs: 


 











Temp Pulse Resp BP Pulse Ox


 


 97.8 F   109 H  18   126/57 H  100 


 


 12/17/18 07:27  12/17/18 07:27  12/17/18 07:27  12/17/18 07:27  12/17/18 07:27








 Intake & Output











 12/16/18 12/17/18 12/18/18





 06:59 06:59 06:59


 


Intake Total 1800 2642 


 


Output Total 1502 800 


 


Balance 298 1842 


 


Weight 61.2 kg 65.3 kg 











General appearance: PRESENT: mild distress


Head exam: PRESENT: atraumatic


Eye exam: PRESENT: conjunctiva pink


Neck exam: PRESENT: carotid bruit.  ABSENT: JVD


Respiratory exam: PRESENT: crackles


Cardiovascular exam: PRESENT: irregular rhythm, +S1, +S2


GI/Abdominal exam: PRESENT: normal bowel sounds, soft


Extremities exam: PRESENT: tenderness.  ABSENT: full ROM


Musculoskeletal exam: PRESENT: tenderness.  ABSENT: ambulatory


Psychiatric exam: PRESENT: flat affect





Results


Laboratory Results: 


 





 12/16/18 15:05 





 12/16/18 15:05 





 











  12/16/18 12/16/18 12/16/18





  15:05 15:05 17:40


 


WBC  10.9 H  


 


RBC  3.37 L  


 


Hgb  8.9 L  


 


Hct  26.1 L  


 


MCV  78 L  


 


MCH  26.3 L  


 


MCHC  33.9  


 


RDW  20.3 H  


 


Plt Count  141 L  


 


Sodium   139.6 


 


Potassium   3.5 L 


 


Chloride   115 H 


 


Carbon Dioxide   16 L 


 


Anion Gap   9 


 


BUN   28 H 


 


Creatinine   1.01 


 


Est GFR ( Amer)   > 60 


 


Est GFR (Non-Af Amer)   > 60 


 


Glucose   144 H 


 


Calcium   6.1 L* 


 


Ionized Calcium Marquis    1.00 L


 


Total Bilirubin   0.7 


 


AST   141 H 


 


ALT   35 


 


Alkaline Phosphatase   606 H 


 


Total Protein   4.7 L 


 


Albumin   2.1 L 








 





12/14/18 16:45   Stool - Stool    - Final


12/14/18 16:45   Stool - Stool   Stool Culture - Final


                            Staphylococcus Aureus





 











  12/09/18 12/09/18 12/09/18





  16:45 16:45 23:10


 


Creatine Kinase  728 H   706 H


 


CK-MB (CK-2)   0.77 


 


Troponin I   0.015 














  12/09/18 12/10/18 12/10/18





  23:10 03:39 03:39


 


Creatine Kinase   756 H 


 


CK-MB (CK-2)  0.64   0.59


 


Troponin I  0.018   0.019











Impressions: 


 





Sacrum and Coccyx X-Ray  12/07/18 20:24


IMPRESSION:


 


No fracture. 


 


copyright 2011 [employer]- All Rights Reserved


 








Chest X-Ray  12/09/18 00:00


IMPRESSION:  Vascular congestion.  Increasing basilar opacities and pleural 

effusions.


 








Guidance Fluoroscopy  12/13/18 00:00


IMPRESSION:  SUCCESSFUL PLACEMENT OF A 5 FR DUAL LUMEN 41 CM PICC IN THE RIGHT 

BASILIC VEIN.


 








Interventional Vascular Procedure  12/13/18 00:00


IMPRESSION:  SUCCESSFUL PLACEMENT OF A 5 FR DUAL LUMEN 41 CM PICC IN THE RIGHT 

BASILIC VEIN.


 








PICC Line Insertion  12/13/18 00:00


IMPRESSION:  SUCCESSFUL PLACEMENT OF A 5 FR DUAL LUMEN 41 CM PICC IN THE RIGHT 

BASILIC VEIN.


 








Head CT  12/15/18 00:00


IMPRESSION:  NORMAL BRAIN CT WITHOUT CONTRAST.


EVIDENCE OF ACUTE STROKE: NO.


 














Assessment & Plan





- Diagnosis


(1) Atrial fibrillation


Qualifiers: 


   Atrial fibrillation type: chronic   Qualified Code(s): I48.2 - Chronic 

atrial fibrillation   


Is this a current diagnosis for this admission?: Yes   


Plan: 


Recurrent episodes of fast A. fib.  Will reconsult with cardiology and add a 

small dose of calcium channel block








(2) Decubitus ulcer of sacral region, stage 1


Is this a current diagnosis for this admission?: Yes   


Plan: 


Improving and being treated by nursing staff








(3) Failure to thrive


Is this a current diagnosis for this admission?: Yes   


Plan: 


Not much change.  Probably related to dementia








(4) Hypocalcemia


Is this a current diagnosis for this admission?: Yes   


Plan: 


Most probably a combination of severe vitamin D deficiency and hyperparathyroid.








(5) Anemia, chronic disease


Is this a current diagnosis for this admission?: Yes   





(6) CKD (chronic kidney disease) stage 3, GFR 30-59 ml/min


Is this a current diagnosis for this admission?: Yes   


Plan: 


Will use some hydration and insert a Alcaraz for strict MARISOL's








(7) Metastatic malignant neoplasm to prostate


Is this a current diagnosis for this admission?: Yes   





(8) Dementia


Is this a current diagnosis for this admission?: Yes   


Plan: 


Severe with worsening episodes while in the hospital and sundowning.








(9) Acute psychosis


Is this a current diagnosis for this admission?: Yes   





(10) Memory loss, short term


Is this a current diagnosis for this admission?: Yes   


Plan: 


Continue supportive treatment








(11) Vitamin D deficiency


Is this a current diagnosis for this admission?: Yes   


Plan: 


Most probably related to hypocalcemia.  We will add vitamin D

## 2018-12-17 NOTE — PDOC PROGRESS REPORT
Subjective


Progress Note for:: 12/17/18


Reason For Visit: 


Patient notes reviewed since admission.  Patient still continues to be having 

altered mental status and has not reached his baseline levels.  He is unaware 

of his present situations.  Complains he is constipated but apparently he has 

been having diarrhea couple of days according to the treating nurse.  Patient 

denies any history of headaches, chest pain or shortness of breath.  No 

abdominal pains.  Eating is variable.  He is in restraints because he has been 

trying to pull out his IV lines.  Patient continues to holler intermittently 

for no definite reasons asking for help.  Labs and medications were reviewed.  

Discussions were done with Ramses Guadalupe MD.








Physical Exam


Vital Signs: 


 











Temp Pulse Resp BP Pulse Ox


 


 97.8 F   109 H  18   126/57 H  100 


 


 12/17/18 07:27  12/17/18 07:27  12/17/18 07:27  12/17/18 07:27  12/17/18 07:27








 Intake & Output











 12/16/18 12/17/18 12/18/18





 06:59 06:59 06:59


 


Intake Total 1800 2642 


 


Output Total 1502 800 


 


Balance 298 1842 


 


Weight 61.2 kg 65.3 kg 











General appearance: PRESENT: no acute distress


Mouth exam: PRESENT: neck supple.  ABSENT: moist


Neck exam: ABSENT: lymphadenopathy, meningismus, tenderness, thyromegaly, 

tracheal deviation


Respiratory exam: PRESENT: clear to auscultation venessa.  ABSENT: crackles


Cardiovascular exam: PRESENT: irregular rhythm, +S1, +S2


GI/Abdominal exam: PRESENT: normal bowel sounds, soft.  ABSENT: firm, guarding, 

organomegaly, tenderness


Extremities exam: PRESENT: pedal edema.  ABSENT: calf tenderness, clubbing


Neurological exam: PRESENT: altered, oriented to person.  ABSENT: oriented to 

place, oriented to time, oriented to situation


Psychiatric exam: PRESENT: agitated, anxious, depressed


Skin exam: ABSENT: erythema, mottled, rash





Results


Laboratory Results: 


 





 12/16/18 15:05 





 12/16/18 15:05 





 











  12/16/18 12/16/18 12/16/18





  15:05 15:05 17:40


 


WBC  10.9 H  


 


RBC  3.37 L  


 


Hgb  8.9 L  


 


Hct  26.1 L  


 


MCV  78 L  


 


MCH  26.3 L  


 


MCHC  33.9  


 


RDW  20.3 H  


 


Plt Count  141 L  


 


Sodium   139.6 


 


Potassium   3.5 L 


 


Chloride   115 H 


 


Carbon Dioxide   16 L 


 


Anion Gap   9 


 


BUN   28 H 


 


Creatinine   1.01 


 


Est GFR ( Amer)   > 60 


 


Est GFR (Non-Af Amer)   > 60 


 


Glucose   144 H 


 


Calcium   6.1 L* 


 


Ionized Calcium Marquis    1.00 L


 


Total Bilirubin   0.7 


 


AST   141 H 


 


ALT   35 


 


Alkaline Phosphatase   606 H 


 


Total Protein   4.7 L 


 


Albumin   2.1 L 








 





12/14/18 16:45   Stool - Stool    - Final


12/14/18 16:45   Stool - Stool   Stool Culture - Final


                            Staphylococcus Aureus





 











  12/09/18 12/09/18 12/09/18





  16:45 16:45 23:10


 


Creatine Kinase  728 H   706 H


 


CK-MB (CK-2)   0.77 


 


Troponin I   0.015 














  12/09/18 12/10/18 12/10/18





  23:10 03:39 03:39


 


Creatine Kinase   756 H 


 


CK-MB (CK-2)  0.64   0.59


 


Troponin I  0.018   0.019











Impressions: 


 





Sacrum and Coccyx X-Ray  12/07/18 20:24


IMPRESSION:


 


No fracture. 


 


copyright 2011 [employer]- All Rights Reserved


 








Chest X-Ray  12/09/18 00:00


IMPRESSION:  Vascular congestion.  Increasing basilar opacities and pleural 

effusions.


 








Guidance Fluoroscopy  12/13/18 00:00


IMPRESSION:  SUCCESSFUL PLACEMENT OF A 5 FR DUAL LUMEN 41 CM PICC IN THE RIGHT 

BASILIC VEIN.


 








Interventional Vascular Procedure  12/13/18 00:00


IMPRESSION:  SUCCESSFUL PLACEMENT OF A 5 FR DUAL LUMEN 41 CM PICC IN THE RIGHT 

BASILIC VEIN.


 








PICC Line Insertion  12/13/18 00:00


IMPRESSION:  SUCCESSFUL PLACEMENT OF A 5 FR DUAL LUMEN 41 CM PICC IN THE RIGHT 

BASILIC VEIN.


 








Head CT  12/15/18 00:00


IMPRESSION:  NORMAL BRAIN CT WITHOUT CONTRAST.


EVIDENCE OF ACUTE STROKE: NO.


 














Assessment & Plan





- Diagnosis


(1) Hypocalcemia


Is this a current diagnosis for this admission?: Yes   


Plan: 


Still undercorrected.His vitamin D2 is low but he is on D 3.  Therefore we will 

stop the D3 that he is on now and start him on the D2 q. weekly.  Add 

calcitriol.  Start IV calcium infusions every 8 hours.  Monitor closely.








(2) Congestive heart failure


Qualifiers: 


   Heart failure type: unspecified   Heart failure chronicity: chronic   

Qualified Code(s): I50.9 - Heart failure, unspecified   


Is this a current diagnosis for this admission?: Yes   


Plan: 


Presently stable.  A. fib rate controlled.  Monitor.








(3) Failure to thrive


Is this a current diagnosis for this admission?: Yes   


Plan: 


He is got a combination of factors including his encephalopathy, anxiety/

depression and obvious cancer leading him to poor intake and failure to thrive.

  He has been begun on antianxiety and antidepressants.  See how they would 

help him once his metabolic factors are corrected.








(4) Iron deficiency anemia


Is this a current diagnosis for this admission?: Yes   


Plan: 


I am going to stop his p.o. iron which I think is giving him GI upsets.  I 

would rather give him IV iron infusions of blood transfusions on a case by case 

basis.  Will monitor.








(5) Metabolic acidosis


Is this a current diagnosis for this admission?: Yes   


Plan: 


The background of respiratory alkalosis from possible hypoventilation from 

various reasons.  Will monitor.  And will not be too vigorous in correcting 

this metabolic acidosis which is secondary rather than primary.








(6) Metastatic malignant neoplasm to prostate


Is this a current diagnosis for this admission?: Yes   


Plan: 


As per Ramses Guadalupe MD and heme oncology.








(7) Vitamin D deficiency


Is this a current diagnosis for this admission?: Yes   


Plan: 


As mentioned earlier.  Currently measures are being instituted.  Will follow.








(8) Abnormal LFTs


Is this a current diagnosis for this admission?: Yes   


Plan: 


From liver and bony metastasis.








(9) Acute on chronic kidney failure


Qualifiers: 


   Chronic kidney disease stage: stage 3 (moderate) 


Plan: 


Presently stable.  Nonoliguric.  Continue present lines of management.








(10) Acute psychosis


Is this a current diagnosis for this admission?: Yes   


Plan: 


Multiple factors but apparently getting better.  Monitor.








(11) CKD (chronic kidney disease) stage 3, GFR 30-59 ml/min


Is this a current diagnosis for this admission?: Yes   


Plan: 


Stable.  Monitor.  Avoid nephrotoxic drugs.  Maintain hydration.








(12) Dehydration


Is this a current diagnosis for this admission?: Yes   


Plan: 


On IV fluids.  Will monitor. Poor  p.o. intake unfortunately.








(13) Diabetes


Qualifiers: 


   Diabetes mellitus type: type 2   Chronic kidney disease stage: stage 3 (

moderate) 


Plan: 


As per Ramses Guadalupe MD.








(14) Hypernatremia


Plan: 


Presently stable on current management.  Monitor.








(15) Hypertension


Qualifiers: 


   Hypertension type: unspecified   Qualified Code(s): I10 - Essential (primary

) hypertension   


Plan: 


controlled.  Monitor








(16) Hypokalemia


Plan: 


Adjust replacements and monitor.








(17) Malnutrition


Qualifiers: 


   Malnutrition type: protein-calorie malnutrition   Protein-calorie 

malnutrition severity: moderate   Qualified Code(s): E44.0 - Moderate protein-

calorie malnutrition   


Is this a current diagnosis for this admission?: Yes   


Plan: 


From poor oral intake.  If he continues to have poor oral intake one may have 

to consider IV parenteral infusions.








(18) Physical deconditioning


Is this a current diagnosis for this admission?: Yes   


Plan: 


Once he gets over his encephalopathy he would need to have ongoing rehab based 

on his overall prognosis.








(19) Atrial fibrillation


Qualifiers: 


   Atrial fibrillation type: chronic   Qualified Code(s): I48.2 - Chronic 

atrial fibrillation   


Is this a current diagnosis for this admission?: Yes   


Plan: 


Rate controlled.








(20) Decubitus ulcer of sacral region, stage 1


Is this a current diagnosis for this admission?: Yes

## 2018-12-18 LAB
%HYPO/RBC NFR BLD AUTO: SLIGHT %
ABSOLUTE LYMPHOCYTES# (MANUAL): 3.1 10^3/UL (ref 0.5–4.7)
ABSOLUTE MONOCYTES # (MANUAL): 1.2 10^3/UL (ref 0.1–1.4)
ABSOLUTE NEUTROPHILS# (MANUAL): 10.4 10^3/UL (ref 1.7–8.2)
ADD MANUAL DIFF: YES
ALBUMIN SERPL-MCNC: 2.3 G/DL (ref 3.5–5)
ALP SERPL-CCNC: 687 U/L (ref 38–126)
ALT SERPL-CCNC: 40 U/L (ref 21–72)
ANION GAP SERPL CALC-SCNC: 6 MMOL/L (ref 5–19)
ANISOCYTOSIS BLD QL SMEAR: (no result)
AST SERPL-CCNC: 148 U/L (ref 17–59)
BASOPHILS NFR BLD MANUAL: 0 % (ref 0–2)
BILIRUB DIRECT SERPL-MCNC: 0.5 MG/DL (ref 0–0.4)
BILIRUB SERPL-MCNC: 0.6 MG/DL (ref 0.2–1.3)
BUN SERPL-MCNC: 19 MG/DL (ref 7–20)
CALCIUM: 7.8 MG/DL (ref 8.4–10.2)
CHLORIDE SERPL-SCNC: 120 MMOL/L (ref 98–107)
CO2 SERPL-SCNC: 17 MMOL/L (ref 22–30)
EOSINOPHIL NFR BLD MANUAL: 0 % (ref 0–6)
ERYTHROCYTE [DISTWIDTH] IN BLOOD BY AUTOMATED COUNT: 20.5 % (ref 11.5–14)
GLUCOSE SERPL-MCNC: 150 MG/DL (ref 75–110)
HCT VFR BLD CALC: 27.1 % (ref 37.9–51)
HGB BLD-MCNC: 9 G/DL (ref 13.5–17)
MCH RBC QN AUTO: 26 PG (ref 27–33.4)
MCHC RBC AUTO-ENTMCNC: 33.1 G/DL (ref 32–36)
MCV RBC AUTO: 78 FL (ref 80–97)
MONOCYTES % (MANUAL): 8 % (ref 3–13)
NEUTS BAND NFR BLD MANUAL: 3 % (ref 3–5)
PLATELET # BLD: 160 10^3/UL (ref 150–450)
PLATELET COMMENT: ADEQUATE
POIKILOCYTOSIS BLD QL SMEAR: SLIGHT
POTASSIUM SERPL-SCNC: 3.6 MMOL/L (ref 3.6–5)
PROT SERPL-MCNC: 5.1 G/DL (ref 6.3–8.2)
RBC # BLD AUTO: 3.46 10^6/UL (ref 4.35–5.55)
SEGMENTED NEUTROPHILS % (MAN): 68 % (ref 42–78)
SODIUM SERPL-SCNC: 142.7 MMOL/L (ref 137–145)
TOTAL CELLS COUNTED BLD: 100
TOXIC GRANULES BLD QL SMEAR: SLIGHT
VARIANT LYMPHS NFR BLD MANUAL: 21 % (ref 13–45)
WBC # BLD AUTO: 14.7 10^3/UL (ref 4–10.5)

## 2018-12-18 RX ADMIN — VALPROIC ACID SCH MG: 250 SOLUTION ORAL at 21:23

## 2018-12-18 RX ADMIN — BUSPIRONE HYDROCHLORIDE SCH MG: 10 TABLET ORAL at 17:37

## 2018-12-18 RX ADMIN — BUSPIRONE HYDROCHLORIDE SCH MG: 10 TABLET ORAL at 11:11

## 2018-12-18 RX ADMIN — CALCITRIOL SCH MCG: 0.25 CAPSULE, LIQUID FILLED ORAL at 11:10

## 2018-12-18 RX ADMIN — POTASSIUM CHLORIDE SCH MEQ: 1.5 SOLUTION ORAL at 11:09

## 2018-12-18 RX ADMIN — LEVETIRACETAM SCH MG: 100 SOLUTION ORAL at 21:22

## 2018-12-18 RX ADMIN — APIXABAN SCH MG: 2.5 TABLET, FILM COATED ORAL at 11:12

## 2018-12-18 RX ADMIN — MAGNESIUM SULFATE IN DEXTROSE SCH MLS/HR: 10 INJECTION, SOLUTION INTRAVENOUS at 08:56

## 2018-12-18 RX ADMIN — MEGESTROL ACETATE SCH MG: 40 SUSPENSION ORAL at 11:14

## 2018-12-18 RX ADMIN — DILTIAZEM HYDROCHLORIDE SCH MG: 30 TABLET, FILM COATED ORAL at 12:09

## 2018-12-18 RX ADMIN — SODIUM CHLORIDE SCH MG: 234 INJECTION INTRAMUSCULAR; INTRAVENOUS; SUBCUTANEOUS at 06:08

## 2018-12-18 RX ADMIN — MAGNESIUM SULFATE IN DEXTROSE SCH MLS/HR: 10 INJECTION, SOLUTION INTRAVENOUS at 09:15

## 2018-12-18 RX ADMIN — LEVETIRACETAM SCH MG: 100 SOLUTION ORAL at 11:14

## 2018-12-18 RX ADMIN — DILTIAZEM HYDROCHLORIDE SCH MG: 30 TABLET, FILM COATED ORAL at 17:37

## 2018-12-18 RX ADMIN — HALOPERIDOL LACTATE PRN MG: 5 INJECTION, SOLUTION INTRAMUSCULAR at 01:41

## 2018-12-18 RX ADMIN — DILTIAZEM HYDROCHLORIDE SCH MG: 30 TABLET, FILM COATED ORAL at 00:34

## 2018-12-18 RX ADMIN — LATANOPROST SCH DROP: 50 SOLUTION OPHTHALMIC at 21:28

## 2018-12-18 RX ADMIN — CALCIUM SCH MG: 500 TABLET ORAL at 17:39

## 2018-12-18 RX ADMIN — CALCIUM SCH MG: 500 TABLET ORAL at 11:11

## 2018-12-18 RX ADMIN — SODIUM CHLORIDE SCH ML: 9 INJECTION INTRAMUSCULAR; INTRAVENOUS; SUBCUTANEOUS at 21:26

## 2018-12-18 RX ADMIN — VALPROIC ACID SCH MG: 250 SOLUTION ORAL at 17:36

## 2018-12-18 RX ADMIN — Medication SCH UNIT: at 21:24

## 2018-12-18 RX ADMIN — DILTIAZEM HYDROCHLORIDE SCH MG: 30 TABLET, FILM COATED ORAL at 06:09

## 2018-12-18 RX ADMIN — SODIUM CHLORIDE SCH MG: 234 INJECTION INTRAMUSCULAR; INTRAVENOUS; SUBCUTANEOUS at 15:17

## 2018-12-18 RX ADMIN — CALCIUM SCH MG: 500 TABLET ORAL at 15:17

## 2018-12-18 RX ADMIN — TAMSULOSIN HYDROCHLORIDE SCH MG: 0.4 CAPSULE ORAL at 21:25

## 2018-12-18 RX ADMIN — SODIUM CHLORIDE SCH MG: 234 INJECTION INTRAMUSCULAR; INTRAVENOUS; SUBCUTANEOUS at 21:21

## 2018-12-18 RX ADMIN — VALPROIC ACID SCH MG: 250 SOLUTION ORAL at 15:14

## 2018-12-18 RX ADMIN — GLIMEPIRIDE SCH MG: 1 TABLET ORAL at 08:56

## 2018-12-18 RX ADMIN — APIXABAN SCH MG: 2.5 TABLET, FILM COATED ORAL at 17:37

## 2018-12-18 RX ADMIN — MAGNESIUM SULFATE IN DEXTROSE SCH MLS/HR: 10 INJECTION, SOLUTION INTRAVENOUS at 11:11

## 2018-12-18 RX ADMIN — SODIUM CHLORIDE SCH ML: 9 INJECTION INTRAMUSCULAR; INTRAVENOUS; SUBCUTANEOUS at 11:14

## 2018-12-18 RX ADMIN — VALPROIC ACID SCH MG: 250 SOLUTION ORAL at 11:15

## 2018-12-18 RX ADMIN — ATORVASTATIN CALCIUM SCH MG: 10 TABLET, FILM COATED ORAL at 21:25

## 2018-12-18 RX ADMIN — Medication SCH UNIT: at 11:09

## 2018-12-18 NOTE — PDOC PROGRESS REPORT
Subjective


Progress Note for:: 12/18/18


Subjective:: 





Patient was seen today sitting up in his bed. According to his nurse in charge 

of his care he has become more lethargic today. Patient was able to open his 

eyes but appeared to fall right back to sleep while talking to him. 


Reason For Visit: 


HYPOCALCEMIA, VIT D DEF, MALNUTRITION ACIDOSIS








Physical Exam


Vital Signs: 


                                        











Temp Pulse Resp BP Pulse Ox


 


 97.8 F   92   12   126/47 H  98 


 


 12/18/18 15:49  12/18/18 15:49  12/18/18 15:49  12/18/18 15:49  12/18/18 15:49








                                 Intake & Output











 12/17/18 12/18/18 12/19/18





 06:59 06:59 06:59


 


Intake Total 2642 2277 518


 


Output Total 800 700 


 


Balance 1842 1577 518


 


Weight 65.3 kg 67.8 kg 











General appearance: PRESENT: no acute distress.  ABSENT: well-developed, well-

nourished


Head exam: PRESENT: normocephalic


Mouth exam: PRESENT: moist, neck supple.  ABSENT: dry mucosa


Neck exam: ABSENT: JVD, tracheal deviation


Respiratory exam: PRESENT: crackles, rales.  ABSENT: accessory muscle use, clear

to auscultation venessa, rhonchi, wheezes


Cardiovascular exam: PRESENT: irregular rhythm, +S1, +S2


GI/Abdominal exam: PRESENT: normal bowel sounds, soft.  ABSENT: firm, guarding, 

organomegaly, tenderness


Extremities exam: PRESENT: pedal edema, +2 edema.  ABSENT: tenderness


Musculoskeletal exam: ABSENT: normal inspection, tenderness


Neurological exam: PRESENT: altered, other - lethargic.  ABSENT: awake, oriented

to person, oriented to place, oriented to time, oriented to situation


Psychiatric exam: ABSENT: appropriate affect, normal mood


Skin exam: PRESENT: dry, intact, warm.  ABSENT: cyanosis





Results


Laboratory Results: 


                                        





                                 12/18/18 17:14 





                                 12/18/18 17:14 





                                        











  12/18/18 12/18/18 12/18/18





  17:14 17:14 17:14


 


WBC  14.7 H  


 


RBC  3.46 L  


 


Hgb  9.0 L  


 


Hct  27.1 L  


 


MCV  78 L  


 


MCH  26.0 L  


 


MCHC  33.1  


 


RDW  20.5 H  


 


Plt Count  160  


 


Seg Neutrophils %  Not Reportable  


 


Lymphocytes %  Not Reportable  


 


Monocytes %  Not Reportable  


 


Eosinophils %  Not Reportable  


 


Basophils %  Not Reportable  


 


Absolute Neutrophils  Not Reportable  


 


Absolute Lymphocytes  Not Reportable  


 


Absolute Monocytes  Not Reportable  


 


Absolute Eosinophils  Not Reportable  


 


Absolute Basophils  Not Reportable  


 


Sodium   142.7 


 


Potassium   3.6 


 


Chloride   120 H 


 


Carbon Dioxide   17 L 


 


Anion Gap   6 


 


BUN   19 


 


Creatinine   0.78 


 


Est GFR ( Amer)   > 60 


 


Est GFR (Non-Af Amer)   > 60 


 


Glucose   150 H 


 


Calcium   7.8 L 


 


Magnesium   2.0 


 


Total Bilirubin   0.6 


 


AST   148 H 


 


ALT   40 


 


Alkaline Phosphatase   687 H 


 


Ammonia   


 


Total Protein   5.1 L 


 


Albumin   2.3 L 


 


TSH    2.77














  12/18/18





  17:14


 


WBC 


 


RBC 


 


Hgb 


 


Hct 


 


MCV 


 


MCH 


 


MCHC 


 


RDW 


 


Plt Count 


 


Seg Neutrophils % 


 


Lymphocytes % 


 


Monocytes % 


 


Eosinophils % 


 


Basophils % 


 


Absolute Neutrophils 


 


Absolute Lymphocytes 


 


Absolute Monocytes 


 


Absolute Eosinophils 


 


Absolute Basophils 


 


Sodium 


 


Potassium 


 


Chloride 


 


Carbon Dioxide 


 


Anion Gap 


 


BUN 


 


Creatinine 


 


Est GFR ( Amer) 


 


Est GFR (Non-Af Amer) 


 


Glucose 


 


Calcium 


 


Magnesium 


 


Total Bilirubin 


 


AST 


 


ALT 


 


Alkaline Phosphatase 


 


Ammonia  50.6 H


 


Total Protein 


 


Albumin 


 


TSH 








                                        





12/14/18 16:45   Stool - Stool    - Final


12/14/18 16:45   Stool - Stool   Stool Culture - Final





                                        











  12/09/18 12/09/18 12/09/18





  16:45 16:45 23:10


 


Creatine Kinase  728 H   706 H


 


CK-MB (CK-2)   0.77 


 


Troponin I   0.015 














  12/09/18 12/10/18 12/10/18





  23:10 03:39 03:39


 


Creatine Kinase   756 H 


 


CK-MB (CK-2)  0.64   0.59


 


Troponin I  0.018   0.019











Impressions: 


                                        





Sacrum and Coccyx X-Ray  12/07/18 20:24


IMPRESSION:


 


No fracture. 


 


copyright 2011 [employer]- All Rights Reserved


 








Chest X-Ray  12/09/18 00:00


IMPRESSION:  Vascular congestion.  Increasing basilar opacities and pleural 

effusions.


 








Guidance Fluoroscopy  12/13/18 00:00


IMPRESSION:  SUCCESSFUL PLACEMENT OF A 5 FR DUAL LUMEN 41 CM PICC IN THE RIGHT 

BASILIC VEIN.


 








Interventional Vascular Procedure  12/13/18 00:00


IMPRESSION:  SUCCESSFUL PLACEMENT OF A 5 FR DUAL LUMEN 41 CM PICC IN THE RIGHT 

BASILIC VEIN.


 








PICC Line Insertion  12/13/18 00:00


IMPRESSION:  SUCCESSFUL PLACEMENT OF A 5 FR DUAL LUMEN 41 CM PICC IN THE RIGHT 

BASILIC VEIN.


 








Head CT  12/15/18 00:00


IMPRESSION:  NORMAL BRAIN CT WITHOUT CONTRAST.


EVIDENCE OF ACUTE STROKE: NO.


 














Assessment & Plan





- Diagnosis


(1) Hypocalcemia


Is this a current diagnosis for this admission?: Yes   


Plan: 


looks to have greatly improved, continue PO calcium, IV calcium and vitamin D.








(2) Altered mental status


Qualifiers: 


   Altered mental status type: delirium   Qualified Code(s): R41.0 - 

Disorientation, unspecified   


Is this a current diagnosis for this admission?: Yes   


Plan: 


ordered an ammonia that came back elevated at 50. Will look to start lactulose 

qd until the patient has 3 bowel movements a day.








(3) Congestive heart failure


Qualifiers: 


   Heart failure type: unspecified   Heart failure chronicity: chronic   Qual

ified Code(s): I50.9 - Heart failure, unspecified   


Is this a current diagnosis for this admission?: Yes   


Plan: 


will look to hold normal saline for now due to the patient looking fluid 

overloaded. Will not look to start diuretics since the patient does not consume 

fluid on his own.








(4) Metastatic malignant neoplasm to prostate


Is this a current diagnosis for this admission?: Yes   


Plan: 


per primary








(5) Vitamin D deficiency


Is this a current diagnosis for this admission?: Yes   


Plan: 


on vitamin D2 and calcitriol








(6) Anemia, chronic disease


Is this a current diagnosis for this admission?: Yes   





(7) CKD (chronic kidney disease) stage 3, GFR 30-59 ml/min


Is this a current diagnosis for this admission?: Yes   


Plan: 


stable but appears fluid overloaded, will stop IV NS








(8) Atrial fibrillation


Qualifiers: 


   Atrial fibrillation type: chronic   Qualified Code(s): I48.2 - Chronic atrial

fibrillation   


Is this a current diagnosis for this admission?: Yes   





(9) Failure to thrive


Is this a current diagnosis for this admission?: Yes   


Plan: 


on megace, still not eating much. He may need to try other routes for nutrition.








(10) Decubitus ulcer of sacral region, stage 1


Is this a current diagnosis for this admission?: Yes   





(11) Hyperparathyroidism


Is this a current diagnosis for this admission?: Yes   


Plan: 


on calcitriol

## 2018-12-18 NOTE — PDOC PROGRESS REPORT
Subjective


Progress Note for:: 12/18/18


Subjective:: 





The patient is more lethargic this morning.  He has been receiving calcium 

infusions.  As we discussed with nephrology will try to re-correct his calcium 

and other electrolytes.


He had a run of V. tach this morning.  Will recheck the magnesium and do a 

magnesium infusion.  The patient is more lethargic and more confused this 

morning.  Will reconsult psychiatry


Reason For Visit: 


HYPOCALCEMIA, VIT D DEF, MALNUTRITION ACIDOSIS








Physical Exam


Vital Signs: 


 











Temp Pulse Resp BP Pulse Ox


 


 98.1 F   104 H  16   112/63   99 


 


 12/18/18 03:00  12/18/18 03:00  12/18/18 03:00  12/18/18 03:00  12/18/18 03:00








 Intake & Output











 12/17/18 12/18/18 12/19/18





 06:59 06:59 06:59


 


Intake Total 2642 1277 


 


Output Total 800 700 


 


Balance 1842 577 


 


Weight 65.3 kg 67.8 kg 











General appearance: PRESENT: severe distress


Eye exam: PRESENT: conjunctival injection


Mouth exam: PRESENT: dry mucosa


Neck exam: PRESENT: carotid bruit.  ABSENT: JVD


Respiratory exam: PRESENT: crackles


Cardiovascular exam: PRESENT: irregular rhythm, +S1, +S2


GI/Abdominal exam: PRESENT: normal bowel sounds, soft


Extremities exam: PRESENT: tenderness


Musculoskeletal exam: PRESENT: tenderness


Psychiatric exam: PRESENT: flat affect





Results


Laboratory Results: 


 





 12/16/18 15:05 





 12/16/18 15:05 





 





12/14/18 16:45   Stool - Stool    - Final


12/14/18 16:45   Stool - Stool   Stool Culture - Final


                            Staphylococcus Aureus





 











  12/09/18 12/09/18 12/09/18





  16:45 16:45 23:10


 


Creatine Kinase  728 H   706 H


 


CK-MB (CK-2)   0.77 


 


Troponin I   0.015 














  12/09/18 12/10/18 12/10/18





  23:10 03:39 03:39


 


Creatine Kinase   756 H 


 


CK-MB (CK-2)  0.64   0.59


 


Troponin I  0.018   0.019











Impressions: 


 





Sacrum and Coccyx X-Ray  12/07/18 20:24


IMPRESSION:


 


No fracture. 


 


copyright 2011 [employer]- All Rights Reserved


 








Chest X-Ray  12/09/18 00:00


IMPRESSION:  Vascular congestion.  Increasing basilar opacities and pleural 

effusions.


 








Guidance Fluoroscopy  12/13/18 00:00


IMPRESSION:  SUCCESSFUL PLACEMENT OF A 5 FR DUAL LUMEN 41 CM PICC IN THE RIGHT 

BASILIC VEIN.


 








Interventional Vascular Procedure  12/13/18 00:00


IMPRESSION:  SUCCESSFUL PLACEMENT OF A 5 FR DUAL LUMEN 41 CM PICC IN THE RIGHT 

BASILIC VEIN.


 








PICC Line Insertion  12/13/18 00:00


IMPRESSION:  SUCCESSFUL PLACEMENT OF A 5 FR DUAL LUMEN 41 CM PICC IN THE RIGHT 

BASILIC VEIN.


 








Head CT  12/15/18 00:00


IMPRESSION:  NORMAL BRAIN CT WITHOUT CONTRAST.


EVIDENCE OF ACUTE STROKE: NO.


 














Assessment & Plan





- Diagnosis


(1) Atrial fibrillation


Qualifiers: 


   Atrial fibrillation type: chronic   Qualified Code(s): I48.2 - Chronic 

atrial fibrillation   


Is this a current diagnosis for this admission?: Yes   


Plan: 


A run of V. tach this morning.  We will continue with Cardizem and add magnesium








(2) Decubitus ulcer of sacral region, stage 1


Is this a current diagnosis for this admission?: Yes   





(3) Failure to thrive


Is this a current diagnosis for this admission?: Yes   


Plan: 


Not much change.  Probably related to dementia








(4) Hypocalcemia


Is this a current diagnosis for this admission?: Yes   


Plan: 


As per nephrology








(5) Anemia, chronic disease


Is this a current diagnosis for this admission?: Yes   


Plan: 


Improving after the transfusion of 2 units of cells








(6) CKD (chronic kidney disease) stage 3, GFR 30-59 ml/min


Is this a current diagnosis for this admission?: Yes   


Plan: 


Will use some hydration and insert a Alcaraz for strict MARISOL's








(7) Metastatic malignant neoplasm to prostate


Is this a current diagnosis for this admission?: Yes   


Plan: 


We will reconsult hematology oncology








(8) Dementia


Is this a current diagnosis for this admission?: Yes   


Plan: 


We will reconsult psychiatry








(9) Acute psychosis


Is this a current diagnosis for this admission?: Yes   





(10) Memory loss, short term


Is this a current diagnosis for this admission?: Yes   





(11) Vitamin D deficiency


Is this a current diagnosis for this admission?: Yes   


Plan: 


Most probably related to hypocalcemia.  We will add vitamin D

## 2018-12-19 LAB
%HYPO/RBC NFR BLD AUTO: (no result) %
ABSOLUTE LYMPHOCYTES# (MANUAL): 0.9 10^3/UL (ref 0.5–4.7)
ABSOLUTE MONOCYTES # (MANUAL): 0.6 10^3/UL (ref 0.1–1.4)
ABSOLUTE NEUTROPHILS# (MANUAL): 13.5 10^3/UL (ref 1.7–8.2)
ADD MANUAL DIFF: YES
ALBUMIN SERPL-MCNC: 2.2 G/DL (ref 3.5–5)
ALP SERPL-CCNC: 649 U/L (ref 38–126)
ALT SERPL-CCNC: 34 U/L (ref 21–72)
ANION GAP SERPL CALC-SCNC: 8 MMOL/L (ref 5–19)
ANISOCYTOSIS BLD QL SMEAR: (no result)
ARTERIAL BLOOD FIO2: (no result)
ARTERIAL BLOOD H2CO3: 0.57 MMOL/L (ref 1.05–1.35)
ARTERIAL BLOOD HCO3: 14.7 MMOL/L (ref 20–24)
ARTERIAL BLOOD PCO2: 18.9 MMHG (ref 35–45)
ARTERIAL BLOOD PH: 7.51 (ref 7.35–7.45)
ARTERIAL BLOOD PO2: 80.1 MMHG (ref 80–100)
ARTERIAL BLOOD TOTAL CO2: 15.3 MMOL/L (ref 23–27)
AST SERPL-CCNC: 139 U/L (ref 17–59)
BASE EXCESS BLDA CALC-SCNC: -6.7 MMOL/L
BASOPHILS NFR BLD MANUAL: 0 % (ref 0–2)
BILIRUB DIRECT SERPL-MCNC: 0.7 MG/DL (ref 0–0.4)
BILIRUB SERPL-MCNC: 0.8 MG/DL (ref 0.2–1.3)
BUN SERPL-MCNC: 18 MG/DL (ref 7–20)
CALCIUM: 7.7 MG/DL (ref 8.4–10.2)
CHLORIDE SERPL-SCNC: 120 MMOL/L (ref 98–107)
CO2 SERPL-SCNC: 17 MMOL/L (ref 22–30)
DACRYOCYTES BLD QL SMEAR: SLIGHT
EOSINOPHIL NFR BLD MANUAL: 0 % (ref 0–6)
ERYTHROCYTE [DISTWIDTH] IN BLOOD BY AUTOMATED COUNT: 20.6 % (ref 11.5–14)
GLUCOSE SERPL-MCNC: 139 MG/DL (ref 75–110)
HCT VFR BLD CALC: 27.2 % (ref 37.9–51)
HGB BLD-MCNC: 8.8 G/DL (ref 13.5–17)
MCH RBC QN AUTO: 25.3 PG (ref 27–33.4)
MCHC RBC AUTO-ENTMCNC: 32.6 G/DL (ref 32–36)
MCV RBC AUTO: 78 FL (ref 80–97)
METAMYELOCYTES % (MANUAL): 1 %
MONOCYTES % (MANUAL): 4 % (ref 3–13)
NEUTS BAND NFR BLD MANUAL: 5 % (ref 3–5)
OVALOCYTES BLD QL SMEAR: SLIGHT
PLATELET # BLD: 161 10^3/UL (ref 150–450)
PLATELET COMMENT: ADEQUATE
POIKILOCYTOSIS BLD QL SMEAR: (no result)
POTASSIUM SERPL-SCNC: 3.7 MMOL/L (ref 3.6–5)
PROT SERPL-MCNC: 5 G/DL (ref 6.3–8.2)
RBC # BLD AUTO: 3.5 10^6/UL (ref 4.35–5.55)
SAO2 % BLDA: 97.1 % (ref 94–98)
SEGMENTED NEUTROPHILS % (MAN): 84 % (ref 42–78)
SODIUM SERPL-SCNC: 145.1 MMOL/L (ref 137–145)
TARGETS BLD QL SMEAR: SLIGHT
TOTAL CELLS COUNTED BLD: 100
VARIANT LYMPHS NFR BLD MANUAL: 6 % (ref 13–45)
WBC # BLD AUTO: 15 10^3/UL (ref 4–10.5)

## 2018-12-19 RX ADMIN — SODIUM CHLORIDE SCH MG: 234 INJECTION INTRAMUSCULAR; INTRAVENOUS; SUBCUTANEOUS at 06:36

## 2018-12-19 RX ADMIN — POTASSIUM CHLORIDE SCH MEQ: 1.5 SOLUTION ORAL at 10:32

## 2018-12-19 RX ADMIN — LEVETIRACETAM SCH MG: 100 SOLUTION ORAL at 21:39

## 2018-12-19 RX ADMIN — LATANOPROST SCH DROP: 50 SOLUTION OPHTHALMIC at 21:39

## 2018-12-19 RX ADMIN — VALPROIC ACID SCH: 250 SOLUTION ORAL at 21:36

## 2018-12-19 RX ADMIN — Medication SCH UNIT: at 10:32

## 2018-12-19 RX ADMIN — BUSPIRONE HYDROCHLORIDE SCH MG: 10 TABLET ORAL at 10:23

## 2018-12-19 RX ADMIN — TAMSULOSIN HYDROCHLORIDE SCH MG: 0.4 CAPSULE ORAL at 21:39

## 2018-12-19 RX ADMIN — DILTIAZEM HYDROCHLORIDE SCH MG: 30 TABLET, FILM COATED ORAL at 12:37

## 2018-12-19 RX ADMIN — APIXABAN SCH MG: 2.5 TABLET, FILM COATED ORAL at 10:36

## 2018-12-19 RX ADMIN — GLIMEPIRIDE SCH MG: 1 TABLET ORAL at 10:36

## 2018-12-19 RX ADMIN — CALCIUM SCH MG: 500 TABLET ORAL at 19:50

## 2018-12-19 RX ADMIN — ATORVASTATIN CALCIUM SCH MG: 10 TABLET, FILM COATED ORAL at 21:39

## 2018-12-19 RX ADMIN — DILTIAZEM HYDROCHLORIDE SCH: 30 TABLET, FILM COATED ORAL at 00:59

## 2018-12-19 RX ADMIN — VALPROIC ACID SCH MG: 250 SOLUTION ORAL at 16:49

## 2018-12-19 RX ADMIN — LACTULOSE SCH GM: 20 SOLUTION ORAL at 16:48

## 2018-12-19 RX ADMIN — VALPROIC ACID SCH MG: 250 SOLUTION ORAL at 10:28

## 2018-12-19 RX ADMIN — DILTIAZEM HYDROCHLORIDE SCH MG: 30 TABLET, FILM COATED ORAL at 06:37

## 2018-12-19 RX ADMIN — CALCIUM SCH MG: 500 TABLET ORAL at 16:48

## 2018-12-19 RX ADMIN — LEVETIRACETAM SCH MG: 100 SOLUTION ORAL at 10:33

## 2018-12-19 RX ADMIN — DILTIAZEM HYDROCHLORIDE SCH MG: 30 TABLET, FILM COATED ORAL at 19:48

## 2018-12-19 RX ADMIN — SODIUM CHLORIDE SCH ML: 9 INJECTION INTRAMUSCULAR; INTRAVENOUS; SUBCUTANEOUS at 21:39

## 2018-12-19 RX ADMIN — APIXABAN SCH MG: 2.5 TABLET, FILM COATED ORAL at 19:50

## 2018-12-19 RX ADMIN — CALCITRIOL SCH MCG: 0.25 CAPSULE, LIQUID FILLED ORAL at 10:35

## 2018-12-19 RX ADMIN — LACTULOSE SCH GM: 20 SOLUTION ORAL at 19:47

## 2018-12-19 RX ADMIN — Medication SCH UNIT: at 21:39

## 2018-12-19 RX ADMIN — SODIUM CHLORIDE SCH ML: 9 INJECTION INTRAMUSCULAR; INTRAVENOUS; SUBCUTANEOUS at 10:33

## 2018-12-19 RX ADMIN — VALPROIC ACID SCH MG: 250 SOLUTION ORAL at 19:48

## 2018-12-19 RX ADMIN — BUSPIRONE HYDROCHLORIDE SCH MG: 10 TABLET ORAL at 19:50

## 2018-12-19 RX ADMIN — MEGESTROL ACETATE SCH MG: 40 SUSPENSION ORAL at 10:33

## 2018-12-19 RX ADMIN — LACTULOSE SCH GM: 20 SOLUTION ORAL at 10:23

## 2018-12-19 RX ADMIN — CALCIUM SCH MG: 500 TABLET ORAL at 10:35

## 2018-12-19 NOTE — PDOC PROGRESS REPORT
Subjective


Progress Note for:: 12/19/18


Subjective:: 





The patient is slightly more alert this morning.  He recognizes who I am but 

upon questioning he states that the president is ED Rosales.


Medications reviewed.  His ammonia level is elevated which might be part of the 

confusion


Reason For Visit: 


HYPOCALCEMIA, VIT D DEF, MALNUTRITION ACIDOSIS








Physical Exam


Vital Signs: 


                                        











Temp Pulse Resp BP Pulse Ox


 


 98.6 F   119 H  22 H  135/64 H  100 


 


 12/19/18 07:37  12/19/18 07:37  12/19/18 07:37  12/19/18 03:00  12/19/18 07:37








                                 Intake & Output











 12/18/18 12/19/18 12/20/18





 06:59 06:59 06:59


 


Intake Total 2277 618 


 


Output Total 700 350 


 


Balance 1577 268 


 


Weight 67.8 kg 63.8 kg 











General appearance: PRESENT: mild distress


Head exam: PRESENT: atraumatic


Eye exam: PRESENT: conjunctival injection


Neck exam: PRESENT: carotid bruit.  ABSENT: JVD


Respiratory exam: PRESENT: crackles


Cardiovascular exam: PRESENT: irregular rhythm, +S1, +S2


GI/Abdominal exam: PRESENT: normal bowel sounds, soft


Extremities exam: PRESENT: pedal edema, tenderness


Musculoskeletal exam: PRESENT: tenderness


Neurological exam: PRESENT: awake


Psychiatric exam: PRESENT: anxious, flat affect





Results


Laboratory Results: 


                                        





                                 12/19/18 06:05 





                                 12/19/18 06:05 





                                        











  12/18/18 12/18/18 12/18/18





  17:14 17:14 17:14


 


WBC  14.7 H  


 


RBC  3.46 L  


 


Hgb  9.0 L  


 


Hct  27.1 L  


 


MCV  78 L  


 


MCH  26.0 L  


 


MCHC  33.1  


 


RDW  20.5 H  


 


Plt Count  160  


 


Seg Neutrophils %  Not Reportable  


 


Lymphocytes %  Not Reportable  


 


Monocytes %  Not Reportable  


 


Eosinophils %  Not Reportable  


 


Basophils %  Not Reportable  


 


Absolute Neutrophils  Not Reportable  


 


Absolute Lymphocytes  Not Reportable  


 


Absolute Monocytes  Not Reportable  


 


Absolute Eosinophils  Not Reportable  


 


Absolute Basophils  Not Reportable  


 


Sodium   142.7 


 


Potassium   3.6 


 


Chloride   120 H 


 


Carbon Dioxide   17 L 


 


Anion Gap   6 


 


BUN   19 


 


Creatinine   0.78 


 


Est GFR ( Amer)   > 60 


 


Est GFR (Non-Af Amer)   > 60 


 


Glucose   150 H 


 


Calcium   7.8 L 


 


Magnesium   2.0 


 


Total Bilirubin   0.6 


 


AST   148 H 


 


ALT   40 


 


Alkaline Phosphatase   687 H 


 


Ammonia   


 


Total Protein   5.1 L 


 


Albumin   2.3 L 


 


TSH    2.77














  12/18/18 12/19/18 12/19/18





  17:14 06:05 06:05


 


WBC   15.0 H 


 


RBC   3.50 L 


 


Hgb   8.8 L 


 


Hct   27.2 L 


 


MCV   78 L 


 


MCH   25.3 L 


 


MCHC   32.6 


 


RDW   20.6 H 


 


Plt Count   161 


 


Seg Neutrophils %   Not Reportable 


 


Lymphocytes %   Not Reportable 


 


Monocytes %   Not Reportable 


 


Eosinophils %   Not Reportable 


 


Basophils %   Not Reportable 


 


Absolute Neutrophils   Not Reportable 


 


Absolute Lymphocytes   Not Reportable 


 


Absolute Monocytes   Not Reportable 


 


Absolute Eosinophils   Not Reportable 


 


Absolute Basophils   Not Reportable 


 


Sodium    145.1 H


 


Potassium    3.7


 


Chloride    120 H


 


Carbon Dioxide    17 L


 


Anion Gap    8


 


BUN    18


 


Creatinine    0.82


 


Est GFR ( Amer)    > 60


 


Est GFR (Non-Af Amer)    > 60


 


Glucose    139 H


 


Calcium    7.7 L


 


Magnesium    2.0


 


Total Bilirubin    0.8


 


AST    139 H


 


ALT    34


 


Alkaline Phosphatase    649 H


 


Ammonia  50.6 H  


 


Total Protein    5.0 L


 


Albumin    2.2 L


 


TSH   








                                        





12/14/18 16:45   Stool - Stool    - Final


12/14/18 16:45   Stool - Stool   Stool Culture - Final





                                        











  12/07/18 12/09/18 12/09/18





  21:35 16:45 16:45


 


Creatine Kinase   728 H 


 


CK-MB (CK-2)    0.77


 


Troponin I    0.015


 


NT-Pro-B Natriuret Pep  80794 H  














  12/09/18 12/09/18 12/10/18





  23:10 23:10 03:39


 


Creatine Kinase  706 H   756 H


 


CK-MB (CK-2)   0.64 


 


Troponin I   0.018 


 


NT-Pro-B Natriuret Pep   














  12/10/18





  03:39


 


Creatine Kinase 


 


CK-MB (CK-2)  0.59


 


Troponin I  0.019


 


NT-Pro-B Natriuret Pep 











Impressions: 


                                        





Sacrum and Coccyx X-Ray  12/07/18 20:24


IMPRESSION:


 


No fracture. 


 


copyright 2011 [employer]- All Rights Reserved


 








Chest X-Ray  12/09/18 00:00


IMPRESSION:  Vascular congestion.  Increasing basilar opacities and pleural 

effusions.


 








Guidance Fluoroscopy  12/13/18 00:00


IMPRESSION:  SUCCESSFUL PLACEMENT OF A 5 FR DUAL LUMEN 41 CM PICC IN THE RIGHT 

BASILIC VEIN.


 








Interventional Vascular Procedure  12/13/18 00:00


IMPRESSION:  SUCCESSFUL PLACEMENT OF A 5 FR DUAL LUMEN 41 CM PICC IN THE RIGHT 

BASILIC VEIN.


 








PICC Line Insertion  12/13/18 00:00


IMPRESSION:  SUCCESSFUL PLACEMENT OF A 5 FR DUAL LUMEN 41 CM PICC IN THE RIGHT 

BASILIC VEIN.


 








Head CT  12/15/18 00:00


IMPRESSION:  NORMAL BRAIN CT WITHOUT CONTRAST.


EVIDENCE OF ACUTE STROKE: NO.


 














Assessment & Plan





- Diagnosis


(1) Atrial fibrillation


Qualifiers: 


   Atrial fibrillation type: chronic   Qualified Code(s): I48.2 - Chronic atrial

fibrillation   


Is this a current diagnosis for this admission?: Yes   


Plan: 


The Cardizem was held last night and this morning the patient is in rapid A. 

fib.  Will increase the Cardizem to 60 mg








(2) Decubitus ulcer of sacral region, stage 1


Is this a current diagnosis for this admission?: Yes   


Plan: 


Improving and being treated by nursing staff








(3) Failure to thrive


Is this a current diagnosis for this admission?: Yes   





(4) Hypocalcemia


Is this a current diagnosis for this admission?: Yes   


Plan: 


Improved with intervention from nephrology.  His calcium is 7.7 this morning








(5) Anemia, chronic disease


Is this a current diagnosis for this admission?: Yes   





(6) CKD (chronic kidney disease) stage 3, GFR 30-59 ml/min


Is this a current diagnosis for this admission?: Yes   


Plan: 


Will use some hydration and insert a Alcaraz for strict MARISOL's








(7) Metastatic malignant neoplasm to prostate


Is this a current diagnosis for this admission?: Yes   


Plan: 


We will reconsult hematology oncology








(8) Dementia


Is this a current diagnosis for this admission?: Yes   


Plan: 


Might be worsening with increase in ammonia level.  We will increase the 

lactulose to 3 times a day








(9) Acute psychosis


Is this a current diagnosis for this admission?: Yes   


Plan: 


The sun downs are getting progressively worse possibly secondary to increase in 

ammonia level








(10) Memory loss, short term


Is this a current diagnosis for this admission?: Yes   





(11) Vitamin D deficiency


Is this a current diagnosis for this admission?: Yes   


Plan: 


Most probably related to hypocalcemia.  We will add vitamin D

## 2018-12-19 NOTE — PDOC PROGRESS REPORT
Subjective


Progress Note for:: 12/19/18


Reason For Visit: 


Patient was seen in the hospital twice today.  Early this morning when I came he

was completely snowed on the nose very hard and difficult to arouse him even 

though he did respond to painful stimuli.  Blood gas done showed that he had 

respiratory alkalosis with compensatory metabolic acidosis.  Discussions were 

done with the treating nurse earlier this morning.  Patient did had haloperidol 

on the day before and he finally slept as he has not had a good night's rest for

quite a few days.Obviously given his deep sleep state he has not been taking any

of his medications or eating.  Obviously he has been getting his IV medications 

including calcium.  Labs and medications were reviewed with with the nurse.








Physical Exam


Vital Signs: 


                                        











Temp Pulse Resp BP Pulse Ox


 


 97.5 F   127 H  18   135/64 H  99 


 


 12/19/18 11:18  12/19/18 11:18  12/19/18 11:18  12/19/18 03:00  12/19/18 11:18








                                 Intake & Output











 12/18/18 12/19/18 12/20/18





 06:59 06:59 06:59


 


Intake Total 2277 618 120


 


Output Total 700 350 330


 


Balance 1577 268 -210


 


Weight 67.8 kg 63.8 kg 











General appearance: PRESENT: no acute distress - Patient is sleeping very 

soundly.  Is hard to wake him but he responds to painful stimuli and moving all 

his 4 limbs.


Eye exam: PRESENT: EOMI, PERRLA.  ABSENT: scleral icterus


Respiratory exam: PRESENT: clear to auscultation venessa, decreased breath sounds.  

ABSENT: crackles


Cardiovascular exam: PRESENT: irregular rhythm, +S1, +S2


GI/Abdominal exam: PRESENT: normal bowel sounds, soft.  ABSENT: firm, guarding, 

organomegaly, tenderness


Neurological exam: PRESENT: altered


Skin exam: ABSENT: cyanosis, erythema, rash





Results


Laboratory Results: 


                                        





                                 12/19/18 06:05 





                                 12/19/18 06:05 





                                        











  12/18/18 12/18/18 12/18/18





  17:14 17:14 17:14


 


WBC  14.7 H  


 


RBC  3.46 L  


 


Hgb  9.0 L  


 


Hct  27.1 L  


 


MCV  78 L  


 


MCH  26.0 L  


 


MCHC  33.1  


 


RDW  20.5 H  


 


Plt Count  160  


 


Seg Neutrophils %  Not Reportable  


 


Lymphocytes %  Not Reportable  


 


Monocytes %  Not Reportable  


 


Eosinophils %  Not Reportable  


 


Basophils %  Not Reportable  


 


Absolute Neutrophils  Not Reportable  


 


Absolute Lymphocytes  Not Reportable  


 


Absolute Monocytes  Not Reportable  


 


Absolute Eosinophils  Not Reportable  


 


Absolute Basophils  Not Reportable  


 


Carbonic Acid   


 


HCO3/H2CO3 Ratio   


 


ABG pH   


 


ABG pCO2   


 


ABG pO2   


 


ABG HCO3   


 


ABG O2 Saturation   


 


ABG Base Excess   


 


FiO2   


 


Sodium   142.7 


 


Potassium   3.6 


 


Chloride   120 H 


 


Carbon Dioxide   17 L 


 


Anion Gap   6 


 


BUN   19 


 


Creatinine   0.78 


 


Est GFR ( Amer)   > 60 


 


Est GFR (Non-Af Amer)   > 60 


 


Glucose   150 H 


 


Calcium   7.8 L 


 


Magnesium   2.0 


 


Total Bilirubin   0.6 


 


AST   148 H 


 


ALT   40 


 


Alkaline Phosphatase   687 H 


 


Ammonia   


 


Total Protein   5.1 L 


 


Albumin   2.3 L 


 


TSH    2.77














  12/18/18 12/19/18 12/19/18





  17:14 06:05 06:05


 


WBC   15.0 H 


 


RBC   3.50 L 


 


Hgb   8.8 L 


 


Hct   27.2 L 


 


MCV   78 L 


 


MCH   25.3 L 


 


MCHC   32.6 


 


RDW   20.6 H 


 


Plt Count   161 


 


Seg Neutrophils %   Not Reportable 


 


Lymphocytes %   Not Reportable 


 


Monocytes %   Not Reportable 


 


Eosinophils %   Not Reportable 


 


Basophils %   Not Reportable 


 


Absolute Neutrophils   Not Reportable 


 


Absolute Lymphocytes   Not Reportable 


 


Absolute Monocytes   Not Reportable 


 


Absolute Eosinophils   Not Reportable 


 


Absolute Basophils   Not Reportable 


 


Carbonic Acid   


 


HCO3/H2CO3 Ratio   


 


ABG pH   


 


ABG pCO2   


 


ABG pO2   


 


ABG HCO3   


 


ABG O2 Saturation   


 


ABG Base Excess   


 


FiO2   


 


Sodium    145.1 H


 


Potassium    3.7


 


Chloride    120 H


 


Carbon Dioxide    17 L


 


Anion Gap    8


 


BUN    18


 


Creatinine    0.82


 


Est GFR ( Amer)    > 60


 


Est GFR (Non-Af Amer)    > 60


 


Glucose    139 H


 


Calcium    7.7 L


 


Magnesium    2.0


 


Total Bilirubin    0.8


 


AST    139 H


 


ALT    34


 


Alkaline Phosphatase    649 H


 


Ammonia  50.6 H  


 


Total Protein    5.0 L


 


Albumin    2.2 L


 


TSH   














  12/19/18





  14:30


 


WBC 


 


RBC 


 


Hgb 


 


Hct 


 


MCV 


 


MCH 


 


MCHC 


 


RDW 


 


Plt Count 


 


Seg Neutrophils % 


 


Lymphocytes % 


 


Monocytes % 


 


Eosinophils % 


 


Basophils % 


 


Absolute Neutrophils 


 


Absolute Lymphocytes 


 


Absolute Monocytes 


 


Absolute Eosinophils 


 


Absolute Basophils 


 


Carbonic Acid  0.57 L


 


HCO3/H2CO3 Ratio  25:1


 


ABG pH  7.51 H


 


ABG pCO2  18.9 L*


 


ABG pO2  80.1


 


ABG HCO3  14.7 L


 


ABG O2 Saturation  97.1


 


ABG Base Excess  -6.7


 


FiO2  ROOM AIR


 


Sodium 


 


Potassium 


 


Chloride 


 


Carbon Dioxide 


 


Anion Gap 


 


BUN 


 


Creatinine 


 


Est GFR ( Amer) 


 


Est GFR (Non-Af Amer) 


 


Glucose 


 


Calcium 


 


Magnesium 


 


Total Bilirubin 


 


AST 


 


ALT 


 


Alkaline Phosphatase 


 


Ammonia 


 


Total Protein 


 


Albumin 


 


TSH 








                                        





12/14/18 16:45   Stool - Stool    - Final


12/14/18 16:45   Stool - Stool   Stool Culture - Final





                                        











  12/07/18 12/09/18 12/09/18





  21:35 16:45 16:45


 


Creatine Kinase   728 H 


 


CK-MB (CK-2)    0.77


 


Troponin I    0.015


 


NT-Pro-B Natriuret Pep  41151 H  














  12/09/18 12/09/18 12/10/18





  23:10 23:10 03:39


 


Creatine Kinase  706 H   756 H


 


CK-MB (CK-2)   0.64 


 


Troponin I   0.018 


 


NT-Pro-B Natriuret Pep   














  12/10/18





  03:39


 


Creatine Kinase 


 


CK-MB (CK-2)  0.59


 


Troponin I  0.019


 


NT-Pro-B Natriuret Pep 











Impressions: 


                                        





Sacrum and Coccyx X-Ray  12/07/18 20:24


IMPRESSION:


 


No fracture. 


 


copyright 2011 [employer]- All Rights Reserved


 








Chest X-Ray  12/09/18 00:00


IMPRESSION:  Vascular congestion.  Increasing basilar opacities and pleural 

effusions.


 








Guidance Fluoroscopy  12/13/18 00:00


IMPRESSION:  SUCCESSFUL PLACEMENT OF A 5 FR DUAL LUMEN 41 CM PICC IN THE RIGHT 

BASILIC VEIN.


 








Interventional Vascular Procedure  12/13/18 00:00


IMPRESSION:  SUCCESSFUL PLACEMENT OF A 5 FR DUAL LUMEN 41 CM PICC IN THE RIGHT 

BASILIC VEIN.


 








PICC Line Insertion  12/13/18 00:00


IMPRESSION:  SUCCESSFUL PLACEMENT OF A 5 FR DUAL LUMEN 41 CM PICC IN THE RIGHT 

BASILIC VEIN.


 








Head CT  12/15/18 00:00


IMPRESSION:  NORMAL BRAIN CT WITHOUT CONTRAST.


EVIDENCE OF ACUTE STROKE: NO.


 














Assessment & Plan





- Diagnosis


(1) Hypocalcemia


Is this a current diagnosis for this admission?: Yes   


Plan: 


Calcium levels are improving.Discuss of continuing treatment with this treating 

nurse Dariela. Monitor closely.








(2) Congestive heart failure


Qualifiers: 


   Heart failure type: unspecified   Heart failure chronicity: chronic   

Qualified Code(s): I50.9 - Heart failure, unspecified   


Is this a current diagnosis for this admission?: Yes   


Plan: 


Presently stable.  A. fib rate uncontrolled.  Being managed by Ramses ortiz MD. Monitor.








(3) Failure to thrive


Is this a current diagnosis for this admission?: Yes   


Plan: 


He is got a combination of factors including his encephalopathy, 

anxiety/depression and obvious cancer leading him to poor intake and failure to 

thrive. Besides all the the above he has also developed high ammonia indicative 

of possible liver cirrhosis cirrhosis as well.  He has been begun on antianxiety

and antidepressants. 








(4) Iron deficiency anemia


Is this a current diagnosis for this admission?: Yes   





(5) Metabolic acidosis


Is this a current diagnosis for this admission?: Yes   


Plan: 


The background of respiratory alkalosis from possible hypoventilation from 

various reasons.  Will monitor.  And will not be too vigorous in correcting this

metabolic acidosis which is secondary rather than primary.








(6) Metastatic malignant neoplasm to prostate


Is this a current diagnosis for this admission?: Yes   


Plan: 


As per Ramses Guadalupe MD and [facility] oncology.








(7) Vitamin D deficiency


Is this a current diagnosis for this admission?: Yes   





(8) Abnormal LFTs


Is this a current diagnosis for this admission?: Yes   


Plan: 


From liver and bony metastasis.High ammonia levels indicative of underlying 

liver cirrhosis.  Unaware of patient's previous alcohol history or hepatitis 

serologies.  Patient started on lactulose.  Would add hepatitis workup as well. 

This could be another factor adding to his delirious state.  Monitor closely.








(9) Acute on chronic kidney failure


Qualifiers: 


   Chronic kidney disease stage: stage 3 (moderate) 





(10) Acute psychosis


Is this a current diagnosis for this admission?: Yes   


Plan: 


Multiple factors but apparently getting better.Now add another factor in the 

form of underlying liver cirrhosis as well.  See response to lactulose. Monitor.








(11) CKD (chronic kidney disease) stage 3, GFR 30-59 ml/min


Is this a current diagnosis for this admission?: Yes   


Plan: 


Stable.  Monitor.  Avoid nephrotoxic drugs.  Maintain hydration.








(12) Dehydration


Is this a current diagnosis for this admission?: Yes   


Plan: 


On IV fluids.  Will monitor. Poor  p.o. intake unfortunately.








(13) Diabetes


Qualifiers: 


   Diabetes mellitus type: type 2   Chronic kidney disease stage: stage 3 

(moderate) 





(14) Hypernatremia


Plan: 


Presently stable on current management.  Monitor.








(15) Hypertension


Qualifiers: 


   Hypertension type: unspecified   Qualified Code(s): I10 - Essential (primary)

hypertension   


Plan: 


controlled.  Monitor








(16) Hypokalemia


Plan: 


Adjust replacements and monitor.








(17) Malnutrition


Qualifiers: 


   Malnutrition type: protein-calorie malnutrition   Protein-calorie 

malnutrition severity: moderate   Qualified Code(s): E44.0 - Moderate protein-c

alorie malnutrition   


Is this a current diagnosis for this admission?: Yes   





(18) Physical deconditioning


Is this a current diagnosis for this admission?: Yes   





(19) Atrial fibrillation


Qualifiers: 


   Atrial fibrillation type: chronic   Qualified Code(s): I48.2 - Chronic atrial

fibrillation   


Is this a current diagnosis for this admission?: Yes   


Plan: 


Presently decompensated.Management as per Dr. Guadalupe.








(20) Decubitus ulcer of sacral region, stage 1


Is this a current diagnosis for this admission?: Yes

## 2018-12-19 NOTE — PSYCHOLOGICAL NOTE
Psych Note





- Psych Note


Date seen by psych provider: 12/19/18


Psych Note: 


Reason for Consult: Medication recommendations/ Psychosis, dementia





This patient was seen by the Behavioral Health Team on 11/30/2018 for similar 

etiology. Medication recommendation were made at that time.  





Head MRI 11/22/2018: Findings indicate prominence of the lateral ventricles 

secondary to mild cortical atrophy





Medication recommendations per [facility]'s contracted psychiatrist 


Please discontinue Haldol 


Please continue Depakote 250mg twice daily


Please continue Buspar 10mg twice daily


Please add Rispirdone 0.25mg twice daily as needed for agitation and aggression





R/O 799.59 (R41.9) unspecified neurocognitive disorder





Impression\plan: Patient is cleared from acute psychiatric services.  Patient is

noted to have a possible diagnosis of dementia which is supported by 

presentation and MRI results during the patient previous visit 11/30/2018.  

Patient is recommended to follow-up with neurology.  Prescribing physicians are 

asked to consider avoiding using any benzodiazepines and antipsychotics as they 

can increase confusion, aggression, agitation, and psychosis.  Clinician notes 

the patient continues to present in medical crisis and an accurate capacity 

cannot be conducted until stabilization.  Please reconsult if new concerns arise

once medically stable.  Dr. Nathan was consulted and the care management of 

this patient; attending physicians in agreement with recommendations and 

disposition.

## 2018-12-20 LAB
ALBUMIN SERPL-MCNC: 2.2 G/DL (ref 3.5–5)
ALP SERPL-CCNC: 638 U/L (ref 38–126)
ALT SERPL-CCNC: 33 U/L (ref 21–72)
ANION GAP SERPL CALC-SCNC: 8 MMOL/L (ref 5–19)
APPEARANCE UR: (no result)
APTT PPP: (no result) S
AST SERPL-CCNC: 156 U/L (ref 17–59)
BILIRUB DIRECT SERPL-MCNC: 0.7 MG/DL (ref 0–0.4)
BILIRUB SERPL-MCNC: 0.9 MG/DL (ref 0.2–1.3)
BILIRUB UR QL STRIP: (no result)
BUN SERPL-MCNC: 19 MG/DL (ref 7–20)
CALCIUM: 7.6 MG/DL (ref 8.4–10.2)
CHLORIDE SERPL-SCNC: 122 MMOL/L (ref 98–107)
CO2 SERPL-SCNC: 16 MMOL/L (ref 22–30)
GLUCOSE SERPL-MCNC: 126 MG/DL (ref 75–110)
GLUCOSE UR STRIP-MCNC: NEGATIVE MG/DL
KETONES UR STRIP-MCNC: NEGATIVE MG/DL
NITRITE UR QL STRIP: NEGATIVE
PH UR STRIP: 5 [PH] (ref 5–9)
POTASSIUM SERPL-SCNC: 3.5 MMOL/L (ref 3.6–5)
PROT SERPL-MCNC: 5 G/DL (ref 6.3–8.2)
PROT UR STRIP-MCNC: 100 MG/DL
SODIUM SERPL-SCNC: 146.4 MMOL/L (ref 137–145)
SP GR UR STRIP: 1.02
UROBILINOGEN UR-MCNC: NEGATIVE MG/DL (ref ?–2)

## 2018-12-20 RX ADMIN — BUSPIRONE HYDROCHLORIDE SCH MG: 10 TABLET ORAL at 11:16

## 2018-12-20 RX ADMIN — DILTIAZEM HYDROCHLORIDE SCH MG: 30 TABLET, FILM COATED ORAL at 18:35

## 2018-12-20 RX ADMIN — APIXABAN SCH MG: 2.5 TABLET, FILM COATED ORAL at 18:38

## 2018-12-20 RX ADMIN — LEVETIRACETAM SCH MG: 100 SOLUTION ORAL at 11:14

## 2018-12-20 RX ADMIN — LACTULOSE SCH GM: 20 SOLUTION ORAL at 14:30

## 2018-12-20 RX ADMIN — BUSPIRONE HYDROCHLORIDE SCH MG: 10 TABLET ORAL at 18:35

## 2018-12-20 RX ADMIN — SODIUM CHLORIDE SCH: 9 INJECTION INTRAMUSCULAR; INTRAVENOUS; SUBCUTANEOUS at 21:21

## 2018-12-20 RX ADMIN — VALPROIC ACID SCH MG: 250 SOLUTION ORAL at 21:17

## 2018-12-20 RX ADMIN — CALCITRIOL SCH MCG: 0.25 CAPSULE, LIQUID FILLED ORAL at 11:11

## 2018-12-20 RX ADMIN — ATORVASTATIN CALCIUM SCH MG: 10 TABLET, FILM COATED ORAL at 21:20

## 2018-12-20 RX ADMIN — DILTIAZEM HYDROCHLORIDE SCH MG: 30 TABLET, FILM COATED ORAL at 08:38

## 2018-12-20 RX ADMIN — LATANOPROST SCH DROP: 50 SOLUTION OPHTHALMIC at 21:22

## 2018-12-20 RX ADMIN — Medication SCH: at 21:20

## 2018-12-20 RX ADMIN — VALPROIC ACID SCH MG: 250 SOLUTION ORAL at 14:29

## 2018-12-20 RX ADMIN — VALPROIC ACID SCH MG: 250 SOLUTION ORAL at 11:12

## 2018-12-20 RX ADMIN — VALPROIC ACID SCH MG: 250 SOLUTION ORAL at 18:36

## 2018-12-20 RX ADMIN — LEUCINE, PHENYLALANINE, LYSINE, METHIONINE, ISOLEUCINE, VALINE, HISTIDINE, THREONINE, TRYPTOPHAN, ALANINE, GLYCINE, ARGININE, PROLINE, SERINE, TYROSINE, DEXTROSE PRN MLS/HR: 365; 280; 290; 200; 300; 290; 240; 210; 90; 1035; 515; 575; 340; 250; 20; 25 INJECTION INTRAVENOUS at 20:30

## 2018-12-20 RX ADMIN — MORPHINE SULFATE PRN MG: 10 INJECTION INTRAMUSCULAR; INTRAVENOUS; SUBCUTANEOUS at 21:16

## 2018-12-20 RX ADMIN — Medication SCH UNIT: at 11:18

## 2018-12-20 RX ADMIN — SODIUM CHLORIDE SCH ML: 9 INJECTION INTRAMUSCULAR; INTRAVENOUS; SUBCUTANEOUS at 11:19

## 2018-12-20 RX ADMIN — DILTIAZEM HYDROCHLORIDE SCH MG: 30 TABLET, FILM COATED ORAL at 11:49

## 2018-12-20 RX ADMIN — TAMSULOSIN HYDROCHLORIDE SCH MG: 0.4 CAPSULE ORAL at 21:21

## 2018-12-20 RX ADMIN — LACTULOSE SCH GM: 20 SOLUTION ORAL at 11:18

## 2018-12-20 RX ADMIN — POTASSIUM CHLORIDE SCH MEQ: 1.5 SOLUTION ORAL at 11:14

## 2018-12-20 RX ADMIN — MORPHINE SULFATE PRN MG: 10 INJECTION INTRAMUSCULAR; INTRAVENOUS; SUBCUTANEOUS at 14:28

## 2018-12-20 RX ADMIN — CALCIUM SCH MG: 500 TABLET ORAL at 11:13

## 2018-12-20 RX ADMIN — SODIUM CHLORIDE SCH MLS/HR: 234 INJECTION INTRAMUSCULAR; INTRAVENOUS; SUBCUTANEOUS at 14:30

## 2018-12-20 RX ADMIN — LEVETIRACETAM SCH MG: 100 SOLUTION ORAL at 21:17

## 2018-12-20 RX ADMIN — LEVOFLOXACIN SCH MLS/HR: 750 INJECTION, SOLUTION INTRAVENOUS at 12:16

## 2018-12-20 RX ADMIN — APIXABAN SCH MG: 2.5 TABLET, FILM COATED ORAL at 11:14

## 2018-12-20 RX ADMIN — CEFTRIAXONE SODIUM SCH MLS/HR: 1 INJECTION, POWDER, FOR SOLUTION INTRAMUSCULAR; INTRAVENOUS at 11:10

## 2018-12-20 RX ADMIN — LACTULOSE SCH GM: 20 SOLUTION ORAL at 18:36

## 2018-12-20 RX ADMIN — GLIMEPIRIDE SCH MG: 1 TABLET ORAL at 08:38

## 2018-12-20 RX ADMIN — SODIUM CHLORIDE SCH MLS/HR: 234 INJECTION INTRAMUSCULAR; INTRAVENOUS; SUBCUTANEOUS at 21:21

## 2018-12-20 RX ADMIN — DILTIAZEM HYDROCHLORIDE SCH MG: 30 TABLET, FILM COATED ORAL at 01:07

## 2018-12-20 RX ADMIN — MEGESTROL ACETATE SCH MG: 40 SUSPENSION ORAL at 11:12

## 2018-12-20 NOTE — PDOC PROGRESS REPORT
Subjective


Progress Note for:: 12/20/18


Reason For Visit: 


Patient is more awake today and more responsive.  He is not as noted down as he 

was yesterday.  He still is quite sleepy.  He is able to respond appropriately 

to questions.  He denies any history of chest pain, shortness of breath, 

abdominal pains, fever or chills.  Discussed his care with the treating nurse 

Dariela.  He is up apparently responding more well and is able to take his 

medications and eat better as well as drink better as compared to the last few 

days.  Labs and medications were reviewed.He has had couple of loose stools 

since he was begun on lactulose.








Physical Exam


Vital Signs: 


                                        











Temp Pulse Resp BP Pulse Ox


 


 97.7 F   89   16   109/62   98 


 


 12/20/18 08:02  12/20/18 08:02  12/20/18 08:02  12/19/18 15:00  12/20/18 08:02








                                 Intake & Output











 12/19/18 12/20/18 12/21/18





 06:59 06:59 06:59


 


Intake Total 618 420 


 


Output Total 350 605 


 


Balance 268 -185 


 


Weight 63.8 kg 86.6 kg 











General appearance: PRESENT: no acute distress


Respiratory exam: PRESENT: clear to auscultation venessa, decreased breath sounds.  

ABSENT: crackles


Cardiovascular exam: PRESENT: irregular rhythm, +S1, +S2


GI/Abdominal exam: PRESENT: normal bowel sounds, soft.  ABSENT: firm, guarding, 

organomegaly, tenderness


Extremities exam: PRESENT: +1 edema


Neurological exam: PRESENT: altered


Skin exam: ABSENT: cyanosis, erythema, rash





Results


Laboratory Results: 


                                        





                                 12/19/18 06:05 





                                 12/20/18 06:25 





                                        











  12/19/18 12/20/18 12/20/18





  14:30 06:25 07:00


 


Carbonic Acid  0.57 L  


 


HCO3/H2CO3 Ratio  25:1  


 


ABG pH  7.51 H  


 


ABG pCO2  18.9 L*  


 


ABG pO2  80.1  


 


ABG HCO3  14.7 L  


 


ABG O2 Saturation  97.1  


 


ABG Base Excess  -6.7  


 


FiO2  ROOM AIR  


 


Sodium   146.4 H 


 


Potassium   3.5 L 


 


Chloride   122 H 


 


Carbon Dioxide   16 L 


 


Anion Gap   8 


 


BUN   19 


 


Creatinine   0.85 


 


Est GFR ( Amer)   > 60 


 


Est GFR (Non-Af Amer)   > 60 


 


Glucose   126 H 


 


Calcium   7.6 L 


 


Total Bilirubin   0.9 


 


AST   156 H 


 


ALT   33 


 


Alkaline Phosphatase   638 H 


 


Ammonia    43.5 H


 


Total Protein   5.0 L 


 


Albumin   2.2 L 








                                        











  12/07/18 12/09/18 12/09/18





  21:35 16:45 16:45


 


Creatine Kinase   728 H 


 


CK-MB (CK-2)    0.77


 


Troponin I    0.015


 


NT-Pro-B Natriuret Pep  77754 H  














  12/09/18 12/09/18 12/10/18





  23:10 23:10 03:39


 


Creatine Kinase  706 H   756 H


 


CK-MB (CK-2)   0.64 


 


Troponin I   0.018 


 


NT-Pro-B Natriuret Pep   














  12/10/18





  03:39


 


Creatine Kinase 


 


CK-MB (CK-2)  0.59


 


Troponin I  0.019


 


NT-Pro-B Natriuret Pep 











Impressions: 


                                        





Sacrum and Coccyx X-Ray  12/07/18 20:24


IMPRESSION:


 


No fracture. 


 


copyright 2011 [employer]- All Rights Reserved


 








Chest X-Ray  12/09/18 00:00


IMPRESSION:  Vascular congestion.  Increasing basilar opacities and pleural 

effusions.


 








Guidance Fluoroscopy  12/13/18 00:00


IMPRESSION:  SUCCESSFUL PLACEMENT OF A 5 FR DUAL LUMEN 41 CM PICC IN THE RIGHT 

BASILIC VEIN.


 








Interventional Vascular Procedure  12/13/18 00:00


IMPRESSION:  SUCCESSFUL PLACEMENT OF A 5 FR DUAL LUMEN 41 CM PICC IN THE RIGHT 

BASILIC VEIN.


 








PICC Line Insertion  12/13/18 00:00


IMPRESSION:  SUCCESSFUL PLACEMENT OF A 5 FR DUAL LUMEN 41 CM PICC IN THE RIGHT 

BASILIC VEIN.


 








Head CT  12/15/18 00:00


IMPRESSION:  NORMAL BRAIN CT WITHOUT CONTRAST.


EVIDENCE OF ACUTE STROKE: NO.


 














Assessment & Plan





- Diagnosis


(1) Hypocalcemia


Is this a current diagnosis for this admission?: Yes   


Plan: 


He needs to continue on IV calcium.  However another choice would be to add this

to the TPN which I believe Dr. Lloyd Villanueva is started today.  Discussed this with

Dariela to discuss with pharmacy to see if that can be arranged.  He should get 3

g on a 24-hour basis.Obviously calcium should be monitored and the dose in the 

TPN should be titrated accordingly.








(2) Congestive heart failure


Qualifiers: 


   Heart failure type: unspecified   Heart failure chronicity: chronic   

Qualified Code(s): I50.9 - Heart failure, unspecified   


Is this a current diagnosis for this admission?: Yes   


Plan: 


Presently stable.  A. fib rate uncontrolled.  Being managed by Ramses Guadalupe MD. Monitor.








(3) Failure to thrive


Is this a current diagnosis for this admission?: Yes   


Plan: 


He is got a combination of factors including his encephalopathy, 

anxiety/depression and obvious cancer leading him to poor intake and failure to 

thrive. Besides all the the above he has also developed high ammonia indicative 

of possible liver cirrhosis cirrhosis as well.  He has been begun on antianxiety

and antidepressants. 








(4) Metabolic acidosis


Is this a current diagnosis for this admission?: Yes   


Plan: 


The background of respiratory alkalosis from possible hyperventilation from 

various reasons.  Will monitor.  And will not be too vigorous in correcting this

metabolic acidosis which is secondary rather than primary.








(5) Metastatic malignant neoplasm to prostate


Is this a current diagnosis for this admission?: Yes   


Plan: 


As per Ramses Guadalupe MD and heme oncology.








(6) Vitamin D deficiency


Is this a current diagnosis for this admission?: Yes   


Plan: 


As mentioned earlier.  Currently measures are being instituted.  Will follow.








(7) Abnormal LFTs


Is this a current diagnosis for this admission?: Yes   


Plan: 


From liver and bony metastasis.High ammonia levels indicative of underlying 

liver cirrhosis.  Unaware of patient's previous alcohol history or hepatitis 

serologies.  Patient started on lactulose.  Ammonia though better still high.  

Needs appropriate lactulose to be titrated.  Discussed with the with the 

treating nurse. Monitor closely.








(8) Acute on chronic kidney failure


Qualifiers: 


   Chronic kidney disease stage: stage 3 (moderate) 


Plan: 


Presently stable.  Nonoliguric.  Continue present lines of management.








(9) Acute psychosis


Is this a current diagnosis for this admission?: Yes   


Plan: 


Multiple factors but apparently getting better.Now add another factor in the 

form of underlying liver cirrhosis as well.  See response to lactulose. Monitor.








(10) CKD (chronic kidney disease) stage 3, GFR 30-59 ml/min


Is this a current diagnosis for this admission?: Yes   


Plan: 


Stable.  Monitor.  Avoid nephrotoxic drugs.  Maintain hydration.








(11) Dehydration


Is this a current diagnosis for this admission?: Yes   


Plan: 


On IV fluids.  Will monitor. Poor  p.o. intake unfortunately.








(12) Diabetes


Qualifiers: 


   Diabetes mellitus type: type 2   Chronic kidney disease stage: stage 3 

(moderate) 








(14) Hypertension


Qualifiers: 


   Hypertension type: unspecified   Qualified Code(s): I10 - Essential (primary)

hypertension   





(15) Hypokalemia


Plan: 


Adjust replacements and monitor.








(16) Malnutrition


Qualifiers: 


   Malnutrition type: protein-calorie malnutrition   Protein-calorie 

malnutrition severity: moderate   Qualified Code(s): E44.0 - Moderate protein-

calorie malnutrition   


Is this a current diagnosis for this admission?: Yes   


Plan: 


From poor oral intake.  TPN being initiated.








(17) Physical deconditioning


Is this a current diagnosis for this admission?: Yes   





(18) Atrial fibrillation


Qualifiers: 


   Atrial fibrillation type: chronic   Qualified Code(s): I48.2 - Chronic atrial

fibrillation   


Is this a current diagnosis for this admission?: Yes   


Plan: 


Had decompensated in the last couple of days but presently compensated.

## 2018-12-20 NOTE — PDOC PROGRESS REPORT
Subjective


Progress Note for:: 12/20/18


Subjective:: 





The patient is lethargic but arousable.  He remembers my name.  He remembers his

wife's name.  That is the only thing that he answers appropriately.


He is moaning and complaining of some pain.  Discussed with nursing staff about 

the use of some morphine.


Reason For Visit: 


HYPOCALCEMIA, VIT D DEF, MALNUTRITION ACIDOSIS








Physical Exam


Vital Signs: 


                                        











Temp Pulse Resp BP Pulse Ox


 


 98.4 F   81   19   109/62   100 


 


 12/20/18 03:32  12/20/18 07:00  12/20/18 03:32  12/19/18 15:00  12/20/18 03:32








                                 Intake & Output











 12/19/18 12/20/18 12/21/18





 06:59 06:59 06:59


 


Intake Total 618 420 


 


Output Total 350 605 


 


Balance 268 -185 


 


Weight 63.8 kg 86.6 kg 











General appearance: PRESENT: mild distress


Head exam: PRESENT: atraumatic


Eye exam: PRESENT: conjunctiva pink


Mouth exam: PRESENT: dry mucosa


Neck exam: PRESENT: carotid bruit.  ABSENT: JVD


Respiratory exam: PRESENT: rhonchi


Cardiovascular exam: PRESENT: irregular rhythm, +S1, +S2


GI/Abdominal exam: PRESENT: normal bowel sounds, soft


Extremities exam: PRESENT: pedal edema, tenderness


Musculoskeletal exam: PRESENT: tenderness


Neurological exam: PRESENT: awake, oriented to person


Skin exam: PRESENT: skin tears





Results


Laboratory Results: 


                                        





                                 12/19/18 06:05 





                                 12/20/18 06:25 





                                        











  12/18/18 12/18/18 12/19/18





  17:14 17:14 14:30


 


Carbonic Acid    0.57 L


 


HCO3/H2CO3 Ratio    25:1


 


ABG pH    7.51 H


 


ABG pCO2    18.9 L*


 


ABG pO2    80.1


 


ABG HCO3    14.7 L


 


ABG O2 Saturation    97.1


 


ABG Base Excess    -6.7


 


FiO2    ROOM AIR


 


Sodium  142.7  


 


Potassium  3.6  


 


Chloride  120 H  


 


Carbon Dioxide  17 L  


 


Anion Gap  6  


 


BUN  19  


 


Creatinine  0.78  


 


Est GFR ( Amer)  > 60  


 


Est GFR (Non-Af Amer)  > 60  


 


Glucose  150 H  


 


Calcium  7.8 L  


 


Magnesium  2.0  


 


Total Bilirubin  0.6  


 


AST  148 H  


 


ALT  40  


 


Alkaline Phosphatase  687 H  


 


Ammonia   


 


Total Protein  5.1 L  


 


Albumin  2.3 L  


 


TSH   2.77 














  12/20/18 12/20/18





  06:25 07:00


 


Carbonic Acid  


 


HCO3/H2CO3 Ratio  


 


ABG pH  


 


ABG pCO2  


 


ABG pO2  


 


ABG HCO3  


 


ABG O2 Saturation  


 


ABG Base Excess  


 


FiO2  


 


Sodium  146.4 H 


 


Potassium  3.5 L 


 


Chloride  122 H 


 


Carbon Dioxide  16 L 


 


Anion Gap  8 


 


BUN  19 


 


Creatinine  0.85 


 


Est GFR ( Amer)  > 60 


 


Est GFR (Non-Af Amer)  > 60 


 


Glucose  126 H 


 


Calcium  7.6 L 


 


Magnesium  


 


Total Bilirubin  0.9 


 


AST  156 H 


 


ALT  33 


 


Alkaline Phosphatase  638 H 


 


Ammonia   43.5 H


 


Total Protein  5.0 L 


 


Albumin  2.2 L 


 


TSH  








                                        











  12/07/18 12/09/18 12/09/18





  21:35 16:45 16:45


 


Creatine Kinase   728 H 


 


CK-MB (CK-2)    0.77


 


Troponin I    0.015


 


NT-Pro-B Natriuret Pep  26762 H  














  12/09/18 12/09/18 12/10/18





  23:10 23:10 03:39


 


Creatine Kinase  706 H   756 H


 


CK-MB (CK-2)   0.64 


 


Troponin I   0.018 


 


NT-Pro-B Natriuret Pep   














  12/10/18





  03:39


 


Creatine Kinase 


 


CK-MB (CK-2)  0.59


 


Troponin I  0.019


 


NT-Pro-B Natriuret Pep 











Impressions: 


                                        





Sacrum and Coccyx X-Ray  12/07/18 20:24


IMPRESSION:


 


No fracture. 


 


copyright 2011 [employer]- All Rights Reserved


 








Chest X-Ray  12/09/18 00:00


IMPRESSION:  Vascular congestion.  Increasing basilar opacities and pleural 

effusions.


 








Guidance Fluoroscopy  12/13/18 00:00


IMPRESSION:  SUCCESSFUL PLACEMENT OF A 5 FR DUAL LUMEN 41 CM PICC IN THE RIGHT 

BASILIC VEIN.


 








Interventional Vascular Procedure  12/13/18 00:00


IMPRESSION:  SUCCESSFUL PLACEMENT OF A 5 FR DUAL LUMEN 41 CM PICC IN THE RIGHT 

BASILIC VEIN.


 








PICC Line Insertion  12/13/18 00:00


IMPRESSION:  SUCCESSFUL PLACEMENT OF A 5 FR DUAL LUMEN 41 CM PICC IN THE RIGHT 

BASILIC VEIN.


 








Head CT  12/15/18 00:00


IMPRESSION:  NORMAL BRAIN CT WITHOUT CONTRAST.


EVIDENCE OF ACUTE STROKE: NO.


 














Assessment & Plan





- Diagnosis


(1) Atrial fibrillation


Qualifiers: 


   Atrial fibrillation type: chronic   Qualified Code(s): I48.2 - Chronic atrial

fibrillation   


Is this a current diagnosis for this admission?: Yes   


Plan: 


The Cardizem was held last night and this morning the patient is in rapid A. f

ib.  Will increase the Cardizem to 60 mg








(2) Decubitus ulcer of sacral region, stage 1


Is this a current diagnosis for this admission?: Yes   





(3) Failure to thrive


Is this a current diagnosis for this admission?: Yes   


Plan: 


We will start the TPN.  We do have a PICC line and it would be a good idea since

the patient is not eating or drinking well by mouth








(4) Hypocalcemia


Is this a current diagnosis for this admission?: Yes   





(5) Anemia, chronic disease


Is this a current diagnosis for this admission?: Yes   





(6) CKD (chronic kidney disease) stage 3, GFR 30-59 ml/min


Is this a current diagnosis for this admission?: Yes   





(7) Metastatic malignant neoplasm to prostate


Is this a current diagnosis for this admission?: Yes   


Plan: 


We will reconsult hematology oncology








(8) Dementia


Is this a current diagnosis for this admission?: Yes   


Plan: 


Might be worsening with increase in ammonia level.  We will increase the 

lactulose to 3 times a day








(9) Acute psychosis


Is this a current diagnosis for this admission?: Yes   





(10) Memory loss, short term


Is this a current diagnosis for this admission?: Yes   


Plan: 


Continue supportive treatment








(11) Vitamin D deficiency


Is this a current diagnosis for this admission?: Yes   


Plan: 


Most probably related to hypocalcemia.  We will add vitamin D








(12) Leukocytosis


Qualifiers: 


   Leukocytosis type: unspecified   Qualified Code(s): D72.829 - Elevated white 

blood cell count, unspecified   


Is this a current diagnosis for this admission?: Yes   


Plan: 


We will obtain chest x-ray, blood cultures and urine cultures.

## 2018-12-20 NOTE — RADIOLOGY REPORT (SQ)
EXAM DESCRIPTION:  CHEST SINGLE VIEW



COMPLETED DATE/TIME:  12/20/2018 11:39 am



REASON FOR STUDY:  COUGH



COMPARISON:  12/9/2018.



EXAM PARAMETERS:  NUMBER OF VIEWS: One view.

TECHNIQUE: Single frontal radiographic view of the chest acquired.

RADIATION DOSE: NA

LIMITATIONS: None.



FINDINGS:  LUNGS AND PLEURA: Basilar airspace disease and bilateral pleural effusions, slightly worse
.

MEDIASTINUM AND HILAR STRUCTURES: No masses.  Contour normal.

HEART AND VASCULAR STRUCTURES: Mild cardiomegaly.  Vascular congestion.

BONES: No acute findings.

HARDWARE: PICC line.

OTHER: No other significant finding.



IMPRESSION:  MILD CARDIOMEGALY AND VASCULAR CONGESTION.  BASILAR AIRSPACE DISEASE AND SMALL PLEURAL E
FFUSIONS APPEAR SLIGHTLY WORSE.



TECHNICAL DOCUMENTATION:  JOB ID:  [id number]

 2011 [employer]- All Rights Reserved



Reading location - IP/workstation name: [email or web address]

## 2018-12-21 LAB
ALBUMIN SERPL-MCNC: 2.2 G/DL (ref 3.5–5)
ALP SERPL-CCNC: 615 U/L (ref 38–126)
ALT SERPL-CCNC: 35 U/L (ref 21–72)
ANION GAP SERPL CALC-SCNC: 8 MMOL/L (ref 5–19)
AST SERPL-CCNC: 181 U/L (ref 17–59)
BILIRUB DIRECT SERPL-MCNC: 0.8 MG/DL (ref 0–0.4)
BILIRUB SERPL-MCNC: 0.8 MG/DL (ref 0.2–1.3)
BUN SERPL-MCNC: 23 MG/DL (ref 7–20)
CALCIUM: 7.9 MG/DL (ref 8.4–10.2)
CHLORIDE SERPL-SCNC: 121 MMOL/L (ref 98–107)
CO2 SERPL-SCNC: 16 MMOL/L (ref 22–30)
GLUCOSE SERPL-MCNC: 216 MG/DL (ref 75–110)
PHOSPHATE SERPL-MCNC: 2.2 MG/DL (ref 2.5–4.5)
POTASSIUM SERPL-SCNC: 4 MMOL/L (ref 3.6–5)
PREALB SERPL-MCNC: 8.6 MG/DL (ref 17.6–36)
PROT SERPL-MCNC: 4.9 G/DL (ref 6.3–8.2)
SODIUM SERPL-SCNC: 145.3 MMOL/L (ref 137–145)

## 2018-12-21 PROCEDURE — 3E0336Z INTRODUCTION OF NUTRITIONAL SUBSTANCE INTO PERIPHERAL VEIN, PERCUTANEOUS APPROACH: ICD-10-PCS | Performed by: INTERNAL MEDICINE

## 2018-12-21 RX ADMIN — VALPROIC ACID SCH MG: 250 SOLUTION ORAL at 22:35

## 2018-12-21 RX ADMIN — Medication SCH: at 09:53

## 2018-12-21 RX ADMIN — DILTIAZEM HYDROCHLORIDE SCH MG: 30 TABLET, FILM COATED ORAL at 00:16

## 2018-12-21 RX ADMIN — LACTULOSE SCH GM: 20 SOLUTION ORAL at 17:46

## 2018-12-21 RX ADMIN — VALPROIC ACID SCH MG: 250 SOLUTION ORAL at 14:31

## 2018-12-21 RX ADMIN — MORPHINE SULFATE PRN MG: 10 INJECTION INTRAMUSCULAR; INTRAVENOUS; SUBCUTANEOUS at 08:22

## 2018-12-21 RX ADMIN — CEFTRIAXONE SODIUM SCH MLS/HR: 1 INJECTION, POWDER, FOR SOLUTION INTRAMUSCULAR; INTRAVENOUS at 10:06

## 2018-12-21 RX ADMIN — HALOPERIDOL LACTATE PRN MG: 5 INJECTION, SOLUTION INTRAMUSCULAR at 01:42

## 2018-12-21 RX ADMIN — MORPHINE SULFATE PRN MG: 10 INJECTION INTRAMUSCULAR; INTRAVENOUS; SUBCUTANEOUS at 01:42

## 2018-12-21 RX ADMIN — GLIMEPIRIDE SCH MG: 1 TABLET ORAL at 08:23

## 2018-12-21 RX ADMIN — SODIUM CHLORIDE SCH: 9 INJECTION INTRAMUSCULAR; INTRAVENOUS; SUBCUTANEOUS at 09:52

## 2018-12-21 RX ADMIN — LEVOFLOXACIN SCH MLS/HR: 750 INJECTION, SOLUTION INTRAVENOUS at 10:39

## 2018-12-21 RX ADMIN — INSULIN HUMAN PRN UNIT: 100 INJECTION, SOLUTION PARENTERAL at 17:45

## 2018-12-21 RX ADMIN — DILTIAZEM HYDROCHLORIDE SCH MG: 30 TABLET, FILM COATED ORAL at 11:58

## 2018-12-21 RX ADMIN — DILTIAZEM HYDROCHLORIDE SCH MG: 30 TABLET, FILM COATED ORAL at 23:09

## 2018-12-21 RX ADMIN — LATANOPROST SCH DROP: 50 SOLUTION OPHTHALMIC at 22:38

## 2018-12-21 RX ADMIN — LEVETIRACETAM SCH MG: 100 SOLUTION ORAL at 10:05

## 2018-12-21 RX ADMIN — Medication SCH UNIT: at 22:36

## 2018-12-21 RX ADMIN — LACTULOSE SCH GM: 20 SOLUTION ORAL at 14:31

## 2018-12-21 RX ADMIN — ATORVASTATIN CALCIUM SCH MG: 10 TABLET, FILM COATED ORAL at 22:35

## 2018-12-21 RX ADMIN — DILTIAZEM HYDROCHLORIDE SCH MG: 30 TABLET, FILM COATED ORAL at 05:34

## 2018-12-21 RX ADMIN — VALPROIC ACID SCH MG: 250 SOLUTION ORAL at 10:05

## 2018-12-21 RX ADMIN — SODIUM CHLORIDE SCH ML: 9 INJECTION INTRAMUSCULAR; INTRAVENOUS; SUBCUTANEOUS at 22:36

## 2018-12-21 RX ADMIN — DILTIAZEM HYDROCHLORIDE SCH MG: 30 TABLET, FILM COATED ORAL at 17:46

## 2018-12-21 RX ADMIN — VALPROIC ACID SCH MG: 250 SOLUTION ORAL at 17:46

## 2018-12-21 RX ADMIN — INSULIN HUMAN PRN UNIT: 100 INJECTION, SOLUTION PARENTERAL at 11:59

## 2018-12-21 RX ADMIN — BUSPIRONE HYDROCHLORIDE SCH MG: 10 TABLET ORAL at 17:46

## 2018-12-21 RX ADMIN — BUSPIRONE HYDROCHLORIDE SCH MG: 10 TABLET ORAL at 10:39

## 2018-12-21 RX ADMIN — APIXABAN SCH MG: 2.5 TABLET, FILM COATED ORAL at 17:46

## 2018-12-21 RX ADMIN — LEVETIRACETAM SCH MG: 100 SOLUTION ORAL at 22:35

## 2018-12-21 RX ADMIN — LEUCINE, PHENYLALANINE, LYSINE, METHIONINE, ISOLEUCINE, VALINE, HISTIDINE, THREONINE, TRYPTOPHAN, ALANINE, GLYCINE, ARGININE, PROLINE, SERINE, TYROSINE, DEXTROSE PRN MLS/HR: 365; 280; 290; 200; 300; 290; 240; 210; 90; 1035; 515; 575; 340; 250; 20; 25 INJECTION INTRAVENOUS at 19:52

## 2018-12-21 RX ADMIN — APIXABAN SCH MG: 2.5 TABLET, FILM COATED ORAL at 10:04

## 2018-12-21 RX ADMIN — MEGESTROL ACETATE SCH MG: 40 SUSPENSION ORAL at 10:06

## 2018-12-21 RX ADMIN — LACTULOSE SCH GM: 20 SOLUTION ORAL at 10:05

## 2018-12-21 RX ADMIN — CALCITRIOL SCH MCG: 0.25 CAPSULE, LIQUID FILLED ORAL at 10:04

## 2018-12-21 RX ADMIN — INSULIN HUMAN PRN UNIT: 100 INJECTION, SOLUTION PARENTERAL at 06:48

## 2018-12-21 RX ADMIN — SODIUM CHLORIDE SCH MLS/HR: 234 INJECTION INTRAMUSCULAR; INTRAVENOUS; SUBCUTANEOUS at 05:37

## 2018-12-21 RX ADMIN — SERTRALINE HYDROCHLORIDE SCH MG: 50 TABLET ORAL at 10:04

## 2018-12-21 RX ADMIN — TAMSULOSIN HYDROCHLORIDE SCH MG: 0.4 CAPSULE ORAL at 22:35

## 2018-12-21 NOTE — PDOC PROGRESS REPORT
Subjective


Progress Note for:: 12/21/18


Subjective:: 





Still very lethargic this morning.


Tolerated TPN well.


The x-ray did not show any infiltrates some mild vascular congestion.


He did eat a little bit better this morning.


Reason For Visit: 


HYPOCALCEMIA, VIT D DEF, MALNUTRITION ACIDOSIS








Physical Exam


Vital Signs: 


                                        











Temp Pulse Resp BP Pulse Ox


 


 98.6 F   99   20   138/61 H  97 


 


 12/21/18 07:22  12/21/18 07:22  12/21/18 07:22  12/21/18 07:22  12/21/18 07:22








                                 Intake & Output











 12/20/18 12/21/18 12/22/18





 06:59 06:59 06:59


 


Intake Total 420 650 


 


Output Total 605 640 


 


Balance -185 10 


 


Weight 86.6 kg 86.1 kg 











General appearance: PRESENT: mild distress


Head exam: PRESENT: atraumatic


Eye exam: PRESENT: conjunctival injection


Mouth exam: PRESENT: dry mucosa


Neck exam: PRESENT: carotid bruit.  ABSENT: JVD


Respiratory exam: PRESENT: crackles


Cardiovascular exam: PRESENT: irregular rhythm, +S1, +S2


GI/Abdominal exam: PRESENT: normal bowel sounds, soft


Extremities exam: PRESENT: pedal edema


Neurological exam: PRESENT: awake


Psychiatric exam: PRESENT: anxious





Results


Laboratory Results: 


                                        





                                 12/19/18 06:05 





                                 12/21/18 04:10 





                                        











  12/20/18 12/21/18 12/21/18





  11:10 04:10 04:10


 


Sodium   145.3 H  Cancelled


 


Potassium   4.0  Cancelled


 


Chloride   121 H  Cancelled


 


Carbon Dioxide   16 L  Cancelled


 


Anion Gap   8  Cancelled


 


BUN   23 H  Cancelled


 


Creatinine   1.01  Cancelled


 


Est GFR ( Amer)   > 60  Cancelled


 


Est GFR (Non-Af Amer)   > 60  Cancelled


 


Glucose   216 H  Cancelled


 


Calcium   7.9 L  Cancelled


 


Phosphorus    2.2 L


 


Total Bilirubin   0.8  Cancelled


 


AST   181 H  Cancelled


 


ALT   35  Cancelled


 


Alkaline Phosphatase   615 H  Cancelled


 


Total Protein   4.9 L  Cancelled


 


Albumin   2.2 L  Cancelled


 


Prealbumin    8.6 L


 


Urine Color  DARK YELLOW  


 


Urine Appearance  CLOUDY  


 


Urine pH  5.0  


 


Ur Specific Gravity  1.019  


 


Urine Protein  100 H  


 


Urine Glucose (UA)  NEGATIVE  


 


Urine Ketones  NEGATIVE  


 


Urine Blood  SMALL H  


 


Urine Nitrite  NEGATIVE  


 


Ur Leukocyte Esterase  LARGE H  


 


Urine WBC (Auto)  123  


 


Urine RBC (Auto)  65  








                                        











  12/07/18 12/09/18 12/09/18





  21:35 16:45 16:45


 


Creatine Kinase   728 H 


 


CK-MB (CK-2)    0.77


 


Troponin I    0.015


 


NT-Pro-B Natriuret Pep  99085 H  














  12/09/18 12/09/18 12/10/18





  23:10 23:10 03:39


 


Creatine Kinase  706 H   756 H


 


CK-MB (CK-2)   0.64 


 


Troponin I   0.018 


 


NT-Pro-B Natriuret Pep   














  12/10/18





  03:39


 


Creatine Kinase 


 


CK-MB (CK-2)  0.59


 


Troponin I  0.019


 


NT-Pro-B Natriuret Pep 











Impressions: 


                                        





Sacrum and Coccyx X-Ray  12/07/18 20:24


IMPRESSION:


 


No fracture. 


 


copyright 2011 [employer]- All Rights Reserved


 








Guidance Fluoroscopy  12/13/18 00:00


IMPRESSION:  SUCCESSFUL PLACEMENT OF A 5 FR DUAL LUMEN 41 CM PICC IN THE RIGHT 

BASILIC VEIN.


 








Interventional Vascular Procedure  12/13/18 00:00


IMPRESSION:  SUCCESSFUL PLACEMENT OF A 5 FR DUAL LUMEN 41 CM PICC IN THE RIGHT 

BASILIC VEIN.


 








PICC Line Insertion  12/13/18 00:00


IMPRESSION:  SUCCESSFUL PLACEMENT OF A 5 FR DUAL LUMEN 41 CM PICC IN THE RIGHT 

BASILIC VEIN.


 








Head CT  12/15/18 00:00


IMPRESSION:  NORMAL BRAIN CT WITHOUT CONTRAST.


EVIDENCE OF ACUTE STROKE: NO.


 








Chest X-Ray  12/20/18 10:57


IMPRESSION:  MILD CARDIOMEGALY AND VASCULAR CONGESTION.  BASILAR AIRSPACE 

DISEASE AND SMALL PLEURAL EFFUSIONS APPEAR SLIGHTLY WORSE.


 














Assessment & Plan





- Diagnosis


(1) Atrial fibrillation


Qualifiers: 


   Atrial fibrillation type: chronic   Qualified Code(s): I48.2 - Chronic atrial

fibrillation   


Is this a current diagnosis for this admission?: Yes   


Plan: 


Continue with Cardizem p.o. no further runs of V. tach since we have increased 

the Cardizem to 60 every 6








(2) Decubitus ulcer of sacral region, stage 1


Is this a current diagnosis for this admission?: Yes   





(3) Failure to thrive


Is this a current diagnosis for this admission?: Yes   


Plan: 


Tolerating TPN well but is not eating well








(4) Hypocalcemia


Is this a current diagnosis for this admission?: Yes   


Plan: 


Improved with treatment by the nephrology.








(5) Anemia, chronic disease


Is this a current diagnosis for this admission?: Yes   





(6) CKD (chronic kidney disease) stage 3, GFR 30-59 ml/min


Is this a current diagnosis for this admission?: Yes   





(7) Metastatic malignant neoplasm to prostate


Is this a current diagnosis for this admission?: Yes   


Plan: 


We will reconsult hematology oncology








(8) Dementia


Is this a current diagnosis for this admission?: Yes   





(9) Acute psychosis


Is this a current diagnosis for this admission?: Yes   





(10) Memory loss, short term


Is this a current diagnosis for this admission?: Yes   





(11) Vitamin D deficiency


Is this a current diagnosis for this admission?: Yes   


Plan: 


Most probably related to hypocalcemia.  We will add vitamin D








(12) Leukocytosis


Qualifiers: 


   Leukocytosis type: unspecified   Qualified Code(s): D72.829 - Elevated white 

blood cell count, unspecified   


Is this a current diagnosis for this admission?: Yes   


Plan: 


We will obtain chest x-ray, blood cultures and urine cultures.

## 2018-12-21 NOTE — PDOC PROGRESS REPORT
Subjective


Progress Note for:: 12/21/18


Reason For Visit: 


Patient seen this morning.  He is still lethargic but responds to questions.  

Denies any specific complaints of chest pain or shortness of breath.  Does not 

look to be in any pain.  Labs and medications were reviewed and discussed with 

the treating nurse.  He is on IV calcium and TPN.








Physical Exam


Vital Signs: 


                                        











Temp Pulse Resp BP Pulse Ox


 


 97.9 F   81   20   126/75 H  100 


 


 12/21/18 15:31  12/21/18 15:31  12/21/18 15:31  12/21/18 15:31  12/21/18 15:31








                                 Intake & Output











 12/20/18 12/21/18 12/22/18





 06:59 06:59 06:59


 


Intake Total 420 650 200


 


Output Total 605 640 


 


Balance -185 10 200


 


Weight 86.6 kg 86.1 kg 











General appearance: PRESENT: no acute distress


Respiratory exam: PRESENT: clear to auscultation venessa.  ABSENT: crackles


Cardiovascular exam: PRESENT: irregular rhythm, +S1, +S2


GI/Abdominal exam: PRESENT: normal bowel sounds, soft.  ABSENT: firm, guarding, 

organomegaly, tenderness


Extremities exam: PRESENT: +1 edema


Neurological exam: PRESENT: altered


Skin exam: ABSENT: erythema, rash





Results


Laboratory Results: 


                                        





                                 12/19/18 06:05 





                                 12/21/18 04:10 





                                        











  12/21/18 12/21/18





  04:10 04:10


 


Sodium  145.3 H  Cancelled


 


Potassium  4.0  Cancelled


 


Chloride  121 H  Cancelled


 


Carbon Dioxide  16 L  Cancelled


 


Anion Gap  8  Cancelled


 


BUN  23 H  Cancelled


 


Creatinine  1.01  Cancelled


 


Est GFR ( Amer)  > 60  Cancelled


 


Est GFR (Non-Af Amer)  > 60  Cancelled


 


Glucose  216 H  Cancelled


 


Calcium  7.9 L  Cancelled


 


Phosphorus   2.2 L


 


Total Bilirubin  0.8  Cancelled


 


AST  181 H  Cancelled


 


ALT  35  Cancelled


 


Alkaline Phosphatase  615 H  Cancelled


 


Total Protein  4.9 L  Cancelled


 


Albumin  2.2 L  Cancelled


 


Prealbumin   8.6 L








                                        





12/20/18 11:10   Clean Catch Midstream   Urine Culture - Final


                            C.albicans/C.dubliniensis





                                        











  12/07/18 12/09/18 12/09/18





  21:35 16:45 16:45


 


Creatine Kinase   728 H 


 


CK-MB (CK-2)    0.77


 


Troponin I    0.015


 


NT-Pro-B Natriuret Pep  10263 H  














  12/09/18 12/09/18 12/10/18





  23:10 23:10 03:39


 


Creatine Kinase  706 H   756 H


 


CK-MB (CK-2)   0.64 


 


Troponin I   0.018 


 


NT-Pro-B Natriuret Pep   














  12/10/18





  03:39


 


Creatine Kinase 


 


CK-MB (CK-2)  0.59


 


Troponin I  0.019


 


NT-Pro-B Natriuret Pep 











Impressions: 


                                        





Sacrum and Coccyx X-Ray  12/07/18 20:24


IMPRESSION:


 


No fracture. 


 


copyright 2011 [employer]- All Rights Reserved


 








Guidance Fluoroscopy  12/13/18 00:00


IMPRESSION:  SUCCESSFUL PLACEMENT OF A 5 FR DUAL LUMEN 41 CM PICC IN THE RIGHT 

BASILIC VEIN.


 








Interventional Vascular Procedure  12/13/18 00:00


IMPRESSION:  SUCCESSFUL PLACEMENT OF A 5 FR DUAL LUMEN 41 CM PICC IN THE RIGHT 

BASILIC VEIN.


 








PICC Line Insertion  12/13/18 00:00


IMPRESSION:  SUCCESSFUL PLACEMENT OF A 5 FR DUAL LUMEN 41 CM PICC IN THE RIGHT 

BASILIC VEIN.


 








Head CT  12/15/18 00:00


IMPRESSION:  NORMAL BRAIN CT WITHOUT CONTRAST.


EVIDENCE OF ACUTE STROKE: NO.


 








Chest X-Ray  12/20/18 10:57


IMPRESSION:  MILD CARDIOMEGALY AND VASCULAR CONGESTION.  BASILAR AIRSPACE 

DISEASE AND SMALL PLEURAL EFFUSIONS APPEAR SLIGHTLY WORSE.


 














Assessment & Plan





- Diagnosis


(1) Hypocalcemia


Is this a current diagnosis for this admission?: Yes   


Plan: 


He needs to continue on IV calcium.His calcium is now remaining rather stable.








(2) Congestive heart failure


Qualifiers: 


   Heart failure type: unspecified   Heart failure chronicity: chronic   

Qualified Code(s): I50.9 - Heart failure, unspecified   


Is this a current diagnosis for this admission?: Yes   


Plan: 


Presently stable.  A. fib rate uncontrolled.  Being managed by Ramses Guadalupe MD. Monitor.








(3) Failure to thrive


Is this a current diagnosis for this admission?: Yes   


Plan: 


He is got a combination of factors including his encephalopathy, 

anxiety/depression and obvious cancer leading him to poor intake and failure to 

thrive. Besides all the the above he has also developed high ammonia indicative 

of possible liver cirrhosis cirrhosis as well.  He has been begun on antianxiety

and antidepressants. Currently been begun on TPN which is going to help him 

hopefully.








(4) Metabolic acidosis


Is this a current diagnosis for this admission?: Yes   


Plan: 


The background of respiratory alkalosis from possible hyperventilation from 

various reasons.  Will monitor.  And will not be too vigorous in correcting this

metabolic acidosis which is secondary rather than primary.








(5) Metastatic malignant neoplasm to prostate


Is this a current diagnosis for this admission?: Yes   


Plan: 


As per Ramses Guadalupe MD and [facility] oncology.








(6) Abnormal LFTs


Is this a current diagnosis for this admission?: Yes   


Plan: 


From liver and bony metastasis.High ammonia levels indicative of underlying 

liver cirrhosis.  Unaware of patient's previous alcohol history or hepatitis 

serologies.  Patient started on lactulose.  Ammonia though better still high.  

Needs appropriate lactulose to be titrated.  Discussed with the with the 

treating nurse. Monitor closely.








(7) Acute on chronic kidney failure


Qualifiers: 


   Chronic kidney disease stage: stage 3 (moderate) 


Plan: 


Presently stable.  Nonoliguric.  Continue present lines of management.








(8) Acute psychosis


Is this a current diagnosis for this admission?: Yes   


Plan: 


Multiple factors but apparently getting better.Now add another factor in the 

form of underlying liver cirrhosis as well.  See response to lactulose. Monitor.








(9) CKD (chronic kidney disease) stage 3, GFR 30-59 ml/min


Is this a current diagnosis for this admission?: Yes   


Plan: 


Stable.  Monitor.  Avoid nephrotoxic drugs.  Maintain hydration.








(10) Dehydration


Is this a current diagnosis for this admission?: Yes   





(11) Diabetes


Qualifiers: 


   Diabetes mellitus type: type 2   Chronic kidney disease stage: stage 3 

(moderate) 





(12) Hypertension


Qualifiers: 


   Hypertension type: unspecified   Qualified Code(s): I10 - Essential (primary)

hypertension   


Plan: 


controlled.  Monitor








(13) Hypokalemia


Plan: 


Adjust replacements and monitor.








(14) Malnutrition


Qualifiers: 


   Malnutrition type: protein-calorie malnutrition   Protein-calorie 

malnutrition severity: moderate   Qualified Code(s): E44.0 - Moderate protein-

calorie malnutrition   


Is this a current diagnosis for this admission?: Yes   


Plan: 


From poor oral intake.  TPN initiated.








(15) Physical deconditioning


Is this a current diagnosis for this admission?: Yes   





(16) Atrial fibrillation


Qualifiers: 


   Atrial fibrillation type: chronic   Qualified Code(s): I48.2 - Chronic atrial

fibrillation   


Is this a current diagnosis for this admission?: Yes   


Plan: 


Had decompensated in the last couple of days but presently compensated.

## 2018-12-22 VITALS — DIASTOLIC BLOOD PRESSURE: 56 MMHG | SYSTOLIC BLOOD PRESSURE: 114 MMHG

## 2018-12-22 LAB
ALBUMIN SERPL-MCNC: 2.3 G/DL (ref 3.5–5)
ALP SERPL-CCNC: 1032 U/L (ref 38–126)
ALT SERPL W P-5'-P-CCNC: 37 IU/L (ref 0–55)
ALT SERPL-CCNC: 39 U/L (ref 21–72)
ANION GAP SERPL CALC-SCNC: 8 MMOL/L (ref 5–19)
AST SERPL-CCNC: 686 U/L (ref 17–59)
BILIRUB DIRECT SERPL-MCNC: 0.9 MG/DL (ref 0–0.4)
BILIRUB SERPL-MCNC: 1 MG/DL (ref 0.2–1.3)
BUN SERPL-MCNC: 30 MG/DL (ref 7–20)
CALCIUM: 8.1 MG/DL (ref 8.4–10.2)
CHLORIDE SERPL-SCNC: 122 MMOL/L (ref 98–107)
CO2 SERPL-SCNC: 17 MMOL/L (ref 22–30)
GGT SERPL-CCNC: 322 IU/L (ref 0–65)
GLUCOSE SERPL-MCNC: 153 MG/DL (ref 75–110)
HAPTOGLOB SERPL-MCNC: 412 MG/DL (ref 34–200)
NECROINFLAMMATORY ACT GRADE SERPL QL: (no result)
NECROINFLAMMATORY ACT SCORE SERPL: 0.2 (ref 0–0.17)
POTASSIUM SERPL-SCNC: 3.9 MMOL/L (ref 3.6–5)
PREALB SERPL-MCNC: 8.9 MG/DL (ref 17.6–36)
PROT SERPL-MCNC: 5.1 G/DL (ref 6.3–8.2)
SODIUM SERPL-SCNC: 147.2 MMOL/L (ref 137–145)

## 2018-12-22 RX ADMIN — LEVETIRACETAM SCH MG: 100 SOLUTION ORAL at 10:05

## 2018-12-22 RX ADMIN — INSULIN HUMAN PRN UNIT: 100 INJECTION, SOLUTION PARENTERAL at 06:41

## 2018-12-22 RX ADMIN — CEFTRIAXONE SODIUM SCH MLS/HR: 1 INJECTION, POWDER, FOR SOLUTION INTRAMUSCULAR; INTRAVENOUS at 10:03

## 2018-12-22 RX ADMIN — VALPROIC ACID SCH MG: 250 SOLUTION ORAL at 10:05

## 2018-12-22 RX ADMIN — DILTIAZEM HYDROCHLORIDE SCH MG: 30 TABLET, FILM COATED ORAL at 05:59

## 2018-12-22 RX ADMIN — VALPROIC ACID SCH: 250 SOLUTION ORAL at 17:26

## 2018-12-22 RX ADMIN — LACTULOSE SCH: 20 SOLUTION ORAL at 14:03

## 2018-12-22 RX ADMIN — CALCITRIOL SCH MCG: 0.25 CAPSULE, LIQUID FILLED ORAL at 10:05

## 2018-12-22 RX ADMIN — DILTIAZEM HYDROCHLORIDE SCH: 30 TABLET, FILM COATED ORAL at 17:26

## 2018-12-22 RX ADMIN — INSULIN HUMAN PRN UNIT: 100 INJECTION, SOLUTION PARENTERAL at 17:30

## 2018-12-22 RX ADMIN — BUSPIRONE HYDROCHLORIDE SCH: 10 TABLET ORAL at 17:25

## 2018-12-22 RX ADMIN — Medication SCH UNIT: at 10:06

## 2018-12-22 RX ADMIN — MEGESTROL ACETATE SCH MG: 40 SUSPENSION ORAL at 10:05

## 2018-12-22 RX ADMIN — SERTRALINE HYDROCHLORIDE SCH MG: 50 TABLET ORAL at 10:05

## 2018-12-22 RX ADMIN — DILTIAZEM HYDROCHLORIDE SCH: 30 TABLET, FILM COATED ORAL at 13:15

## 2018-12-22 RX ADMIN — SODIUM CHLORIDE SCH ML: 9 INJECTION INTRAMUSCULAR; INTRAVENOUS; SUBCUTANEOUS at 10:07

## 2018-12-22 RX ADMIN — LEVOFLOXACIN SCH MLS/HR: 750 INJECTION, SOLUTION INTRAVENOUS at 10:06

## 2018-12-22 RX ADMIN — LACTULOSE SCH GM: 20 SOLUTION ORAL at 10:05

## 2018-12-22 RX ADMIN — BUSPIRONE HYDROCHLORIDE SCH MG: 10 TABLET ORAL at 10:05

## 2018-12-22 RX ADMIN — APIXABAN SCH MG: 2.5 TABLET, FILM COATED ORAL at 10:05

## 2018-12-22 RX ADMIN — APIXABAN SCH: 2.5 TABLET, FILM COATED ORAL at 17:26

## 2018-12-22 RX ADMIN — VALPROIC ACID SCH: 250 SOLUTION ORAL at 14:03

## 2018-12-22 RX ADMIN — LACTULOSE SCH: 20 SOLUTION ORAL at 17:26

## 2018-12-22 NOTE — PDOC PROGRESS REPORT
Subjective


Progress Note for:: 12/22/18


Subjective:: 


Patient is new to me.  I am covering for Dr. Guadalupe.  Patient is a 72 years 

old male with multiple comorbidities brought from assisted living with chief 

complaint of sacral pain.  Patient is a known case of stage IV metastatic 

prostate cancer, type 2 diabetes mellitus, stage III CKD, CHF, hypertension, 

malnutrition, hypocalcemia and seizure disorder.  Afternoon I seen patient in 

bed and is grunting he does not respond to verbal stimuli.  His chest is full of

crackles.  His vital signs are okay.  His blood work shows mild hyponatremia 

with sodium of 147.2.  His daughter is on her way to come to the hospital so I 

will discusses her to change the status of care to comfort care.


Reason For Visit: 


HYPOCALCEMIA, VIT D DEF, MALNUTRITION ACIDOSIS








Physical Exam


Vital Signs: 


                                        











Temp Pulse Resp BP Pulse Ox


 


 98 F   83   35 H  129/61 H  95 


 


 12/22/18 13:06  12/22/18 13:06  12/22/18 15:09  12/22/18 13:06  12/22/18 15:09








                                 Intake & Output











 12/21/18 12/22/18 12/23/18





 06:59 06:59 06:59


 


Intake Total 650 1265 50


 


Output Total 640 800 


 


Balance 10 465 50


 


Weight 86.1 kg 87.3 kg 











General appearance: PRESENT: mild distress


Head exam: PRESENT: atraumatic


Eye exam: PRESENT: conjunctiva pink


Respiratory exam: PRESENT: accessory muscle use, crackles


Cardiovascular exam: PRESENT: tachycardia


GI/Abdominal exam: PRESENT: normal bowel sounds, soft.  ABSENT: distended, 

guarding, mass, organolmegaly, rebound, tenderness





Results


Laboratory Results: 


                                        





                                 12/19/18 06:05 





                                 12/22/18 06:58 





                                        











  12/22/18





  06:58


 


Sodium  147.2 H


 


Potassium  3.9


 


Chloride  122 H


 


Carbon Dioxide  17 L


 


Anion Gap  8


 


BUN  30 H


 


Creatinine  1.12


 


Est GFR ( Amer)  > 60


 


Est GFR (Non-Af Amer)  > 60


 


Glucose  153 H


 


Calcium  8.1 L


 


Total Bilirubin  1.0


 


AST  686 H


 


ALT  39


 


Alkaline Phosphatase  1032 H


 


Total Protein  5.1 L


 


Albumin  2.3 L


 


Prealbumin  8.9 L








                                        





12/20/18 11:10   Clean Catch Midstream   Urine Culture - Final


                            C.albicans/C.dubliniensis





                                        











  12/07/18 12/09/18 12/09/18





  21:35 16:45 16:45


 


Creatine Kinase   728 H 


 


CK-MB (CK-2)    0.77


 


Troponin I    0.015


 


NT-Pro-B Natriuret Pep  35701 H  














  12/09/18 12/09/18 12/10/18





  23:10 23:10 03:39


 


Creatine Kinase  706 H   756 H


 


CK-MB (CK-2)   0.64 


 


Troponin I   0.018 


 


NT-Pro-B Natriuret Pep   














  12/10/18





  03:39


 


Creatine Kinase 


 


CK-MB (CK-2)  0.59


 


Troponin I  0.019


 


NT-Pro-B Natriuret Pep 











Impressions: 


                                        





Sacrum and Coccyx X-Ray  12/07/18 20:24


IMPRESSION:


 


No fracture. 


 


copyright 2011 [employer]- All Rights Reserved


 








Guidance Fluoroscopy  12/13/18 00:00


IMPRESSION:  SUCCESSFUL PLACEMENT OF A 5 FR DUAL LUMEN 41 CM PICC IN THE RIGHT 

BASILIC VEIN.


 








Interventional Vascular Procedure  12/13/18 00:00


IMPRESSION:  SUCCESSFUL PLACEMENT OF A 5 FR DUAL LUMEN 41 CM PICC IN THE RIGHT 

BASILIC VEIN.


 








PICC Line Insertion  12/13/18 00:00


IMPRESSION:  SUCCESSFUL PLACEMENT OF A 5 FR DUAL LUMEN 41 CM PICC IN THE RIGHT 

BASILIC VEIN.


 








Head CT  12/15/18 00:00


IMPRESSION:  NORMAL BRAIN CT WITHOUT CONTRAST.


EVIDENCE OF ACUTE STROKE: NO.


 








Chest X-Ray  12/20/18 10:57


IMPRESSION:  MILD CARDIOMEGALY AND VASCULAR CONGESTION.  BASILAR AIRSPACE 

DISEASE AND SMALL PLEURAL EFFUSIONS APPEAR SLIGHTLY WORSE.


 














Assessment & Plan





- Diagnosis


(1) Dementia


Is this a current diagnosis for this admission?: Yes   


Plan: 


Continue current regimen








(2) Failure to thrive


Is this a current diagnosis for this admission?: Yes   


Plan: 


Patient has been on TPN








(3) Metabolic acidosis


Is this a current diagnosis for this admission?: Yes   


Plan: 


Has resolved but currently patient has mild metabolic alkalosis








(4) Metastatic malignant neoplasm to prostate


Is this a current diagnosis for this admission?: Yes   


Plan: 


Management per oncologist








(5) Abnormal LFTs


Is this a current diagnosis for this admission?: Yes   


Plan: 


The alkaline phosphatase is markedly elevated most probably related to bone 

metastases does not look at his of liver origin.








(6) Acute on chronic kidney failure


Qualifiers: 


   Chronic kidney disease stage: stage 3 (moderate) 


Is this a current diagnosis for this admission?: Yes   


Plan: 


Improving.  Management per nephrology








(7) Altered mental status


Qualifiers: 


   Altered mental status type: delirium   Qualified Code(s): R41.0 - 

Disorientation, unspecified   


Is this a current diagnosis for this admission?: Yes   


Plan: 


No change








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


Is this a current diagnosis for this admission?: Yes   


Plan: 


Continue current regimen








(9) Leukocytosis


Qualifiers: 


   Leukocytosis type: unspecified   Qualified Code(s): D72.829 - Elevated white 

blood cell count, unspecified   


Is this a current diagnosis for this admission?: Yes   


Plan: 


Patient has mild leukocytosis may be stress related.

## 2018-12-28 NOTE — DEATH SUMMARY
Death Summary


Date : 18


Time of Death:: 19:40


Autopsy: No


Resuscitation Status: Do Not Resuscitate





- Final Diagnosis


(1) Dementia


Is this a current diagnosis for this admission?: Yes   





(2) Failure to thrive


Is this a current diagnosis for this admission?: Yes   





(3) Metabolic acidosis


Is this a current diagnosis for this admission?: Yes   





(4) Metastatic malignant neoplasm to prostate


Is this a current diagnosis for this admission?: Yes   





(5) Abnormal LFTs


Is this a current diagnosis for this admission?: Yes   





(6) Acute on chronic kidney failure


Is this a current diagnosis for this admission?: Yes   





(7) Altered mental status


Is this a current diagnosis for this admission?: Yes   





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


Is this a current diagnosis for this admission?: Yes   





(9) Leukocytosis


Is this a current diagnosis for this admission?: Yes   


Hospital Course:: 


 Patient is a 72 years old male with multiple comorbidities brought from 

assisted living with chief complaint of sacral pain.  Patient is a known case of

stage IV metastatic prostate cancer, type 2 diabetes mellitus, stage III CKD, 

CHF, hypertension, malnutrition, hypocalcemia and seizure disorder.Nephrologist 

and oncologist has been involved in the management of this patient and managed 

accordingly.Patient's condition deterirated gradually.He became hypoxemic and 

had laboured breathing.He was put on bipap and had had aggressive suctioning.I 

discussed and answered questions  to his daughter who is the POA.Patient 

couldn't come out of his situation and pronaunced dead at 19:40.

## 2022-03-16 NOTE — PROGRESS NOTE
Low grade fever - iresolved  Leukocytosis - improving  ? intraabdominal infection  No evidence of any other infection    Plan: Continue Azactam 1g iv q12  Continue Clindamycin 600mg iv q8  If WBC count continues to downtrend, consider stopping antibiotics in the next few days.     Provider Note


Provider Note: 


ID Consult Note





Asked to review patient's chart by Pharmacy. Pt not seen or examined. Reviewed 

VS, imaging reports, culture results, other pertinent labs and provider reports.





Mr Tucker is a 72 year old man with PMH including stage IV prostate cancer with 

lung and bone metastases, DM, HTN, CHF and CKD who came to [facility] on 11/21/18 with altered mental status - "not acting like himself," 

with the family noting slurred speech on Saturday and inability to understand 

the patient at all on Sunday.  ED and admitting providers noted pt to have 

irregularly irrgular heart rhythm, clear lungs b/l without respiratory distress

, dry mucous membranes, and skin tenting. Pt was found to have new onset AF. 

Labs revealed leukocytosis, evidence of acute kidney injury. 





Imaging and lab studies included:


U/A - no pyuria. UCx no growth.


Blood cultures negative. 


LP on 11/23 yielded CSF with a formula inconsistent with bacterial meningitis 

and no culture growth or Gram stain evidence of such. 


CXR was read as showing diminished aeration in the lung bases b/l with dense 

consolidation R>L and mild vascular congestion. 


Pleural fluid was tapped on 11/24 and yielded clear fluid consistent with a 

transudate. Pleural fluid culture - no growth.


Mycoplasma IgG was positive in the serum with negative IgM, results suggesting 

past exposure.





Impression/Recommendations


Pt has received 4 days of Rocephin before losing IV access.


If the patient is being treated for community acquired pneumonia, Levaquin PO 

is a reasonable empiric choice to finish treatment.


Consideration could also be given to cefuroxime 500 mg BID plus doxycycline 100 

mg BID (if there is a desire to avoid fluoroquinolones, given the association 

with delirium in elderly).


Recommend discontinuing IV Rocephin to avoid duplicate therapy. 





Miller Santa MD


[facility]


pager [phone] Low grade fever - iresolved  Leukocytosis - improving  ? intraabdominal infection  No evidence of any other infection    Plan: Continue Azactam 1g iv q12  Continue Clindamycin 600mg iv q8  If WBC count continues to downtrend, consider stopping antibiotics in the next few days.    I agree with above

## 2022-05-17 NOTE — PDOC PROGRESS REPORT
Internal Medicine Discharge Summary      MRN: [id number]    Destiny Mcdaniel is a 48 year old female admitted for Facial Swelling       Admission Dt/Tm     5/8/2022  2:51 PM    Discharge DT/TM  No discharge date for patient encounter.    Hospital Course  Patient is a 48 year old female with history of long-haul COVID, seropositive RA, Sjogren's syndrome on prednisone, class IV lupus nephritis undergoing treatment, ESRD on hemodialysis on Monday Wednesday Friday, RTA type I, asthma, hypokalemic induced periodic muscle paralysis that presented to the ED for facial pain and swelling.  She was initially thought to have a cellulitis however after further work-up it was found that she had disseminated herpes zoster infection despite being on oral Valtrex therapy.  Her work-up was also complicated by positive CMV viremia.  She was then started on IV ganciclovir for CMV coverage.  During her stay she also endorsed a chronic dry cough that did not resolve with multiple modalities such as Tessalon Perles, Robitussin, opioid pain medication indicated for cough as well.  She had a difficult time controlling her pain given her severe renal disease.  Pain management was consulted that could not provide any additional local regimens.  Her pain was controlled with IV Dilaudid as well as lidocaine patch as needed.  She also had chronic diarrhea prior to admission, C. difficile negative.  It was controlled with Imodium.  She was also found to have a CD4 count of 116 for which she was started on dapsone as PJP prophylaxis. During her stay her prednisone course was eventually tapered down to 10 mg twice daily to reduce the amount of immunosuppression. Ultimately it was decided that patient is going to need IV ganciclovir over oral given that she had a breakthrough infection despite being on an oral regimen of Valtrex.  Nephrology was able to order IV ganciclovir for her at her dialysis center and she is to continue it on her  Subjective


Progress Note for:: 12/03/18


Subjective:: 


Pt eating a little more, getting a little better, mental status stabilizing, 

discussed plans w/ pt for consideration of rehab





Reason For Visit: 


ALTERED MENTAL STATUS,METABOLIC ENCEPHALOPATHY








Physical Exam


Vital Signs: 


 











Temp Pulse Resp BP Pulse Ox


 


 98.7 F   88   18   136/42 H  99 


 


 12/03/18 03:37  12/03/18 03:37  12/03/18 03:37  12/03/18 03:37  12/03/18 03:37








 Intake & Output











 12/02/18 12/03/18 12/04/18





 06:59 06:59 06:59


 


Intake Total 1337 1095 


 


Output Total 700 550 


 


Balance 637 545 


 


Weight 90.3 kg 70.9 kg 











General appearance: PRESENT: no acute distress, well-developed, well-nourished


Head exam: PRESENT: atraumatic, normocephalic


Eye exam: PRESENT: conjunctiva pink, EOMI, PERRLA.  ABSENT: scleral icterus


Ear exam: PRESENT: normal external ear exam


Mouth exam: PRESENT: moist, tongue midline


Neck exam: ABSENT: carotid bruit, JVD, lymphadenopathy, thyromegaly


Respiratory exam: PRESENT: clear to auscultation venessa.  ABSENT: rales, rhonchi, 

wheezes


Cardiovascular exam: PRESENT: RRR.  ABSENT: diastolic murmur, rubs, systolic 

murmur


Pulses: PRESENT: normal dorsalis pedis pul


Vascular exam: PRESENT: normal capillary refill


GI/Abdominal exam: PRESENT: normal bowel sounds, soft.  ABSENT: distended, 

guarding, mass, organolmegaly, rebound, tenderness


Rectal exam: PRESENT: deferred


Extremities exam: PRESENT: full ROM.  ABSENT: calf tenderness, clubbing, pedal 

edema


Neurological exam: PRESENT: alert, awake, oriented to person, oriented to place

, oriented to time, oriented to situation, CN II-XII grossly intact.  ABSENT: 

motor sensory deficit


Psychiatric exam: PRESENT: appropriate affect, normal mood.  ABSENT: homicidal 

ideation, suicidal ideation


Skin exam: PRESENT: dry, intact, warm.  ABSENT: cyanosis, rash





Results


Laboratory Results: 


 





 12/01/18 04:46 





 12/02/18 17:00 





 











  12/02/18





  17:00


 


Sodium  143.2


 


Potassium  4.0


 


Chloride  111 H


 


Carbon Dioxide  21 L


 


Anion Gap  11


 


BUN  25 H


 


Creatinine  1.07


 


Est GFR ( Amer)  > 60


 


Est GFR (Non-Af Amer)  > 60


 


Glucose  193 H


 


Calcium  7.6 L


 


Total Bilirubin  0.7


 


AST  213 H


 


ALT  51


 


Alkaline Phosphatase  541 H


 


Total Protein  5.5 L


 


Albumin  2.6 L








 





11/23/18 12:46   Cerebral Spinal Fluid - Csf   Viral Culture - Final





 











  11/22/18 11/22/18 11/23/18





  03:54 03:54 16:05


 


Creatine Kinase  617 H   367 H


 


NT-Pro-B Natriuret Pep   9380 H 














  11/24/18 11/24/18 11/25/18





  08:45 08:45 03:53


 


Creatine Kinase  291 H  


 


NT-Pro-B Natriuret Pep   8430 H  9560 H











Impressions: 


 





Abdomen/Pelvis CT  11/21/18 00:00


IMPRESSION:  Fluid overload with bilateral moderate pleural effusions and small 

amount of ascites.


Bibasilar consolidation atelectasis versus pneumonia


 








Head CT  11/21/18 10:00


IMPRESSION:  No acute intracranial pathology.  No noncontrast CT findings to 

explain symptoms.  MRI may be used to more sensitively evaluate for acute 

diffusion restricting infarction if suspected.


EVIDENCE OF ACUTE STROKE: NO.


 








Head MRI  11/22/18 14:54


IMPRESSION:  Mild involutional changes of aging with no acute intracranial 

imaging findings on this slightly limited study.


EVIDENCE OF ACUTE STROKE: NO.


 








Lumbar Puncture  11/23/18 10:40


IMPRESSION:  Lumbar puncture under fluoroscopy. No immediate complication.


 








Thoracentesis Ultrasound  11/24/18 10:19


IMPRESSION:  SUCCESSFUL THORACENTESIS USING ULTRASOUND GUIDANCE.


 








Chest X-Ray  11/24/18 18:00


IMPRESSION:  Stable pulmonary exam demonstrating small bilateral pleural 

effusions.


 








Guidance Fluoroscopy  11/27/18 00:00


IMPRESSION:  SUCCESSFUL PLACEMENT OF A 5 FR DUAL LUMEN 42 CM PICC IN THE LEFT 

BASILIC VEIN.


 








Interventional Vascular Procedure  11/27/18 00:00


IMPRESSION:  SUCCESSFUL PLACEMENT OF A 5 FR DUAL LUMEN 42 CM PICC IN THE LEFT 

BASILIC VEIN.


 








PICC Line Insertion  11/27/18 00:00


IMPRESSION:  SUCCESSFUL PLACEMENT OF A 5 FR DUAL LUMEN 42 CM PICC IN THE LEFT 

BASILIC VEIN.


 














Assessment & Plan





- Diagnosis


(1) Altered mental status


Qualifiers: 


   Altered mental status type: delirium   Qualified Code(s): R41.0 - 

Disorientation, unspecified   


Is this a current diagnosis for this admission?: Yes   


Plan: 


Improved








(2) Difficulty swallowing liquids


Is this a current diagnosis for this admission?: Yes   


Plan: 


improived








(3) Prostate cancer


Is this a current diagnosis for this admission?: Yes   


Plan: 


Further rx as outpt








(4) Malnutrition


Qualifiers: 


   Malnutrition type: protein-calorie malnutrition   Protein-calorie 

malnutrition severity: moderate   Qualified Code(s): E44.0 - Moderate protein-

calorie malnutrition   


Is this a current diagnosis for this admission?: Yes   


Plan: 


Cont oral nutrition, cont megace








- Time


Time Spent with patient: 35 or more minutes


Disposition: 





d/w pt pt and family extensively over weekend





- Inpatient Certification


Based on my medical assessment, after consideration of the patient's 

comorbidities, presenting symptoms, or acuity I expect that the services needed 

warrant INPATIENT care.: Yes


I certify that my determination is in accordance with my understanding of 

Medicare's requirements for reasonable and necessary INPATIENT services [42 CFR 

412.3e].: Yes


Medical Necessity: Risk of Complication if Not Cared For in Hospital Monday Wednesday Friday dialysis sessions.     Follow up tasks  Resume diet and activity as previously tolerated  You will be getting IV ganciclovir with dialysis. Medication will be at the dialysis center.   Prednisone dose is decreased to 10mg twice daily  Continue taking dapsone 100mg dialy  Can stop taking atovaquone, valtrex and tessalon pearls  Resume all other home medications as prior   Follow ups written above    Discharge Diagnosis  Disseminated zoster   CMV viremia  Seropositive RA  Sjogren's syndrome  SLE  Steroid-induced hyperglycemia/myopathy  Pancytopenia  ESRD on HD Monday Wednesday Friday  Lupus nephritis  HTN  Diarrhea    Lab Results  Recent Results (from the past 48 hour(s))   GLUCOSE, BEDSIDE - POINT OF CARE    Collection Time: 05/15/22  2:34 PM   Result Value Ref Range    GLUCOSE, BEDSIDE - POINT OF CARE 87 70 - 99 mg/dL   Electrolyte Panel    Collection Time: 05/15/22  2:43 PM   Result Value Ref Range    Sodium 140 135 - 145 mmol/L    Potassium 5.7 (H) 3.4 - 5.1 mmol/L    Chloride 113 (H) 98 - 107 mmol/L    Carbon Dioxide 22 21 - 32 mmol/L    Anion Gap 11 10 - 20 mmol/L   GLUCOSE, BEDSIDE - POINT OF CARE    Collection Time: 05/15/22  6:07 PM   Result Value Ref Range    GLUCOSE, BEDSIDE - POINT OF CARE 137 (H) 70 - 99 mg/dL   GLUCOSE, BEDSIDE - POINT OF CARE    Collection Time: 05/15/22  9:05 PM   Result Value Ref Range    GLUCOSE, BEDSIDE - POINT OF CARE 138 (H) 70 - 99 mg/dL   Comprehensive Metabolic Panel    Collection Time: 05/16/22  5:19 AM   Result Value Ref Range    Fasting Status      Sodium 135 135 - 145 mmol/L    Potassium 5.7 (H) 3.4 - 5.1 mmol/L    Chloride 110 (H) 98 - 107 mmol/L    Carbon Dioxide 20 (L) 21 - 32 mmol/L    Anion Gap 11 10 - 20 mmol/L    Glucose 180 (H) 70 - 99 mg/dL    BUN 52 (H) 6 - 20 mg/dL    Creatinine 3.28 (H) 0.51 - 0.95 mg/dL    Glomerular Filtration Rate 17 (L) >=60    BUN/ Creatinine Ratio 16 7 - 25    Calcium 8.5 8.4 - 10.2 mg/dL    Bilirubin, Total 0.2 0.2  - 1.0 mg/dL    GOT/AST 36 <=37 Units/L    GPT/ALT 25 <64 Units/L    Alkaline Phosphatase 113 45 - 117 Units/L    Albumin 1.7 (L) 3.6 - 5.1 g/dL    Protein, Total 5.5 (L) 6.4 - 8.2 g/dL    Globulin 3.8 2.0 - 4.0 g/dL    A/G Ratio 0.4 (L) 1.0 - 2.4   Magnesium    Collection Time: 05/16/22  5:19 AM   Result Value Ref Range    Magnesium 2.2 1.7 - 2.4 mg/dL   Phosphorus    Collection Time: 05/16/22  5:19 AM   Result Value Ref Range    Phosphorus 3.1 2.4 - 4.7 mg/dL   CBC with Automated Differential (performable only)    Collection Time: 05/16/22  5:19 AM   Result Value Ref Range    WBC 8.9 4.2 - 11.0 K/mcL    RBC 2.68 (L) 4.00 - 5.20 mil/mcL    HGB 8.6 (L) 12.0 - 15.5 g/dL    HCT 28.0 (L) 36.0 - 46.5 %    .5 (H) 78.0 - 100.0 fl    MCH 32.1 26.0 - 34.0 pg    MCHC 30.7 (L) 32.0 - 36.5 g/dL    RDW-CV 16.5 (H) 11.0 - 15.0 %    RDW-SD 63.5 (H) 39.0 - 50.0 fL     140 - 450 K/mcL    NRBC 0 <=0 /100 WBC   Manual Differential    Collection Time: 05/16/22  5:19 AM   Result Value Ref Range    Neutrophil, Percent 86 %    Lymphocytes, Percent 5 %    Mono, Percent 4 %    Eosinophils, Percent 1 %    Basophils, Percent 0 %    Bands, Percent 2 0 - 10 %    Metamyelocytes, Percent  1 0 - 2 %    Myelocytes, Percent 1 (H) <=0 %    Absolute Neutrophil 7.8 (H) 1.8 - 7.7 K/mcL    Absolute Lymphocytes 0.4 (L) 1.0 - 4.8 K/mcL    Absolute Monocytes 0.4 0.3 - 0.9 K/mcL    Absolute Eosinophils 0.1 0.0 - 0.5 K/mcL    Absolute Basophils 0.0 0.0 - 0.3 K/mcL    WBC Morphology Normal Normal    Macrocytosis Few     Ovalocytes Few    GLUCOSE, BEDSIDE - POINT OF CARE    Collection Time: 05/16/22  2:56 PM   Result Value Ref Range    GLUCOSE, BEDSIDE - POINT OF CARE 158 (H) 70 - 99 mg/dL   GLUCOSE, BEDSIDE - POINT OF CARE    Collection Time: 05/16/22  5:31 PM   Result Value Ref Range    GLUCOSE, BEDSIDE - POINT OF CARE 149 (H) 70 - 99 mg/dL   GLUCOSE, BEDSIDE - POINT OF CARE    Collection Time: 05/16/22 10:00 PM   Result Value Ref Range     GLUCOSE, BEDSIDE - POINT OF CARE 153 (H) 70 - 99 mg/dL   Comprehensive Metabolic Panel    Collection Time: 05/17/22  5:08 AM   Result Value Ref Range    Fasting Status      Sodium 137 135 - 145 mmol/L    Potassium 4.2 3.4 - 5.1 mmol/L    Chloride 106 98 - 107 mmol/L    Carbon Dioxide 25 21 - 32 mmol/L    Anion Gap 10 10 - 20 mmol/L    Glucose 80 70 - 99 mg/dL    BUN 25 (H) 6 - 20 mg/dL    Creatinine 2.21 (H) 0.51 - 0.95 mg/dL    Glomerular Filtration Rate 27 (L) >=60    BUN/ Creatinine Ratio 11 7 - 25    Calcium 7.9 (L) 8.4 - 10.2 mg/dL    Bilirubin, Total 0.2 0.2 - 1.0 mg/dL    GOT/AST 28 <=37 Units/L    GPT/ALT 23 <64 Units/L    Alkaline Phosphatase 103 45 - 117 Units/L    Albumin 1.6 (L) 3.6 - 5.1 g/dL    Protein, Total 5.1 (L) 6.4 - 8.2 g/dL    Globulin 3.5 2.0 - 4.0 g/dL    A/G Ratio 0.5 (L) 1.0 - 2.4   CBC with Automated Differential (performable only)    Collection Time: 05/17/22  5:08 AM   Result Value Ref Range    WBC 8.8 4.2 - 11.0 K/mcL    RBC 2.50 (L) 4.00 - 5.20 mil/mcL    HGB 8.2 (L) 12.0 - 15.5 g/dL    HCT 25.0 (L) 36.0 - 46.5 %    .0 78.0 - 100.0 fl    MCH 32.8 26.0 - 34.0 pg    MCHC 32.8 32.0 - 36.5 g/dL    RDW-CV 15.9 (H) 11.0 - 15.0 %    RDW-SD 58.5 (H) 39.0 - 50.0 fL     140 - 450 K/mcL    NRBC 0 <=0 /100 WBC   Phosphorus    Collection Time: 05/17/22  5:08 AM   Result Value Ref Range    Phosphorus 2.0 (L) 2.4 - 4.7 mg/dL   Magnesium    Collection Time: 05/17/22  5:08 AM   Result Value Ref Range    Magnesium 1.5 (L) 1.7 - 2.4 mg/dL   Manual Differential    Collection Time: 05/17/22  5:08 AM   Result Value Ref Range    Neutrophil, Percent 81 %    Lymphocytes, Percent 9 %    Mono, Percent 8 %    Eosinophils, Percent 0 %    Basophils, Percent 0 %    Bands, Percent 2 0 - 10 %    Absolute Neutrophil 7.3 1.8 - 7.7 K/mcL    Absolute Lymphocytes 0.8 (L) 1.0 - 4.8 K/mcL    Absolute Monocytes 0.7 0.3 - 0.9 K/mcL    Absolute Eosinophils 0.0 0.0 - 0.5 K/mcL    Absolute Basophils 0.0 0.0 - 0.3  K/mcL    WBC Morphology Normal Normal    Ovalocytes Few     Platelet Morphology Normal Normal   GLUCOSE, BEDSIDE - POINT OF CARE    Collection Time: 05/17/22  7:50 AM   Result Value Ref Range    GLUCOSE, BEDSIDE - POINT OF CARE 55 (L) 70 - 99 mg/dL   GLUCOSE, BEDSIDE - POINT OF CARE    Collection Time: 05/17/22  9:01 AM   Result Value Ref Range    GLUCOSE, BEDSIDE - POINT OF CARE 129 (H) 70 - 99 mg/dL   GLUCOSE, BEDSIDE - POINT OF CARE    Collection Time: 05/17/22 12:48 PM   Result Value Ref Range    GLUCOSE, BEDSIDE - POINT OF CARE 99 70 - 99 mg/dL         Imaging  XR CHEST PA OR AP 1 VIEW   Final Result       Trace bibasilar atelectasis.  No acute cardiac pulmonary disease.         Electronically Signed by: VERONICA NAZARIO M.D.    Signed on: 5/14/2022 8:52 AM          XR CHEST PA OR AP 1 VIEW   Final Result      1.   No radiographically evident acute cardiopulmonary process.         Electronically Signed by: KARLENE FARNSWORTH M.D.    Signed on: 5/11/2022 5:40 PM          XR CHEST PA OR AP 1 VIEW   Final Result      1.  Bilateral interstitial opacities without confluent airspace   opacification.      Electronically Signed by: DAISY BARR M.D.    Signed on: 5/10/2022 7:38 AM                Pathology  * Cannot find OR log *    Test Results Pending At Discharge      Pertinent Physical Exam At Time of Discharge  Physical Exam    Discharge Medications     Summary of your Discharge Medications      Take these Medications      Details   Acetaminophen 500 MG Cap   Take 1,000 mg by mouth every 4 hours as needed for Pain.     albuterol 108 (90 Base) MCG/ACT inhaler   INHALE 1-2 PUFFS EVERY 4 HOURS AS NEEDED FOR SHORTNESS OF BREATH     amLODIPine 10 MG tablet  Commonly known as: NORVASC   Take 1 tablet by mouth daily.     atovaquone 750 MG/5ML suspension  Commonly known as: MEPRON   Take 10 mLs by mouth daily (with breakfast). Do not start before April 27, 2022.     B complex-vitamin C-folic acid 0.8 MG Tab   Take 1  tablet by mouth daily. Do not start before April 27, 2022.     bumetanide 2 MG tablet  Commonly known as: BUMEX   Take 2 mg by mouth 4 days a week.     calcitRIOL 0.25 MCG capsule  Commonly known as: ROCALTROL  Start taking on: May 18, 2022   Take 1 capsule by mouth 3 days a week. Monday, Wednesday, Friday     CARBOXYMethylcellulose PF 0.5 % ophthalmic solution  Commonly known as: REFRESH PLUS   Place 1 drop into both eyes 3 times daily.     carvedilol 12.5 MG tablet  Commonly known as: COREG   Take 1 tablet by mouth every 12 hours.     dapsone 100 MG tablet  Start taking on: May 18, 2022   Take 1 tablet by mouth daily. Do not start before May 18, 2022.     erythromycin ophthalmic ointment  Commonly known as: ILOTYCIN   Place into left eye 4 times daily.     famotidine 20 MG tablet  Commonly known as: PEPCID   TAKE 1 TABLET BY MOUTH DAILY  Comment: **Patient requests 90 days supply**     hydrALAZINE 25 MG tablet  Commonly known as: APRESOLINE   Take 1 tablet by mouth every 8 hours.  Comment: **Patient requests 90 days supply**     hydroxychloroquine 200 MG tablet  Commonly known as: PLAQUENIL   Take 1.5 tablets by mouth daily.     loperamide 2 MG capsule  Commonly known as: IMODIUM   Take 1 capsule by mouth as needed for Diarrhea.     predniSONE 10 MG tablet  Commonly known as: DELTASONE   Take 1 tablet by mouth 2 times daily (with meals).     vitamin D3 1.25 mg (50,000 units) capsule  Commonly known as: CHOLECALCIFEROL   Take 1 capsule by mouth 1 day a week.  Comment: 1.25 mg = 50,000 units            Smoking Cessation  Counseling given: Not Answered      Primary Care Physician  MD Bassem Garcia Salam DO   Internal Medicine, PGY-1    5/17/2022 1:12 PM
